# Patient Record
Sex: FEMALE | Race: WHITE | NOT HISPANIC OR LATINO | Employment: FULL TIME | URBAN - METROPOLITAN AREA
[De-identification: names, ages, dates, MRNs, and addresses within clinical notes are randomized per-mention and may not be internally consistent; named-entity substitution may affect disease eponyms.]

---

## 2018-02-03 ENCOUNTER — HOSPITAL ENCOUNTER (EMERGENCY)
Facility: HOSPITAL | Age: 63
Discharge: HOME | End: 2018-02-03
Attending: EMERGENCY MEDICINE

## 2018-02-03 VITALS
BODY MASS INDEX: 25.69 KG/M2 | DIASTOLIC BLOOD PRESSURE: 58 MMHG | HEART RATE: 79 BPM | RESPIRATION RATE: 16 BRPM | OXYGEN SATURATION: 95 % | SYSTOLIC BLOOD PRESSURE: 124 MMHG | WEIGHT: 145 LBS | TEMPERATURE: 100 F

## 2018-02-03 DIAGNOSIS — N12 PYELONEPHRITIS: Primary | ICD-10-CM

## 2018-02-03 LAB
ALBUMIN SERPL BCP-MCNC: 3.5 G/DL
ALP SERPL-CCNC: 74 U/L
ALT SERPL W/O P-5'-P-CCNC: 22 U/L
ANION GAP SERPL CALC-SCNC: 10 MMOL/L
AST SERPL-CCNC: 12 U/L
BACTERIA #/AREA URNS HPF: ABNORMAL /HPF
BASOPHILS # BLD AUTO: 0 K/UL
BASOPHILS NFR BLD: 0.4 %
BILIRUB SERPL-MCNC: 0.9 MG/DL
BILIRUB UR QL STRIP: NEGATIVE
BUN SERPL-MCNC: 12 MG/DL
CALCIUM SERPL-MCNC: 9.7 MG/DL
CHLORIDE SERPL-SCNC: 108 MMOL/L
CLARITY UR: ABNORMAL
CO2 SERPL-SCNC: 20 MMOL/L
COLOR UR: YELLOW
CREAT SERPL-MCNC: 0.7 MG/DL
DIFFERENTIAL METHOD: ABNORMAL
EOSINOPHIL # BLD AUTO: 0 K/UL
EOSINOPHIL NFR BLD: 0 %
ERYTHROCYTE [DISTWIDTH] IN BLOOD BY AUTOMATED COUNT: 13.4 %
EST. GFR  (AFRICAN AMERICAN): >60 ML/MIN/1.73 M^2
EST. GFR  (NON AFRICAN AMERICAN): >60 ML/MIN/1.73 M^2
GLUCOSE SERPL-MCNC: 120 MG/DL
GLUCOSE UR QL STRIP: NEGATIVE
HCT VFR BLD AUTO: 42.5 %
HGB BLD-MCNC: 14.3 G/DL
HGB UR QL STRIP: ABNORMAL
HYALINE CASTS #/AREA URNS LPF: 0 /LPF
KETONES UR QL STRIP: NEGATIVE
LEUKOCYTE ESTERASE UR QL STRIP: ABNORMAL
LIPASE SERPL-CCNC: 7 U/L
LYMPHOCYTES # BLD AUTO: 1.1 K/UL
LYMPHOCYTES NFR BLD: 9.2 %
MCH RBC QN AUTO: 28.1 PG
MCHC RBC AUTO-ENTMCNC: 33.5 G/DL
MCV RBC AUTO: 84 FL
MICROSCOPIC COMMENT: ABNORMAL
MONOCYTES # BLD AUTO: 0.8 K/UL
MONOCYTES NFR BLD: 6.2 %
NEUTROPHILS # BLD AUTO: 10.4 K/UL
NEUTROPHILS NFR BLD: 84.2 %
NITRITE UR QL STRIP: POSITIVE
PH UR STRIP: 7 [PH] (ref 5–8)
PLATELET # BLD AUTO: 276 K/UL
PMV BLD AUTO: 7.7 FL
POTASSIUM SERPL-SCNC: 3.7 MMOL/L
PROT SERPL-MCNC: 7.5 G/DL
PROT UR QL STRIP: ABNORMAL
RBC # BLD AUTO: 5.07 M/UL
RBC #/AREA URNS HPF: 20 /HPF (ref 0–4)
SODIUM SERPL-SCNC: 138 MMOL/L
SP GR UR STRIP: 1.01 (ref 1–1.03)
SQUAMOUS #/AREA URNS HPF: 0 /HPF
URN SPEC COLLECT METH UR: ABNORMAL
UROBILINOGEN UR STRIP-ACNC: NEGATIVE EU/DL
WBC # BLD AUTO: 12.3 K/UL
WBC #/AREA URNS HPF: 60 /HPF (ref 0–5)

## 2018-02-03 PROCEDURE — 81000 URINALYSIS NONAUTO W/SCOPE: CPT

## 2018-02-03 PROCEDURE — 96374 THER/PROPH/DIAG INJ IV PUSH: CPT

## 2018-02-03 PROCEDURE — 96375 TX/PRO/DX INJ NEW DRUG ADDON: CPT

## 2018-02-03 PROCEDURE — 85025 COMPLETE CBC W/AUTO DIFF WBC: CPT

## 2018-02-03 PROCEDURE — 87088 URINE BACTERIA CULTURE: CPT

## 2018-02-03 PROCEDURE — 63600175 PHARM REV CODE 636 W HCPCS: Performed by: EMERGENCY MEDICINE

## 2018-02-03 PROCEDURE — 25000003 PHARM REV CODE 250: Performed by: NURSE PRACTITIONER

## 2018-02-03 PROCEDURE — 99284 EMERGENCY DEPT VISIT MOD MDM: CPT | Mod: 25

## 2018-02-03 PROCEDURE — 87086 URINE CULTURE/COLONY COUNT: CPT

## 2018-02-03 PROCEDURE — 36415 COLL VENOUS BLD VENIPUNCTURE: CPT

## 2018-02-03 PROCEDURE — 87077 CULTURE AEROBIC IDENTIFY: CPT

## 2018-02-03 PROCEDURE — 63600175 PHARM REV CODE 636 W HCPCS: Performed by: NURSE PRACTITIONER

## 2018-02-03 PROCEDURE — 87186 SC STD MICRODIL/AGAR DIL: CPT

## 2018-02-03 PROCEDURE — 80053 COMPREHEN METABOLIC PANEL: CPT

## 2018-02-03 PROCEDURE — 96361 HYDRATE IV INFUSION ADD-ON: CPT

## 2018-02-03 PROCEDURE — 83690 ASSAY OF LIPASE: CPT

## 2018-02-03 RX ORDER — CIPROFLOXACIN 500 MG/1
500 TABLET ORAL 2 TIMES DAILY
Qty: 20 TABLET | Refills: 0 | Status: SHIPPED | OUTPATIENT
Start: 2018-02-03 | End: 2018-02-03

## 2018-02-03 RX ORDER — CIPROFLOXACIN 500 MG/1
500 TABLET ORAL
Status: COMPLETED | OUTPATIENT
Start: 2018-02-03 | End: 2018-02-03

## 2018-02-03 RX ORDER — ONDANSETRON 4 MG/1
4 TABLET, FILM COATED ORAL EVERY 6 HOURS
Qty: 12 TABLET | Refills: 0 | Status: SHIPPED | OUTPATIENT
Start: 2018-02-03 | End: 2018-02-03

## 2018-02-03 RX ORDER — MORPHINE SULFATE 2 MG/ML
6 INJECTION, SOLUTION INTRAMUSCULAR; INTRAVENOUS
Status: DISCONTINUED | OUTPATIENT
Start: 2018-02-03 | End: 2018-02-03 | Stop reason: SDUPTHER

## 2018-02-03 RX ORDER — MORPHINE SULFATE 10 MG/ML
6 INJECTION INTRAMUSCULAR; INTRAVENOUS; SUBCUTANEOUS
Status: COMPLETED | OUTPATIENT
Start: 2018-02-03 | End: 2018-02-03

## 2018-02-03 RX ORDER — KETOROLAC TROMETHAMINE 30 MG/ML
15 INJECTION, SOLUTION INTRAMUSCULAR; INTRAVENOUS
Status: DISCONTINUED | OUTPATIENT
Start: 2018-02-03 | End: 2018-02-03 | Stop reason: HOSPADM

## 2018-02-03 RX ORDER — HYDROCODONE BITARTRATE AND ACETAMINOPHEN 5; 325 MG/1; MG/1
1 TABLET ORAL EVERY 8 HOURS PRN
Qty: 5 TABLET | Refills: 0 | OUTPATIENT
Start: 2018-02-03 | End: 2019-01-24

## 2018-02-03 RX ORDER — CIPROFLOXACIN 500 MG/1
500 TABLET ORAL 2 TIMES DAILY
Qty: 20 TABLET | Refills: 0 | Status: SHIPPED | OUTPATIENT
Start: 2018-02-03 | End: 2018-02-13

## 2018-02-03 RX ORDER — MORPHINE SULFATE 10 MG/ML
6 INJECTION, SOLUTION INTRAMUSCULAR; INTRAVENOUS
Status: DISCONTINUED | OUTPATIENT
Start: 2018-02-03 | End: 2018-02-03

## 2018-02-03 RX ORDER — HYDROCODONE BITARTRATE AND ACETAMINOPHEN 5; 325 MG/1; MG/1
1 TABLET ORAL EVERY 4 HOURS PRN
Qty: 5 TABLET | Refills: 0 | Status: SHIPPED | OUTPATIENT
Start: 2018-02-03 | End: 2018-02-03

## 2018-02-03 RX ORDER — ONDANSETRON 2 MG/ML
4 INJECTION INTRAMUSCULAR; INTRAVENOUS
Status: COMPLETED | OUTPATIENT
Start: 2018-02-03 | End: 2018-02-03

## 2018-02-03 RX ORDER — ONDANSETRON 4 MG/1
4 TABLET, FILM COATED ORAL EVERY 6 HOURS
Qty: 12 TABLET | Refills: 0 | Status: ON HOLD | OUTPATIENT
Start: 2018-02-03 | End: 2020-11-08

## 2018-02-03 RX ADMIN — MORPHINE SULFATE 6 MG: 10 INJECTION INTRAVENOUS at 12:02

## 2018-02-03 RX ADMIN — SODIUM CHLORIDE 1000 ML: 0.9 INJECTION, SOLUTION INTRAVENOUS at 10:02

## 2018-02-03 RX ADMIN — ONDANSETRON 4 MG: 2 INJECTION INTRAMUSCULAR; INTRAVENOUS at 10:02

## 2018-02-03 RX ADMIN — CIPROFLOXACIN HYDROCHLORIDE 500 MG: 500 TABLET, FILM COATED ORAL at 12:02

## 2018-02-03 NOTE — ED PROVIDER NOTES
Encounter Date: 2/3/2018    SCRIBE #1 NOTE: IHeranndez, am scribing for, and in the presence of, Jailene Conley NP-C.       History     Chief Complaint   Patient presents with    Back Pain     and vomiting x 1 day. thinks she may have kidney stones       02/03/2018 10:39 AM     Chief complaint: bilateral flank pain      Alma Rosa Helm is a 63 y.o. female with a PMHx of COPD, kidney stones, Bipolar, and CHF who presents to the ED c/o bilateral flank pain. She states she began vomiting this morning and reports a low grade fever of 100. She denies having any chest pain, SOB or diarrhea. She does report increased urine frequency and dark colored urine. She has a PSHx of hysterectomy, tubal ligation, cystoscopy, and lithotripsy. She states in 2006 she had bilateral kidney stones and stents were placed. Allergens include Imitrex, pennicillin, and sulfa.      The history is provided by the patient.     Review of patient's allergies indicates:   Allergen Reactions    Imitrex [sumatriptan succinate]     Penicillins     Sulfa (sulfonamide antibiotics)      Past Medical History:   Diagnosis Date    Anxiety     Bipolar disorder     COPD, mild     Kidney stone      Past Surgical History:   Procedure Laterality Date    BACK SURGERY      CYSTOSCOPY      HYSTERECTOMY      LITHOTRIPSY      TUBAL LIGATION       History reviewed. No pertinent family history.  Social History   Substance Use Topics    Smoking status: Current Every Day Smoker     Packs/day: 0.75     Years: 20.00    Smokeless tobacco: Never Used    Alcohol use Yes      Comment: occasionally approx once a month     Review of Systems   Constitutional: Positive for fever (low grade).   HENT: Negative for sore throat.    Respiratory: Negative for shortness of breath.    Cardiovascular: Negative for chest pain.   Gastrointestinal: Positive for vomiting. Negative for diarrhea and nausea.   Genitourinary: Positive for flank pain (bilateral) and frequency.  Negative for dysuria.        Dark colored urine   Musculoskeletal: Negative for back pain.   Skin: Negative for rash.   Neurological: Negative for weakness.   Hematological: Does not bruise/bleed easily.       Physical Exam     Initial Vitals [02/03/18 1025]   BP Pulse Resp Temp SpO2   (!) 124/58 79 16 100.2 °F (37.9 °C) 95 %      MAP       80         Physical Exam    Constitutional: Vital signs are normal. She appears well-developed and well-nourished.   HENT:   Head: Normocephalic and atraumatic.   Mouth/Throat: Abnormal dentition (absent).   Eyes: Pupils are equal, round, and reactive to light.   Neck: Neck supple.   Cardiovascular: Normal rate, regular rhythm and intact distal pulses.   Murmur heard.   Systolic murmur is present with a grade of 3/6   Pulmonary/Chest:   Decreased breath sounds   Abdominal: Soft. Normal appearance and bowel sounds are normal. She exhibits no distension. There is tenderness (mild) in the right upper quadrant and epigastric area. There is CVA tenderness (bilateral, tenderness). There is no rebound and no guarding.   Neurological: She is alert and oriented to person, place, and time. She has normal strength.   Skin: Skin is warm, dry and intact.   Psychiatric: She has a normal mood and affect. Her speech is normal and behavior is normal.         ED Course   Procedures  Labs Reviewed   URINALYSIS             Medical Decision Making:   History:   Old Medical Records: I decided to obtain old medical records.  Differential Diagnosis:   Nephrolithiasis, obstructive uropathy, pyelonephritis, lumbar stain, cholecystis, pancreatitis.   Clinical Tests:   Lab Tests: Ordered and Reviewed  Radiological Study: Ordered and Reviewed       APC / Resident Notes:   Patient is a 63 y.o. female who presents to the ED 02/03/2018 who underwent emergent evaluation for bi flank pain. Patient has history of kidney stones and is certain it is her stones again. Patient has mild left greater than right CVA  tenderness without abdominal tenderness, distention. She is not tachycardic and is afebrile without leukocytosis; she does not meet sepsis criteria; + UTI; see labs; CT abdomen and pelvis without obstructive uropathy; CT results discussed with patient in detail. Patient has SOB secondary to PCN; will not give ROCEPHIN in ED but will start patient on PO ciprofloxacin and send urine culture; will cover patient for pyelonephritis; patient tolerating PO in ED. Pt does not appear septic or toxic. Patient's vital signs normal. Given norco for pain management.   Based on my clinical evaluation, I do not appreciate any immediate, emergent, or life threatening condition or etiology that warrants additional workup today and feel that the patient can be discharged with close follow up care. Case discussed with Dr. Kumar who is agreeable to plan of care. Follow up and return precautions discussed; patient verbalized understanding and is agreeable to plan of care. Patient discharged home in stable condition.               Scribe Attestation:   Scribe #1: I performed the above scribed service and the documentation accurately describes the services I performed. I attest to the accuracy of the note.    Attending Attestation:           Physician Attestation for Scribe:  Physician Attestation Statement for Scribe #1: I, Jailene Conley, reviewed documentation, as scribed by in my presence, and it is both accurate and complete.     Comments: I, LG Payne, personally performed the services described in this documentation. All medical record entries made by the scribe were at my direction and in my presence.  I have reviewed the chart and agree that the record reflects my personal performance and is accurate and complete. LG Payne.  3:08 PM 02/03/2018 e            ED Course      Clinical Impression:   The encounter diagnosis was Pyelonephritis.    Disposition:   Disposition: Discharged  Condition:  Stable                        Jailene Conley, RICHY  02/03/18 1507

## 2018-02-05 LAB — BACTERIA UR CULT: NORMAL

## 2019-01-24 ENCOUNTER — HOSPITAL ENCOUNTER (EMERGENCY)
Facility: HOSPITAL | Age: 64
Discharge: HOME | End: 2019-01-24
Attending: EMERGENCY MEDICINE
Payer: COMMERCIAL

## 2019-01-24 VITALS
WEIGHT: 160 LBS | RESPIRATION RATE: 18 BRPM | HEIGHT: 64 IN | TEMPERATURE: 98 F | SYSTOLIC BLOOD PRESSURE: 129 MMHG | DIASTOLIC BLOOD PRESSURE: 60 MMHG | OXYGEN SATURATION: 96 % | HEART RATE: 63 BPM | BODY MASS INDEX: 27.31 KG/M2

## 2019-01-24 DIAGNOSIS — N39.0 URINARY TRACT INFECTION WITHOUT HEMATURIA, SITE UNSPECIFIED: ICD-10-CM

## 2019-01-24 DIAGNOSIS — N23 RENAL COLIC ON RIGHT SIDE: ICD-10-CM

## 2019-01-24 DIAGNOSIS — N20.0 RENAL STONE: Primary | ICD-10-CM

## 2019-01-24 LAB
ALBUMIN SERPL BCP-MCNC: 3.9 G/DL
ALP SERPL-CCNC: 96 U/L
ALT SERPL W/O P-5'-P-CCNC: 14 U/L
ANION GAP SERPL CALC-SCNC: 10 MMOL/L
AST SERPL-CCNC: 15 U/L
BACTERIA #/AREA URNS HPF: ABNORMAL /HPF
BASOPHILS # BLD AUTO: 0.1 K/UL
BASOPHILS NFR BLD: 0.9 %
BILIRUB SERPL-MCNC: 0.3 MG/DL
BILIRUB UR QL STRIP: NEGATIVE
BUN SERPL-MCNC: 15 MG/DL
CALCIUM SERPL-MCNC: 10 MG/DL
CHLORIDE SERPL-SCNC: 108 MMOL/L
CLARITY UR: ABNORMAL
CO2 SERPL-SCNC: 22 MMOL/L
COLOR UR: YELLOW
CREAT SERPL-MCNC: 0.8 MG/DL
DIFFERENTIAL METHOD: ABNORMAL
EOSINOPHIL # BLD AUTO: 0.1 K/UL
EOSINOPHIL NFR BLD: 0.8 %
ERYTHROCYTE [DISTWIDTH] IN BLOOD BY AUTOMATED COUNT: 14.3 %
EST. GFR  (AFRICAN AMERICAN): >60 ML/MIN/1.73 M^2
EST. GFR  (NON AFRICAN AMERICAN): >60 ML/MIN/1.73 M^2
GLUCOSE SERPL-MCNC: 86 MG/DL
GLUCOSE UR QL STRIP: NEGATIVE
HCT VFR BLD AUTO: 43 %
HGB BLD-MCNC: 14.6 G/DL
HGB UR QL STRIP: ABNORMAL
KETONES UR QL STRIP: NEGATIVE
LEUKOCYTE ESTERASE UR QL STRIP: ABNORMAL
LYMPHOCYTES # BLD AUTO: 2.6 K/UL
LYMPHOCYTES NFR BLD: 26.8 %
MCH RBC QN AUTO: 28 PG
MCHC RBC AUTO-ENTMCNC: 33.9 G/DL
MCV RBC AUTO: 82 FL
MICROSCOPIC COMMENT: ABNORMAL
MONOCYTES # BLD AUTO: 0.5 K/UL
MONOCYTES NFR BLD: 5.5 %
NEUTROPHILS # BLD AUTO: 6.3 K/UL
NEUTROPHILS NFR BLD: 66 %
NITRITE UR QL STRIP: POSITIVE
PH UR STRIP: 6 [PH] (ref 5–8)
PLATELET # BLD AUTO: 370 K/UL
PMV BLD AUTO: 7.7 FL
POTASSIUM SERPL-SCNC: 4.2 MMOL/L
PROT SERPL-MCNC: 7.8 G/DL
PROT UR QL STRIP: NEGATIVE
RBC # BLD AUTO: 5.21 M/UL
RBC #/AREA URNS HPF: 2 /HPF (ref 0–4)
SODIUM SERPL-SCNC: 140 MMOL/L
SP GR UR STRIP: 1.02 (ref 1–1.03)
URN SPEC COLLECT METH UR: ABNORMAL
UROBILINOGEN UR STRIP-ACNC: NEGATIVE EU/DL
WBC # BLD AUTO: 9.6 K/UL
WBC #/AREA URNS HPF: 60 /HPF (ref 0–5)

## 2019-01-24 PROCEDURE — 85025 COMPLETE CBC W/AUTO DIFF WBC: CPT

## 2019-01-24 PROCEDURE — 36415 COLL VENOUS BLD VENIPUNCTURE: CPT

## 2019-01-24 PROCEDURE — 81000 URINALYSIS NONAUTO W/SCOPE: CPT

## 2019-01-24 PROCEDURE — 99284 EMERGENCY DEPT VISIT MOD MDM: CPT | Mod: 25

## 2019-01-24 PROCEDURE — 87088 URINE BACTERIA CULTURE: CPT

## 2019-01-24 PROCEDURE — 87186 SC STD MICRODIL/AGAR DIL: CPT

## 2019-01-24 PROCEDURE — 96365 THER/PROPH/DIAG IV INF INIT: CPT

## 2019-01-24 PROCEDURE — 80053 COMPREHEN METABOLIC PANEL: CPT

## 2019-01-24 PROCEDURE — 96375 TX/PRO/DX INJ NEW DRUG ADDON: CPT

## 2019-01-24 PROCEDURE — 87077 CULTURE AEROBIC IDENTIFY: CPT

## 2019-01-24 PROCEDURE — 25000003 PHARM REV CODE 250: Performed by: PHYSICIAN ASSISTANT

## 2019-01-24 PROCEDURE — 63600175 PHARM REV CODE 636 W HCPCS: Performed by: PHYSICIAN ASSISTANT

## 2019-01-24 PROCEDURE — 87086 URINE CULTURE/COLONY COUNT: CPT

## 2019-01-24 RX ORDER — HYDROCODONE BITARTRATE AND ACETAMINOPHEN 5; 325 MG/1; MG/1
1 TABLET ORAL 4 TIMES DAILY PRN
Qty: 12 TABLET | Refills: 0 | Status: ON HOLD | OUTPATIENT
Start: 2019-01-24 | End: 2020-11-08

## 2019-01-24 RX ORDER — TAMSULOSIN HYDROCHLORIDE 0.4 MG/1
0.4 CAPSULE ORAL
Status: COMPLETED | OUTPATIENT
Start: 2019-01-24 | End: 2019-01-24

## 2019-01-24 RX ORDER — CEPHALEXIN 500 MG/1
500 CAPSULE ORAL 4 TIMES DAILY
Qty: 28 CAPSULE | Refills: 0 | Status: SHIPPED | OUTPATIENT
Start: 2019-01-24 | End: 2019-01-31

## 2019-01-24 RX ORDER — TAMSULOSIN HYDROCHLORIDE 0.4 MG/1
0.4 CAPSULE ORAL DAILY
Qty: 10 CAPSULE | Refills: 0 | Status: ON HOLD | OUTPATIENT
Start: 2019-01-24 | End: 2020-11-08

## 2019-01-24 RX ORDER — MORPHINE SULFATE 4 MG/ML
4 INJECTION, SOLUTION INTRAMUSCULAR; INTRAVENOUS
Status: COMPLETED | OUTPATIENT
Start: 2019-01-24 | End: 2019-01-24

## 2019-01-24 RX ADMIN — TAMSULOSIN HYDROCHLORIDE 0.4 MG: 0.4 CAPSULE ORAL at 11:01

## 2019-01-24 RX ADMIN — CEFTRIAXONE 1 G: 1 INJECTION, SOLUTION INTRAVENOUS at 09:01

## 2019-01-24 RX ADMIN — MORPHINE SULFATE 4 MG: 4 INJECTION, SOLUTION INTRAMUSCULAR; INTRAVENOUS at 09:01

## 2019-01-24 RX ADMIN — SODIUM CHLORIDE 1000 ML: 0.9 INJECTION, SOLUTION INTRAVENOUS at 09:01

## 2019-01-24 NOTE — ED NOTES
Pt awake alert in no acute distress, pt reports rt flank pain since yesterday with nausea, denies emesis or diarrhea, denies chest pain or sob, pt reports hx of kidney stones, family at bedside

## 2019-01-25 NOTE — ED PROVIDER NOTES
Encounter Date: 1/24/2019       History     Chief Complaint   Patient presents with    Flank Pain     C/o right flank pain since yesterday. +nausea; denies urinary sx. Hx of kidney stones     Alma Rosa Helm is a 64 y.o. Female presenting for evaluation of right sided flank and abdominal pain, persisting since yesterday.  She is concerned that she may have another kidney stone, since she has had those in the past.  No fever, no chills.  No nausea, vomiting or diarrhea.  She has noticed no obvious blood in her urine.  She is taking over-the-counter medication with minimal relief.  She has seen Dr. Lauren in the past.      The history is provided by the patient.     Review of patient's allergies indicates:   Allergen Reactions    Imitrex [sumatriptan succinate]     Penicillins     Sulfa (sulfonamide antibiotics)     Toradol [ketorolac] Swelling     Past Medical History:   Diagnosis Date    Anxiety     Bipolar disorder     COPD, mild     Kidney stone      Past Surgical History:   Procedure Laterality Date    BACK SURGERY      CYSTOSCOPY      CYSTOSCOPY WITH STENT REMOVAL Left 1/26/2016    [facility]    CYSTOSCOPY/ RETROGRADE PYELOGRAM/ STENT PLACEMENT/STENT EXCHANGE Left 12/15/2015    [facility]    HYSTERECTOMY      LITHOTRIPSY      LITHOTRIPSY-EXTRACORPOREAL SHOCK WAVE Left 1/26/2016    [facility]    PYELOGRAM-RETROGRADE Right 12/15/2015    [facility]    TUBAL LIGATION       History reviewed. No pertinent family history.  Social History     Tobacco Use    Smoking status: Current Every Day Smoker     Packs/day: 0.75     Years: 20.00     Pack years: 15.00    Smokeless tobacco: Never Used   Substance Use Topics    Alcohol use: Yes     Comment: occasionally approx once a month    Drug use: No     Review of Systems   Constitutional: Negative for chills and fever.   HENT:  Negative for facial swelling and trouble swallowing.    Eyes: Negative for discharge.   Respiratory: Negative for cough, chest tightness, shortness of breath and wheezing.    Cardiovascular: Negative for chest pain and palpitations.   Gastrointestinal: Positive for abdominal pain. Negative for diarrhea, nausea and vomiting.   Genitourinary: Positive for flank pain. Negative for dysuria and hematuria.   Musculoskeletal: Negative for arthralgias, back pain, joint swelling, myalgias, neck pain and neck stiffness.   Skin: Negative for color change, pallor, rash and wound.   Neurological: Negative for dizziness, syncope, weakness, light-headedness, numbness and headaches.   Hematological: Does not bruise/bleed easily.   Psychiatric/Behavioral: The patient is not nervous/anxious.        Physical Exam     Initial Vitals [01/24/19 0802]   BP Pulse Resp Temp SpO2   (!) 114/56 72 18 97.8 °F (36.6 °C) 97 %      MAP       --         Physical Exam    Nursing note and vitals reviewed.  Constitutional: She appears well-developed and well-nourished. She is not diaphoretic. No distress.   HENT:   Head: Normocephalic and atraumatic.   Mouth/Throat: Oropharynx is clear and moist.   Eyes: Conjunctivae are normal.   Neck: Normal range of motion. Neck supple.   Cardiovascular: Normal rate, regular rhythm, normal heart sounds and intact distal pulses. Exam reveals no gallop and no friction rub.    No murmur heard.  Pulmonary/Chest: Breath sounds normal. No respiratory distress. She has no wheezes. She has no rhonchi. She has no rales.   Abdominal: Soft. She exhibits no distension and no mass. There is tenderness.   Mild TTP noted to right sided abdomen.  No CVA tenderness noted.    Musculoskeletal: Normal range of motion. She exhibits no edema or tenderness.   Neurological: She is alert and oriented to person, place, and time. She has normal strength. No sensory deficit.   Skin: Skin is warm and dry. No rash and no abscess noted. No  erythema.   Psychiatric: She has a normal mood and affect.         ED Course   Procedures  Labs Reviewed   URINALYSIS, REFLEX TO URINE CULTURE - Abnormal; Notable for the following components:       Result Value    Appearance, UA Hazy (*)     Occult Blood UA Trace (*)     Nitrite, UA Positive (*)     Leukocytes, UA 3+ (*)     All other components within normal limits    Narrative:     Preferred Collection Type->Urine, Clean Catch   URINALYSIS MICROSCOPIC - Abnormal; Notable for the following components:    WBC, UA 60 (*)     Bacteria, UA Many (*)     All other components within normal limits    Narrative:     Preferred Collection Type->Urine, Clean Catch   CBC W/ AUTO DIFFERENTIAL - Abnormal; Notable for the following components:    Platelets 370 (*)     MPV 7.7 (*)     All other components within normal limits   COMPREHENSIVE METABOLIC PANEL - Abnormal; Notable for the following components:    CO2 22 (*)     All other components within normal limits   CULTURE, URINE          Imaging Results          CT Renal Stone Study ABD Pelvis WO (Final result)  Result time 01/24/19 09:41:21    Final result by Roberto Serrato Jr., MD (01/24/19 09:41:21)                 Impression:      2 mm nonobstructing right UVJ stone.  5 mm right intrarenal stone and small flecks of calcification of the renal medulla in the right kidney.  Prior hysterectomy.  Cholelithiasis.  Two right adrenal adenomas and 1 left adrenal probable adenoma unchanged since February 2018.      Electronically signed by: Roberto Serrato MD  Date:    01/24/2019  Time:    09:41             Narrative:    EXAMINATION:  CT RENAL STONE STUDY ABD PELVIS WO    CLINICAL HISTORY:  Flank pain, stone disease suspected;    TECHNIQUE:  Low dose axial images, sagittal and coronal reformations were obtained from the lung bases to the pubic symphysis.  Contrast was not administered.    COMPARISON:  CT of February 3, 2018.    FINDINGS:  The liver is of normal size contour and  CT density for a noncontrast study.  The gallbladder contains a gallstone but gallbladder wall thickening or edema is not seen.  The pancreas is of normal contour and CT density without edema or mass.  The spleen is of normal size and CT density.    There are a 1.6 cm and a 1.2 cm low-density mass of the right adrenal gland consistent with adrenal adenomas.  There is a 1.6 cm soft tissue density mass of the left adrenal gland also probably a adrenal adenoma.  These are unchanged since February 2018 CT scan.    On the right there is a 5 mm intrarenal stone in the lower pole calyx and small flecks of calcification at the renal medulla.  Significant hydronephrosis is not seen but there is a 2 mm stone at the right UVJ.    On the left an intrarenal stone or hydronephrosis is not seen.  There is calcification of the abdominal aorta which is small.  Retroperitoneal adenopathy is not seen.    The gastrointestinal organs appear normal without dilatation air-fluid levels or soft tissue masses.  A normal appendix is noted bowel dilatation or air-fluid levels are not seen.  No free fluid or free air is seen.    In the pelvis the bladder is of normal contour without mass or asymmetry.  A uterus is not seen.                                 Medical Decision Making:   Differential Diagnosis:     Renal stone  Pyelonephritis  Acute abdomen  Clinical Tests:   Lab Tests: Reviewed and Ordered  Radiological Study: Ordered and Reviewed       APC / Resident Notes:   Urinalysis shows nitrite positive urine and trace hematuria.  CT abdomen and pelvis shows a 2 mm nonobstructing right renal stone at the UVJ.  She is well appearing and is more comfortable after pain medication and IV fluids here in the emergency department.  No need for further imaging or testing at this time.  I did discuss the case with Urology, Dr. Staples on call, and he recommends antibiotics, urine culture and Flomax.  She should be able to pass the stone, especially  since it is close to the bladder already.  She voices understanding and is agreeable to the plan.  She is given a prescription for Keflex and a dose of IV Rocephin here in the ED.  She is also given a prescription for Norco and Flomax.  She is given specific return precautions.                 Clinical Impression:   The primary encounter diagnosis was Renal stone. Diagnoses of Renal colic on right side and Urinary tract infection without hematuria, site unspecified were also pertinent to this visit.                             Peri Garrison PA-C  01/24/19 1652

## 2019-01-26 LAB — BACTERIA UR CULT: NORMAL

## 2020-01-30 ENCOUNTER — TELEPHONE (OUTPATIENT)
Dept: PULMONOLOGY | Facility: CLINIC | Age: 65
End: 2020-01-30

## 2020-01-30 NOTE — TELEPHONE ENCOUNTER
The patient called with a 3 way conference call to have an appointment scheduled with Dr. Holder.  Lost the patient in the middle of the call and when tried to call her back no numbers on the account are working numbers.

## 2020-03-02 RX ORDER — PROPRANOLOL HYDROCHLORIDE 80 MG/1
80 TABLET ORAL
Status: ON HOLD | COMMUNITY
End: 2020-11-08

## 2020-03-02 RX ORDER — TIZANIDINE 4 MG/1
TABLET ORAL
Status: ON HOLD | COMMUNITY
Start: 2020-01-08 | End: 2020-11-08

## 2020-03-02 RX ORDER — PRAVASTATIN SODIUM 20 MG/1
20 TABLET ORAL
Status: ON HOLD | COMMUNITY
End: 2020-11-08

## 2020-03-02 RX ORDER — DOXEPIN HYDROCHLORIDE 10 MG/1
10 CAPSULE ORAL
Status: ON HOLD | COMMUNITY
End: 2020-11-08

## 2020-03-02 RX ORDER — GABAPENTIN 100 MG/1
300 CAPSULE ORAL 3 TIMES DAILY
COMMUNITY
End: 2022-04-11

## 2020-03-02 RX ORDER — OXYBUTYNIN CHLORIDE 5 MG/1
5 TABLET ORAL
Status: ON HOLD | COMMUNITY
End: 2020-11-08

## 2020-03-02 RX ORDER — SIMVASTATIN 20 MG/1
20 TABLET, FILM COATED ORAL
Status: ON HOLD | COMMUNITY
End: 2020-11-08

## 2020-03-02 RX ORDER — BUTALBITAL, ACETAMINOPHEN AND CAFFEINE 50; 325; 40 MG/1; MG/1; MG/1
1 TABLET ORAL
Status: ON HOLD | COMMUNITY
End: 2020-11-08

## 2020-03-02 RX ORDER — TOPIRAMATE 25 MG/1
TABLET ORAL
Status: ON HOLD | COMMUNITY
Start: 2014-12-12 | End: 2020-11-08

## 2020-03-02 RX ORDER — ZIPRASIDONE HYDROCHLORIDE 40 MG/1
40 CAPSULE ORAL
Status: ON HOLD | COMMUNITY
End: 2020-11-08

## 2020-03-02 RX ORDER — NITROGLYCERIN 0.4 MG/1
0.4 TABLET SUBLINGUAL
Status: ON HOLD | COMMUNITY
End: 2020-11-08

## 2020-03-02 RX ORDER — ESTRADIOL 1 MG/1
1 TABLET ORAL
Status: ON HOLD | COMMUNITY
End: 2020-11-08

## 2020-03-02 RX ORDER — DICLOFENAC SODIUM 50 MG/1
50 TABLET, DELAYED RELEASE ORAL 2 TIMES DAILY
COMMUNITY
End: 2020-12-11

## 2020-03-03 ENCOUNTER — OFFICE VISIT (OUTPATIENT)
Dept: PULMONOLOGY | Facility: CLINIC | Age: 65
End: 2020-03-03
Payer: COMMERCIAL

## 2020-03-03 VITALS
SYSTOLIC BLOOD PRESSURE: 140 MMHG | HEART RATE: 81 BPM | WEIGHT: 176 LBS | BODY MASS INDEX: 30.21 KG/M2 | DIASTOLIC BLOOD PRESSURE: 80 MMHG | OXYGEN SATURATION: 97 %

## 2020-03-03 DIAGNOSIS — J44.9 CHRONIC OBSTRUCTIVE PULMONARY DISEASE, UNSPECIFIED COPD TYPE: Primary | ICD-10-CM

## 2020-03-03 PROCEDURE — 99204 OFFICE O/P NEW MOD 45 MIN: CPT | Mod: S$GLB,,, | Performed by: INTERNAL MEDICINE

## 2020-03-03 PROCEDURE — 99204 PR OFFICE/OUTPT VISIT, NEW, LEVL IV, 45-59 MIN: ICD-10-PCS | Mod: S$GLB,,, | Performed by: INTERNAL MEDICINE

## 2020-03-03 PROCEDURE — 3008F PR BODY MASS INDEX (BMI) DOCUMENTED: ICD-10-PCS | Mod: S$GLB,,, | Performed by: INTERNAL MEDICINE

## 2020-03-03 PROCEDURE — 3008F BODY MASS INDEX DOCD: CPT | Mod: S$GLB,,, | Performed by: INTERNAL MEDICINE

## 2020-03-03 RX ORDER — TRAMADOL HYDROCHLORIDE 50 MG/1
50 TABLET ORAL EVERY 8 HOURS PRN
Status: ON HOLD | COMMUNITY
Start: 2019-10-28 | End: 2020-11-08

## 2020-03-03 RX ORDER — BENZONATATE 200 MG/1
200 CAPSULE ORAL 3 TIMES DAILY PRN
Qty: 90 CAPSULE | Refills: 11 | Status: SHIPPED | OUTPATIENT
Start: 2020-03-03 | End: 2020-03-13

## 2020-03-03 NOTE — PROGRESS NOTES
SUBJECTIVE:    Patient ID: Alma Rosa Helm is a 65 y.o. female.    Chief Complaint: Cough (had for over a year daughter sees dr silva ) and Wheezing    HPI The patient is here with a cough.  It is productive.  She cannot lie down because she only coughs.  She has a neublizer.  She has been told she has COPD.  Around 8 years ago she was hospitalized at [facility].  She is still smoking but is down to 3-4 cigarettes a day.  She was smoking 2 ppd.  Past Medical History:   Diagnosis Date    Anxiety     Bipolar disorder     COPD, mild     Nephrolithiasis      Past Surgical History:   Procedure Laterality Date    BACK SURGERY      CYSTOSCOPY      HYSTERECTOMY      LITHOTRIPSY      TUBAL LIGATION       Family History   Problem Relation Age of Onset    Cancer Mother     Diabetes Mother     COPD Father     Hypertension Father         Social History:   Marital Status:   Occupation: CNA at [employer]  Alcohol History:  reports that she does not drink alcohol.  Tobacco History:  reports that she has been smoking cigarettes. She started smoking about 44 years ago. She has a 90.00 pack-year smoking history. She has never used smokeless tobacco.  Drug History:  reports that she does not use drugs.    Review of patient's allergies indicates:   Allergen Reactions    Tramadol Swelling    Imitrex [sumatriptan succinate]     Penicillins     Sulfa (sulfonamide antibiotics)     Sulfur     Toradol [ketorolac] Swelling       Current Outpatient Medications   Medication Sig Dispense Refill    benzonatate (TESSALON) 200 MG capsule Take 1 capsule (200 mg total) by mouth 3 (three) times daily as needed for Cough. 90 capsule 11    butalbital-acetaminophen-caffeine -40 mg (FIORICET, ESGIC) -40 mg per tablet Take 1 tablet by mouth.      clonazePAM (KLONOPIN) 0.5 MG tablet Take 0.5 mg by mouth 3 (three) times daily.       diclofenac (VOLTAREN) 50 MG EC tablet Take 50 mg by mouth.      doxepin (SINEQUAN) 10 MG  capsule Take 10 mg by mouth.      estradiol (ESTRACE) 1 MG tablet Take 1 mg by mouth.      gabapentin (NEURONTIN) 100 MG capsule Take 100 mg by mouth.      HYDROcodone-acetaminophen (NORCO) 5-325 mg per tablet Take 1 tablet by mouth 4 (four) times daily as needed. (Patient not taking: Reported on 3/3/2020) 12 tablet 0    lurasidone 60 mg Tab tablet Take 80 mg by mouth once daily.       methylphenidate (CONCERTA) 36 MG CR tablet Take 54 mg by mouth every morning.       mometasone-formoterol (DULERA) 100-5 mcg/actuation HFAA Inhale 1 puff into the lungs.      nitroGLYCERIN (NITROSTAT) 0.4 MG SL tablet Place 0.4 mg under the tongue.      ondansetron (ZOFRAN) 4 MG tablet Take 1 tablet (4 mg total) by mouth every 6 (six) hours. (Patient not taking: Reported on 3/3/2020) 12 tablet 0    oxybutynin (DITROPAN) 5 MG Tab Take 5 mg by mouth.      pravastatin (PRAVACHOL) 20 MG tablet Take 20 mg by mouth.      propranolol (INDERAL) 80 MG tablet Take 80 mg by mouth.      simvastatin (ZOCOR) 20 MG tablet Take 20 mg by mouth.      tamsulosin (FLOMAX) 0.4 mg Cap Take 1 capsule (0.4 mg total) by mouth once daily. 10 capsule 0    tiZANidine (ZANAFLEX) 4 MG tablet       topiramate (TOPAMAX) 25 MG tablet Take 1 QHS x 1 week, 2 QHS x 1 week, 3 QHS x 1 week, then 4 QHS      traMADol (ULTRAM) 50 mg tablet Take 50 mg by mouth every 8 (eight) hours as needed.      vortioxetine 10 mg Tab Take 20 mg by mouth once daily.       ziprasidone (GEODON) 40 MG Cap Take 40 mg by mouth.       No current facility-administered medications for this visit.        Alpha-1 Antitrypsin:  Last PFT:   Last CT:    Review of Systems  General: Feeling weak and tired.  Eyes: Vision is strange  ENT:  No sinusitis or pharyngitis.   Heart:: No chest pain or palpitations.  Lungs: shortness of breath, wheezing, coughing up mucus daily  GI: No Nausea, vomiting, constipation, diarrhea, or reflux.  : No dysuria, hesitancy, or  nocturia.  Musculoskeletal:everything from the waist down hurts  Skin: No lesions or rashes.  Neuro: No headaches or neuropathy.  Lymph: No edema or adenopathy.  Psych: No anxiety or depression.  Endo: weight gain over time    OBJECTIVE:      BP (!) 140/80 (BP Location: Left arm, Patient Position: Sitting)   Pulse 81   Wt 79.8 kg (176 lb)   SpO2 97%   BMI 30.21 kg/m²     Physical Exam  GENERAL: Older patient in no distress.  HEENT: Pupils equal and reactive. Extraocular movements intact. Nose intact.      Pharynx moist.  NECK: Supple.   HEART: Regular rate and rhythm. No murmur or gallop auscultated.  LUNGS: Clear to auscultation and percussion.  Very productive sounding cough.  Lung excursion symmetrical. No change in fremitus. No adventitial noises.  ABDOMEN: Bowel sounds present. Non-tender, no masses palpated.  EXTREMITIES: Normal muscle tone and joint movement, no cyanosis or clubbing.   LYMPHATICS: No adenopathy palpated, no edema.  SKIN: Dry, intact, no lesions.   NEURO: Cranial nerves II-XII intact. Motor strength 5/5 bilaterally, upper and lower extremities.  PSYCH: Appropriate affect.    Assessment:       1. Chronic obstructive pulmonary disease, unspecified COPD type          Plan:       Chronic obstructive pulmonary disease, unspecified COPD type  -     Complete PFT with bronchodilator; Future  -     CT Chest Lung Screening Low Dose; Future  -     benzonatate (TESSALON) 200 MG capsule; Take 1 capsule (200 mg total) by mouth 3 (three) times daily as needed for Cough.  Dispense: 90 capsule; Refill: 11         Follow up in about 1 month (around 4/3/2020).  QUIT SMOKING

## 2020-03-16 DIAGNOSIS — F17.210 CIGARETTE SMOKER: Primary | ICD-10-CM

## 2020-03-17 ENCOUNTER — TELEPHONE (OUTPATIENT)
Dept: PULMONOLOGY | Facility: CLINIC | Age: 65
End: 2020-03-17

## 2020-03-17 ENCOUNTER — HOSPITAL ENCOUNTER (OUTPATIENT)
Dept: RADIOLOGY | Facility: HOSPITAL | Age: 65
Discharge: HOME | End: 2020-03-17
Attending: INTERNAL MEDICINE
Payer: COMMERCIAL

## 2020-03-17 DIAGNOSIS — J44.9 CHRONIC OBSTRUCTIVE PULMONARY DISEASE, UNSPECIFIED COPD TYPE: ICD-10-CM

## 2020-03-17 PROCEDURE — G0297 LDCT FOR LUNG CA SCREEN: HCPCS | Mod: TC,PO

## 2020-03-17 NOTE — TELEPHONE ENCOUNTER
Screening ct     Impression       1. No noncalcified pulmonary nodules are identified.  2. Few small bilateral blebs are noted, with no acute abnormalities demonstrated.  LUNG-RADS CATEGORY 1: NEGATIVE

## 2020-03-24 ENCOUNTER — TELEPHONE (OUTPATIENT)
Dept: PULMONOLOGY | Facility: CLINIC | Age: 65
End: 2020-03-24

## 2020-03-24 ENCOUNTER — NURSE TRIAGE (OUTPATIENT)
Dept: ADMINISTRATIVE | Facility: CLINIC | Age: 65
End: 2020-03-24

## 2020-03-24 NOTE — TELEPHONE ENCOUNTER
Patient called this am states that she is having more mucous production than usual. 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  She did recently quit smoking, not sure if more mucous is related to that or if she is ill.  I gave her the Covid hotline and strongly advised her that she should not be at work having the symptoms that she is experiencing.  Note typed for her work

## 2020-03-24 NOTE — TELEPHONE ENCOUNTER
Pt calling with COVID-19 concerns (sx: Cough;Muscle pain;Weakness;Shortness of breath;Diarrhea;Fever;Abdominal pain;Joint pain;Severe headache). Reports difficulty breathing as well along with COPD. Per triage protocol, advised that she go to ED now. Pt states she will go to [facility] ED.    Spoke with Charge Nurse Unique at [facility] ED who states pt can just sign in as usual.    Reason for Disposition   Difficulty breathing occurs within 14 days of COVID-19 EXPOSURE    Additional Information   Negative: Sounds like a life-threatening emergency to the triager   Negative: Severe difficulty breathing (e.g., struggling for each breath, speak in single words, bluish lips)    Protocols used: CORONAVIRUS (COVID-19) EXPOSURE-A-[place]

## 2020-04-03 ENCOUNTER — NURSE TRIAGE (OUTPATIENT)
Dept: ADMINISTRATIVE | Facility: CLINIC | Age: 65
End: 2020-04-03

## 2020-04-03 NOTE — TELEPHONE ENCOUNTER
Patient is angry, states she was told my our line to get tested at the hospital in [place] two weeks ago.Also states she was told to stay home from work by her pulmonologist until tested. Went to ED in [place] and just found out they only tested her for the flu, has missed work, states Dr. Holder states she has to be tested. Wants information on [facility],information given on [facility], patient aware she may not be tested but will be treated. Only wants to be tested at [facility], states she will have son drive her to [facility], ED education done, verb understanding

## 2020-04-03 NOTE — TELEPHONE ENCOUNTER
Reason for Disposition   General information question, no triage required and triager able to answer question    Additional Information   Negative: [1] Caller is not with the adult (patient) AND [2] reporting urgent symptoms   Negative: Lab result questions   Negative: Medication questions   Negative: Caller can't be reached by phone   Negative: Caller has already spoken to PCP or another triager   Negative: RN needs further essential information from caller in order to complete triage   Negative: Requesting regular office appointment   Negative: [1] Caller requesting NON-URGENT health information AND [2] PCP's office is the best resource    Protocols used: INFORMATION ONLY CALL-A-

## 2020-04-08 ENCOUNTER — OFFICE VISIT (OUTPATIENT)
Dept: PULMONOLOGY | Facility: CLINIC | Age: 65
End: 2020-04-08
Payer: COMMERCIAL

## 2020-04-08 DIAGNOSIS — J44.9 CHRONIC OBSTRUCTIVE PULMONARY DISEASE, UNSPECIFIED COPD TYPE: Primary | ICD-10-CM

## 2020-04-08 PROCEDURE — 99212 OFFICE O/P EST SF 10 MIN: CPT | Mod: 95,,, | Performed by: NURSE PRACTITIONER

## 2020-04-08 PROCEDURE — 99212 PR OFFICE/OUTPT VISIT, EST, LEVL II, 10-19 MIN: ICD-10-PCS | Mod: 95,,, | Performed by: NURSE PRACTITIONER

## 2020-04-08 RX ORDER — FLUTICASONE FUROATE AND VILANTEROL 100; 25 UG/1; UG/1
1 POWDER RESPIRATORY (INHALATION) DAILY
Qty: 1 EACH | Refills: 6 | Status: SHIPPED | OUTPATIENT
Start: 2020-04-08 | End: 2022-04-11

## 2020-04-08 RX ORDER — IPRATROPIUM BROMIDE AND ALBUTEROL SULFATE 2.5; .5 MG/3ML; MG/3ML
3 SOLUTION RESPIRATORY (INHALATION) EVERY 6 HOURS PRN
Qty: 120 VIAL | Refills: 5 | Status: SHIPPED | OUTPATIENT
Start: 2020-04-08 | End: 2022-11-14 | Stop reason: SDUPTHER

## 2020-04-08 NOTE — PROGRESS NOTES
Subjective:        The chief complaint leading to consultation is: cough  The patient location is:  Home  Visit type: Virtual visit with synchronous audio/video or audio only  This was a video visit in lieu of in-person visit due to the coronavirus emergency. Patient acknowledged and consented to the video visit encounter.     HPI  Patient home feeling better. She quit smoking 5 weeks ago. She is wearing patch. She started having fever and sweats as well as increased cough 2 weeks ago. She went to be tested for COVID as she works at a local nursing home and was only tested for the flu.  She is not running fever anymore and does feel like her breathing is improving. She has proair to use as needed.  She did not get to have her PFT because of Covid precautions at the hospital.    Past Surgical History:   Procedure Laterality Date    BACK SURGERY      CYSTOSCOPY      HYSTERECTOMY      LITHOTRIPSY      TUBAL LIGATION       Past Medical History:   Diagnosis Date    Anxiety     Bipolar disorder     COPD, mild     Nephrolithiasis      Family History   Problem Relation Age of Onset    Cancer Mother     Diabetes Mother     COPD Father     Hypertension Father         Social History:   Marital Status:   Alcohol History:  reports that she does not drink alcohol.  Tobacco History:  reports that she has been smoking cigarettes. She started smoking about 44 years ago. She has a 90.00 pack-year smoking history. She has never used smokeless tobacco.  Drug History:  reports that she does not use drugs.    Review of patient's allergies indicates:   Allergen Reactions    Tramadol Swelling    Imitrex [sumatriptan succinate]     Penicillins     Sulfa (sulfonamide antibiotics)     Sulfur     Toradol [ketorolac] Swelling       Current Outpatient Medications   Medication Sig Dispense Refill    diclofenac (VOLTAREN) 50 MG EC tablet Take 50 mg by mouth.      gabapentin (NEURONTIN) 100 MG capsule Take 100 mg by  mouth.      HYDROcodone-acetaminophen (NORCO) 5-325 mg per tablet Take 1 tablet by mouth 4 (four) times daily as needed. 12 tablet 0    traMADol (ULTRAM) 50 mg tablet Take 50 mg by mouth every 8 (eight) hours as needed.      albuterol-ipratropium (DUO-NEB) 2.5 mg-0.5 mg/3 mL nebulizer solution Take 3 mLs by nebulization every 6 (six) hours as needed for Wheezing. Rescue 120 vial 5    butalbital-acetaminophen-caffeine -40 mg (FIORICET, ESGIC) -40 mg per tablet Take 1 tablet by mouth.      clonazePAM (KLONOPIN) 0.5 MG tablet Take 0.5 mg by mouth 3 (three) times daily.       doxepin (SINEQUAN) 10 MG capsule Take 10 mg by mouth.      estradiol (ESTRACE) 1 MG tablet Take 1 mg by mouth.      fluticasone furoate-vilanteroL (BREO ELLIPTA) 100-25 mcg/dose diskus inhaler Inhale 1 puff into the lungs once daily. Controller Rinse after you use it 1 each 6    lurasidone 60 mg Tab tablet Take 80 mg by mouth once daily.       methylphenidate (CONCERTA) 36 MG CR tablet Take 54 mg by mouth every morning.       mometasone-formoterol (DULERA) 100-5 mcg/actuation HFAA Inhale 1 puff into the lungs.      nitroGLYCERIN (NITROSTAT) 0.4 MG SL tablet Place 0.4 mg under the tongue.      ondansetron (ZOFRAN) 4 MG tablet Take 1 tablet (4 mg total) by mouth every 6 (six) hours. (Patient not taking: Reported on 3/3/2020) 12 tablet 0    oxybutynin (DITROPAN) 5 MG Tab Take 5 mg by mouth.      pravastatin (PRAVACHOL) 20 MG tablet Take 20 mg by mouth.      propranolol (INDERAL) 80 MG tablet Take 80 mg by mouth.      simvastatin (ZOCOR) 20 MG tablet Take 20 mg by mouth.      tamsulosin (FLOMAX) 0.4 mg Cap Take 1 capsule (0.4 mg total) by mouth once daily. 10 capsule 0    tiZANidine (ZANAFLEX) 4 MG tablet       topiramate (TOPAMAX) 25 MG tablet Take 1 QHS x 1 week, 2 QHS x 1 week, 3 QHS x 1 week, then 4 QHS      vortioxetine 10 mg Tab Take 20 mg by mouth once daily.       ziprasidone (GEODON) 40 MG Cap Take 40 mg by  mouth.       No current facility-administered medications for this visit.        Review of Systems   Constitutional: Negative.    HENT: Positive for congestion.    Eyes: Negative.    Respiratory: Positive for cough, chest tightness and shortness of breath.         Chest congestion improving. Does feel tight occasionally with coughing spells.  Quit smoking 5 weeks ago   Cardiovascular: Negative.    Genitourinary: Negative.    Musculoskeletal: Negative.    Skin: Negative.    Neurological: Negative.    Psychiatric/Behavioral: Negative.          Objective:        Physical Exam:   Physical Exam   Constitutional: She is oriented to person, place, and time. She appears well-developed and well-nourished. No distress.   HENT:   Head: Normocephalic and atraumatic.   Pulmonary/Chest: Effort normal. No respiratory distress.   Neurological: She is alert and oriented to person, place, and time.   Psychiatric: She has a normal mood and affect. Her behavior is normal. Judgment and thought content normal.            Assessment:       1. Chronic obstructive pulmonary disease, unspecified COPD type      Plan:   Chronic obstructive pulmonary disease, unspecified COPD type  -     NEBULIZER FOR HOME USE    Other orders  -     fluticasone furoate-vilanteroL (BREO ELLIPTA) 100-25 mcg/dose diskus inhaler; Inhale 1 puff into the lungs once daily. Controller Rinse after you use it  Dispense: 1 each; Refill: 6  -     albuterol-ipratropium (DUO-NEB) 2.5 mg-0.5 mg/3 mL nebulizer solution; Take 3 mLs by nebulization every 6 (six) hours as needed for Wheezing. Rescue  Dispense: 120 vial; Refill: 5    Mucinex 1200mg twice a day  Do not smoke with patch on  Congrats on Quitting  Breo 100 1 puff daily, rinse after you use it  Duonebs for nebulizer as needed  Nebulizer ordered  PFT when hospital starts doing again  No follow-ups on file.    Total time spent with patient: 20 mins    Each patient to whom he or she provides medical services by  telemedicine is:  (1) informed of the relationship between the physician and patient and the respective role of any other health care provider with respect to management of the patient; and (2) notified that he or she may decline to receive medical services by telemedicine and may withdraw from such care at any time.    This note was created using [employer] voice recognition software that occasionally misinterprets phrases or words.

## 2020-04-08 NOTE — PATIENT INSTRUCTIONS
Mucinex 1200mg twice a day  Do not smoke with patch on  Congrats on Quitting  Breo 100 1 puff daily, rinse after you use it  Duonebs for nebulizer as needed  Nebulizer ordered  PFT when hospital starts doing again

## 2020-04-09 ENCOUNTER — TELEPHONE (OUTPATIENT)
Dept: PULMONOLOGY | Facility: CLINIC | Age: 65
End: 2020-04-09

## 2020-04-10 ENCOUNTER — PATIENT MESSAGE (OUTPATIENT)
Dept: PULMONOLOGY | Facility: CLINIC | Age: 65
End: 2020-04-10

## 2020-04-13 ENCOUNTER — PATIENT MESSAGE (OUTPATIENT)
Dept: PULMONOLOGY | Facility: CLINIC | Age: 65
End: 2020-04-13

## 2020-04-20 ENCOUNTER — PATIENT MESSAGE (OUTPATIENT)
Dept: PULMONOLOGY | Facility: CLINIC | Age: 65
End: 2020-04-20

## 2020-05-01 ENCOUNTER — TELEPHONE (OUTPATIENT)
Dept: PULMONOLOGY | Facility: CLINIC | Age: 65
End: 2020-05-01

## 2020-05-01 DIAGNOSIS — Z01.818 PREOP EXAMINATION: Primary | ICD-10-CM

## 2020-05-05 ENCOUNTER — HOSPITAL ENCOUNTER (OUTPATIENT)
Dept: PULMONOLOGY | Facility: HOSPITAL | Age: 65
Discharge: HOME | End: 2020-05-05
Attending: INTERNAL MEDICINE
Payer: COMMERCIAL

## 2020-05-05 ENCOUNTER — TELEPHONE (OUTPATIENT)
Dept: PULMONOLOGY | Facility: CLINIC | Age: 65
End: 2020-05-05

## 2020-05-05 ENCOUNTER — HOSPITAL ENCOUNTER (OUTPATIENT)
Dept: PREADMISSION TESTING | Facility: HOSPITAL | Age: 65
Discharge: HOME | End: 2020-05-05
Attending: INTERNAL MEDICINE
Payer: COMMERCIAL

## 2020-05-05 DIAGNOSIS — J44.9 CHRONIC OBSTRUCTIVE PULMONARY DISEASE, UNSPECIFIED COPD TYPE: ICD-10-CM

## 2020-05-05 DIAGNOSIS — Z01.818 PREOP EXAMINATION: ICD-10-CM

## 2020-05-05 LAB — SARS-COV-2 RDRP RESP QL NAA+PROBE: NEGATIVE

## 2020-05-05 PROCEDURE — 94060 EVALUATION OF WHEEZING: CPT

## 2020-05-05 PROCEDURE — 94010 BREATHING CAPACITY TEST: CPT | Mod: XB

## 2020-05-05 PROCEDURE — 94727 GAS DIL/WSHOT DETER LNG VOL: CPT

## 2020-05-05 PROCEDURE — 94729 DIFFUSING CAPACITY: CPT

## 2020-05-05 PROCEDURE — U0002 COVID-19 LAB TEST NON-CDC: HCPCS

## 2020-05-05 NOTE — TELEPHONE ENCOUNTER
PFT shows no obstruction with good response to bronchodilator. No diffusion defect, no restriction.

## 2020-05-07 ENCOUNTER — OFFICE VISIT (OUTPATIENT)
Dept: PULMONOLOGY | Facility: CLINIC | Age: 65
End: 2020-05-07
Payer: COMMERCIAL

## 2020-05-07 VITALS
WEIGHT: 182 LBS | HEART RATE: 77 BPM | BODY MASS INDEX: 31.07 KG/M2 | TEMPERATURE: 98 F | HEIGHT: 64 IN | SYSTOLIC BLOOD PRESSURE: 125 MMHG | DIASTOLIC BLOOD PRESSURE: 80 MMHG | OXYGEN SATURATION: 98 %

## 2020-05-07 DIAGNOSIS — J44.1 COPD EXACERBATION: Primary | ICD-10-CM

## 2020-05-07 DIAGNOSIS — R06.00 DYSPNEA, UNSPECIFIED TYPE: ICD-10-CM

## 2020-05-07 DIAGNOSIS — J43.9 PULMONARY EMPHYSEMA, UNSPECIFIED EMPHYSEMA TYPE: ICD-10-CM

## 2020-05-07 DIAGNOSIS — Z72.0 TOBACCO ABUSE: ICD-10-CM

## 2020-05-07 PROCEDURE — 99214 OFFICE O/P EST MOD 30 MIN: CPT | Mod: S$GLB,,, | Performed by: NURSE PRACTITIONER

## 2020-05-07 PROCEDURE — 3008F BODY MASS INDEX DOCD: CPT | Mod: S$GLB,,, | Performed by: NURSE PRACTITIONER

## 2020-05-07 PROCEDURE — 99214 PR OFFICE/OUTPT VISIT, EST, LEVL IV, 30-39 MIN: ICD-10-PCS | Mod: S$GLB,,, | Performed by: NURSE PRACTITIONER

## 2020-05-07 PROCEDURE — 3008F PR BODY MASS INDEX (BMI) DOCUMENTED: ICD-10-PCS | Mod: S$GLB,,, | Performed by: NURSE PRACTITIONER

## 2020-05-07 RX ORDER — DOXYCYCLINE HYCLATE 100 MG
100 TABLET ORAL 2 TIMES DAILY
Qty: 14 TABLET | Refills: 0 | Status: ON HOLD | OUTPATIENT
Start: 2020-05-07 | End: 2020-11-08

## 2020-05-07 RX ORDER — PREDNISONE 10 MG/1
TABLET ORAL
Qty: 20 TABLET | Refills: 0 | Status: ON HOLD | OUTPATIENT
Start: 2020-05-07 | End: 2020-11-08

## 2020-05-07 NOTE — PATIENT INSTRUCTIONS
Treatment for COPD    Your healthcare provider will prescribe the best treatments for your COPD.  Treatment  Recommendations include the following:  · Medicines. Some medicines help relieve symptoms when you have them. Others are taken daily to control inflammation in the lungs. Always take your medicines as prescribed. Learn the names of your medicines, as well as how and when to use them.  · Oxygen therapy. Oxygen may be prescribed if tests show that your blood contains too little oxygen.  · Smoking. If you smoke, quit. Smoking is the main cause of COPD. Quitting will help you be able to better manage your COPD. Ask your healthcare provider about ways to help you quit smoking.  · Avoiding infections. Infections, like a cold or the flu, can cause your symptoms to worsen. Try to stay away from people who are sick. Wash your hands often. And, ask your healthcare provider about vaccines for the flu and pneumonia.  Coping with shortness of breath  Coping tips include the following:  · Exercise. Try to be as active as possible. This will improve energy levels and strengthen your muscles, so you can do more.  · Breathing techniques. Ask your healthcare provider or nurse to show you how to do pursed-lip breathing.  · Balance rest and activity. Each day, try to balance rest periods with activity. For example, you might start the day with getting dressed and eating breakfast, then relax and read the paper. After that, take a brief walk. And then sit with your feet up for a while.  · Pulmonary rehabilitation. Ask your provider, or call your local hospital to find out about pulmonary rehab programs. The programs help with managing your disease, breathing techniques, exercise, support and counseling.  · Healthy eating. Eating a healthy, balanced diet and making an effort to maintain your ideal weight are important to staying as healthy as possible. Make sure you have a lot of fruit and vegetables every day, as well as  balanced portions of whole grains, lean meats and fish, and low-fat dairy products.  Date Last Reviewed: 5/1/2016  © 7563-1880 [employer]. [address] [place] [address], [place] [address]. All rights reserved. This information is not intended as a substitute for professional medical care. Always follow your healthcare professional's instructions.      Prednisone short taper   Doxycycline 100mg by mouth twice a day for a week, make sure you eat with it and use sun block in the sun  Use the Duoneb 3-4 times a day  QUIT SMOKING  Refer to cardiology for evaluation of  orthopnea

## 2020-05-07 NOTE — PROGRESS NOTES
SUBJECTIVE:    Patient ID: Alma Rosa Helm is a 65 y.o. female.    Chief Complaint: COPD and Follow-up    HPI   Patient here today feeling alright.  She is using Breo daily.  She has smoked a little of lately.  She is using Proair three times a day. Her PFT showed no obstruction but good response to bronchodilator, no restriction, no diffusion defect. She feels benefit with the Breo.     Screening CT scan showed few small bilateral blebs.   Past Medical History:   Diagnosis Date    Anxiety     Bipolar disorder     COPD, mild     Nephrolithiasis      Past Surgical History:   Procedure Laterality Date    BACK SURGERY      CYSTOSCOPY      HYSTERECTOMY      LITHOTRIPSY      TUBAL LIGATION       Family History   Problem Relation Age of Onset    Cancer Mother     Diabetes Mother     COPD Father     Hypertension Father         Social History:   Marital Status:   Occupation: CNA at [employer]  Alcohol History:  reports that she does not drink alcohol.  Tobacco History:  reports that she quit smoking about 4 weeks ago. Her smoking use included cigarettes. She started smoking about 44 years ago. She has a 90.00 pack-year smoking history. She has never used smokeless tobacco.  Drug History:  reports that she does not use drugs.    Review of patient's allergies indicates:   Allergen Reactions    Tramadol Swelling    Imitrex [sumatriptan succinate]     Penicillins     Sulfa (sulfonamide antibiotics)     Sulfur     Toradol [ketorolac] Swelling       Current Outpatient Medications   Medication Sig Dispense Refill    albuterol-ipratropium (DUO-NEB) 2.5 mg-0.5 mg/3 mL nebulizer solution Take 3 mLs by nebulization every 6 (six) hours as needed for Wheezing. Rescue 120 vial 5    diclofenac (VOLTAREN) 50 MG EC tablet Take 50 mg by mouth.      fluticasone furoate-vilanteroL (BREO ELLIPTA) 100-25 mcg/dose diskus inhaler Inhale 1 puff into the lungs once daily. Controller Rinse after you use it 1 each 6     gabapentin (NEURONTIN) 100 MG capsule Take 100 mg by mouth.      butalbital-acetaminophen-caffeine -40 mg (FIORICET, ESGIC) -40 mg per tablet Take 1 tablet by mouth.      clonazePAM (KLONOPIN) 0.5 MG tablet Take 0.5 mg by mouth 3 (three) times daily.       doxepin (SINEQUAN) 10 MG capsule Take 10 mg by mouth.      doxycycline (VIBRA-TABS) 100 MG tablet Take 1 tablet (100 mg total) by mouth 2 (two) times daily. 14 tablet 0    estradiol (ESTRACE) 1 MG tablet Take 1 mg by mouth.      HYDROcodone-acetaminophen (NORCO) 5-325 mg per tablet Take 1 tablet by mouth 4 (four) times daily as needed. (Patient not taking: Reported on 5/7/2020) 12 tablet 0    lurasidone 60 mg Tab tablet Take 80 mg by mouth once daily.       methylphenidate (CONCERTA) 36 MG CR tablet Take 54 mg by mouth every morning.       mometasone-formoterol (DULERA) 100-5 mcg/actuation HFAA Inhale 1 puff into the lungs.      nitroGLYCERIN (NITROSTAT) 0.4 MG SL tablet Place 0.4 mg under the tongue.      ondansetron (ZOFRAN) 4 MG tablet Take 1 tablet (4 mg total) by mouth every 6 (six) hours. (Patient not taking: Reported on 3/3/2020) 12 tablet 0    oxybutynin (DITROPAN) 5 MG Tab Take 5 mg by mouth.      pravastatin (PRAVACHOL) 20 MG tablet Take 20 mg by mouth.      predniSONE (DELTASONE) 10 MG tablet Take 4 tabs x 2 days, then take 3 tabs x 2 days, then take 2 tabs x 2 days, then take 1 tab x 2 days. 20 tablet 0    propranolol (INDERAL) 80 MG tablet Take 80 mg by mouth.      simvastatin (ZOCOR) 20 MG tablet Take 20 mg by mouth.      tamsulosin (FLOMAX) 0.4 mg Cap Take 1 capsule (0.4 mg total) by mouth once daily. 10 capsule 0    tiZANidine (ZANAFLEX) 4 MG tablet       topiramate (TOPAMAX) 25 MG tablet Take 1 QHS x 1 week, 2 QHS x 1 week, 3 QHS x 1 week, then 4 QHS      traMADol (ULTRAM) 50 mg tablet Take 50 mg by mouth every 8 (eight) hours as needed.      vortioxetine 10 mg Tab Take 20 mg by mouth once daily.       ziprasidone  "(GEODON) 40 MG Cap Take 40 mg by mouth.       No current facility-administered medications for this visit.          Last PFT: 05/2020  Last CT:03/2020    Review of Systems  General: feels alright   Eyes: Vision is strange  ENT:  No sinusitis or pharyngitis.   Heart:: orthopnea   Lungs: wheezing at night, dyspnea with exertion, coughs, back to smoking    GI: No Nausea, vomiting, constipation, diarrhea, or reflux.  : No dysuria, hesitancy, or nocturia.  Musculoskeletal:everything always from the waist down hurts  Skin: No lesions or rashes.  Neuro: No headaches or neuropathy.  Lymph: No edema or adenopathy.  Psych: No anxiety or depression.  Endo: weight gain over time    OBJECTIVE:      /80 (BP Location: Left arm, Patient Position: Sitting, BP Method: Medium (Manual))   Pulse 77   Temp 98.2 °F (36.8 °C)   Ht 5' 4" (1.626 m)   Wt 82.6 kg (182 lb)   SpO2 98%   BMI 31.24 kg/m²     Physical Exam  GENERAL: Older patient in no distress.  HEENT: Pupils equal and reactive. Extraocular movements intact. Nose intact.      Pharynx moist.  NECK: Supple.   HEART: Regular rate and rhythm. No murmur or gallop auscultated.  LUNGS: expiratory wheeze all over   ABDOMEN: Bowel sounds present. Non-tender, no masses palpated.  EXTREMITIES: Normal muscle tone and joint movement, no cyanosis or clubbing.   LYMPHATICS: No adenopathy palpated, no edema.  SKIN: Dry, intact, no lesions.   NEURO: Cranial nerves II-XII intact. Motor strength 5/5 bilaterally, upper and lower extremities.  PSYCH: Appropriate affect.    Assessment:       1. COPD exacerbation    2. Dyspnea, unspecified type    3. Pulmonary emphysema, unspecified emphysema type    4. Tobacco abuse          Plan:       COPD exacerbation    Dyspnea, unspecified type  -     Ambulatory referral/consult to Cardiology; Future; Expected date: 05/14/2020    Pulmonary emphysema, unspecified emphysema type    Tobacco abuse    Other orders  -     predniSONE (DELTASONE) 10 MG " tablet; Take 4 tabs x 2 days, then take 3 tabs x 2 days, then take 2 tabs x 2 days, then take 1 tab x 2 days.  Dispense: 20 tablet; Refill: 0  -     doxycycline (VIBRA-TABS) 100 MG tablet; Take 1 tablet (100 mg total) by mouth 2 (two) times daily.  Dispense: 14 tablet; Refill: 0         Follow up in about 3 months (around 8/7/2020).   Prednisone short taper   Doxycycline 100mg by mouth twice a day for a week, make sure you eat with it and use sun block in the sun  Use the Duoneb 3-4 times a day  QUIT SMOKING  Refer to cardiology for evaluation of  orthopnea

## 2020-05-14 ENCOUNTER — DOCUMENTATION ONLY (OUTPATIENT)
Dept: PULMONOLOGY | Facility: CLINIC | Age: 65
End: 2020-05-14

## 2020-05-14 NOTE — PROGRESS NOTES
In my medical opinion, this procedure, done 5/5/20, was time sensitive and had to be performed at this time. It is in the best interest of the patient, and further delay may have been detrimental to the patient.

## 2020-08-08 ENCOUNTER — HOSPITAL ENCOUNTER (EMERGENCY)
Facility: HOSPITAL | Age: 65
Discharge: HOME | End: 2020-08-08
Attending: EMERGENCY MEDICINE
Payer: COMMERCIAL

## 2020-08-08 VITALS
HEIGHT: 64 IN | RESPIRATION RATE: 18 BRPM | BODY MASS INDEX: 31.81 KG/M2 | WEIGHT: 186.31 LBS | DIASTOLIC BLOOD PRESSURE: 55 MMHG | HEART RATE: 60 BPM | OXYGEN SATURATION: 94 % | SYSTOLIC BLOOD PRESSURE: 111 MMHG | TEMPERATURE: 98 F

## 2020-08-08 DIAGNOSIS — R07.89 CHEST DISCOMFORT: ICD-10-CM

## 2020-08-08 DIAGNOSIS — R07.9 CHEST PAIN, UNSPECIFIED TYPE: Primary | ICD-10-CM

## 2020-08-08 LAB
ALBUMIN SERPL BCP-MCNC: 4.1 G/DL (ref 3.5–5.2)
ALP SERPL-CCNC: 90 U/L (ref 55–135)
ALT SERPL W/O P-5'-P-CCNC: 19 U/L (ref 10–44)
ANION GAP SERPL CALC-SCNC: 12 MMOL/L (ref 8–16)
AST SERPL-CCNC: 12 U/L (ref 10–40)
BASOPHILS # BLD AUTO: 0.05 K/UL (ref 0–0.2)
BASOPHILS NFR BLD: 0.5 % (ref 0–1.9)
BILIRUB SERPL-MCNC: 0.4 MG/DL (ref 0.1–1)
BNP SERPL-MCNC: <10 PG/ML (ref 0–99)
BUN SERPL-MCNC: 20 MG/DL (ref 8–23)
CALCIUM SERPL-MCNC: 9.8 MG/DL (ref 8.7–10.5)
CHLORIDE SERPL-SCNC: 107 MMOL/L (ref 95–110)
CO2 SERPL-SCNC: 22 MMOL/L (ref 23–29)
CREAT SERPL-MCNC: 1 MG/DL (ref 0.5–1.4)
D DIMER PPP IA.FEU-MCNC: 0.25 MG/L FEU
DIFFERENTIAL METHOD: ABNORMAL
EOSINOPHIL # BLD AUTO: 0.3 K/UL (ref 0–0.5)
EOSINOPHIL NFR BLD: 2.6 % (ref 0–8)
ERYTHROCYTE [DISTWIDTH] IN BLOOD BY AUTOMATED COUNT: 14.4 % (ref 11.5–14.5)
EST. GFR  (AFRICAN AMERICAN): >60 ML/MIN/1.73 M^2
EST. GFR  (NON AFRICAN AMERICAN): >60 ML/MIN/1.73 M^2
GLUCOSE SERPL-MCNC: 120 MG/DL (ref 70–110)
HCT VFR BLD AUTO: 44.6 % (ref 37–48.5)
HGB BLD-MCNC: 14.3 G/DL (ref 12–16)
IMM GRANULOCYTES # BLD AUTO: 0.03 K/UL (ref 0–0.04)
IMM GRANULOCYTES NFR BLD AUTO: 0.3 % (ref 0–0.5)
LYMPHOCYTES # BLD AUTO: 3.1 K/UL (ref 1–4.8)
LYMPHOCYTES NFR BLD: 29.6 % (ref 18–48)
MCH RBC QN AUTO: 27.9 PG (ref 27–31)
MCHC RBC AUTO-ENTMCNC: 32.1 G/DL (ref 32–36)
MCV RBC AUTO: 87 FL (ref 82–98)
MONOCYTES # BLD AUTO: 0.7 K/UL (ref 0.3–1)
MONOCYTES NFR BLD: 6.5 % (ref 4–15)
NEUTROPHILS # BLD AUTO: 6.4 K/UL (ref 1.8–7.7)
NEUTROPHILS NFR BLD: 60.5 % (ref 38–73)
NRBC BLD-RTO: 0 /100 WBC
PLATELET # BLD AUTO: 363 K/UL (ref 150–350)
PMV BLD AUTO: 9.2 FL (ref 9.2–12.9)
POTASSIUM SERPL-SCNC: 4.1 MMOL/L (ref 3.5–5.1)
PROT SERPL-MCNC: 8.3 G/DL (ref 6–8.4)
RBC # BLD AUTO: 5.13 M/UL (ref 4–5.4)
SODIUM SERPL-SCNC: 141 MMOL/L (ref 136–145)
TROPONIN I SERPL DL<=0.01 NG/ML-MCNC: 0.01 NG/ML (ref 0–0.03)
TROPONIN I SERPL DL<=0.01 NG/ML-MCNC: 0.02 NG/ML (ref 0–0.03)
WBC # BLD AUTO: 10.53 K/UL (ref 3.9–12.7)

## 2020-08-08 PROCEDURE — 85379 FIBRIN DEGRADATION QUANT: CPT

## 2020-08-08 PROCEDURE — 36415 COLL VENOUS BLD VENIPUNCTURE: CPT

## 2020-08-08 PROCEDURE — 36000 PLACE NEEDLE IN VEIN: CPT

## 2020-08-08 PROCEDURE — 25000003 PHARM REV CODE 250: Performed by: EMERGENCY MEDICINE

## 2020-08-08 PROCEDURE — 85025 COMPLETE CBC W/AUTO DIFF WBC: CPT

## 2020-08-08 PROCEDURE — 99285 EMERGENCY DEPT VISIT HI MDM: CPT | Mod: 25

## 2020-08-08 PROCEDURE — 80053 COMPREHEN METABOLIC PANEL: CPT

## 2020-08-08 PROCEDURE — 93005 ELECTROCARDIOGRAM TRACING: CPT

## 2020-08-08 PROCEDURE — 84484 ASSAY OF TROPONIN QUANT: CPT | Mod: 91

## 2020-08-08 PROCEDURE — 83880 ASSAY OF NATRIURETIC PEPTIDE: CPT

## 2020-08-08 RX ORDER — ASPIRIN 325 MG
325 TABLET ORAL
Status: COMPLETED | OUTPATIENT
Start: 2020-08-08 | End: 2020-08-08

## 2020-08-08 RX ADMIN — ASPIRIN 325 MG ORAL TABLET 325 MG: 325 PILL ORAL at 07:08

## 2020-08-08 NOTE — Clinical Note
Alma Rosa Helm was seen and treated in our emergency department on 8/8/2020.  She may return to work on 08/09/2020.       If you have any questions or concerns, please don't hesitate to call.       RN

## 2020-08-08 NOTE — ED PROVIDER NOTES
Encounter Date: 2020    SCRIBE #1 NOTE: IFawad, am scribing for, and in the presence of, Thien Reyna MD.       History   No chief complaint on file.      Time seen by provider: 7:06 PM on 2020    Alma Rosa Helm is a 65 y.o. female with PMHx COPD, bipolar disorder, and anxiety who presents to the ED with an onset of chest pain beginning PTA. The patient explains she was at work when she began to feel the pain. Associated symptoms includes nausea, SOB, and jaw pain. The patient notes her chest pain has almost resolved since coming the ED. No ripping or tearing pain.  The patient denies abdominal pain or any other symptoms at this time. Pertinent PSHx includes a back surgery.      The history is provided by the patient.     Review of patient's allergies indicates:   Allergen Reactions    Tramadol Swelling    Imitrex [sumatriptan succinate]     Penicillins     Sulfa (sulfonamide antibiotics)     Sulfur     Toradol [ketorolac] Swelling     Past Medical History:   Diagnosis Date    Anxiety     Bipolar disorder     COPD, mild     Nephrolithiasis      Past Surgical History:   Procedure Laterality Date    BACK SURGERY      CYSTOSCOPY      HYSTERECTOMY      LITHOTRIPSY      TUBAL LIGATION       Family History   Problem Relation Age of Onset    Cancer Mother     Diabetes Mother     COPD Father     Hypertension Father      Social History     Tobacco Use    Smoking status: Former Smoker     Packs/day: 2.00     Years: 45.00     Pack years: 90.00     Types: Cigarettes     Start date:      Quit date: 2020     Years since quittin.3    Smokeless tobacco: Never Used    Tobacco comment: now smoking 1/4 ppd   Substance Use Topics    Alcohol use: Never     Frequency: Never    Drug use: No     Review of Systems   Respiratory: Positive for shortness of breath.    Cardiovascular: Positive for chest pain.   Gastrointestinal: Positive for nausea. Negative for abdominal pain.    Musculoskeletal: Positive for arthralgias (jaw).   All other systems reviewed and are negative.      Physical Exam     Initial Vitals   BP Pulse Resp Temp SpO2   -- -- -- -- --      MAP       --         Physical Exam    Nursing note and vitals reviewed.  Constitutional: She appears well-developed and well-nourished. She is not diaphoretic. No distress.   HENT:   Head: Normocephalic and atraumatic.   Eyes: EOM are normal.   Neck: Normal range of motion. Neck supple.   Cardiovascular: Normal rate, regular rhythm and normal heart sounds. Exam reveals no gallop and no friction rub.    No murmur heard.  Equal radial pulses   Pulmonary/Chest: Breath sounds normal. No respiratory distress. She has no wheezes. She has no rhonchi. She has no rales.   Abdominal: Soft. There is no abdominal tenderness.   No abdominal tenderness   Musculoskeletal: Normal range of motion.   Neurological: She is alert and oriented to person, place, and time.   Skin: Skin is warm and dry.   Psychiatric: She has a normal mood and affect. Her behavior is normal. Judgment and thought content normal.         ED Course   Procedures  Labs Reviewed - No data to display       Imaging Results    None          Medical Decision Making:   History:   Old Medical Records: I decided to obtain old medical records.  Independently Interpreted Test(s):   I have ordered and independently interpreted X-rays - see prior notes.  I have ordered and independently interpreted EKG Reading(s) - see prior notes  Clinical Tests:   Lab Tests: Ordered and Reviewed  Radiological Study: Reviewed and Ordered  Medical Tests: Ordered and Reviewed            Scribe Attestation:   Scribe #1: I performed the above scribed service and the documentation accurately describes the services I performed. I attest to the accuracy of the note.    I, Dr. Reyna, personally performed the services described in this documentation. All medical record entries made by the scribe were at my direction  and in my presence.  I have reviewed the chart and agree that the record reflects my personal performance and is accurate and complete.12:58 AM 08/09/2020            ED Course as of Aug 09 0058   Sat Aug 08, 2020   1900 Temp: 98.3 °F (36.8 °C) [EF]   1900 Temp src: Oral [EF]   1900 Pulse: 82 [EF]   1900 Resp: 18 [EF]   1900 SpO2: 98 % [EF]   1905 Sinus rhythm 74 beats per minute normal axis no ST segment elevation or depression or T-wave inversion normal intervals independently interpreted    [EF]   1917 Blood pressure 136/60    [EF]   1946 Troponin I: 0.020 [EF]   1946 D-Dimer: 0.25 [EF]   1947 X-Ray Chest AP Portable [EF]   2034 D-dimer initial troponin negative.  Plan is to repeat 2nd troponin at 10:00 p.m..  By the heart score the patient is low risk.    [EF]   2319 Troponin I: 0.011 [EF]   2329 65-year-old with COPD presents with an episode of sternal chest pain some jaw pain.  Troponin negative x2 D-dimer chest x-ray EKG negative.  EKG is completely normal sinus.  Heart score of 2.  MI has been ruled out PE ruled out no sign of pneumothorax.  No indication for hospital admission or further testing patient can be discharged home.  Follow-up primary care for this return to the ER for any concerns.    [EF]      ED Course User Index  [EF] Thien Reyna MD                Clinical Impression:       ICD-10-CM ICD-9-CM   1. Chest pain, unspecified type  R07.9 786.50   2. Chest discomfort  R07.89 786.59                                Thien Reyna MD  08/09/20 0059

## 2020-11-07 ENCOUNTER — HOSPITAL ENCOUNTER (OUTPATIENT)
Facility: HOSPITAL | Age: 65
Discharge: HOME | End: 2020-11-08
Attending: EMERGENCY MEDICINE | Admitting: INTERNAL MEDICINE
Payer: COMMERCIAL

## 2020-11-07 DIAGNOSIS — N12 PYELONEPHRITIS: Primary | ICD-10-CM

## 2020-11-07 DIAGNOSIS — R07.9 CHEST PAIN: ICD-10-CM

## 2020-11-07 LAB
ALBUMIN SERPL BCP-MCNC: 3.4 G/DL (ref 3.5–5.2)
ALP SERPL-CCNC: 81 U/L (ref 55–135)
ALT SERPL W/O P-5'-P-CCNC: 21 U/L (ref 10–44)
ANION GAP SERPL CALC-SCNC: 11 MMOL/L (ref 8–16)
AST SERPL-CCNC: 14 U/L (ref 10–40)
BACTERIA #/AREA URNS HPF: ABNORMAL /HPF
BASOPHILS # BLD AUTO: 0.03 K/UL (ref 0–0.2)
BASOPHILS NFR BLD: 0.3 % (ref 0–1.9)
BILIRUB SERPL-MCNC: 0.6 MG/DL (ref 0.1–1)
BILIRUB UR QL STRIP: NEGATIVE
BUN SERPL-MCNC: 16 MG/DL (ref 8–23)
CALCIUM SERPL-MCNC: 9.1 MG/DL (ref 8.7–10.5)
CHLORIDE SERPL-SCNC: 109 MMOL/L (ref 95–110)
CLARITY UR: CLEAR
CO2 SERPL-SCNC: 20 MMOL/L (ref 23–29)
COLOR UR: YELLOW
CREAT SERPL-MCNC: 0.8 MG/DL (ref 0.5–1.4)
DIFFERENTIAL METHOD: ABNORMAL
EOSINOPHIL # BLD AUTO: 0 K/UL (ref 0–0.5)
EOSINOPHIL NFR BLD: 0.1 % (ref 0–8)
ERYTHROCYTE [DISTWIDTH] IN BLOOD BY AUTOMATED COUNT: 14.7 % (ref 11.5–14.5)
EST. GFR  (AFRICAN AMERICAN): >60 ML/MIN/1.73 M^2
EST. GFR  (NON AFRICAN AMERICAN): >60 ML/MIN/1.73 M^2
GLUCOSE SERPL-MCNC: 126 MG/DL (ref 70–110)
GLUCOSE UR QL STRIP: NEGATIVE
HCT VFR BLD AUTO: 42.7 % (ref 37–48.5)
HGB BLD-MCNC: 13.6 G/DL (ref 12–16)
HGB UR QL STRIP: ABNORMAL
HYALINE CASTS #/AREA URNS LPF: 0 /LPF
IMM GRANULOCYTES # BLD AUTO: 0.05 K/UL (ref 0–0.04)
IMM GRANULOCYTES NFR BLD AUTO: 0.4 % (ref 0–0.5)
KETONES UR QL STRIP: NEGATIVE
LEUKOCYTE ESTERASE UR QL STRIP: ABNORMAL
LYMPHOCYTES # BLD AUTO: 0.5 K/UL (ref 1–4.8)
LYMPHOCYTES NFR BLD: 4.3 % (ref 18–48)
MCH RBC QN AUTO: 27.6 PG (ref 27–31)
MCHC RBC AUTO-ENTMCNC: 31.9 G/DL (ref 32–36)
MCV RBC AUTO: 87 FL (ref 82–98)
MICROSCOPIC COMMENT: ABNORMAL
MONOCYTES # BLD AUTO: 0.3 K/UL (ref 0.3–1)
MONOCYTES NFR BLD: 2.1 % (ref 4–15)
NEUTROPHILS # BLD AUTO: 10.9 K/UL (ref 1.8–7.7)
NEUTROPHILS NFR BLD: 92.8 % (ref 38–73)
NITRITE UR QL STRIP: POSITIVE
NRBC BLD-RTO: 0 /100 WBC
PH UR STRIP: 6 [PH] (ref 5–8)
PLATELET # BLD AUTO: 268 K/UL (ref 150–350)
PMV BLD AUTO: 9 FL (ref 9.2–12.9)
POTASSIUM SERPL-SCNC: 3.8 MMOL/L (ref 3.5–5.1)
PROT SERPL-MCNC: 6.9 G/DL (ref 6–8.4)
PROT UR QL STRIP: ABNORMAL
RBC # BLD AUTO: 4.92 M/UL (ref 4–5.4)
RBC #/AREA URNS HPF: 10 /HPF (ref 0–4)
SARS-COV-2 RDRP RESP QL NAA+PROBE: NEGATIVE
SODIUM SERPL-SCNC: 140 MMOL/L (ref 136–145)
SP GR UR STRIP: 1.02 (ref 1–1.03)
SQUAMOUS #/AREA URNS HPF: 2 /HPF
URN SPEC COLLECT METH UR: ABNORMAL
UROBILINOGEN UR STRIP-ACNC: NEGATIVE EU/DL
WBC # BLD AUTO: 11.71 K/UL (ref 3.9–12.7)
WBC #/AREA URNS HPF: 31 /HPF (ref 0–5)

## 2020-11-07 PROCEDURE — 99285 EMERGENCY DEPT VISIT HI MDM: CPT | Mod: 25

## 2020-11-07 PROCEDURE — 96365 THER/PROPH/DIAG IV INF INIT: CPT

## 2020-11-07 PROCEDURE — 80053 COMPREHEN METABOLIC PANEL: CPT

## 2020-11-07 PROCEDURE — U0002 COVID-19 LAB TEST NON-CDC: HCPCS

## 2020-11-07 PROCEDURE — 85025 COMPLETE CBC W/AUTO DIFF WBC: CPT

## 2020-11-07 PROCEDURE — 63600175 PHARM REV CODE 636 W HCPCS: Performed by: EMERGENCY MEDICINE

## 2020-11-07 PROCEDURE — 87088 URINE BACTERIA CULTURE: CPT

## 2020-11-07 PROCEDURE — 81000 URINALYSIS NONAUTO W/SCOPE: CPT

## 2020-11-07 PROCEDURE — 25000003 PHARM REV CODE 250: Performed by: EMERGENCY MEDICINE

## 2020-11-07 PROCEDURE — 96361 HYDRATE IV INFUSION ADD-ON: CPT

## 2020-11-07 PROCEDURE — 87186 SC STD MICRODIL/AGAR DIL: CPT

## 2020-11-07 PROCEDURE — 36415 COLL VENOUS BLD VENIPUNCTURE: CPT

## 2020-11-07 PROCEDURE — 87077 CULTURE AEROBIC IDENTIFY: CPT

## 2020-11-07 PROCEDURE — 87086 URINE CULTURE/COLONY COUNT: CPT

## 2020-11-07 RX ORDER — CIPROFLOXACIN 500 MG/1
500 TABLET ORAL
COMMUNITY
Start: 2020-11-06 | End: 2020-11-16

## 2020-11-07 RX ORDER — ACETAMINOPHEN 500 MG
1000 TABLET ORAL
Status: COMPLETED | OUTPATIENT
Start: 2020-11-07 | End: 2020-11-07

## 2020-11-07 RX ORDER — PHENAZOPYRIDINE HYDROCHLORIDE 200 MG/1
200 TABLET, FILM COATED ORAL
COMMUNITY
Start: 2020-11-06 | End: 2020-11-09

## 2020-11-07 RX ADMIN — CEFTRIAXONE 1 G: 1 INJECTION, SOLUTION INTRAVENOUS at 09:11

## 2020-11-07 RX ADMIN — SODIUM CHLORIDE 1000 ML: 0.9 INJECTION, SOLUTION INTRAVENOUS at 10:11

## 2020-11-07 RX ADMIN — ACETAMINOPHEN 1000 MG: 500 TABLET ORAL at 10:11

## 2020-11-08 VITALS
OXYGEN SATURATION: 95 % | RESPIRATION RATE: 20 BRPM | TEMPERATURE: 98 F | DIASTOLIC BLOOD PRESSURE: 52 MMHG | SYSTOLIC BLOOD PRESSURE: 110 MMHG | BODY MASS INDEX: 32.61 KG/M2 | HEIGHT: 64 IN | WEIGHT: 191 LBS | HEART RATE: 78 BPM

## 2020-11-08 PROBLEM — M19.90 ARTHRITIS: Status: ACTIVE | Noted: 2020-11-08

## 2020-11-08 PROBLEM — N12 PYELONEPHRITIS: Status: ACTIVE | Noted: 2020-11-08

## 2020-11-08 LAB
ALBUMIN SERPL BCP-MCNC: 3.2 G/DL (ref 3.5–5.2)
ALP SERPL-CCNC: 77 U/L (ref 55–135)
ALT SERPL W/O P-5'-P-CCNC: 19 U/L (ref 10–44)
ANION GAP SERPL CALC-SCNC: 12 MMOL/L (ref 8–16)
AST SERPL-CCNC: 13 U/L (ref 10–40)
BASOPHILS # BLD AUTO: 0.03 K/UL (ref 0–0.2)
BASOPHILS NFR BLD: 0.3 % (ref 0–1.9)
BILIRUB SERPL-MCNC: 0.6 MG/DL (ref 0.1–1)
BUN SERPL-MCNC: 16 MG/DL (ref 8–23)
CALCIUM SERPL-MCNC: 8.8 MG/DL (ref 8.7–10.5)
CHLORIDE SERPL-SCNC: 108 MMOL/L (ref 95–110)
CO2 SERPL-SCNC: 23 MMOL/L (ref 23–29)
CREAT SERPL-MCNC: 0.8 MG/DL (ref 0.5–1.4)
DIFFERENTIAL METHOD: ABNORMAL
EOSINOPHIL # BLD AUTO: 0.1 K/UL (ref 0–0.5)
EOSINOPHIL NFR BLD: 1.3 % (ref 0–8)
ERYTHROCYTE [DISTWIDTH] IN BLOOD BY AUTOMATED COUNT: 15 % (ref 11.5–14.5)
EST. GFR  (AFRICAN AMERICAN): >60 ML/MIN/1.73 M^2
EST. GFR  (NON AFRICAN AMERICAN): >60 ML/MIN/1.73 M^2
GLUCOSE SERPL-MCNC: 94 MG/DL (ref 70–110)
HCT VFR BLD AUTO: 42.2 % (ref 37–48.5)
HGB BLD-MCNC: 13.2 G/DL (ref 12–16)
IMM GRANULOCYTES # BLD AUTO: 0.04 K/UL (ref 0–0.04)
IMM GRANULOCYTES NFR BLD AUTO: 0.4 % (ref 0–0.5)
LYMPHOCYTES # BLD AUTO: 1.3 K/UL (ref 1–4.8)
LYMPHOCYTES NFR BLD: 11.5 % (ref 18–48)
MAGNESIUM SERPL-MCNC: 2 MG/DL (ref 1.6–2.6)
MCH RBC QN AUTO: 27.4 PG (ref 27–31)
MCHC RBC AUTO-ENTMCNC: 31.3 G/DL (ref 32–36)
MCV RBC AUTO: 88 FL (ref 82–98)
MONOCYTES # BLD AUTO: 0.5 K/UL (ref 0.3–1)
MONOCYTES NFR BLD: 4.8 % (ref 4–15)
NEUTROPHILS # BLD AUTO: 9.1 K/UL (ref 1.8–7.7)
NEUTROPHILS NFR BLD: 81.7 % (ref 38–73)
NRBC BLD-RTO: 0 /100 WBC
PLATELET # BLD AUTO: 253 K/UL (ref 150–350)
PMV BLD AUTO: 9.2 FL (ref 9.2–12.9)
POTASSIUM SERPL-SCNC: 4.2 MMOL/L (ref 3.5–5.1)
PROT SERPL-MCNC: 6.8 G/DL (ref 6–8.4)
RBC # BLD AUTO: 4.81 M/UL (ref 4–5.4)
SODIUM SERPL-SCNC: 143 MMOL/L (ref 136–145)
WBC # BLD AUTO: 11.15 K/UL (ref 3.9–12.7)

## 2020-11-08 PROCEDURE — G0378 HOSPITAL OBSERVATION PER HR: HCPCS

## 2020-11-08 PROCEDURE — 85025 COMPLETE CBC W/AUTO DIFF WBC: CPT

## 2020-11-08 PROCEDURE — 96376 TX/PRO/DX INJ SAME DRUG ADON: CPT

## 2020-11-08 PROCEDURE — 25000242 PHARM REV CODE 250 ALT 637 W/ HCPCS: Performed by: NURSE PRACTITIONER

## 2020-11-08 PROCEDURE — 80053 COMPREHEN METABOLIC PANEL: CPT

## 2020-11-08 PROCEDURE — 27000221 HC OXYGEN, UP TO 24 HOURS

## 2020-11-08 PROCEDURE — 83735 ASSAY OF MAGNESIUM: CPT

## 2020-11-08 PROCEDURE — 96361 HYDRATE IV INFUSION ADD-ON: CPT

## 2020-11-08 PROCEDURE — 63600175 PHARM REV CODE 636 W HCPCS: Performed by: NURSE PRACTITIONER

## 2020-11-08 PROCEDURE — 25000003 PHARM REV CODE 250: Performed by: NURSE PRACTITIONER

## 2020-11-08 PROCEDURE — 36415 COLL VENOUS BLD VENIPUNCTURE: CPT

## 2020-11-08 PROCEDURE — 94761 N-INVAS EAR/PLS OXIMETRY MLT: CPT

## 2020-11-08 PROCEDURE — 87040 BLOOD CULTURE FOR BACTERIA: CPT | Mod: 59

## 2020-11-08 PROCEDURE — 94640 AIRWAY INHALATION TREATMENT: CPT

## 2020-11-08 RX ORDER — LANOLIN ALCOHOL/MO/W.PET/CERES
800 CREAM (GRAM) TOPICAL
Status: DISCONTINUED | OUTPATIENT
Start: 2020-11-08 | End: 2020-11-08 | Stop reason: HOSPADM

## 2020-11-08 RX ORDER — HYDROCODONE BITARTRATE AND ACETAMINOPHEN 7.5; 325 MG/1; MG/1
1 TABLET ORAL 2 TIMES DAILY
COMMUNITY
End: 2022-06-02

## 2020-11-08 RX ORDER — IBUPROFEN 200 MG
16 TABLET ORAL
Status: DISCONTINUED | OUTPATIENT
Start: 2020-11-08 | End: 2020-11-08 | Stop reason: HOSPADM

## 2020-11-08 RX ORDER — ONDANSETRON 2 MG/ML
4 INJECTION INTRAMUSCULAR; INTRAVENOUS EVERY 6 HOURS PRN
Status: DISCONTINUED | OUTPATIENT
Start: 2020-11-08 | End: 2020-11-08 | Stop reason: HOSPADM

## 2020-11-08 RX ORDER — SODIUM CHLORIDE 0.9 % (FLUSH) 0.9 %
10 SYRINGE (ML) INJECTION
Status: DISCONTINUED | OUTPATIENT
Start: 2020-11-08 | End: 2020-11-08 | Stop reason: HOSPADM

## 2020-11-08 RX ORDER — POTASSIUM CHLORIDE 1.5 G/1.58G
40 POWDER, FOR SOLUTION ORAL
Status: DISCONTINUED | OUTPATIENT
Start: 2020-11-08 | End: 2020-11-08 | Stop reason: HOSPADM

## 2020-11-08 RX ORDER — SODIUM CHLORIDE 9 MG/ML
INJECTION, SOLUTION INTRAVENOUS CONTINUOUS
Status: DISCONTINUED | OUTPATIENT
Start: 2020-11-08 | End: 2020-11-08 | Stop reason: HOSPADM

## 2020-11-08 RX ORDER — GABAPENTIN 100 MG/1
100 CAPSULE ORAL 3 TIMES DAILY
Status: DISCONTINUED | OUTPATIENT
Start: 2020-11-08 | End: 2020-11-08 | Stop reason: HOSPADM

## 2020-11-08 RX ORDER — CYCLOBENZAPRINE HCL 5 MG
5 TABLET ORAL 2 TIMES DAILY
COMMUNITY
End: 2023-11-02 | Stop reason: DRUGHIGH

## 2020-11-08 RX ORDER — IPRATROPIUM BROMIDE AND ALBUTEROL SULFATE 2.5; .5 MG/3ML; MG/3ML
3 SOLUTION RESPIRATORY (INHALATION)
Status: DISCONTINUED | OUTPATIENT
Start: 2020-11-08 | End: 2020-11-08 | Stop reason: HOSPADM

## 2020-11-08 RX ORDER — ACETAMINOPHEN 325 MG/1
650 TABLET ORAL EVERY 4 HOURS PRN
Status: DISCONTINUED | OUTPATIENT
Start: 2020-11-08 | End: 2020-11-08 | Stop reason: HOSPADM

## 2020-11-08 RX ORDER — INSULIN ASPART 100 [IU]/ML
0-5 INJECTION, SOLUTION INTRAVENOUS; SUBCUTANEOUS
Status: DISCONTINUED | OUTPATIENT
Start: 2020-11-08 | End: 2020-11-08 | Stop reason: HOSPADM

## 2020-11-08 RX ORDER — IBUPROFEN 200 MG
24 TABLET ORAL
Status: DISCONTINUED | OUTPATIENT
Start: 2020-11-08 | End: 2020-11-08 | Stop reason: HOSPADM

## 2020-11-08 RX ORDER — GLUCAGON 1 MG
1 KIT INJECTION
Status: DISCONTINUED | OUTPATIENT
Start: 2020-11-08 | End: 2020-11-08 | Stop reason: HOSPADM

## 2020-11-08 RX ADMIN — CEFTRIAXONE 1 G: 1 INJECTION, SOLUTION INTRAVENOUS at 09:11

## 2020-11-08 RX ADMIN — SODIUM CHLORIDE: 0.9 INJECTION, SOLUTION INTRAVENOUS at 04:11

## 2020-11-08 RX ADMIN — IPRATROPIUM BROMIDE AND ALBUTEROL SULFATE 3 ML: .5; 3 SOLUTION RESPIRATORY (INHALATION) at 09:11

## 2020-11-08 RX ADMIN — GABAPENTIN 100 MG: 100 CAPSULE ORAL at 08:11

## 2020-11-08 NOTE — ASSESSMENT & PLAN NOTE
Noted in EMR - patient states she does not take any of her home medications anymore except for Voltaren, Norco, Gabapentin, and Flexeril.

## 2020-11-08 NOTE — ED PROVIDER NOTES
"Encounter Date: 11/7/2020    SCRIBE #1 NOTE: I, Darryl Myrick, am scribing for, and in the presence of, Dr. Champion.       History     Chief Complaint   Patient presents with    Flank Pain     to right side since yesterday     Time seen by provider: 8:39 PM on 11/07/2020      Alma Rosa Helm is a 65 y.o. female with a PMHx of COPD, Anxiety, bipolar disorder, and nephrolithiasis who presents to the ED via EMS for right flank pain that started 1 day ago. She states that she was recently diagnosed with a UTI and states that she has known "Stones in my kidney." Patient reports that while she was at home she started having vomiting and some nausea. She states that she was complaining of right flank and right sided abdominal pain that she describes as a fullness. The patient does admit to having some difficulty urinating, stating "It comes out in dribbles sometimes." The patient denies SOB, chest pain, pain with urination, fever, blood in urine, or any other complaint at this time. The patient has a PSHx of hysterectomy, lithotripsy, tubal ligation, back surgery, and cystoscopy.       The history is provided by the patient.     Review of patient's allergies indicates:   Allergen Reactions    Tramadol Swelling    Imitrex [sumatriptan succinate]     Penicillins     Sulfa (sulfonamide antibiotics)     Toradol [ketorolac] Swelling     Past Medical History:   Diagnosis Date    Anxiety     Bipolar disorder     COPD, mild     Nephrolithiasis      Past Surgical History:   Procedure Laterality Date    BACK SURGERY      CYSTOSCOPY      HYSTERECTOMY      LITHOTRIPSY      TUBAL LIGATION       Family History   Problem Relation Age of Onset    Cancer Mother     Diabetes Mother     COPD Father     Hypertension Father      Social History     Tobacco Use    Smoking status: Current Every Day Smoker     Packs/day: 0.50     Years: 45.00     Pack years: 22.50     Types: Cigarettes     Start date: 1976     Last attempt to " quit: 2020     Years since quittin.5    Smokeless tobacco: Never Used    Tobacco comment: now smoking 1/4 ppd   Substance Use Topics    Alcohol use: Never     Frequency: Never    Drug use: No     Review of Systems   Constitutional: Negative for appetite change, chills, fever and unexpected weight change.   HENT: Negative for congestion, ear pain, hearing loss and sore throat.    Eyes: Negative for pain and visual disturbance.   Respiratory: Negative for cough, shortness of breath and wheezing.    Cardiovascular: Negative for chest pain, palpitations and leg swelling.   Gastrointestinal: Negative for abdominal pain, blood in stool, diarrhea, nausea and vomiting.   Genitourinary: Positive for difficulty urinating and flank pain. Negative for dysuria and hematuria.   Musculoskeletal: Negative for back pain, gait problem, myalgias, neck pain and neck stiffness.   Skin: Negative for rash.   Neurological: Negative for syncope, weakness, numbness and headaches.   Hematological: Does not bruise/bleed easily.       Physical Exam     Initial Vitals [20 1925]   BP Pulse Resp Temp SpO2   (!) 152/67 (!) 111 20 (!) 101.7 °F (38.7 °C) (!) 94 %      MAP       --         Physical Exam    Nursing note and vitals reviewed.  Constitutional: She appears well-developed.  Non-toxic appearance.   Nontoxic, well appearing female.    HENT:   Head: Normocephalic and atraumatic.   Eyes: EOM are normal. Pupils are equal, round, and reactive to light.   Neck: Neck supple.   Cardiovascular: Normal rate, regular rhythm, normal heart sounds and intact distal pulses. Exam reveals no gallop and no friction rub.    No murmur heard.  Pulmonary/Chest: No respiratory distress. She has no decreased breath sounds. She has wheezes. She has no rhonchi. She has no rales.   Bilateral expiratory wheezing   Abdominal: Soft. Bowel sounds are normal. She exhibits no distension. There is no abdominal tenderness. There is CVA tenderness.   Right  flank tenderness to palpation   Musculoskeletal: Normal range of motion.   Neurological: She is alert and oriented to person, place, and time.   Skin: Skin is warm and dry.   Psychiatric: She has a normal mood and affect.         ED Course   Procedures  Labs Reviewed   COMPREHENSIVE METABOLIC PANEL - Abnormal; Notable for the following components:       Result Value    CO2 20 (*)     Glucose 126 (*)     Albumin 3.4 (*)     All other components within normal limits   URINALYSIS, REFLEX TO URINE CULTURE - Abnormal; Notable for the following components:    Protein, UA 1+ (*)     Occult Blood UA 1+ (*)     Nitrite, UA Positive (*)     Leukocytes, UA Trace (*)     All other components within normal limits    Narrative:     Specimen Source->Urine   CBC W/ AUTO DIFFERENTIAL - Abnormal; Notable for the following components:    MCHC 31.9 (*)     RDW 14.7 (*)     MPV 9.0 (*)     Gran # (ANC) 10.9 (*)     Immature Grans (Abs) 0.05 (*)     Lymph # 0.5 (*)     Gran % 92.8 (*)     Lymph % 4.3 (*)     Mono % 2.1 (*)     All other components within normal limits   URINALYSIS MICROSCOPIC - Abnormal; Notable for the following components:    RBC, UA 10 (*)     WBC, UA 31 (*)     Bacteria Many (*)     All other components within normal limits    Narrative:     Specimen Source->Urine   SARS-COV-2 RNA AMPLIFICATION, QUAL          Imaging Results          X-Ray Chest AP Portable (Final result)  Result time 11/07/20 23:29:11    Final result by Danny Banda DO (11/07/20 23:29:11)                 Impression:      No acute abnormality.      Electronically signed by: Danny Banda  Date:    11/07/2020  Time:    23:29             Narrative:    EXAMINATION:  XR CHEST AP PORTABLE    CLINICAL HISTORY:  Shortness of breath.    TECHNIQUE:  Single frontal view of the chest was performed.    COMPARISON:  Chest radiographs from 08/08/2020, 04/06/2014.    FINDINGS:  The lungs are well expanded and clear. No focal opacities are seen. The pleural  spaces are clear.  The cardiac silhouette is unremarkable.  The visualized osseous structures are unremarkable.                               CT Renal Stone Study ABD Pelvis WO (Final result)  Result time 11/07/20 21:38:51    Final result by Danny Banda DO (11/07/20 21:38:51)                 Impression:      1. Nonobstructing stones within the right kidney, the largest of which measures 9 mm.  No hydronephrosis or obstructing stone.  2. Cholelithiasis without acute cholecystitis.  3. Colonic diverticulosis without acute diverticulitis.  4. Bilateral adrenal adenomas, unchanged.  5. Left renal angiomyolipoma, unchanged.      Electronically signed by: Danny Banda  Date:    11/07/2020  Time:    21:38             Narrative:    EXAMINATION:  CT RENAL STONE STUDY ABD PELVIS WO    CLINICAL HISTORY:  Flank pain, kidney stone suspected;    TECHNIQUE:  Multiplanar images were obtained of the abdomen and pelvis from the hemidiaphragms through the symphysis pubis without intravenous contrast.    COMPARISON:  CT of the abdomen and pelvis from 01/24/2019 and additional priors.    FINDINGS:  Lung Bases: There is subsegmental atelectasis of the right posterior lower lobe.    Heart: Heart size is normal. No pericardial effusion.    Liver: The liver is enlarged.  No focal hepatic lesion.    Biliary tract: No intrahepatic or extrahepatic biliary ductal dilatation.    Gallbladder: There is a radiodense gallstone.  No wall thickening or pericholecystic fluid.    Pancreas: Normal. No pancreatic ductal dilatation.    Spleen: Normal.    Adrenals: There is a 2.0 right adrenal adenoma and a 1.9 cm left adrenal adenoma.    Kidneys and urinary collecting systems: There are nonobstructing stones within the right kidney, the largest of which measures 9 mm.  There is no hydronephrosis.  There is a gross fat containing lesion within the left kidney midpole measuring 1.5 cm, unchanged from prior, compatible with an angiomyolipoma.    Lymph  nodes: None enlarged.    Stomach and bowel: The stomach is normal. Loops of small and large bowel are normal in caliber without evidence for inflammation or obstruction.  There is colonic diverticulosis without evidence of acute diverticulitis.  The appendix is normal.    Peritoneum and mesentery: No ascites or free intraperitoneal air. No abdominal fluid collection.    Vasculature: Moderate atherosclerosis of the abdominal aorta.    Urinary bladder: Normal.    Reproductive organs: The uterus is surgically absent.    Body wall: No abnormality.    Musculoskeletal: No aggressive osseous lesion.                                 Medical Decision Making:   History:   Old Medical Records: I decided to obtain old medical records.  Clinical Tests:   Lab Tests: Ordered and Reviewed  Radiological Study: Ordered and Reviewed            Scribe Attestation:   Scribe #1: I performed the above scribed service and the documentation accurately describes the services I performed. I attest to the accuracy of the note.    I, Dr. Darryl Champion personally performed the services described in this documentation. All medical record entries made by the scribe were at my direction and in my presence.  I have reviewed the chart and agree that the record reflects my personal performance and is accurate and complete. Darryl Champion MD.  6:25 AM 11/10/2020    DISCLAIMER: This note was prepared with Dragon NaturallySpeaking voice recognition transcription software. Garbled syntax, mangled pronouns, and other bizarre constructions may be attributed to that software system         ED Course as of Nov 10 0625   Sat Nov 07, 2020 2130 Patient appears to have urinary tract infection.  Will give a dose of Rocephin here.    [JS]   2201 Patient likely has a kidney stone , but no signs of ureterolithiasis at this time.  She does have urinary tract infection.  I gave her dose of IV Rocephin.  She feels better here in the ER and would like to go home.   She needs to follow up with Urology.  I do not believe this is cholecystitis.  No signs of increased LFTs.    [JS]   2208 Patient is febrile tachycardic has urinary tract infection nephrolithiasis.  No evidence of ureterolithiasis on CT.  She is tender over the right flank.  She is vomiting.  She was already on outpatient antibiotics.  She has failed outpatient antibiotic therapy and I believe has pyelonephritis.  She is also has a history of COPD and is satting 91% with a cough.  I will add on a chest x-ray to evaluate for pneumonia.  COVID test order.  Will admit for IV antibiotics.  I am concerned about sepsis.    [JS]   2208 Originally I thought the patient would go home and I ordered IV antibiotics however she still feels bad and therefore to admitted to the hospital pyelonephritis    [JS]      ED Course User Index  [JS] Darryl Champion MD            Clinical Impression:     ICD-10-CM ICD-9-CM   1. Pyelonephritis  N12 590.80     R07.9 786.50                          ED Disposition Condition    Observation                             Darryl Champion MD  11/10/20 0625

## 2020-11-08 NOTE — ED NOTES
Presented to room 6 by EMS via stretcher with complaints of right flank pain and vomiting that has since resolved stated toradol given in ambulance relieved pain. Denies any pain at present

## 2020-11-08 NOTE — SUBJECTIVE & OBJECTIVE
Past Medical History:   Diagnosis Date    Anxiety     Bipolar disorder     COPD, mild     Nephrolithiasis        Past Surgical History:   Procedure Laterality Date    BACK SURGERY      CYSTOSCOPY      HYSTERECTOMY      LITHOTRIPSY      TUBAL LIGATION         Review of patient's allergies indicates:   Allergen Reactions    Tramadol Swelling    Imitrex [sumatriptan succinate]     Penicillins     Sulfa (sulfonamide antibiotics)     Toradol [ketorolac] Swelling       No current facility-administered medications on file prior to encounter.      Current Outpatient Medications on File Prior to Encounter   Medication Sig    ciprofloxacin HCl (CIPRO) 500 MG tablet Take 500 mg by mouth.    cyclobenzaprine (FLEXERIL) 5 MG tablet Take 5 mg by mouth 2 (two) times a day.    HYDROcodone-acetaminophen (NORCO) 7.5-325 mg per tablet Take 1 tablet by mouth 2 (two) times a day.    phenazopyridine (PYRIDIUM) 200 MG tablet Take 200 mg by mouth.    albuterol-ipratropium (DUO-NEB) 2.5 mg-0.5 mg/3 mL nebulizer solution Take 3 mLs by nebulization every 6 (six) hours as needed for Wheezing. Rescue    butalbital-acetaminophen-caffeine -40 mg (FIORICET, ESGIC) -40 mg per tablet Take 1 tablet by mouth.    clonazePAM (KLONOPIN) 0.5 MG tablet Take 0.5 mg by mouth 3 (three) times daily.     diclofenac (VOLTAREN) 50 MG EC tablet Take 50 mg by mouth.    doxepin (SINEQUAN) 10 MG capsule Take 10 mg by mouth.    doxycycline (VIBRA-TABS) 100 MG tablet Take 1 tablet (100 mg total) by mouth 2 (two) times daily.    estradiol (ESTRACE) 1 MG tablet Take 1 mg by mouth.    fluticasone furoate-vilanteroL (BREO ELLIPTA) 100-25 mcg/dose diskus inhaler Inhale 1 puff into the lungs once daily. Controller Rinse after you use it    gabapentin (NEURONTIN) 100 MG capsule Take 300 mg by mouth 3 (three) times daily.     HYDROcodone-acetaminophen (NORCO) 5-325 mg per tablet Take 1 tablet by mouth 4 (four) times daily as needed.  (Patient not taking: Reported on 2020)    lurasidone 60 mg Tab tablet Take 80 mg by mouth once daily.     methylphenidate (CONCERTA) 36 MG CR tablet Take 54 mg by mouth every morning.     mometasone-formoterol (DULERA) 100-5 mcg/actuation HFAA Inhale 1 puff into the lungs.    nitroGLYCERIN (NITROSTAT) 0.4 MG SL tablet Place 0.4 mg under the tongue.    ondansetron (ZOFRAN) 4 MG tablet Take 1 tablet (4 mg total) by mouth every 6 (six) hours. (Patient not taking: Reported on 3/3/2020)    oxybutynin (DITROPAN) 5 MG Tab Take 5 mg by mouth.    pravastatin (PRAVACHOL) 20 MG tablet Take 20 mg by mouth.    predniSONE (DELTASONE) 10 MG tablet Take 4 tabs x 2 days, then take 3 tabs x 2 days, then take 2 tabs x 2 days, then take 1 tab x 2 days.    propranolol (INDERAL) 80 MG tablet Take 80 mg by mouth.    simvastatin (ZOCOR) 20 MG tablet Take 20 mg by mouth.    tamsulosin (FLOMAX) 0.4 mg Cap Take 1 capsule (0.4 mg total) by mouth once daily.    tiZANidine (ZANAFLEX) 4 MG tablet     topiramate (TOPAMAX) 25 MG tablet Take 1 QHS x 1 week, 2 QHS x 1 week, 3 QHS x 1 week, then 4 QHS    traMADol (ULTRAM) 50 mg tablet Take 50 mg by mouth every 8 (eight) hours as needed.    vortioxetine 10 mg Tab Take 20 mg by mouth once daily.     ziprasidone (GEODON) 40 MG Cap Take 40 mg by mouth.     Family History     Problem Relation (Age of Onset)    COPD Father    Cancer Mother    Diabetes Mother    Hypertension Father        Tobacco Use    Smoking status: Current Every Day Smoker     Packs/day: 0.50     Years: 45.00     Pack years: 22.50     Types: Cigarettes     Start date:      Last attempt to quit: 2020     Years since quittin.5    Smokeless tobacco: Never Used    Tobacco comment: now smoking 1/4 ppd   Substance and Sexual Activity    Alcohol use: Never     Frequency: Never    Drug use: No    Sexual activity: Not on file     Review of Systems   Constitutional: Positive for activity change, chills and  fatigue. Negative for appetite change and fever.   HENT: Negative for congestion, sinus pressure, sinus pain and sore throat.    Eyes: Negative for photophobia and visual disturbance.   Respiratory: Negative for cough, choking, chest tightness, shortness of breath and wheezing.    Cardiovascular: Negative for chest pain, palpitations and leg swelling.   Gastrointestinal: Positive for abdominal pain, nausea and vomiting. Negative for abdominal distention, blood in stool, constipation and diarrhea.   Genitourinary: Positive for dysuria. Negative for difficulty urinating, flank pain and hematuria.   Musculoskeletal: Positive for arthralgias, back pain and joint swelling. Negative for gait problem.   Skin: Negative for rash and wound.   Neurological: Positive for weakness. Negative for dizziness, syncope, light-headedness, numbness and headaches.   Psychiatric/Behavioral: Negative for confusion. The patient is not nervous/anxious.      Objective:     Vital Signs (Most Recent):  Temp: 97.6 °F (36.4 °C) (11/08/20 0428)  Pulse: 69 (11/08/20 0428)  Resp: 20 (11/08/20 0428)  BP: (!) 100/49 (11/08/20 0428)  SpO2: 99 % (11/08/20 0428) Vital Signs (24h Range):  Temp:  [97.5 °F (36.4 °C)-101.7 °F (38.7 °C)] 97.6 °F (36.4 °C)  Pulse:  [] 69  Resp:  [20] 20  SpO2:  [93 %-99 %] 99 %  BP: ()/(47-67) 100/49     Weight: 81.6 kg (180 lb)  Body mass index is 30.9 kg/m².    Physical Exam  Vitals signs and nursing note reviewed.   Constitutional:       General: She is not in acute distress.     Appearance: She is well-developed. She is not diaphoretic.   HENT:      Head: Normocephalic and atraumatic.   Eyes:      General:         Right eye: No discharge.         Left eye: No discharge.      Pupils: Pupils are equal, round, and reactive to light.   Neck:      Musculoskeletal: Normal range of motion.      Vascular: No JVD.   Cardiovascular:      Rate and Rhythm: Normal rate and regular rhythm.      Heart sounds: Normal heart  sounds. No murmur.   Pulmonary:      Effort: Pulmonary effort is normal. No respiratory distress.      Breath sounds: Normal breath sounds. No wheezing or rales.      Comments: 2L via NC , in no acute distress    Chest:      Chest wall: No tenderness.   Abdominal:      General: Bowel sounds are normal. There is no distension.      Palpations: Abdomen is soft.      Tenderness: There is no abdominal tenderness. There is right CVA tenderness. There is no guarding or rebound.   Genitourinary:     Comments: Exam Deferred     Musculoskeletal: Normal range of motion.         General: No tenderness or deformity.   Skin:     General: Skin is warm and dry.      Capillary Refill: Capillary refill takes 2 to 3 seconds.      Findings: No erythema or rash.   Neurological:      Mental Status: She is alert and oriented to person, place, and time.      Cranial Nerves: No cranial nerve deficit.      Sensory: No sensory deficit.   Psychiatric:         Behavior: Behavior normal.           CRANIAL NERVES     CN III, IV, VI   Pupils are equal, round, and reactive to light.       Significant Labs:   CBC:   Recent Labs   Lab 11/07/20 2125   WBC 11.71   HGB 13.6   HCT 42.7        CMP:   Recent Labs   Lab 11/07/20 2125      K 3.8      CO2 20*   *   BUN 16   CREATININE 0.8   CALCIUM 9.1   PROT 6.9   ALBUMIN 3.4*   BILITOT 0.6   ALKPHOS 81   AST 14   ALT 21   ANIONGAP 11   EGFRNONAA >60     Urine Studies:   Recent Labs   Lab 11/07/20 2056   COLORU Yellow   APPEARANCEUA Clear   PHUR 6.0   SPECGRAV 1.020   PROTEINUA 1+*   GLUCUA Negative   KETONESU Negative   BILIRUBINUA Negative   OCCULTUA 1+*   NITRITE Positive*   UROBILINOGEN Negative   LEUKOCYTESUR Trace*   RBCUA 10*   WBCUA 31*   BACTERIA Many*   SQUAMEPITHEL 2   HYALINECASTS 0     COVID-negative    All pertinent labs within the past 24 hours have been reviewed.    Significant Imaging: I have reviewed all pertinent imaging results/findings within the past 24  hours.     Chest x-ray:  Impression:       No acute abnormality.      CT renal stone study abdomen pelvis without:  Impression:       1. Nonobstructing stones within the right kidney, the largest of which measures 9 mm.  No hydronephrosis or obstructing stone.   2. Cholelithiasis without acute cholecystitis.   3. Colonic diverticulosis without acute diverticulitis.   4. Bilateral adrenal adenomas, unchanged.   5. Left renal angiomyolipoma, unchanged

## 2020-11-08 NOTE — ASSESSMENT & PLAN NOTE
Patient was initiated on IV Rocephin in the emergency room, will continue upon admission to the hospital.  Urine culture pending.  IV fluid hydration.

## 2020-11-08 NOTE — PLAN OF CARE
Pt sleeping. Admit assessment done. Pt admitted to floor from ER in stable condition. All needs in reach. Continuing to monitor.

## 2020-11-08 NOTE — PLAN OF CARE
VSS. Pt denied pain, N/V. Pt states she is feeling better. O2 removed this am. O2 home trial completed by respiratory. Pt ambulated in [place] with respiratory and in room independently.

## 2020-11-08 NOTE — HPI
Alma Rosa Helm is a 65-year-old female with a past medical history of bipolar disorder, hypertension, hyperlipidemia, and arthritis who presents to the emergency room tonight with reports of vomiting.  Patient states she developed vomiting, projectile and trembling tonight prior to arrival to emergency room.  Patient states she was seen on 11/06/2020 by her family doctor for abdominal pain and flank pain.  Patient was given Cipro prescription and Pyridium.  Patient states she has yet to start the Pyridium, however has taken 2 doses of Cipro.  [facility] suspected fever with her symptoms.  Patient denies chest pain, shortness of breath, dysuria, hematuria, syncope, diarrhea.  In the emergency room patient noted to have pyelonephritis, was initiated on IV Rocephin.  Patient placed in observation on November 8, 2020 at approximately 3:00 a.m..  Patient not accompanied by any visitors during my initial interview and physical exam.

## 2020-11-08 NOTE — NURSING
Discharge instructions given to pt. All questions answered. Pt verbalized understanding. Pt waiting on ride at this time.

## 2020-11-08 NOTE — H&P
[facility] Medicine  History & Physical    Patient Name: Alma Rosa Helm  MRN: [id number]  Admission Date: 11/7/2020  Attending Physician: Prerna Espitia MD   Primary Care Provider: Cindy Lowe NP         Patient information was obtained from patient, past medical records and ER records.     Subjective:     Principal Problem:Pyelonephritis    Chief Complaint:   Chief Complaint   Patient presents with    Flank Pain     to right side since yesterday        HPI: Alma Rosa Helm is a 65-year-old female with a past medical history of bipolar disorder, hypertension, hyperlipidemia, and arthritis who presents to the emergency room tonight with reports of vomiting.  Patient states she developed vomiting, projectile and trembling tonight prior to arrival to emergency room.  Patient states she was seen on 11/06/2020 by her family doctor for abdominal pain and flank pain.  Patient was given Cipro prescription and Pyridium.  Patient states she has yet to start the Pyridium, however has taken 2 doses of Cipro.  Patient states she did not check her temperature prior to arrival to emergency room tonight, but however suspected fever with her symptoms.  Patient denies chest pain, shortness of breath, dysuria, hematuria, syncope, diarrhea.  In the emergency room patient noted to have pyelonephritis, was initiated on IV Rocephin.  Patient placed in observation on November 8, 2020 at approximately 3:00 a.m..  Patient not accompanied by any visitors during my initial interview and physical exam.    Past Medical History:   Diagnosis Date    Anxiety     Bipolar disorder     COPD, mild     Nephrolithiasis        Past Surgical History:   Procedure Laterality Date    BACK SURGERY      CYSTOSCOPY      HYSTERECTOMY      LITHOTRIPSY      TUBAL LIGATION         Review of patient's allergies indicates:   Allergen Reactions    Tramadol Swelling    Imitrex [sumatriptan succinate]     Penicillins      Sulfa (sulfonamide antibiotics)     Toradol [ketorolac] Swelling       No current facility-administered medications on file prior to encounter.      Current Outpatient Medications on File Prior to Encounter   Medication Sig    ciprofloxacin HCl (CIPRO) 500 MG tablet Take 500 mg by mouth.    cyclobenzaprine (FLEXERIL) 5 MG tablet Take 5 mg by mouth 2 (two) times a day.    HYDROcodone-acetaminophen (NORCO) 7.5-325 mg per tablet Take 1 tablet by mouth 2 (two) times a day.    phenazopyridine (PYRIDIUM) 200 MG tablet Take 200 mg by mouth.    albuterol-ipratropium (DUO-NEB) 2.5 mg-0.5 mg/3 mL nebulizer solution Take 3 mLs by nebulization every 6 (six) hours as needed for Wheezing. Rescue    butalbital-acetaminophen-caffeine -40 mg (FIORICET, ESGIC) -40 mg per tablet Take 1 tablet by mouth.    clonazePAM (KLONOPIN) 0.5 MG tablet Take 0.5 mg by mouth 3 (three) times daily.     diclofenac (VOLTAREN) 50 MG EC tablet Take 50 mg by mouth.    doxepin (SINEQUAN) 10 MG capsule Take 10 mg by mouth.    doxycycline (VIBRA-TABS) 100 MG tablet Take 1 tablet (100 mg total) by mouth 2 (two) times daily.    estradiol (ESTRACE) 1 MG tablet Take 1 mg by mouth.    fluticasone furoate-vilanteroL (BREO ELLIPTA) 100-25 mcg/dose diskus inhaler Inhale 1 puff into the lungs once daily. Controller Rinse after you use it    gabapentin (NEURONTIN) 100 MG capsule Take 300 mg by mouth 3 (three) times daily.     HYDROcodone-acetaminophen (NORCO) 5-325 mg per tablet Take 1 tablet by mouth 4 (four) times daily as needed. (Patient not taking: Reported on 5/7/2020)    lurasidone 60 mg Tab tablet Take 80 mg by mouth once daily.     methylphenidate (CONCERTA) 36 MG CR tablet Take 54 mg by mouth every morning.     mometasone-formoterol (DULERA) 100-5 mcg/actuation HFAA Inhale 1 puff into the lungs.    nitroGLYCERIN (NITROSTAT) 0.4 MG SL tablet Place 0.4 mg under the tongue.    ondansetron (ZOFRAN) 4 MG tablet Take 1 tablet (4 mg  total) by mouth every 6 (six) hours. (Patient not taking: Reported on 3/3/2020)    oxybutynin (DITROPAN) 5 MG Tab Take 5 mg by mouth.    pravastatin (PRAVACHOL) 20 MG tablet Take 20 mg by mouth.    predniSONE (DELTASONE) 10 MG tablet Take 4 tabs x 2 days, then take 3 tabs x 2 days, then take 2 tabs x 2 days, then take 1 tab x 2 days.    propranolol (INDERAL) 80 MG tablet Take 80 mg by mouth.    simvastatin (ZOCOR) 20 MG tablet Take 20 mg by mouth.    tamsulosin (FLOMAX) 0.4 mg Cap Take 1 capsule (0.4 mg total) by mouth once daily.    tiZANidine (ZANAFLEX) 4 MG tablet     topiramate (TOPAMAX) 25 MG tablet Take 1 QHS x 1 week, 2 QHS x 1 week, 3 QHS x 1 week, then 4 QHS    traMADol (ULTRAM) 50 mg tablet Take 50 mg by mouth every 8 (eight) hours as needed.    vortioxetine 10 mg Tab Take 20 mg by mouth once daily.     ziprasidone (GEODON) 40 MG Cap Take 40 mg by mouth.     Family History     Problem Relation (Age of Onset)    COPD Father    Cancer Mother    Diabetes Mother    Hypertension Father        Tobacco Use    Smoking status: Current Every Day Smoker     Packs/day: 0.50     Years: 45.00     Pack years: 22.50     Types: Cigarettes     Start date:      Last attempt to quit: 2020     Years since quittin.5    Smokeless tobacco: Never Used    Tobacco comment: now smoking 1/4 ppd   Substance and Sexual Activity    Alcohol use: Never     Frequency: Never    Drug use: No    Sexual activity: Not on file     Review of Systems   Constitutional: Positive for activity change, chills and fatigue. Negative for appetite change and fever.   HENT: Negative for congestion, sinus pressure, sinus pain and sore throat.    Eyes: Negative for photophobia and visual disturbance.   Respiratory: Negative for cough, choking, chest tightness, shortness of breath and wheezing.    Cardiovascular: Negative for chest pain, palpitations and leg swelling.   Gastrointestinal: Positive for abdominal pain, nausea and  vomiting. Negative for abdominal distention, blood in stool, constipation and diarrhea.   Genitourinary: Positive for dysuria. Negative for difficulty urinating, flank pain and hematuria.   Musculoskeletal: Positive for arthralgias, back pain and joint swelling. Negative for gait problem.   Skin: Negative for rash and wound.   Neurological: Positive for weakness. Negative for dizziness, syncope, light-headedness, numbness and headaches.   Psychiatric/Behavioral: Negative for confusion. The patient is not nervous/anxious.      Objective:     Vital Signs (Most Recent):  Temp: 97.6 °F (36.4 °C) (11/08/20 0428)  Pulse: 69 (11/08/20 0428)  Resp: 20 (11/08/20 0428)  BP: (!) 100/49 (11/08/20 0428)  SpO2: 99 % (11/08/20 0428) Vital Signs (24h Range):  Temp:  [97.5 °F (36.4 °C)-101.7 °F (38.7 °C)] 97.6 °F (36.4 °C)  Pulse:  [] 69  Resp:  [20] 20  SpO2:  [93 %-99 %] 99 %  BP: ()/(47-67) 100/49     Weight: 81.6 kg (180 lb)  Body mass index is 30.9 kg/m².    Physical Exam  Vitals signs and nursing note reviewed.   Constitutional:       General: She is not in acute distress.     Appearance: She is well-developed. She is not diaphoretic.   HENT:      Head: Normocephalic and atraumatic.   Eyes:      General:         Right eye: No discharge.         Left eye: No discharge.      Pupils: Pupils are equal, round, and reactive to light.   Neck:      Musculoskeletal: Normal range of motion.      Vascular: No JVD.   Cardiovascular:      Rate and Rhythm: Normal rate and regular rhythm.      Heart sounds: Normal heart sounds. No murmur.   Pulmonary:      Effort: Pulmonary effort is normal. No respiratory distress.      Breath sounds: Normal breath sounds. No wheezing or rales.      Comments: 2L via NC , in no acute distress    Chest:      Chest wall: No tenderness.   Abdominal:      General: Bowel sounds are normal. There is no distension.      Palpations: Abdomen is soft.      Tenderness: There is no abdominal tenderness.  There is right CVA tenderness. There is no guarding or rebound.   Genitourinary:     Comments: Exam Deferred     Musculoskeletal: Normal range of motion.         General: No tenderness or deformity.   Skin:     General: Skin is warm and dry.      Capillary Refill: Capillary refill takes 2 to 3 seconds.      Findings: No erythema or rash.   Neurological:      Mental Status: She is alert and oriented to person, place, and time.      Cranial Nerves: No cranial nerve deficit.      Sensory: No sensory deficit.   Psychiatric:         Behavior: Behavior normal.           CRANIAL NERVES     CN III, IV, VI   Pupils are equal, round, and reactive to light.       Significant Labs:   CBC:   Recent Labs   Lab 11/07/20 2125   WBC 11.71   HGB 13.6   HCT 42.7        CMP:   Recent Labs   Lab 11/07/20 2125      K 3.8      CO2 20*   *   BUN 16   CREATININE 0.8   CALCIUM 9.1   PROT 6.9   ALBUMIN 3.4*   BILITOT 0.6   ALKPHOS 81   AST 14   ALT 21   ANIONGAP 11   EGFRNONAA >60     Urine Studies:   Recent Labs   Lab 11/07/20 2056   COLORU Yellow   APPEARANCEUA Clear   PHUR 6.0   SPECGRAV 1.020   PROTEINUA 1+*   GLUCUA Negative   KETONESU Negative   BILIRUBINUA Negative   OCCULTUA 1+*   NITRITE Positive*   UROBILINOGEN Negative   LEUKOCYTESUR Trace*   RBCUA 10*   WBCUA 31*   BACTERIA Many*   SQUAMEPITHEL 2   HYALINECASTS 0     COVID-negative    All pertinent labs within the past 24 hours have been reviewed.    Significant Imaging: I have reviewed all pertinent imaging results/findings within the past 24 hours.     Chest x-ray:  Impression:       No acute abnormality.      CT renal stone study abdomen pelvis without:  Impression:       1. Nonobstructing stones within the right kidney, the largest of which measures 9 mm.  No hydronephrosis or obstructing stone.   2. Cholelithiasis without acute cholecystitis.   3. Colonic diverticulosis without acute diverticulitis.   4. Bilateral adrenal adenomas, unchanged.   5.  Left renal angiomyolipoma, unchanged         Assessment/Plan:     * Pyelonephritis  Patient was initiated on IV Rocephin in the emergency room, will continue upon admission to the hospital.  Urine culture pending.  IV fluid hydration.      Arthritis  Chronic, controlled.  Continue home medication.      Nephrolithiasis  Noted, IV fluid hydration.      Bipolar disorder  Noted in EMR - patient states she does not take any of her home medications anymore except for Voltaren, Norco, Gabapentin, and Flexeril.        Nicotine addiction  Assistance with smoking cessation was offered, including:  []  Medications  [x]  Counseling  []  Printed Information on Smoking Cessation  []  Referral to a Smoking Cessation Program    Patient was counseled regarding smoking for >10 minutes.            VTE Risk Mitigation (From admission, onward)         Ordered     Place BEAR hose  Until discontinued      11/08/20 0426     IP VTE HIGH RISK PATIENT  Once      11/08/20 0404     Place sequential compression device  Until discontinued      11/08/20 0404              Time spent seeing patient( greater than 1/2 spent in direct contact) : 65 minutes     Ansley Lee NP  Department of Hospital Medicine   [facility]

## 2020-11-08 NOTE — PLAN OF CARE
Pt clear for discharge from case management standpoint. Pt discharging home with no DME/HH/infusion needs. SW remains available.         11/08/20 1111   Final Note   Assessment Type Final Discharge Note   Anticipated Discharge Disposition Home   Right Care Referral Info   Post Acute Recommendation No Care

## 2020-11-08 NOTE — CARE UPDATE
11/08/20 0716   PRE-TX-O2   O2 Device (Oxygen Therapy) nasal cannula   $ Is the patient on Low Flow Oxygen? Yes   Flow (L/min) 2   SpO2 99 %   Pulse Oximetry Type Intermittent   $ Pulse Oximetry - Multiple Charge Pulse Oximetry - Multiple

## 2020-11-08 NOTE — CARE UPDATE
11/08/20 0920   Home Oxygen Qualification   Room Air SpO2 At Rest 94 %   Room Air SpO2 on Exertion 91 %   SpO2 on Recovery 96 %   Recovery Heart Rate 78 bpm   Home O2 Eval Comments Does not qualify for home 02

## 2020-11-08 NOTE — PLAN OF CARE
SW spoke with patient to complete a discharge planning assessment. Patient presents as alert and oriented x 4, pleasant, good eye contact, well groomed, recall good, concentration/judgement good, average intelligence, calm, communicative, cooperative, and asking and answering questions appropriately. JIGNESH verified all information on demographic sheet is correct. Patient reports living with  self  and that she is independent with ADLs at this time and does drive. Patient reports daughter will transport pt home.    Patient reports does not have home health. Patient reports that she is not receiving outside resources. Patient reports having no medical equipment. Pt may discharge with oxygen pending respiratory recommendations. Pt is choosing [facility] as her provider.  Patient reports Cindy Lowe NP as pt's PCP and has [employer] as their insurance. Patient reports receiving medications from [facility] in [place] and reports that she is able to afford medications at this time and at time of discharge. Patient reports that she is not on dialysis at this time. Patient reports that she is not receiving services with the coumadin clinic. Patient reports has not been admitted to the hospital within the last 30 days.    Patient reports does not have a Living Will or Healthcare Power of . Patient denies mental health issues.    Patient expressed no other needs at this time. SW remains available.         11/08/20 0915   Discharge Assessment   Assessment Type Discharge Planning Assessment   Confirmed/corrected address and phone number on facesheet? Yes   Assessment information obtained from? Patient   Communicated expected length of stay with patient/caregiver no   Prior to hospitilization cognitive status: Alert/Oriented   Prior to hospitalization functional status: Independent   Current cognitive status: Alert/Oriented   Current Functional Status: Independent;Assistive Equipment   Lives With alone   Able to Return  to Prior Arrangements yes   Is patient able to care for self after discharge? Yes   Who are your caregiver(s) and their phone number(s)? Naomi Cortez (daughter) [phone]   Patient's perception of discharge disposition home or selfcare   Readmission Within the Last 30 Days no previous admission in last 30 days   Patient currently being followed by outpatient case management? No   Patient currently receives any other outside agency services? No   Equipment Currently Used at Home none   Part D Coverage Pt has private insurance   Do you have any problems affording any of your prescribed medications? No   Is the patient taking medications as prescribed? yes   Does the patient have transportation home? Yes   Transportation Anticipated car, drives self;family or friend will provide   Dialysis Name and Scheduled days N/A   Does the patient receive services at the Coumadin Clinic? No   Discharge Plan A Home   Discharge Plan B Home   DME Needed Upon Discharge  oxygen   Patient/Family in Agreement with Plan yes   Does the patient have transportation to healthcare appointments? Yes

## 2020-11-08 NOTE — CARE UPDATE
Receiving aerosol tx Q4     11/08/20 0925   Patient Assessment/Suction   Level of Consciousness (AVPU) alert   Respiratory Effort Short of breath   All Lung Fields Breath Sounds wheezes, expiratory;crackles   Cough Frequency infrequent   Cough Type congested   PRE-TX-O2   O2 Device (Oxygen Therapy) room air   SpO2 95 %   Pulse Oximetry Type Intermittent   $ Pulse Oximetry - Multiple Charge Pulse Oximetry - Multiple   Pulse 78   Resp 20   Aerosol Therapy   $ Aerosol Therapy Charges Aerosol Treatment   Respiratory Treatment Status (SVN) given   Treatment Route (SVN) mask;oxygen   Patient Position (SVN) semi-Hein's   Signs of Intolerance (SVN) none   Breath Sounds Post-Respiratory Treatment   Throughout All Fields Post-Treatment All Fields   Throughout All Fields Post-Treatment aeration increased   Post-treatment Heart Rate (beats/min) 75   Post-treatment Resp Rate (breaths/min) 20

## 2020-11-10 ENCOUNTER — LAB VISIT (OUTPATIENT)
Dept: LAB | Facility: OTHER | Age: 65
End: 2020-11-10
Attending: INTERNAL MEDICINE
Payer: COMMERCIAL

## 2020-11-10 ENCOUNTER — TELEPHONE (OUTPATIENT)
Dept: MEDSURG UNIT | Facility: HOSPITAL | Age: 65
End: 2020-11-10

## 2020-11-10 DIAGNOSIS — Z11.59 SPECIAL SCREENING EXAMINATION FOR UNSPECIFIED VIRAL DISEASE: ICD-10-CM

## 2020-11-10 LAB — BACTERIA UR CULT: ABNORMAL

## 2020-11-10 PROCEDURE — U0003 INFECTIOUS AGENT DETECTION BY NUCLEIC ACID (DNA OR RNA); SEVERE ACUTE RESPIRATORY SYNDROME CORONAVIRUS 2 (SARS-COV-2) (CORONAVIRUS DISEASE [COVID-19]), AMPLIFIED PROBE TECHNIQUE, MAKING USE OF HIGH THROUGHPUT TECHNOLOGIES AS DESCRIBED BY CMS-2020-01-R: HCPCS

## 2020-11-10 NOTE — DISCHARGE SUMMARY
[facility]  Discharge Summary      Patient Name: Alma Rosa Helm  MRN: [id number]  Admission Date: 11/7/2020  Hospital Length of Stay: 0 days  Discharge Date and Time: 11/8/2020 11:57 AM  Attending Physician: No att. providers found   Discharging Provider: Tabby Holly NP  Primary Care Provider: Cindy Lowe NP      HPI:   Alma Rosa Helm is a 65-year-old female with a past medical history of bipolar disorder, hypertension, hyperlipidemia, and arthritis who presents to the emergency room tonight with reports of vomiting.  Patient states she developed vomiting, projectile and trembling tonight prior to arrival to emergency room.  Patient states she was seen on 11/06/2020 by her family doctor for abdominal pain and flank pain.  Patient was given Cipro prescription and Pyridium.  Patient states she has yet to start the Pyridium, however has taken 2 doses of Cipro.  Patient states she did not check her temperature prior to arrival to emergency room tonight, but however suspected fever with her symptoms.  Patient denies chest pain, shortness of breath, dysuria, hematuria, syncope, diarrhea.  In the emergency room patient noted to have pyelonephritis, was initiated on IV Rocephin.  Patient placed in observation on November 8, 2020 at approximately 3:00 a.m..  Patient not accompanied by any visitors during my initial interview and physical exam.    * No surgery found *      Hospital Course:   Patient monitored closely during observation stay. She was initiated on IV abx for UTI. Urine culture obtained. She is pain free and afebrile. She was noted to have wheezing and received resp treatment. CXR negative. She did not qualify for home oxygen. She did not fail outpatient abx and is stable for DC from medical standpoint.     PE chest few exp wheeze. Abd: soft NT     Consults:     No new Assessment & Plan notes have been filed under this hospital service since the last note  was generated.  Service: Hospital Medicine    Final Active Diagnoses:    Diagnosis Date Noted POA    PRINCIPAL PROBLEM:  Pyelonephritis [N12] 11/08/2020 Yes    Arthritis [M19.90] 11/08/2020 Yes    Nephrolithiasis [N20.0] 01/26/2016 Yes    Nicotine addiction [F17.200] 12/14/2015 Yes     Chronic    Bipolar disorder [F31.9] 12/14/2015 Yes     Chronic      Problems Resolved During this Admission:       Discharged Condition: stable    Disposition: Home or Self Care    Follow Up:  Follow-up Information     Cindy Lowe NP In 1 week.    Specialty: Family Medicine  Contact information:  [address]  [facility] [place] [address]  [phone]                 Patient Instructions:      Diet Cardiac     Notify your health care provider if you experience any of the following:  severe persistent headache     Notify your health care provider if you experience any of the following:  difficulty breathing or increased cough     Notify your health care provider if you experience any of the following:  redness, tenderness, or signs of infection (pain, swelling, redness, odor or green/yellow discharge around incision site)     Notify your health care provider if you experience any of the following:  severe uncontrolled pain     Notify your health care provider if you experience any of the following:  persistent nausea and vomiting or diarrhea     Notify your health care provider if you experience any of the following:  temperature >100.4     Notify your health care provider if you experience any of the following:  persistent dizziness, light-headedness, or visual disturbances     Activity as tolerated       Significant Diagnostic Studies: Labs:   BMP:   Recent Labs   Lab 11/07/20 2125 11/08/20  0941   * 94    143   K 3.8 4.2    108   CO2 20* 23   BUN 16 16   CREATININE 0.8 0.8   CALCIUM 9.1 8.8   MG  --  2.0    and CMP   Recent Labs   Lab 11/07/20 2125 11/08/20  0941    143   K  3.8 4.2    108   CO2 20* 23   * 94   BUN 16 16   CREATININE 0.8 0.8   CALCIUM 9.1 8.8   PROT 6.9 6.8   ALBUMIN 3.4* 3.2*   BILITOT 0.6 0.6   ALKPHOS 81 77   AST 14 13   ALT 21 19   ANIONGAP 11 12   ESTGFRAFRICA >60 >60   EGFRNONAA >60 >60       Pending Diagnostic Studies:     None         Medications:  Reconciled Home Medications:      Medication List      CONTINUE taking these medications    albuterol-ipratropium 2.5 mg-0.5 mg/3 mL nebulizer solution  Commonly known as: DUO-NEB  Take 3 mLs by nebulization every 6 (six) hours as needed for Wheezing. Rescue     ciprofloxacin HCl 500 MG tablet  Commonly known as: CIPRO  Take 500 mg by mouth.     cyclobenzaprine 5 MG tablet  Commonly known as: FLEXERIL  Take 5 mg by mouth 2 (two) times a day.     diclofenac 50 MG EC tablet  Commonly known as: VOLTAREN  Take 50 mg by mouth 2 (two) times daily.     fluticasone furoate-vilanteroL 100-25 mcg/dose diskus inhaler  Commonly known as: BREO ELLIPTA  Inhale 1 puff into the lungs once daily. Controller Rinse after you use it     gabapentin 100 MG capsule  Commonly known as: NEURONTIN  Take 300 mg by mouth 3 (three) times daily.     HYDROcodone-acetaminophen 7.5-325 mg per tablet  Commonly known as: NORCO  Take 1 tablet by mouth 2 (two) times a day.     phenazopyridine 200 MG tablet  Commonly known as: PYRIDIUM  Take 200 mg by mouth.            Indwelling Lines/Drains at time of discharge:   Lines/Drains/Airways     None                 Time spent on the discharge of patient: 50 minutes  Patient was seen and examined on the date of discharge and determined to be suitable for discharge.         Tabby Holly NP  Department of Hospital Medicine  [facility]

## 2020-11-10 NOTE — HOSPITAL COURSE
Patient monitored closely during observation stay. She was initiated on IV abx for UTI. Urine culture obtained. She is pain free and afebrile. She was noted to have wheezing and received resp treatment. CXR negative. She did not qualify for home oxygen. She did not fail outpatient abx and is stable for DC from medical standpoint.     PE chest few exp wheeze. Abd: soft NT

## 2020-11-11 LAB — SARS-COV-2 RNA RESP QL NAA+PROBE: NOT DETECTED

## 2020-11-13 LAB
BACTERIA BLD CULT: NORMAL
BACTERIA BLD CULT: NORMAL

## 2020-11-17 ENCOUNTER — LAB VISIT (OUTPATIENT)
Dept: LAB | Facility: OTHER | Age: 65
End: 2020-11-17
Payer: COMMERCIAL

## 2020-11-17 DIAGNOSIS — Z03.818 ENCOUNTER FOR OBSERVATION FOR SUSPECTED EXPOSURE TO OTHER BIOLOGICAL AGENTS RULED OUT: ICD-10-CM

## 2020-11-17 PROCEDURE — U0003 INFECTIOUS AGENT DETECTION BY NUCLEIC ACID (DNA OR RNA); SEVERE ACUTE RESPIRATORY SYNDROME CORONAVIRUS 2 (SARS-COV-2) (CORONAVIRUS DISEASE [COVID-19]), AMPLIFIED PROBE TECHNIQUE, MAKING USE OF HIGH THROUGHPUT TECHNOLOGIES AS DESCRIBED BY CMS-2020-01-R: HCPCS

## 2020-11-19 LAB — SARS-COV-2 RNA RESP QL NAA+PROBE: NOT DETECTED

## 2020-11-24 ENCOUNTER — LAB VISIT (OUTPATIENT)
Dept: LAB | Facility: OTHER | Age: 65
End: 2020-11-24
Payer: COMMERCIAL

## 2020-11-24 DIAGNOSIS — Z03.818 ENCOUNTER FOR OBSERVATION FOR SUSPECTED EXPOSURE TO OTHER BIOLOGICAL AGENTS RULED OUT: ICD-10-CM

## 2020-11-24 PROCEDURE — U0003 INFECTIOUS AGENT DETECTION BY NUCLEIC ACID (DNA OR RNA); SEVERE ACUTE RESPIRATORY SYNDROME CORONAVIRUS 2 (SARS-COV-2) (CORONAVIRUS DISEASE [COVID-19]), AMPLIFIED PROBE TECHNIQUE, MAKING USE OF HIGH THROUGHPUT TECHNOLOGIES AS DESCRIBED BY CMS-2020-01-R: HCPCS

## 2020-11-26 LAB — SARS-COV-2 RNA RESP QL NAA+PROBE: NOT DETECTED

## 2020-12-01 ENCOUNTER — LAB VISIT (OUTPATIENT)
Dept: LAB | Facility: OTHER | Age: 65
End: 2020-12-01
Payer: OTHER GOVERNMENT

## 2020-12-01 DIAGNOSIS — Z03.818 ENCOUNTER FOR OBSERVATION FOR SUSPECTED EXPOSURE TO OTHER BIOLOGICAL AGENTS RULED OUT: ICD-10-CM

## 2020-12-01 PROCEDURE — U0003 INFECTIOUS AGENT DETECTION BY NUCLEIC ACID (DNA OR RNA); SEVERE ACUTE RESPIRATORY SYNDROME CORONAVIRUS 2 (SARS-COV-2) (CORONAVIRUS DISEASE [COVID-19]), AMPLIFIED PROBE TECHNIQUE, MAKING USE OF HIGH THROUGHPUT TECHNOLOGIES AS DESCRIBED BY CMS-2020-01-R: HCPCS

## 2020-12-03 LAB — SARS-COV-2 RNA RESP QL NAA+PROBE: NOT DETECTED

## 2020-12-08 ENCOUNTER — LAB VISIT (OUTPATIENT)
Dept: LAB | Facility: OTHER | Age: 65
End: 2020-12-08
Attending: INTERNAL MEDICINE
Payer: OTHER GOVERNMENT

## 2020-12-08 DIAGNOSIS — Z03.818 ENCOUNTER FOR OBSERVATION FOR SUSPECTED EXPOSURE TO OTHER BIOLOGICAL AGENTS RULED OUT: ICD-10-CM

## 2020-12-08 PROCEDURE — U0003 INFECTIOUS AGENT DETECTION BY NUCLEIC ACID (DNA OR RNA); SEVERE ACUTE RESPIRATORY SYNDROME CORONAVIRUS 2 (SARS-COV-2) (CORONAVIRUS DISEASE [COVID-19]), AMPLIFIED PROBE TECHNIQUE, MAKING USE OF HIGH THROUGHPUT TECHNOLOGIES AS DESCRIBED BY CMS-2020-01-R: HCPCS

## 2020-12-10 LAB — SARS-COV-2 RNA RESP QL NAA+PROBE: NOT DETECTED

## 2020-12-11 ENCOUNTER — HOSPITAL ENCOUNTER (EMERGENCY)
Facility: HOSPITAL | Age: 65
Discharge: HOME | End: 2020-12-11
Attending: EMERGENCY MEDICINE
Payer: COMMERCIAL

## 2020-12-11 VITALS
WEIGHT: 189.63 LBS | SYSTOLIC BLOOD PRESSURE: 192 MMHG | TEMPERATURE: 98 F | RESPIRATION RATE: 20 BRPM | OXYGEN SATURATION: 95 % | BODY MASS INDEX: 32.54 KG/M2 | DIASTOLIC BLOOD PRESSURE: 91 MMHG | HEART RATE: 72 BPM

## 2020-12-11 DIAGNOSIS — M15.3 POST-TRAUMATIC OSTEOARTHRITIS OF MULTIPLE JOINTS: ICD-10-CM

## 2020-12-11 DIAGNOSIS — R06.02 SHORTNESS OF BREATH: ICD-10-CM

## 2020-12-11 DIAGNOSIS — J44.1 COPD WITH EXACERBATION: Primary | ICD-10-CM

## 2020-12-11 DIAGNOSIS — R06.02 SOB (SHORTNESS OF BREATH): ICD-10-CM

## 2020-12-11 LAB
ALBUMIN SERPL BCP-MCNC: 3.8 G/DL (ref 3.5–5.2)
ALP SERPL-CCNC: 88 U/L (ref 55–135)
ALT SERPL W/O P-5'-P-CCNC: 17 U/L (ref 10–44)
ANION GAP SERPL CALC-SCNC: 10 MMOL/L (ref 8–16)
AST SERPL-CCNC: 11 U/L (ref 10–40)
BASOPHILS # BLD AUTO: 0.03 K/UL (ref 0–0.2)
BASOPHILS NFR BLD: 0.3 % (ref 0–1.9)
BILIRUB SERPL-MCNC: 0.3 MG/DL (ref 0.1–1)
BNP SERPL-MCNC: 21 PG/ML (ref 0–99)
BUN SERPL-MCNC: 14 MG/DL (ref 8–23)
CALCIUM SERPL-MCNC: 9.5 MG/DL (ref 8.7–10.5)
CHLORIDE SERPL-SCNC: 107 MMOL/L (ref 95–110)
CO2 SERPL-SCNC: 25 MMOL/L (ref 23–29)
CREAT SERPL-MCNC: 0.8 MG/DL (ref 0.5–1.4)
DIFFERENTIAL METHOD: ABNORMAL
EOSINOPHIL # BLD AUTO: 0.4 K/UL (ref 0–0.5)
EOSINOPHIL NFR BLD: 3.7 % (ref 0–8)
ERYTHROCYTE [DISTWIDTH] IN BLOOD BY AUTOMATED COUNT: 14.6 % (ref 11.5–14.5)
EST. GFR  (AFRICAN AMERICAN): >60 ML/MIN/1.73 M^2
EST. GFR  (NON AFRICAN AMERICAN): >60 ML/MIN/1.73 M^2
GLUCOSE SERPL-MCNC: 90 MG/DL (ref 70–110)
HCT VFR BLD AUTO: 45.2 % (ref 37–48.5)
HGB BLD-MCNC: 14.7 G/DL (ref 12–16)
IMM GRANULOCYTES # BLD AUTO: 0.03 K/UL (ref 0–0.04)
IMM GRANULOCYTES NFR BLD AUTO: 0.3 % (ref 0–0.5)
INR PPP: 0.9 (ref 0.8–1.2)
LYMPHOCYTES # BLD AUTO: 2.9 K/UL (ref 1–4.8)
LYMPHOCYTES NFR BLD: 28.2 % (ref 18–48)
MCH RBC QN AUTO: 28.2 PG (ref 27–31)
MCHC RBC AUTO-ENTMCNC: 32.5 G/DL (ref 32–36)
MCV RBC AUTO: 87 FL (ref 82–98)
MONOCYTES # BLD AUTO: 0.7 K/UL (ref 0.3–1)
MONOCYTES NFR BLD: 7.1 % (ref 4–15)
NEUTROPHILS # BLD AUTO: 6.2 K/UL (ref 1.8–7.7)
NEUTROPHILS NFR BLD: 60.4 % (ref 38–73)
NRBC BLD-RTO: 0 /100 WBC
PLATELET # BLD AUTO: 307 K/UL (ref 150–350)
PMV BLD AUTO: 8.8 FL (ref 9.2–12.9)
POTASSIUM SERPL-SCNC: 3.9 MMOL/L (ref 3.5–5.1)
PROT SERPL-MCNC: 7.8 G/DL (ref 6–8.4)
PROTHROMBIN TIME: 10.2 SEC (ref 9–12.5)
RBC # BLD AUTO: 5.22 M/UL (ref 4–5.4)
SODIUM SERPL-SCNC: 142 MMOL/L (ref 136–145)
TROPONIN I SERPL DL<=0.01 NG/ML-MCNC: <0.006 NG/ML (ref 0–0.03)
TSH SERPL DL<=0.005 MIU/L-ACNC: 1.1 UIU/ML (ref 0.4–4)
WBC # BLD AUTO: 10.19 K/UL (ref 3.9–12.7)

## 2020-12-11 PROCEDURE — 85025 COMPLETE CBC W/AUTO DIFF WBC: CPT

## 2020-12-11 PROCEDURE — 83880 ASSAY OF NATRIURETIC PEPTIDE: CPT

## 2020-12-11 PROCEDURE — 93010 ELECTROCARDIOGRAM REPORT: CPT | Mod: ,,, | Performed by: INTERNAL MEDICINE

## 2020-12-11 PROCEDURE — 80053 COMPREHEN METABOLIC PANEL: CPT

## 2020-12-11 PROCEDURE — 85610 PROTHROMBIN TIME: CPT

## 2020-12-11 PROCEDURE — 93010 EKG 12-LEAD: ICD-10-PCS | Mod: ,,, | Performed by: INTERNAL MEDICINE

## 2020-12-11 PROCEDURE — 94640 AIRWAY INHALATION TREATMENT: CPT

## 2020-12-11 PROCEDURE — 84443 ASSAY THYROID STIM HORMONE: CPT

## 2020-12-11 PROCEDURE — 25000242 PHARM REV CODE 250 ALT 637 W/ HCPCS: Performed by: EMERGENCY MEDICINE

## 2020-12-11 PROCEDURE — 93005 ELECTROCARDIOGRAM TRACING: CPT

## 2020-12-11 PROCEDURE — 99285 EMERGENCY DEPT VISIT HI MDM: CPT | Mod: 25

## 2020-12-11 PROCEDURE — 36415 COLL VENOUS BLD VENIPUNCTURE: CPT

## 2020-12-11 PROCEDURE — 84484 ASSAY OF TROPONIN QUANT: CPT

## 2020-12-11 RX ORDER — DICLOFENAC SODIUM 50 MG/1
50 TABLET, DELAYED RELEASE ORAL 3 TIMES DAILY PRN
Qty: 15 TABLET | Refills: 0 | Status: SHIPPED | OUTPATIENT
Start: 2020-12-11 | End: 2021-12-11

## 2020-12-11 RX ORDER — IPRATROPIUM BROMIDE AND ALBUTEROL SULFATE 2.5; .5 MG/3ML; MG/3ML
3 SOLUTION RESPIRATORY (INHALATION)
Status: COMPLETED | OUTPATIENT
Start: 2020-12-11 | End: 2020-12-11

## 2020-12-11 RX ORDER — PREDNISONE 20 MG/1
20 TABLET ORAL 2 TIMES DAILY
Qty: 10 TABLET | Refills: 0 | Status: SHIPPED | OUTPATIENT
Start: 2020-12-11 | End: 2020-12-16

## 2020-12-11 RX ADMIN — IPRATROPIUM BROMIDE AND ALBUTEROL SULFATE 3 ML: .5; 2.5 SOLUTION RESPIRATORY (INHALATION) at 03:12

## 2020-12-11 NOTE — ED NOTES
AAOx4, skin warm/dry, respirations even/unlabored.  NAD.  Denies complaints at this time.  Neb treatment complete.

## 2020-12-11 NOTE — ED PROVIDER NOTES
Encounter Date: 12/11/2020    SCRIBE #1 NOTE: I, Darrly Myrick, am scribing for, and in the presence of, Dr. Whaley.       History     Chief Complaint   Patient presents with    Leg Swelling     c/o bilateral leg swelling, since before Thanksgiving. seen by laura levi here , concerned new onset chf.     Time seen by provider: 3:00 PM on 12/11/2020      Alma Rosa Helm is a 65 y.o. female with a PMHx of COPD, bipolar disorder, anxiety, and nephrolithiasis who presents to the ED for bilateral leg swelling that started 15 days ago. She states that she was on a televist appointment with her physician, who instructed the patient to come to the ED. Patient reports that since thanksgiving 2020 she has been having swelling in the bilateral lower extremities. She states that she then developed SOB 1 week ago. She reports that the SOB worsens with exertion. Patient denies chest pain, palpations, cough, fever, abdominal pain, hx of heart failure, or any other complaint at this time. The patient has a PSHx of hysterectomy, tubal ligation, and cystoscopy.    The history is provided by the patient.     Review of patient's allergies indicates:   Allergen Reactions    Tramadol Swelling    Imitrex [sumatriptan succinate]     Penicillins     Sulfa (sulfonamide antibiotics)     Toradol [ketorolac] Swelling     Past Medical History:   Diagnosis Date    Anxiety     Bipolar disorder     COPD, mild     Nephrolithiasis      Past Surgical History:   Procedure Laterality Date    BACK SURGERY      CYSTOSCOPY      HYSTERECTOMY      LITHOTRIPSY      TUBAL LIGATION       Family History   Problem Relation Age of Onset    Cancer Mother     Diabetes Mother     COPD Father     Hypertension Father      Social History     Tobacco Use    Smoking status: Current Every Day Smoker     Packs/day: 0.50     Years: 45.00     Pack years: 22.50     Types: Cigarettes     Start date: 1976     Last attempt to quit: 4/8/2020     Years since  quittin.6    Smokeless tobacco: Never Used    Tobacco comment: now smoking 1/4 ppd   Substance Use Topics    Alcohol use: Never     Frequency: Never    Drug use: No     Review of Systems   Constitutional: Negative for activity change, appetite change, chills, fatigue and fever.   Eyes: Negative for visual disturbance.   Respiratory: Positive for shortness of breath. Negative for apnea and cough.    Cardiovascular: Positive for leg swelling. Negative for chest pain and palpitations.   Gastrointestinal: Negative for abdominal distention, abdominal pain, diarrhea, nausea and vomiting.   Genitourinary: Negative for difficulty urinating.   Musculoskeletal: Negative for neck pain.   Skin: Negative for pallor and rash.   Neurological: Negative for headaches.   Hematological: Does not bruise/bleed easily.   Psychiatric/Behavioral: Negative for agitation.       Physical Exam     Initial Vitals [20 1229]   BP Pulse Resp Temp SpO2   (!) 192/91 72 20 98 °F (36.7 °C) 95 %      MAP       --         Physical Exam    Nursing note and vitals reviewed.  Constitutional: She appears well-developed and well-nourished.   HENT:   Head: Normocephalic and atraumatic.   Eyes: Conjunctivae are normal.   Neck: Normal range of motion. Neck supple.   Cardiovascular: Normal rate, regular rhythm and normal heart sounds. Exam reveals no gallop and no friction rub.    No murmur heard.  Pulmonary/Chest: Effort normal. No respiratory distress. She has wheezes (expiratory). She has no rhonchi. She has rales (bibasilar).   Abdominal: Soft. She exhibits no distension. There is no abdominal tenderness.   Musculoskeletal: Normal range of motion.   Neurological: She is alert and oriented to person, place, and time.   Skin: Skin is warm and dry. No erythema.   Psychiatric: She has a normal mood and affect.         ED Course   Procedures  Labs Reviewed   CBC W/ AUTO DIFFERENTIAL   COMPREHENSIVE METABOLIC PANEL   TROPONIN I   B-TYPE NATRIURETIC  PEPTIDE   PROTIME-INR     EKG Readings: (Independently Interpreted)   Rhythm: Normal Sinus Rhythm. Heart Rate: 61. Ectopy: No Ectopy. Conduction: Normal. ST Segments: Normal ST Segments. T Waves: Normal. Clinical Impression: Normal Sinus Rhythm       Imaging Results          X-Ray Chest AP Portable (Final result)  Result time 12/11/20 14:16:42    Final result by Roberto Serrato Jr., MD (12/11/20 14:16:42)                 Impression:      No acute abnormality.      Electronically signed by: Roberto Serrato MD  Date:    12/11/2020  Time:    14:16             Narrative:    EXAMINATION:  XR CHEST AP PORTABLE    CLINICAL HISTORY:  CHF;    TECHNIQUE:  Single frontal view of the chest was performed.    COMPARISON:  Prior chest of November 7, 2020    FINDINGS:  The lungs are clear, with normal appearance of pulmonary vasculature and no pleural effusion or pneumothorax.    The cardiac silhouette is normal in size. The hilar and mediastinal contours are unremarkable.    Bones are intact.                                 Medical Decision Making:   History:   Old Medical Records: I decided to obtain old medical records.  Independently Interpreted Test(s):   I have ordered and independently interpreted EKG Reading(s) - see summary below  Clinical Tests:   Lab Tests: Ordered and Reviewed  Radiological Study: Ordered and Reviewed  Medical Tests: Ordered and Reviewed  ED Management:  65-year-old female presents with complaints of shortness of breath and lower extremity swelling.  She has no visible swelling of the lower extremities at present.  There is no evidence of right-sided heart failure or left-sided heart failure.  Chest x-ray independently interpreted by me is normal.  She has symptomatic improvement with bronchodilators suggesting that the shortness of breath is related to a COPD exacerbation.  She is encouraged to discontinue all smoking.  She reports bilateral diffuse MTP pain which most likely is secondary to  osteoarthritis.  She request a work note for pain that is persistent for weeks.  She is encouraged to follow up with Podiatry.  The diffuse nature of the pain makes inflammatory arthritis less likely.  The absence of fever night sweats or chills makes septic arthritis less likely.       APC / Resident Notes:   I, Dr. Homero Whaley III, personally performed the services described in this documentation. All medical record entries made by the scribe were at my direction and in my presence.  I have reviewed the chart and agree that the record reflects my personal performance and is accurate and complete       Scribe Attestation:   Scribe #1: I performed the above scribed service and the documentation accurately describes the services I performed. I attest to the accuracy of the note.                      Clinical Impression:       ICD-10-CM ICD-9-CM   1. Shortness of breath  R06.02 786.05                                               Homero Whaley III, MD  12/11/20 1713

## 2020-12-11 NOTE — FIRST PROVIDER EVALUATION
Emergency Department TeleTriage Encounter Note      CHIEF COMPLAINT    Chief Complaint   Patient presents with    Leg Swelling     c/o bilateral leg swelling, since before Thanksgiving. seen by  , sent here , concerned new onset chf.       VITAL SIGNS   Initial Vitals [12/11/20 1229]   BP Pulse Resp Temp SpO2   (!) 192/91 72 20 98 °F (36.7 °C) 95 %      MAP       --            ALLERGIES    Review of patient's allergies indicates:   Allergen Reactions    Tramadol Swelling    Imitrex [sumatriptan succinate]     Penicillins     Sulfa (sulfonamide antibiotics)     Toradol [ketorolac] Swelling       PROVIDER TRIAGE NOTE  Patient is a 65-year-old female who presents with bilateral lower extremity swelling for a week now.  She had a telephone visit with her primary care provider and also complained of orthopnea, cough and shortness of breath.  She was sent to the ER for evaluation for possible congestive heart failure.      ORDERS  Labs Reviewed   CBC W/ AUTO DIFFERENTIAL   COMPREHENSIVE METABOLIC PANEL   TROPONIN I   B-TYPE NATRIURETIC PEPTIDE   PROTIME-INR       ED Orders (720h ago, onward)    Start Ordered     Status Ordering Provider    12/11/20 1333 12/11/20 1332  Insert peripheral IV  Once      Ordered ARLEEN GARCIA    12/11/20 1333 12/11/20 1332  CBC auto differential  STAT      Pending Collection ARLEEN GARCIA    12/11/20 1333 12/11/20 1332  Comprehensive metabolic panel  STAT      Pending Collection ARLEEN GARCIA    12/11/20 1333 12/11/20 1332  Troponin I  STAT      Pending Collection ARLEEN GARCIA    12/11/20 1333 12/11/20 1332  Brain natriuretic peptide  STAT      Pending Collection ARLEEN GARCIA    12/11/20 1333 12/11/20 1332  Protime-INR  Once      Pending Collection ARLEEN GARCIA    12/11/20 1333 12/11/20 1332  EKG 12-lead  Once      Ordered ARLEEN GARCIA    12/11/20 1333 12/11/20 1332  X-Ray Chest AP  Portable  1 time imaging      Ordered ARLEEN GARCIA            Virtual Visit Note: The provider triage portion of this emergency department evaluation and documentation was performed via [employer], a HIPAA-compliant telemedicine application, in concert with a tele-presenter in the room. A face to face patient evaluation with one of my colleagues will occur once the patient is placed in an emergency department room.      DISCLAIMER: This note was prepared with [employer] voice recognition transcription software. Garbled syntax, mangled pronouns, and other bizarre constructions may be attributed to that software system.

## 2020-12-15 ENCOUNTER — LAB VISIT (OUTPATIENT)
Dept: LAB | Facility: OTHER | Age: 65
End: 2020-12-15
Payer: COMMERCIAL

## 2020-12-15 DIAGNOSIS — Z03.818 ENCOUNTER FOR OBSERVATION FOR SUSPECTED EXPOSURE TO OTHER BIOLOGICAL AGENTS RULED OUT: ICD-10-CM

## 2020-12-15 PROCEDURE — U0003 INFECTIOUS AGENT DETECTION BY NUCLEIC ACID (DNA OR RNA); SEVERE ACUTE RESPIRATORY SYNDROME CORONAVIRUS 2 (SARS-COV-2) (CORONAVIRUS DISEASE [COVID-19]), AMPLIFIED PROBE TECHNIQUE, MAKING USE OF HIGH THROUGHPUT TECHNOLOGIES AS DESCRIBED BY CMS-2020-01-R: HCPCS

## 2020-12-16 LAB — SARS-COV-2 RNA RESP QL NAA+PROBE: NOT DETECTED

## 2020-12-22 ENCOUNTER — LAB VISIT (OUTPATIENT)
Dept: LAB | Facility: OTHER | Age: 65
End: 2020-12-22
Payer: OTHER GOVERNMENT

## 2020-12-22 DIAGNOSIS — Z03.818 ENCOUNTER FOR OBSERVATION FOR SUSPECTED EXPOSURE TO OTHER BIOLOGICAL AGENTS RULED OUT: ICD-10-CM

## 2020-12-22 PROCEDURE — U0003 INFECTIOUS AGENT DETECTION BY NUCLEIC ACID (DNA OR RNA); SEVERE ACUTE RESPIRATORY SYNDROME CORONAVIRUS 2 (SARS-COV-2) (CORONAVIRUS DISEASE [COVID-19]), AMPLIFIED PROBE TECHNIQUE, MAKING USE OF HIGH THROUGHPUT TECHNOLOGIES AS DESCRIBED BY CMS-2020-01-R: HCPCS

## 2020-12-23 LAB — SARS-COV-2 RNA RESP QL NAA+PROBE: NOT DETECTED

## 2020-12-29 ENCOUNTER — LAB VISIT (OUTPATIENT)
Dept: LAB | Facility: OTHER | Age: 65
End: 2020-12-29
Payer: COMMERCIAL

## 2020-12-29 DIAGNOSIS — Z03.818 ENCOUNTER FOR OBSERVATION FOR SUSPECTED EXPOSURE TO OTHER BIOLOGICAL AGENTS RULED OUT: ICD-10-CM

## 2020-12-29 PROCEDURE — U0003 INFECTIOUS AGENT DETECTION BY NUCLEIC ACID (DNA OR RNA); SEVERE ACUTE RESPIRATORY SYNDROME CORONAVIRUS 2 (SARS-COV-2) (CORONAVIRUS DISEASE [COVID-19]), AMPLIFIED PROBE TECHNIQUE, MAKING USE OF HIGH THROUGHPUT TECHNOLOGIES AS DESCRIBED BY CMS-2020-01-R: HCPCS

## 2020-12-30 LAB — SARS-COV-2 RNA RESP QL NAA+PROBE: NOT DETECTED

## 2021-01-05 ENCOUNTER — LAB VISIT (OUTPATIENT)
Dept: LAB | Facility: OTHER | Age: 66
End: 2021-01-05
Payer: OTHER GOVERNMENT

## 2021-01-05 DIAGNOSIS — Z03.818 ENCOUNTER FOR OBSERVATION FOR SUSPECTED EXPOSURE TO OTHER BIOLOGICAL AGENTS RULED OUT: ICD-10-CM

## 2021-01-05 PROCEDURE — U0003 INFECTIOUS AGENT DETECTION BY NUCLEIC ACID (DNA OR RNA); SEVERE ACUTE RESPIRATORY SYNDROME CORONAVIRUS 2 (SARS-COV-2) (CORONAVIRUS DISEASE [COVID-19]), AMPLIFIED PROBE TECHNIQUE, MAKING USE OF HIGH THROUGHPUT TECHNOLOGIES AS DESCRIBED BY CMS-2020-01-R: HCPCS

## 2021-01-07 LAB — SARS-COV-2 RNA RESP QL NAA+PROBE: NOT DETECTED

## 2021-03-16 DIAGNOSIS — Z20.822 ENCOUNTER FOR LABORATORY TESTING FOR COVID-19 VIRUS: ICD-10-CM

## 2021-03-23 ENCOUNTER — LAB VISIT (OUTPATIENT)
Dept: LAB | Facility: OTHER | Age: 66
End: 2021-03-23
Payer: OTHER GOVERNMENT

## 2021-03-23 DIAGNOSIS — Z20.822 ENCOUNTER FOR LABORATORY TESTING FOR COVID-19 VIRUS: ICD-10-CM

## 2021-03-23 PROCEDURE — U0003 INFECTIOUS AGENT DETECTION BY NUCLEIC ACID (DNA OR RNA); SEVERE ACUTE RESPIRATORY SYNDROME CORONAVIRUS 2 (SARS-COV-2) (CORONAVIRUS DISEASE [COVID-19]), AMPLIFIED PROBE TECHNIQUE, MAKING USE OF HIGH THROUGHPUT TECHNOLOGIES AS DESCRIBED BY CMS-2020-01-R: HCPCS | Performed by: NURSE PRACTITIONER

## 2021-03-25 LAB — SARS-COV-2 RNA RESP QL NAA+PROBE: NOT DETECTED

## 2021-04-06 ENCOUNTER — LAB VISIT (OUTPATIENT)
Dept: LAB | Facility: OTHER | Age: 66
End: 2021-04-06
Payer: OTHER GOVERNMENT

## 2021-04-06 DIAGNOSIS — Z20.822 ENCOUNTER FOR LABORATORY TESTING FOR COVID-19 VIRUS: ICD-10-CM

## 2021-04-06 PROCEDURE — U0003 INFECTIOUS AGENT DETECTION BY NUCLEIC ACID (DNA OR RNA); SEVERE ACUTE RESPIRATORY SYNDROME CORONAVIRUS 2 (SARS-COV-2) (CORONAVIRUS DISEASE [COVID-19]), AMPLIFIED PROBE TECHNIQUE, MAKING USE OF HIGH THROUGHPUT TECHNOLOGIES AS DESCRIBED BY CMS-2020-01-R: HCPCS | Performed by: NURSE PRACTITIONER

## 2021-04-08 LAB — SARS-COV-2 RNA RESP QL NAA+PROBE: NOT DETECTED

## 2021-05-04 ENCOUNTER — LAB VISIT (OUTPATIENT)
Dept: LAB | Facility: OTHER | Age: 66
End: 2021-05-04
Payer: OTHER GOVERNMENT

## 2021-05-04 DIAGNOSIS — Z20.822 ENCOUNTER FOR LABORATORY TESTING FOR COVID-19 VIRUS: ICD-10-CM

## 2021-05-04 PROCEDURE — U0003 INFECTIOUS AGENT DETECTION BY NUCLEIC ACID (DNA OR RNA); SEVERE ACUTE RESPIRATORY SYNDROME CORONAVIRUS 2 (SARS-COV-2) (CORONAVIRUS DISEASE [COVID-19]), AMPLIFIED PROBE TECHNIQUE, MAKING USE OF HIGH THROUGHPUT TECHNOLOGIES AS DESCRIBED BY CMS-2020-01-R: HCPCS | Performed by: NURSE PRACTITIONER

## 2021-05-05 LAB — SARS-COV-2 RNA RESP QL NAA+PROBE: NOT DETECTED

## 2021-07-06 ENCOUNTER — LAB VISIT (OUTPATIENT)
Dept: LAB | Facility: OTHER | Age: 66
End: 2021-07-06
Payer: COMMERCIAL

## 2021-07-06 DIAGNOSIS — Z20.822 ENCOUNTER FOR LABORATORY TESTING FOR COVID-19 VIRUS: ICD-10-CM

## 2021-07-06 PROCEDURE — U0003 INFECTIOUS AGENT DETECTION BY NUCLEIC ACID (DNA OR RNA); SEVERE ACUTE RESPIRATORY SYNDROME CORONAVIRUS 2 (SARS-COV-2) (CORONAVIRUS DISEASE [COVID-19]), AMPLIFIED PROBE TECHNIQUE, MAKING USE OF HIGH THROUGHPUT TECHNOLOGIES AS DESCRIBED BY CMS-2020-01-R: HCPCS | Performed by: NURSE PRACTITIONER

## 2021-07-07 LAB — SARS-COV-2 RNA RESP QL NAA+PROBE: NOT DETECTED

## 2021-08-03 ENCOUNTER — LAB VISIT (OUTPATIENT)
Dept: LAB | Facility: OTHER | Age: 66
End: 2021-08-03
Payer: COMMERCIAL

## 2021-08-03 DIAGNOSIS — Z20.822 ENCOUNTER FOR LABORATORY TESTING FOR COVID-19 VIRUS: ICD-10-CM

## 2021-08-03 PROCEDURE — U0003 INFECTIOUS AGENT DETECTION BY NUCLEIC ACID (DNA OR RNA); SEVERE ACUTE RESPIRATORY SYNDROME CORONAVIRUS 2 (SARS-COV-2) (CORONAVIRUS DISEASE [COVID-19]), AMPLIFIED PROBE TECHNIQUE, MAKING USE OF HIGH THROUGHPUT TECHNOLOGIES AS DESCRIBED BY CMS-2020-01-R: HCPCS | Performed by: NURSE PRACTITIONER

## 2021-08-06 LAB
SARS-COV-2 RNA RESP QL NAA+PROBE: NORMAL
TEST PERFORMANCE INFO SPEC: NORMAL

## 2021-08-10 DIAGNOSIS — Z20.822 ENCOUNTER FOR LABORATORY TESTING FOR COVID-19 VIRUS: ICD-10-CM

## 2021-08-17 ENCOUNTER — LAB VISIT (OUTPATIENT)
Dept: LAB | Facility: OTHER | Age: 66
End: 2021-08-17
Payer: COMMERCIAL

## 2021-08-17 DIAGNOSIS — Z20.822 ENCOUNTER FOR LABORATORY TESTING FOR COVID-19 VIRUS: ICD-10-CM

## 2021-08-17 PROCEDURE — U0003 INFECTIOUS AGENT DETECTION BY NUCLEIC ACID (DNA OR RNA); SEVERE ACUTE RESPIRATORY SYNDROME CORONAVIRUS 2 (SARS-COV-2) (CORONAVIRUS DISEASE [COVID-19]), AMPLIFIED PROBE TECHNIQUE, MAKING USE OF HIGH THROUGHPUT TECHNOLOGIES AS DESCRIBED BY CMS-2020-01-R: HCPCS | Performed by: NURSE PRACTITIONER

## 2021-08-20 LAB
SARS-COV-2 RNA RESP QL NAA+PROBE: NORMAL
TEST PERFORMANCE INFO SPEC: NORMAL

## 2021-12-28 ENCOUNTER — LAB VISIT (OUTPATIENT)
Dept: LAB | Facility: OTHER | Age: 66
End: 2021-12-28
Payer: COMMERCIAL

## 2021-12-28 DIAGNOSIS — Z20.822 ENCOUNTER FOR LABORATORY TESTING FOR COVID-19 VIRUS: ICD-10-CM

## 2021-12-28 PROCEDURE — U0003 INFECTIOUS AGENT DETECTION BY NUCLEIC ACID (DNA OR RNA); SEVERE ACUTE RESPIRATORY SYNDROME CORONAVIRUS 2 (SARS-COV-2) (CORONAVIRUS DISEASE [COVID-19]), AMPLIFIED PROBE TECHNIQUE, MAKING USE OF HIGH THROUGHPUT TECHNOLOGIES AS DESCRIBED BY CMS-2020-01-R: HCPCS | Performed by: NURSE PRACTITIONER

## 2021-12-30 LAB
SARS-COV-2 RNA RESP QL NAA+PROBE: NOT DETECTED
SARS-COV-2- CYCLE NUMBER: NORMAL

## 2022-03-25 ENCOUNTER — HOSPITAL ENCOUNTER (EMERGENCY)
Facility: HOSPITAL | Age: 67
Discharge: HOME | End: 2022-03-25
Attending: EMERGENCY MEDICINE
Payer: MEDICARE

## 2022-03-25 VITALS
OXYGEN SATURATION: 96 % | BODY MASS INDEX: 32.44 KG/M2 | TEMPERATURE: 98 F | SYSTOLIC BLOOD PRESSURE: 136 MMHG | HEIGHT: 64 IN | RESPIRATION RATE: 16 BRPM | WEIGHT: 190 LBS | DIASTOLIC BLOOD PRESSURE: 63 MMHG | HEART RATE: 74 BPM

## 2022-03-25 DIAGNOSIS — S63.501A SPRAIN OF RIGHT WRIST, INITIAL ENCOUNTER: Primary | ICD-10-CM

## 2022-03-25 DIAGNOSIS — M25.531 RIGHT WRIST PAIN: ICD-10-CM

## 2022-03-25 DIAGNOSIS — M25.512 LEFT SHOULDER PAIN: ICD-10-CM

## 2022-03-25 PROCEDURE — 99284 EMERGENCY DEPT VISIT MOD MDM: CPT | Mod: 25

## 2022-03-25 PROCEDURE — 29125 APPL SHORT ARM SPLINT STATIC: CPT | Mod: RT

## 2022-03-25 NOTE — ED PROVIDER NOTES
Encounter Date: 3/25/2022       History     Chief Complaint   Patient presents with    Fall     C/o R hand, R hip and L shoulder pain after trip and fall yesterday. No LOC      67-year-old female with anxiety bipolar disorder COPD presents with right hand and left shoulder pain after a trip and fall yesterday.  Triage note states right hip pain but the patient states this is chronic and unchanged.  Patient had a mechanical fall yesterday at a gas station.  She fell forward and fell on both hands.  She is complaining of pain to the right hand and bruising to the right hand.  Also left shoulder pain.  Shortness of breath but this is also chronic and unchanged.  No wounds no other complaints.  She did not strike her head.    The history is provided by the patient.     Review of patient's allergies indicates:   Allergen Reactions    Tramadol Swelling    Imitrex [sumatriptan succinate]     Penicillins     Sulfa (sulfonamide antibiotics)     Toradol [ketorolac] Swelling     Past Medical History:   Diagnosis Date    Anxiety     Bipolar disorder     COPD, mild     Nephrolithiasis      Past Surgical History:   Procedure Laterality Date    BACK SURGERY      CYSTOSCOPY      HYSTERECTOMY      LITHOTRIPSY      TUBAL LIGATION       Family History   Problem Relation Age of Onset    Cancer Mother     Diabetes Mother     COPD Father     Hypertension Father      Social History     Tobacco Use    Smoking status: Current Every Day Smoker     Packs/day: 0.50     Years: 45.00     Pack years: 22.50     Types: Cigarettes     Start date:      Last attempt to quit: 2020     Years since quittin.9    Smokeless tobacco: Never Used    Tobacco comment: now smoking 1/4 ppd   Substance Use Topics    Alcohol use: Never    Drug use: No     Review of Systems   Respiratory: Positive for shortness of breath (chronic).    Gastrointestinal: Negative.    Musculoskeletal: Positive for arthralgias and joint swelling (r  hand). Negative for back pain, gait problem, neck pain and neck stiffness.   Skin: Negative for wound.   Neurological: Negative for headaches.       Physical Exam     Initial Vitals [03/25/22 1319]   BP Pulse Resp Temp SpO2   136/63 74 16 97.9 °F (36.6 °C) 96 %      MAP       --         Physical Exam    Nursing note and vitals reviewed.  Constitutional: She appears well-developed and well-nourished. She is not diaphoretic.  Non-toxic appearance. She does not have a sickly appearance. She does not appear ill. No distress.   HENT:   Head: Normocephalic and atraumatic.   Eyes: EOM are normal. Pupils are equal, round, and reactive to light.   Neck: Neck supple.   Normal range of motion.   Full passive range of motion without pain.     Pulmonary/Chest: No respiratory distress. She has wheezes.   Musculoskeletal:         General: No edema.      Right shoulder: Normal.      Left shoulder: Tenderness and bony tenderness present. No swelling or deformity. Decreased range of motion.      Right elbow: Normal.      Left elbow: Normal.      Right wrist: Swelling, tenderness, bony tenderness and snuff box tenderness present. No deformity or effusion. Decreased range of motion.      Left wrist: Normal.      Right hand: Swelling, tenderness and bony tenderness present. No deformity. Decreased range of motion.        Hands:       Cervical back: Full passive range of motion without pain, normal range of motion and neck supple. No rigidity. No spinous process tenderness or muscular tenderness. Normal range of motion.      Right hip: Normal.      Left hip: Normal.      Right knee: Normal.      Left knee: Normal.      Right ankle: Normal.      Left ankle: Normal.     Neurological: She is alert and oriented to person, place, and time.   Skin: Skin is warm and dry.   Psychiatric: She has a normal mood and affect. Her behavior is normal. Judgment and thought content normal.         ED Course   Splint Application    Date/Time: 3/25/2022  3:12 PM  Performed by: Thien Reyna MD  Authorized by: Thien Reyna MD   Consent Done: Not Needed  Location details: right wrist  Splint type: velcro wrist.  Supplies used: velcro.  Post-procedure: The splinted body part was neurovascularly unchanged following the procedure.  Patient tolerance: Patient tolerated the procedure well with no immediate complications        Labs Reviewed - No data to display       Imaging Results          X-Ray Wrist Complete Right (Final result)  Result time 03/25/22 14:00:07    Final result by Eli Logan MD (03/25/22 14:00:07)                 Impression:      No acute fracture or dislocation.      Electronically signed by: Eli Logan MD  Date:    03/25/2022  Time:    14:00             Narrative:    EXAMINATION:  XR WRIST COMPLETE 3 VIEWS RIGHT    CLINICAL HISTORY:  Pain in right wrist    TECHNIQUE:  PA, lateral, and oblique views of the right wrist were performed.    COMPARISON:  None    FINDINGS:  No acute fracture or dislocation.  Joint spaces appear maintained and there are no erosive changes.                               X-Ray Hand 3 view Right (Final result)  Result time 03/25/22 14:01:51    Final result by Eli Logan MD (03/25/22 14:01:51)                 Impression:      As above      Electronically signed by: Eli Logan MD  Date:    03/25/2022  Time:    14:01             Narrative:    EXAMINATION:  XR HAND COMPLETE 3 VIEW RIGHT    CLINICAL HISTORY:  right hand pain;    TECHNIQUE:  PA, lateral, and oblique views of the right hand were performed.    COMPARISON:  None    FINDINGS:  Very mild scattered degenerative changes.  No acute fracture or dislocation.                               X-Ray Shoulder Trauma Left (Final result)  Result time 03/25/22 14:00:47    Final result by Eli Logan MD (03/25/22 14:00:47)                 Impression:      As above      Electronically signed by: Eli Logan MD  Date:    03/25/2022  Time:    14:00              Narrative:    EXAMINATION:  XR SHOULDER TRAUMA 3 VIEW LEFT    CLINICAL HISTORY:  Pain in left shoulder    TECHNIQUE:  Three views of the left shoulder were performed.    COMPARISON  None    FINDINGS:  Mild degenerative change of the AC joint and very slight bony eburnation the greater tuberosity of the humerus.  No acute fracture or dislocation                                 Medications - No data to display  Medical Decision Making:   Clinical Tests:   Radiological Study: Ordered and Reviewed             ED Course as of 03/25/22 1512   Fri Mar 25, 2022   1327 BP: 136/63 [EF]   1327 Temp: 97.9 °F (36.6 °C) [EF]   1327 Temp src: Oral [EF]   1327 Pulse: 74 [EF]   1327 Resp: 16 [EF]   1327 SpO2: 96 % [EF]   1404 X-Ray Wrist Complete Right [EF]   1405 X-Ray Shoulder Trauma Left [EF]   1405 X-Ray Hand 3 view Right [EF]   1424   Patient presents to the emergency room with right hand wrist and left shoulder pain after mechanical fall.    X-rays do not demonstrate any fracture or dislocation. She has tenderness swelling and bruising to the right hand and wrist.  This was splinted in the emergency room.  She is neurovascularly intact in the right upper extremity.  She will be referred to Orthopedics. [EF]      ED Course User Index  [EF] Thien Reyna MD             Clinical Impression:   Final diagnoses:  [M25.512] Left shoulder pain  [M25.531] Right wrist pain  [S63.501A] Sprain of right wrist, initial encounter (Primary)          ED Disposition Condition    Discharge Stable        ED Prescriptions     None        Follow-up Information     Follow up With Specialties Details Why Contact Info    Jayme Wells MD Orthopedic Surgery Schedule an appointment as soon as possible for a visit   [address] [place] [address]  [address]  [facility]  [facility] [address]  [phone]      [facility] Emergency Dept Emergency Medicine  As needed, If symptoms worsen [address] [place] [address]  [place] [address]  [phone]           Thien Reyna MD  03/25/22 1510

## 2022-03-25 NOTE — Clinical Note
"Alma Rosa Dixonshar Helm was seen and treated in our emergency department on 3/25/2022.  She may return to work on 04/01/2022.       If you have any questions or concerns, please don't hesitate to call.      Thien Reyna MD"

## 2022-04-11 ENCOUNTER — OFFICE VISIT (OUTPATIENT)
Dept: ENDOCRINOLOGY | Facility: CLINIC | Age: 67
End: 2022-04-11
Payer: COMMERCIAL

## 2022-04-11 VITALS
SYSTOLIC BLOOD PRESSURE: 122 MMHG | HEIGHT: 64 IN | TEMPERATURE: 98 F | DIASTOLIC BLOOD PRESSURE: 86 MMHG | BODY MASS INDEX: 33.27 KG/M2 | WEIGHT: 194.88 LBS | OXYGEN SATURATION: 98 % | HEART RATE: 102 BPM

## 2022-04-11 DIAGNOSIS — E55.9 HYPOVITAMINOSIS D: ICD-10-CM

## 2022-04-11 DIAGNOSIS — E27.8 ADRENAL NODULE: ICD-10-CM

## 2022-04-11 DIAGNOSIS — E27.8 OTHER SPECIFIED DISORDERS OF ADRENAL GLAND: ICD-10-CM

## 2022-04-11 DIAGNOSIS — R73.03 PRE-DIABETES: ICD-10-CM

## 2022-04-11 DIAGNOSIS — Z78.0 POSTMENOPAUSAL: ICD-10-CM

## 2022-04-11 DIAGNOSIS — E04.1 NODULAR THYROID DISEASE: Primary | ICD-10-CM

## 2022-04-11 PROCEDURE — 1159F PR MEDICATION LIST DOCUMENTED IN MEDICAL RECORD: ICD-10-PCS | Mod: CPTII,S$GLB,, | Performed by: PHYSICIAN ASSISTANT

## 2022-04-11 PROCEDURE — 3079F PR MOST RECENT DIASTOLIC BLOOD PRESSURE 80-89 MM HG: ICD-10-PCS | Mod: CPTII,S$GLB,, | Performed by: PHYSICIAN ASSISTANT

## 2022-04-11 PROCEDURE — 3288F FALL RISK ASSESSMENT DOCD: CPT | Mod: CPTII,S$GLB,, | Performed by: PHYSICIAN ASSISTANT

## 2022-04-11 PROCEDURE — 3008F BODY MASS INDEX DOCD: CPT | Mod: CPTII,S$GLB,, | Performed by: PHYSICIAN ASSISTANT

## 2022-04-11 PROCEDURE — 1160F RVW MEDS BY RX/DR IN RCRD: CPT | Mod: CPTII,S$GLB,, | Performed by: PHYSICIAN ASSISTANT

## 2022-04-11 PROCEDURE — 3074F SYST BP LT 130 MM HG: CPT | Mod: CPTII,S$GLB,, | Performed by: PHYSICIAN ASSISTANT

## 2022-04-11 PROCEDURE — 99203 OFFICE O/P NEW LOW 30 MIN: CPT | Mod: S$GLB,,, | Performed by: PHYSICIAN ASSISTANT

## 2022-04-11 PROCEDURE — 1100F PTFALLS ASSESS-DOCD GE2>/YR: CPT | Mod: CPTII,S$GLB,, | Performed by: PHYSICIAN ASSISTANT

## 2022-04-11 PROCEDURE — 1100F PR PT FALLS ASSESS DOC 2+ FALLS/FALL W/INJURY/YR: ICD-10-PCS | Mod: CPTII,S$GLB,, | Performed by: PHYSICIAN ASSISTANT

## 2022-04-11 PROCEDURE — 99203 PR OFFICE/OUTPT VISIT, NEW, LEVL III, 30-44 MIN: ICD-10-PCS | Mod: S$GLB,,, | Performed by: PHYSICIAN ASSISTANT

## 2022-04-11 PROCEDURE — 3288F PR FALLS RISK ASSESSMENT DOCUMENTED: ICD-10-PCS | Mod: CPTII,S$GLB,, | Performed by: PHYSICIAN ASSISTANT

## 2022-04-11 PROCEDURE — 1159F MED LIST DOCD IN RCRD: CPT | Mod: CPTII,S$GLB,, | Performed by: PHYSICIAN ASSISTANT

## 2022-04-11 PROCEDURE — 3008F PR BODY MASS INDEX (BMI) DOCUMENTED: ICD-10-PCS | Mod: CPTII,S$GLB,, | Performed by: PHYSICIAN ASSISTANT

## 2022-04-11 PROCEDURE — 3079F DIAST BP 80-89 MM HG: CPT | Mod: CPTII,S$GLB,, | Performed by: PHYSICIAN ASSISTANT

## 2022-04-11 PROCEDURE — 3074F PR MOST RECENT SYSTOLIC BLOOD PRESSURE < 130 MM HG: ICD-10-PCS | Mod: CPTII,S$GLB,, | Performed by: PHYSICIAN ASSISTANT

## 2022-04-11 PROCEDURE — 1160F PR REVIEW ALL MEDS BY PRESCRIBER/CLIN PHARMACIST DOCUMENTED: ICD-10-PCS | Mod: CPTII,S$GLB,, | Performed by: PHYSICIAN ASSISTANT

## 2022-04-11 PROCEDURE — 99999 PR PBB SHADOW E&M-EST. PATIENT-LVL V: ICD-10-PCS | Mod: PBBFAC,,, | Performed by: PHYSICIAN ASSISTANT

## 2022-04-11 PROCEDURE — 99999 PR PBB SHADOW E&M-EST. PATIENT-LVL V: CPT | Mod: PBBFAC,,, | Performed by: PHYSICIAN ASSISTANT

## 2022-04-11 RX ORDER — PRAVASTATIN SODIUM 40 MG/1
40 TABLET ORAL NIGHTLY
COMMUNITY
Start: 2022-02-04 | End: 2022-11-09 | Stop reason: SDUPTHER

## 2022-04-11 RX ORDER — METOPROLOL TARTRATE 25 MG/1
12.5 TABLET, FILM COATED ORAL
COMMUNITY
Start: 2021-08-19 | End: 2022-11-09 | Stop reason: SDUPTHER

## 2022-04-11 RX ORDER — ONDANSETRON 4 MG/1
TABLET, ORALLY DISINTEGRATING ORAL
COMMUNITY
Start: 2022-02-04 | End: 2022-10-11 | Stop reason: SDUPTHER

## 2022-04-11 RX ORDER — BUDESONIDE AND FORMOTEROL FUMARATE DIHYDRATE 160; 4.5 UG/1; UG/1
2 AEROSOL RESPIRATORY (INHALATION) EVERY 12 HOURS
COMMUNITY
End: 2023-02-09 | Stop reason: SDUPTHER

## 2022-04-11 RX ORDER — HYDROCODONE BITARTRATE AND ACETAMINOPHEN 10; 325 MG/1; MG/1
1 TABLET ORAL 2 TIMES DAILY PRN
Status: ON HOLD | COMMUNITY
Start: 2021-07-24 | End: 2023-11-06 | Stop reason: HOSPADM

## 2022-04-11 RX ORDER — TOLTERODINE 4 MG/1
4 CAPSULE, EXTENDED RELEASE ORAL DAILY
COMMUNITY
Start: 2021-10-25 | End: 2022-10-11

## 2022-04-11 NOTE — PROGRESS NOTES
"CC: MNG    HPI: Alma Rosa Helm is a 67 y.o. female here for nodular thyroid disease along with pending conditions listed in the Visit Diagnosis. Her mother had a goiter.   Diet is low in seafood, soy and cabbage. No hx of neck radiation. [place].  Thyroid u/s 7/21 showed a 2 cm nodule in the left thyroid.  There is a 1.6 cm nodule in the right thyroid.  FNA was benign on the left. + dysphagia, sob (COPD), voice changes (deep and raspy). + constipation, diarrhea, hair loss, insomnia, anxiety, fatigue, wt gain, palpiatations. -TPO     Adrenal Nodules  CT 7/21 for kidney stones showed two adrenal nodules 1.7 cm on the rights and 1.1 cm on the left  + sweating (significant), facial swelling, HTN.  + palpitations, HA, muscle weakness in legs.     Pre-diabetes  A1c was 5.9% in 1/21    She fell three weeks ago and has a sprained wrist. Not ca or vd.     PMHx, PSHx: reviewed in epic.    Social Hx: no ETOH use. Smokes 1 ppd    Wt Readings from Last 6 Encounters:   04/11/22 88.4 kg (194 lb 14.2 oz)   03/25/22 86.2 kg (190 lb)   12/11/20 86 kg (189 lb 9.5 oz)   11/08/20 86.6 kg (191 lb)   08/08/20 84.5 kg (186 lb 4.6 oz)   05/07/20 82.6 kg (182 lb)      ROS:   Constitutional: No recent significant weight change  Eyes: No recent visual changes  Cardiovascular: Denies current anginal symptoms  Respiratory: Denies current respiratory difficulty  Gastrointestinal: Denies recent bowel disturbances  GenitoUrinary - No dysuria  Skin: No new skin rash  Neurologic: No focal neurologic complaints  Musculoskeletal: no joint pain  Endocrine: no polyphagia, polydipsia or polyuria  Remainder ROS negative     /86 (BP Location: Right arm, Patient Position: Sitting, BP Method: Large (Manual))   Pulse 102   Temp 98.4 °F (36.9 °C) (Oral)   Ht 5' 4" (1.626 m)   Wt 88.4 kg (194 lb 14.2 oz)   SpO2 98%   BMI 33.45 kg/m²      Personally reviewed labs below:    Lab Results   Component Value Date    TSH 1.104 12/11/2020          " Chemistry        Component Value Date/Time     12/11/2020 1504    K 3.9 12/11/2020 1504     12/11/2020 1504    CO2 25 12/11/2020 1504    BUN 14 12/11/2020 1504    CREATININE 0.8 12/11/2020 1504    GLU 90 12/11/2020 1504        Component Value Date/Time    CALCIUM 9.5 12/11/2020 1504    ALKPHOS 88 12/11/2020 1504    AST 11 12/11/2020 1504    ALT 17 12/11/2020 1504    BILITOT 0.3 12/11/2020 1504    ESTGFRAFRICA >60 12/11/2020 1504    EGFRNONAA >60 12/11/2020 1504           No results found for: HGBA1C     PE:  GENERAL: Well developed, well nourished  NECK: Supple neck, normal thyroid. No bruit  LYMPHATIC: No cervical or supraclavicular lymphadenopathy  CARDIOVASCULAR: Normal heart sounds, no pedal edema  RESPIRATORY: Normal effort, clear to auscultation  MUSC: 2+ DTR UE/LE  NEURO: steady gait, CN ll-Xll grossly intact  PSYCH: normal mood and affect    Assessment/Plan:   1. Nodular thyroid disease  Thyroid Peroxidase Antibody    Anti-Thyroglobulin Antibody    Calcitonin    T4, Free    TSH    TSH    T4, Free    CT Soft Tissue Neck WO Contrast   2. Postmenopausal  DXA Bone Density Spine And Hip   3. Adrenal nodule  Metanephrines, Plasma Free    ACTH    Aldosterone/Renin Activity Ratio    Cortisol    Testosterone Panel    Catecholamines, Fractionated, Plasma    Estrogens, fractionated    CT Abdomen With Without Contrast    Creatinine, serum   4. Pre-diabetes  Hemoglobin A1C   5. Other specified disorders of adrenal gland  CT Abdomen With Without Contrast   6. Hypovitaminosis D  Vitamin D      Nodular thyroid disease-stable-CT scan of neck to evaluate dysphagia.  Postmenopausal-DEXA scan.  Adrenal Nodule- CT scan. Check hormones.   Pre-diabetes-check a1c.   Hypovitaminosis d-check vd    Fasting labs   Dexa scan  CT scans-neck and abodomen  F/u in 6 mths -tsh, t4

## 2022-04-18 ENCOUNTER — HOSPITAL ENCOUNTER (OUTPATIENT)
Dept: RADIOLOGY | Facility: CLINIC | Age: 67
Discharge: HOME | End: 2022-04-18
Attending: PHYSICIAN ASSISTANT
Payer: COMMERCIAL

## 2022-04-18 DIAGNOSIS — Z78.0 POSTMENOPAUSAL: ICD-10-CM

## 2022-04-18 PROCEDURE — 77080 DXA BONE DENSITY AXIAL: CPT | Mod: 26,,, | Performed by: RADIOLOGY

## 2022-04-18 PROCEDURE — 77080 DXA BONE DENSITY AXIAL: CPT | Mod: TC,PO

## 2022-04-18 PROCEDURE — 77080 DEXA BONE DENSITY SPINE HIP: ICD-10-PCS | Mod: 26,,, | Performed by: RADIOLOGY

## 2022-04-22 ENCOUNTER — HOSPITAL ENCOUNTER (OUTPATIENT)
Dept: RADIOLOGY | Facility: HOSPITAL | Age: 67
Discharge: HOME | End: 2022-04-22
Attending: PHYSICIAN ASSISTANT
Payer: COMMERCIAL

## 2022-04-22 DIAGNOSIS — E04.1 NODULAR THYROID DISEASE: ICD-10-CM

## 2022-04-22 DIAGNOSIS — E27.8 OTHER SPECIFIED DISORDERS OF ADRENAL GLAND: ICD-10-CM

## 2022-04-22 DIAGNOSIS — E27.8 ADRENAL NODULE: ICD-10-CM

## 2022-04-22 PROCEDURE — 25500020 PHARM REV CODE 255

## 2022-04-22 PROCEDURE — 70490 CT SOFT TISSUE NECK W/O DYE: CPT | Mod: TC

## 2022-04-22 PROCEDURE — 74170 CT ABDOMEN W WO CONTRAST: ICD-10-PCS | Mod: 26,,, | Performed by: RADIOLOGY

## 2022-04-22 PROCEDURE — 74170 CT ABD WO CNTRST FLWD CNTRST: CPT | Mod: 26,,, | Performed by: RADIOLOGY

## 2022-04-22 PROCEDURE — 74170 CT ABD WO CNTRST FLWD CNTRST: CPT | Mod: TC

## 2022-04-22 RX ADMIN — IOHEXOL 100 ML: 350 INJECTION, SOLUTION INTRAVENOUS at 12:04

## 2022-05-05 ENCOUNTER — TELEPHONE (OUTPATIENT)
Dept: ENDOCRINOLOGY | Facility: CLINIC | Age: 67
End: 2022-05-05
Payer: COMMERCIAL

## 2022-05-05 DIAGNOSIS — Z78.0 POSTMENOPAUSAL: Primary | ICD-10-CM

## 2022-05-05 NOTE — TELEPHONE ENCOUNTER
----- Message from Anne-Marie Randall sent at 5/3/2022  1:59 PM CDT -----  There was an issue with the estrogen test ordered 04/18/2022.  The specimen was quantity not sufficient.  The ordered has been cancelled and will need to be reordered and recollected.  If there are any questions please call the sendout laboratory. Anyone in the sendout lab will be able to assist you.

## 2022-05-05 NOTE — TELEPHONE ENCOUNTER
Reordered ESTROGENS, FRACTIONATED due to insufficient quantity & faxed the order to [facility] at Ms. Helm's request. I also scheduled an appt. on 6/2/22 with CLAUDIA Burris PA-C at her request to discuss her concerns & lab results.

## 2022-05-05 NOTE — PROGRESS NOTES
Test Not Performed     Comment: Estrogens, E1+E2, fractionated, S was cancelled on   05/02/2022 at 15:56; Quantity was not sufficient for repeat   testing.

## 2022-05-24 ENCOUNTER — TELEPHONE (OUTPATIENT)
Dept: ENDOCRINOLOGY | Facility: CLINIC | Age: 67
End: 2022-05-24
Payer: COMMERCIAL

## 2022-05-24 DIAGNOSIS — E04.1 NODULAR THYROID DISEASE: Primary | ICD-10-CM

## 2022-05-24 NOTE — TELEPHONE ENCOUNTER
----- Message from Jailene Gamboa MA sent at 5/23/2022  3:24 PM CDT -----  Please advise  ----- Message -----  From: Magui Ayala  Sent: 5/23/2022  12:15 PM CDT  To: [facility] Staff    Type: Needs Medical Advice  Who Called:  PT  Symptoms (please be specific):  PT would like to discuss the next steps in her health care    Best Call Back Number: [phone]  Additional Information: Please call and advise

## 2022-05-24 NOTE — TELEPHONE ENCOUNTER
Attempted to contact Ms. Helm X 3 to inform to call [phone] to schedule an ENT appt. for her thyroid surgery but there was no answer. A VM was left. .     Medication provided. Discussed medication use and side effects. Discussed allergy avoidance and possible etiologist including pets and carpet. Discussed the use of proper handwashing and showers. If not improved can discuss allergy referral at next appointment.

## 2022-05-25 ENCOUNTER — PATIENT MESSAGE (OUTPATIENT)
Dept: FAMILY MEDICINE | Facility: CLINIC | Age: 67
End: 2022-05-25
Payer: COMMERCIAL

## 2022-05-25 ENCOUNTER — TELEPHONE (OUTPATIENT)
Dept: FAMILY MEDICINE | Facility: CLINIC | Age: 67
End: 2022-05-25
Payer: COMMERCIAL

## 2022-06-02 ENCOUNTER — OFFICE VISIT (OUTPATIENT)
Dept: OTOLARYNGOLOGY | Facility: CLINIC | Age: 67
End: 2022-06-02
Payer: COMMERCIAL

## 2022-06-02 VITALS — WEIGHT: 200.81 LBS | HEIGHT: 64 IN | BODY MASS INDEX: 34.28 KG/M2 | OXYGEN SATURATION: 97 % | HEART RATE: 83 BPM

## 2022-06-02 DIAGNOSIS — F17.200 SMOKER: ICD-10-CM

## 2022-06-02 DIAGNOSIS — R13.14 PHARYNGOESOPHAGEAL PHASE DYSPHAGIA: Primary | ICD-10-CM

## 2022-06-02 DIAGNOSIS — E04.2 GOITER, NONTOXIC, MULTINODULAR: ICD-10-CM

## 2022-06-02 DIAGNOSIS — H60.8X3 CHRONIC ECZEMATOUS OTITIS EXTERNA OF BOTH EARS: ICD-10-CM

## 2022-06-02 DIAGNOSIS — D38.0 NEOPLASM OF UNCERTAIN BEHAVIOR OF VOCAL CORD: ICD-10-CM

## 2022-06-02 DIAGNOSIS — R06.02 SHORTNESS OF BREATH: ICD-10-CM

## 2022-06-02 DIAGNOSIS — J44.9 CHRONIC OBSTRUCTIVE PULMONARY DISEASE, UNSPECIFIED COPD TYPE: ICD-10-CM

## 2022-06-02 DIAGNOSIS — H60.391 ACUTE INFECTIVE OTITIS EXTERNA, RIGHT: ICD-10-CM

## 2022-06-02 DIAGNOSIS — E04.1 NODULAR THYROID DISEASE: ICD-10-CM

## 2022-06-02 DIAGNOSIS — E66.9 OBESITY (BMI 30.0-34.9): ICD-10-CM

## 2022-06-02 DIAGNOSIS — J37.0 CHRONIC LARYNGITIS: ICD-10-CM

## 2022-06-02 PROBLEM — E66.811 OBESITY (BMI 30.0-34.9): Status: ACTIVE | Noted: 2022-06-02

## 2022-06-02 PROCEDURE — 99204 PR OFFICE/OUTPT VISIT, NEW, LEVL IV, 45-59 MIN: ICD-10-PCS | Mod: 25,S$GLB,, | Performed by: OTOLARYNGOLOGY

## 2022-06-02 PROCEDURE — 99215 OFFICE O/P EST HI 40 MIN: CPT | Mod: PBBFAC,PO,25 | Performed by: OTOLARYNGOLOGY

## 2022-06-02 PROCEDURE — 99999 PR PBB SHADOW E&M-EST. PATIENT-LVL V: ICD-10-PCS | Mod: PBBFAC,,, | Performed by: OTOLARYNGOLOGY

## 2022-06-02 PROCEDURE — 31575 LARYNGOSCOPY: ICD-10-PCS | Mod: S$GLB,,, | Performed by: OTOLARYNGOLOGY

## 2022-06-02 PROCEDURE — 99204 OFFICE O/P NEW MOD 45 MIN: CPT | Mod: 25,S$GLB,, | Performed by: OTOLARYNGOLOGY

## 2022-06-02 PROCEDURE — 31575 DIAGNOSTIC LARYNGOSCOPY: CPT | Mod: PBBFAC,PO | Performed by: OTOLARYNGOLOGY

## 2022-06-02 PROCEDURE — 99999 PR PBB SHADOW E&M-EST. PATIENT-LVL V: CPT | Mod: PBBFAC,,, | Performed by: OTOLARYNGOLOGY

## 2022-06-02 RX ORDER — CIPROFLOXACIN AND DEXAMETHASONE 3; 1 MG/ML; MG/ML
SUSPENSION/ DROPS AURICULAR (OTIC)
Qty: 7.5 ML | Refills: 1 | Status: ON HOLD | OUTPATIENT
Start: 2022-06-02 | End: 2022-10-25 | Stop reason: HOSPADM

## 2022-06-02 RX ORDER — ALBUTEROL SULFATE 1.25 MG/3ML
1.25 SOLUTION RESPIRATORY (INHALATION)
COMMUNITY
Start: 2022-02-04 | End: 2023-02-04

## 2022-06-02 RX ORDER — DICLOFENAC SODIUM 75 MG/1
75 TABLET, DELAYED RELEASE ORAL 2 TIMES DAILY
COMMUNITY
Start: 2022-05-19 | End: 2023-11-02

## 2022-06-02 RX ORDER — FLUOCINOLONE ACETONIDE 0.11 MG/ML
OIL AURICULAR (OTIC)
Qty: 20 ML | Refills: 0 | Status: SHIPPED | OUTPATIENT
Start: 2022-06-02 | End: 2022-11-09

## 2022-06-02 RX ORDER — ALBUTEROL SULFATE 90 UG/1
2 AEROSOL, METERED RESPIRATORY (INHALATION) EVERY 6 HOURS PRN
COMMUNITY
Start: 2022-03-23 | End: 2023-02-09 | Stop reason: SDUPTHER

## 2022-06-02 RX ORDER — BUPROPION HYDROCHLORIDE 100 MG/1
100 TABLET, EXTENDED RELEASE ORAL 2 TIMES DAILY
Qty: 60 TABLET | Refills: 2 | Status: SHIPPED | OUTPATIENT
Start: 2022-06-02 | End: 2022-11-09

## 2022-06-02 NOTE — PROGRESS NOTES
[facility]    Subjective:      Patient: Alma Rosa Helm Patient PCP: Cindy Lowe NP         :  1955     Sex:  female      MRN:  [id number]          Date of Visit: 2022      Chief Complaint: Nodular thyroid disease      Patient ID: Alma Rosa Helm is a 67 y.o. female smoker with known multinodular goiter with a previous biopsy reportedly benign subsequent development of contralateral nodule in a 1.6 cm not previously biopsied who has been having some longstanding issues with shortness of breath attributed to COPD without stridor but now having more more episodes of choking.  She feels she has to drink through straws in order to not choke on liquids.  She has not been losing weight or had aspiration pneumonia.  She is  referred to me by CLAUDIA Burris in consultation for multinodular goiter with CT scan findings of airway compression in the cervical trachea.  Capture images of prior CT chest from 2 years ago and more recent CT neck similar levels below shows some fish mouth type deformity.  Patient last had pulmonary function testing spirometry done with Dr. Morillo or and 2020 with no reported abnormality of spirometry.  I feel like there is a bit of flattening the inspiratory limb.  This is not been repeated.      Patient also complains of ear infections.  She digs at her ears frequently due to itching.  She is not a diabetic and never had ear surgery.  Her hearing is diminished right worse than left with right greater than left tenderness and intermittent drainage.    Patient is a long-term smoker she smokes between to intense cigarettes a day currently.  She has some intermittent chronic insomnia and previous prescription of Wellbutrin/Zyban resulted in poor sleep but is unclear if this was truly related to the medication.  She is desirous of quitting.    She does report some raspy quality or voice which is mostly intermittent.  She denies any hemoptysis or sore throat or otalgia.    TSH    Date Value Ref Range Status   04/18/2022 0.635 0.400 - 4.000 uIU/mL Final   12/11/2020 1.104 0.400 - 4.000 uIU/mL Final         EXAMINATION:  CT SOFT TISSUE NECK WITHOUT CONTRAST     CLINICAL HISTORY:  Goiter;Please determine if the thyroid is encroaching on the trachea or esophagus.;Nontoxic single thyroid nodule     TECHNIQUE:  Low dose axial images, sagittal and coronal reformations were performed from the skull base to the level of the clavicles.  Contrast was not administered.     COMPARISON:  None     FINDINGS:  There is enlargement of the thyroid and a heterogeneous CT density consistent with nodules.  A hypodense 1.6 cm nodule is seen in the upper right lobe.  On the coronal images the right lobe measures 7 cm in length and the left lobe measures 5.8 cm in length.     The trachea at the lower edge of the larynx is oval.  In the superior mediastinum the trachea is oval but at the thyroid level the trachea is a convex-concave shape consistent compression with the AP  diameter  reduced to 1 cm.  This is seen in the sagittal reconstruction images as well.  In the superior mediastinum the AP diameter is 1.7 cm.     The parotid and submandibular salivary glands are symmetric and of normal CT density.  Adenopathy or soft tissue masses within the anterior cervical triangle is not seen.  Adenopathy within the neck/anterior cervical triangles is not seen.     Impression:     Multinodular goiter with compression of the trachea at the level of the isthmus.  The        Electronically signed by: Roberto Serrato MD  Date:                                            04/22/2022  Time:                                           14:27      CT chest 3/2020        Review of Systems     Past Medical History  She has a past medical history of Anxiety, Bipolar disorder, COPD, mild, and Nephrolithiasis.    Family / Surgical / Social History  Her family history includes COPD in her father; Cancer in her mother; Diabetes in her  mother; Hypertension in her father.    Past Surgical History:   Procedure Laterality Date    BACK SURGERY      CYSTOSCOPY      HYSTERECTOMY      LITHOTRIPSY      TUBAL LIGATION         Social History     Tobacco Use    Smoking status: Current Every Day Smoker     Packs/day: 0.75     Years: 45.00     Pack years: 33.75     Types: Cigarettes     Start date:      Last attempt to quit: 2020     Years since quittin.1    Smokeless tobacco: Never Used    Tobacco comment: now smoking  ppd   Substance and Sexual Activity    Alcohol use: Never    Drug use: No    Sexual activity: Not on file       Medications  She has a current medication list which includes the following prescription(s): albuterol, albuterol, albuterol-ipratropium, budesonide-formoterol 160-4.5 mcg, cyclobenzaprine, diclofenac, hydrocodone-acetaminophen, metoprolol tartrate, ondansetron, pravastatin, tolterodine, bupropion, ciprofloxacin-dexamethasone 0.3-0.1%, and fluocinolone acetonide oil.      Allergies  Review of patient's allergies indicates:   Allergen Reactions    Tramadol Swelling    Imitrex [sumatriptan succinate]     Penicillins     Sulfa (sulfonamide antibiotics)     Toradol [ketorolac] Swelling       All medications, allergies, and past history have been reviewed.    Objective:      Vitals:  Vitals - 1 value per visit 2022   SYSTOLIC 122 - -   DIASTOLIC 86 - -   Pulse 102 - 83   Temp 98.4 - -   Resp - - -   SPO2 98 - 97   Weight (lb) 194.89 - 200.84   Weight (kg) 88.4 - 91.1   Height 64 - 64   BMI (Calculated) 33.4 - 34.5   VISIT REPORT - - -   Pain Score  - 6 -       Body surface area is 2.03 meters squared.    Physical Exam:    GENERAL  APPEARANCE -  alert, appears stated age, cooperative and moderately obese  BARRIER(S) TO COMMUNICATION -  none VOICE - mildly raspy, mild strain    INTEGUMENTARY  no suspicious head and neck lesions    HEENT  HEAD: Normocephalic, without obvious abnormality,  atraumatic  FACE: INSPECTION - Symmetric, no signs of trauma, no suspicious lesion(s)  PALPATION -  No masses SALIVARY GLANDS - non-tender with no appreciable mass  STRENGTH - facial symmetry  NECK/THYROID: mildly enlarged, dominent right nodule of 1.5-2 cm elevates well above clavicle/notch.  Nontender, midline.    EYES  Normal occular alignment and mobility with no visible nystagmus at rest    EARS/NOSE/MOUTH/THROAT  EARS  PINNAE AND EXTERNAL EARS - redness and scaling of meatus bilaterally OTOSCOPIC EXAM (surgical microscopy was not used for visualization/instrumentation): EAR EXAM - right > left exudate and canal edema/erythema.    HEARING - decreased    NOSE AND SINUSES  EXTERNAL NOSE - Grossly normal for age/sex  SEPTUM - normal/no obstruction on anterior exam without decongestion TURBINATES - within normal limits MUCOSA - within normal limits     MOUTH AND THROAT   ORAL CAVITY, LIPS, TEETH, GUMS & TONGUE - moist, no suspicious lesions  OROPHARYNX /TONSILS/PHARYNGEAL WALLS/HYPOPHARYNX - no erythema or exudates  NASOPHARYNX - limited mirror exam - unable to visualize due to anatomy/gag  LARYNX -  - limited mirror exam - unable to visualize due to anatomy/gag      CHEST AND LUNG   INSPECTION & AUSCULTATION - normal effort, no stridor    CARDIOVASCULAR  AUSCULTATION & PERIPHERAL VASCULAR - regular rate and rhythm.    NEUROLOGIC  MENTAL STATUS - alert, interactive CRANIAL NERVES - normal    LYMPHATIC  HEAD AND NECK - non-palpable; SUPRACLAVICULAR - deferred; AXILLARY - deferred; INGUINAL - deferred; LIVER/SPLEEN - deferred        Procedure(s):  Flexible laryngoscopy performed.  See procedure note. Photo in media.    Labs:  WBC   Date Value Ref Range Status   12/11/2020 10.19 3.90 - 12.70 K/uL Final     Hemoglobin   Date Value Ref Range Status   12/11/2020 14.7 12.0 - 16.0 g/dL Final     Platelets   Date Value Ref Range Status   12/11/2020 307 150 - 350 K/uL Final     Creatinine   Date Value Ref Range Status    04/18/2022 0.8 0.5 - 1.4 mg/dL Final     TSH   Date Value Ref Range Status   04/18/2022 0.635 0.400 - 4.000 uIU/mL Final     Glucose   Date Value Ref Range Status   12/11/2020 90 70 - 110 mg/dL Final     Hemoglobin A1C   Date Value Ref Range Status   04/18/2022 5.9 (H) 4.0 - 5.6 % Final     Comment:     ADA Screening Guidelines:  5.7-6.4%  Consistent with prediabetes  >or=6.5%  Consistent with diabetes    High levels of fetal hemoglobin interfere with the HbA1C  assay. Heterozygous hemoglobin variants (HbS, HgC, etc)do  not significantly interfere with this assay.   However, presence of multiple variants may affect accuracy.           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  Return if worsening or not improving prior to planned follow-up in a month.    Prior to follow-up in 1 month complete barium swallow, smoking cessation with Wellbutrin/Zyban/bupropion, pulmonary function testing (spirometry to assess degree of airway obstruction from thyroid versus COPD).  Conservative care of reflux as outlined.    We will plan on operative care of the thyroid if appropriate with respect to swallowing and breathing difficulties otherwise we will proceed with a  repeat biopsy of the 2 nodules 1 on left and more on the right 1 of which is not been biopsied and not proceed with surgery.  We may have to proceed with surgery for biopsy of the right true vocal cord lesion especially if this does not resolve with quitting smoking.

## 2022-06-02 NOTE — PATIENT INSTRUCTIONS
With respect to the ears use the Ciprodex drops to the right ear for a week then used the derm otic (fluocinolone drops) to both ears for another week then follow routine ear care instructions as outlined.  Return if worsening or not improving prior to planned follow-up in a month.    Prior to follow-up in 1 month complete barium swallow, smoking cessation with Wellbutrin/Zyban/bupropion, pulmonary function testing (spirometry to assess degree of airway obstruction from thyroid versus COPD).  Conservative care of reflux as outlined.    We will plan on operative care of the thyroid if appropriate with respect to swallowing and breathing difficulties otherwise we will proceed with a repeat biopsy of the 2 nodules 1 on left and more on the right 1 of which is not been biopsied and not proceed with surgery.  We may have to proceed with surgery for biopsy of the right true vocal cord lesion especially if this does not resolve with quitting smoking.      DERMOTIC/FLUOCINOLONE OIL    Use prescribed fluocinolone/derm otic oil to both ears smearing around the outer ear scaling areas as well as down in the ear canal twice daily for a week no more than 2. At this point follow a routine ear care as outlined.  If not improved with this dose of steroid a higher concentration steroid may prove necessary.      ROUTINE EAR CARE    Keep the ears dry in general.  Water and soap dry the ears increase scaling by stripping away the natural oils of the ears.    If water gets stuck in the ear a twisted tissue can be used on occasion or the water can be displaced with concentrated alcohol like swimming ear or 72-95% isopropyl alcohol.  This will of course further dry the ears.    The ear should be kept moist in general with mineral oil 3 or 4 drops 2 to 3 times a week or even every night.    If the ears need to be irrigated use either a 50 50 mixture of white vinegar and distilled water or 50 50 mixture of alcohol and white  vinegar.    Painful draining years it do not resolve with conservative care could represent secondary infection and may need debridement/clearing of the wax, and topical antibiotic drops with or without steroids.        ACID REFLUX   What is acid reflux?    When we eat, food passes from the throat and into the stomach through a tube called the esophagus. At the bottom of the esophagus is a ring of muscles that acts as a valve between the esophagus and stomach, called the lower esophageal sphincter. Smoking, alcohol, and certain types of food may weaken the sphincter, so it may stop closing properly. The contents in the stomach then may leak back, or reflux, into the esophagus. This problem is called gastroesophageal reflux disease (GERD). Symptoms of GERD include heartburn, belching, regurgitation of stomach contents, and swallowing difficulties.    Sometimes, the stomach acid travels up through the esophagus and spills into the larynx or pharynx (voice box). This is called laryngopharyngeal reflux (LPR) and is irritating to the vocal folds and surrounding tissues. Often, patients with LPR do not experience heartburn as a symptom. More commonly, symptoms of LPR include hoarseness, excessive mucous resulting in frequent throat clearing, post-nasal drip, coughing, throat soreness or burning, choking episodes, difficulty swallowing, and sensation of a lump in the throat.     How is acid reflux treated?   Treatment for acid reflux can involve any combination of medication, lifestyle modifications, and surgery.   Medications. Your doctor may prescribe a proton pump inhibitor (PPI) or an H2 blocker. If you are prescribed a PPI, take in on an empty stomach in the morning 30 minutes prior to eating breakfast. Keep in mind that it may take 4-6 weeks before symptoms begin to resolve, so do not stop medications without consulting your doctor.   Lifestyle and dietary modifications. Eat smaller meals at a slower pace. Avoid  over-eating. If you are overweight, try to lose weight. Do not lie down or exercise directly after eating; eat your last meal of the day at least 2-3 hours prior to going to sleep. Avoid tight-fitting clothes. If you are a smoker, reduce or quit smoking. Elevate your head of bed 4-6 inches by putting phone books under the legs at the head of your bed or buy a wedge pillow, but do not use more than two regular pillows as this causes the body to curl and compresses your stomach.     Food group Foods to avoid to reduce reflux   Beverages  Whole milk, 2% milk, chocolate milk/hot chocolate, alcohol, coffee (regular and decaf), caffeinated tea, mint tea, carbonated beverages, citrus juice    Breads/grains Commercial sweet rolls, doughnuts, croissants, and other high-fat pastries    Fruits and vegetables Fried or cream-style vegetables, tomatoes, tomato-based products, citrus fruits, hot peppers    Soups and seasonings Cream, cheese, tomato-based soups, vinegar    Meats and proteins Fatty or fried meat/fish, marinelli, sausage, pepperoni, lunch meat, fried eggs    Fats and oil Lard, marinelli drippings, salt pork, meat drippings, gravies, highly seasoned salad dressings, nuts    Sweets/desserts Anything made with or from chocolate, peppermint, spearmint, whole milk, or cream; high-fat pastries, gum, hard candy

## 2022-06-02 NOTE — PROCEDURES
"Laryngoscopy    Date/Time: 6/2/2022 11:20 AM  Performed by: Koko Garrison MD  Authorized by: Koko Garrison MD     Time out: Immediately prior to procedure a "time out" was called to verify the correct patient, procedure, equipment, support staff and site/side marked as required.    Consent Done?:  Yes (Verbal)  Anesthesia:     Local anesthetic:  4% Xylocaine spray with Jean Paul-Synephrine    Patient tolerance:  Patient tolerated the procedure well with no immediate complications    Decongestion performed?: Yes    Laryngoscopy:     Areas examined:  Nasal cavities, nasopharynx, oropharynx, hypopharynx, larynx and vocal cords  Larynx/hypopharynx:      Performed for hoarseness in a smoker with dysphagia and multinodular goiter.  No suspicious nasal findings a little bit of dryness of the left middle turbinate.  Some residual mid mild flat adenoid type tissue midline high nasopharynx.  Normal oropharynx hypopharynx otherwise.  No supraglottic lesions.  No asymmetry of function of the vocal cords.  Both vocal cords hyperemesis and thickened with some superficial healed ulceration?  Scarring?  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  Generally inflamed smoker's glottis.      "

## 2022-06-15 ENCOUNTER — TELEPHONE (OUTPATIENT)
Dept: SMOKING CESSATION | Facility: CLINIC | Age: 67
End: 2022-06-15
Payer: COMMERCIAL

## 2022-06-15 NOTE — TELEPHONE ENCOUNTER
Attempt to contact patient in regards to her scheduled appointment. Recorded message left with return contact information.

## 2022-06-20 ENCOUNTER — HOSPITAL ENCOUNTER (OUTPATIENT)
Dept: PULMONOLOGY | Facility: HOSPITAL | Age: 67
Discharge: HOME | End: 2022-06-20
Attending: OTOLARYNGOLOGY
Payer: COMMERCIAL

## 2022-06-20 ENCOUNTER — HOSPITAL ENCOUNTER (OUTPATIENT)
Dept: RADIOLOGY | Facility: HOSPITAL | Age: 67
Discharge: HOME | End: 2022-06-20
Attending: OTOLARYNGOLOGY
Payer: COMMERCIAL

## 2022-06-20 DIAGNOSIS — E66.9 OBESITY (BMI 30.0-34.9): ICD-10-CM

## 2022-06-20 DIAGNOSIS — E04.1 NODULAR THYROID DISEASE: ICD-10-CM

## 2022-06-20 DIAGNOSIS — J44.9 CHRONIC OBSTRUCTIVE PULMONARY DISEASE, UNSPECIFIED COPD TYPE: ICD-10-CM

## 2022-06-20 DIAGNOSIS — R13.14 PHARYNGOESOPHAGEAL PHASE DYSPHAGIA: ICD-10-CM

## 2022-06-20 DIAGNOSIS — E04.1 NODULAR THYROID DISEASE: Primary | ICD-10-CM

## 2022-06-20 DIAGNOSIS — R06.02 SHORTNESS OF BREATH: ICD-10-CM

## 2022-06-20 LAB
BRPFT: NORMAL
ERVN2 LLN: -16449.27
ERVN2 PRE REF: 9.2 %
ERVN2 PRE: 0.07 L
ERVN2 REF: 0.73
FEF 25 75 LLN: 1
FEF 25 75 PRE REF: 72 %
FEF 25 75 REF: 2.05
FEV1 FVC LLN: 66
FEV1 FVC PRE REF: 109.3 %
FEV1 FVC REF: 79
FEV1 LLN: 1.75
FEV1 PRE REF: 53.3 %
FEV1 REF: 2.36
FRCN2 LLN: 1.89
FRCN2 PRE REF: 61.5 %
FRCN2 REF: 2.72
FVC LLN: 2.24
FVC PRE REF: 48.5 %
FVC REF: 3.02
MVV LLN: 75
MVV PRE REF: 52 %
MVV REF: 89
PEF LLN: 4.3
PEF PRE REF: 59.3 %
PEF REF: 6.02
PRE FEF 25 75: 1.48 L/S
PRE FET 100: 2.53 SEC
PRE FEV1 FVC: 85.97 %
PRE FEV1: 1.26 L
PRE FRC N2: 1.67 L
PRE FVC: 1.46 L
PRE MVV: 46 L/MIN
PRE PEF: 3.57 L/S
RVN2 LLN: 1.41
RVN2 PRE REF: 80.9 %
RVN2 PRE: 1.61 L
RVN2 REF: 1.99
RVN2TLCN2 LLN: 31.47
RVN2TLCN2 PRE REF: 116.7 %
RVN2TLCN2 PRE: 47.92 %
RVN2TLCN2 REF: 41.06
TLCN2 LLN: 3.98
TLCN2 PRE REF: 67.6 %
TLCN2 PRE: 3.36 L
TLCN2 REF: 4.97
VCMAXN2 LLN: 2.24
VCMAXN2 PRE REF: 58 %
VCMAXN2 PRE: 1.75 L
VCMAXN2 REF: 3.02

## 2022-06-20 PROCEDURE — 94150 PR VITAL CAPACITY TEST: ICD-10-PCS | Mod: 26,59,, | Performed by: INTERNAL MEDICINE

## 2022-06-20 PROCEDURE — 94727 GAS DIL/WSHOT DETER LNG VOL: CPT | Mod: 26,,, | Performed by: INTERNAL MEDICINE

## 2022-06-20 PROCEDURE — 74220 FL ESOPHAGRAM WITH BARIUM TABLET: ICD-10-PCS | Mod: 26,,, | Performed by: RADIOLOGY

## 2022-06-20 PROCEDURE — 94010 BREATHING CAPACITY TEST: CPT | Mod: 26,,, | Performed by: INTERNAL MEDICINE

## 2022-06-20 PROCEDURE — A9698 NON-RAD CONTRAST MATERIALNOC: HCPCS | Performed by: OTOLARYNGOLOGY

## 2022-06-20 PROCEDURE — 94010 BREATHING CAPACITY TEST: CPT

## 2022-06-20 PROCEDURE — 74220 X-RAY XM ESOPHAGUS 1CNTRST: CPT | Mod: 26,,, | Performed by: RADIOLOGY

## 2022-06-20 PROCEDURE — 94727 PR PULM FUNCTION TEST BY GAS: ICD-10-PCS | Mod: 26,,, | Performed by: INTERNAL MEDICINE

## 2022-06-20 PROCEDURE — 25500020 PHARM REV CODE 255: Performed by: OTOLARYNGOLOGY

## 2022-06-20 PROCEDURE — 74220 X-RAY XM ESOPHAGUS 1CNTRST: CPT | Mod: TC

## 2022-06-20 PROCEDURE — 94010 BREATHING CAPACITY TEST: ICD-10-PCS | Mod: 26,,, | Performed by: INTERNAL MEDICINE

## 2022-06-20 PROCEDURE — 94727 GAS DIL/WSHOT DETER LNG VOL: CPT

## 2022-06-20 PROCEDURE — 94150 VITAL CAPACITY TEST: CPT | Mod: 26,59,, | Performed by: INTERNAL MEDICINE

## 2022-06-20 RX ADMIN — BARIUM SULFATE 300 ML: 0.6 SUSPENSION ORAL at 02:06

## 2022-07-08 ENCOUNTER — OFFICE VISIT (OUTPATIENT)
Dept: OTOLARYNGOLOGY | Facility: CLINIC | Age: 67
End: 2022-07-08
Payer: COMMERCIAL

## 2022-07-08 VITALS — TEMPERATURE: 98 F | HEIGHT: 64 IN | WEIGHT: 202.38 LBS | BODY MASS INDEX: 34.55 KG/M2

## 2022-07-08 DIAGNOSIS — J44.9 CHRONIC OBSTRUCTIVE PULMONARY DISEASE, UNSPECIFIED COPD TYPE: ICD-10-CM

## 2022-07-08 DIAGNOSIS — E66.9 OBESITY (BMI 30.0-34.9): ICD-10-CM

## 2022-07-08 DIAGNOSIS — F17.200 SMOKER: ICD-10-CM

## 2022-07-08 DIAGNOSIS — R06.02 SHORTNESS OF BREATH: ICD-10-CM

## 2022-07-08 DIAGNOSIS — E04.2 GOITER, NONTOXIC, MULTINODULAR: ICD-10-CM

## 2022-07-08 DIAGNOSIS — H60.8X3 CHRONIC ECZEMATOUS OTITIS EXTERNA OF BOTH EARS: ICD-10-CM

## 2022-07-08 DIAGNOSIS — R13.14 PHARYNGOESOPHAGEAL PHASE DYSPHAGIA: ICD-10-CM

## 2022-07-08 DIAGNOSIS — J37.0 CHRONIC LARYNGITIS: ICD-10-CM

## 2022-07-08 DIAGNOSIS — H60.391 ACUTE INFECTIVE OTITIS EXTERNA, RIGHT: ICD-10-CM

## 2022-07-08 DIAGNOSIS — E04.1 NODULAR THYROID DISEASE: ICD-10-CM

## 2022-07-08 DIAGNOSIS — D38.0 NEOPLASM OF UNCERTAIN BEHAVIOR OF VOCAL CORD: Primary | ICD-10-CM

## 2022-07-08 PROCEDURE — 99214 PR OFFICE/OUTPT VISIT, EST, LEVL IV, 30-39 MIN: ICD-10-PCS | Mod: 25,S$GLB,, | Performed by: OTOLARYNGOLOGY

## 2022-07-08 PROCEDURE — 99214 OFFICE O/P EST MOD 30 MIN: CPT | Mod: PBBFAC,PO | Performed by: OTOLARYNGOLOGY

## 2022-07-08 PROCEDURE — 31575 LARYNGOSCOPY: ICD-10-PCS | Mod: S$GLB,,, | Performed by: OTOLARYNGOLOGY

## 2022-07-08 PROCEDURE — 99999 PR PBB SHADOW E&M-EST. PATIENT-LVL IV: ICD-10-PCS | Mod: PBBFAC,,, | Performed by: OTOLARYNGOLOGY

## 2022-07-08 PROCEDURE — 99214 OFFICE O/P EST MOD 30 MIN: CPT | Mod: 25,S$GLB,, | Performed by: OTOLARYNGOLOGY

## 2022-07-08 PROCEDURE — 31575 DIAGNOSTIC LARYNGOSCOPY: CPT | Mod: PBBFAC,PO | Performed by: OTOLARYNGOLOGY

## 2022-07-08 PROCEDURE — 99999 PR PBB SHADOW E&M-EST. PATIENT-LVL IV: CPT | Mod: PBBFAC,,, | Performed by: OTOLARYNGOLOGY

## 2022-07-08 NOTE — PROCEDURES
"Laryngoscopy    Date/Time: 7/8/2022 3:00 PM  Performed by: Koko Garrison MD  Authorized by: Kkoo Garrison MD     Time out: Immediately prior to procedure a "time out" was called to verify the correct patient, procedure, equipment, support staff and site/side marked as required.    Consent Done?:  Yes (Verbal)  Anesthesia:     Local anesthetic:  4% Xylocaine spray with Jean Paul-Synephrine    Patient tolerance:  Patient tolerated the procedure well with no immediate complications    Decongestion performed?: Yes    Laryngoscopy:     Areas examined:  Nasal cavities, nasopharynx, oropharynx, hypopharynx, larynx and vocal cords  Larynx/hypopharynx:      No suspicious findings of a deviated septum of nasal nasopharyngeal exam.  Normal oropharynx and hypopharynx other than some mild posterior edema.  Some generalized laryngeal irritation with pink slight thickening of both vocal cords and some prominent vasculature.  There is some very slight focal anterior 1/3 to superior left possibly reactive change where as the right side shows the persistent thickening and leukoplakia which is unchanged from prior if not possibly a little more dense and even larger.  No ulceration.  No paresis or paralysis.      "

## 2022-07-08 NOTE — PROGRESS NOTES
[facility]    Subjective:      Patient: Alma Rosa Helm Patient PCP: Cindy Lowe NP         :  1955     Sex:  female      MRN:  [id number]          Date of Visit: 2022      Chief Complaint: Follow-up (1 month follow-up //RSI Score: 34/45/GERD: 2/5)      2022 follow-up visit: Alma Rosa Helm is a 67 y.o. female smoker seen a month ago with multiple complaints including multinodular goiter with previous benign biopsy and some interval growth, dysphagia, chronic eczematoid otitis externa and nose notably her smoking and COPD related conditions.  Endoscopy at our last visit revealed    No suspicious nasal findings a little bit of dryness of the left middle turbinate.  Some residual mid mild flat adenoid type tissue midline high nasopharynx.  Normal oropharynx hypopharynx otherwise.  No supraglottic lesions.  No asymmetry of function of the vocal cords.  Both vocal cords hyperemesis and thickened with some superficial healed ulceration?  Scarring?  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  Generally inflamed smoker's glottis.    She returns today for repeat endoscopy.  She continues to smoke but has decreased over a pack a day to 2 per day.  She expects to quit entirely by .  Positional PFTs showed no significant change in function depending upon position.  Esophagram completed showed no appreciable obstruction associated with the thyroid nodules but perhaps a 30% narrowing in area but also notable due to cervical osteophytes on the lateral view..          2022 initial consultation: Alma Rosa Helm is a 67 y.o. female smoker with known multinodular goiter with a previous biopsy reportedly benign subsequent development of contralateral nodule in a 1.6 cm not previously biopsied who has been having some  longstanding issues with shortness of breath attributed to COPD without stridor but now having more more episodes of choking.  She feels she has to drink through straws in order to not choke on liquids.  She has not been losing weight or had aspiration pneumonia.  She is  referred to me by CLAUDIA Burris in consultation for multinodular goiter with CT scan findings of airway compression in the cervical trachea.  Capture images of prior CT chest from 2 years ago and more recent CT neck similar levels below shows some fish mouth type deformity.  Patient last had pulmonary function testing spirometry done with Dr. Morillo or and 2020 with no reported abnormality of spirometry.  I feel like there is a bit of flattening the inspiratory limb.  This is not been repeated.      Patient also complains of ear infections.  She digs at her ears frequently due to itching.  She is not a diabetic and never had ear surgery.  Her hearing is diminished right worse than left with right greater than left tenderness and intermittent drainage.    Patient is a long-term smoker she smokes between to intense cigarettes a day currently.  She has some intermittent chronic insomnia and previous prescription of Wellbutrin/Zyban resulted in poor sleep but is unclear if this was truly related to the medication.  She is desirous of quitting.    She does report some raspy quality or voice which is mostly intermittent.  She denies any hemoptysis or sore throat or otalgia.    TSH   Date Value Ref Range Status   04/18/2022 0.635 0.400 - 4.000 uIU/mL Final   12/11/2020 1.104 0.400 - 4.000 uIU/mL Final         EXAMINATION:  CT SOFT TISSUE NECK WITHOUT CONTRAST     CLINICAL HISTORY:  Goiter;Please determine if the thyroid is encroaching on the trachea or esophagus.;Nontoxic single thyroid nodule     TECHNIQUE:  Low dose axial images, sagittal and coronal reformations were performed from the skull base to the level of the clavicles.  Contrast was not  administered.     COMPARISON:  None     FINDINGS:  There is enlargement of the thyroid and a heterogeneous CT density consistent with nodules.  A hypodense 1.6 cm nodule is seen in the upper right lobe.  On the coronal images the right lobe measures 7 cm in length and the left lobe measures 5.8 cm in length.     The trachea at the lower edge of the larynx is oval.  In the superior mediastinum the trachea is oval but at the thyroid level the trachea is a convex-concave shape consistent compression with the AP  diameter  reduced to 1 cm.  This is seen in the sagittal reconstruction images as well.  In the superior mediastinum the AP diameter is 1.7 cm.     The parotid and submandibular salivary glands are symmetric and of normal CT density.  Adenopathy or soft tissue masses within the anterior cervical triangle is not seen.  Adenopathy within the neck/anterior cervical triangles is not seen.     Impression:     Multinodular goiter with compression of the trachea at the level of the isthmus.  The        Electronically signed by: Roberto Serrato MD  Date:                                            2022  Time:                                           14:27      CT chest 3/2020        Review of Systems     Past Medical History  She has a past medical history of Anxiety, Bipolar disorder, COPD, mild, and Nephrolithiasis.    Family / Surgical / Social History  Her family history includes COPD in her father; Cancer in her mother; Diabetes in her mother; Hypertension in her father.    Past Surgical History:   Procedure Laterality Date    BACK SURGERY      CYSTOSCOPY      HYSTERECTOMY      LITHOTRIPSY      TUBAL LIGATION         Social History     Tobacco Use    Smoking status: Current Every Day Smoker     Packs/day: 0.75     Years: 45.00     Pack years: 33.75     Types: Cigarettes     Start date:      Last attempt to quit: 2020     Years since quittin.2    Smokeless tobacco: Never Used    Tobacco  comment: now smoking 1/4 ppd   Substance and Sexual Activity    Alcohol use: Never    Drug use: No    Sexual activity: Not on file       Medications  She has a current medication list which includes the following prescription(s): albuterol, albuterol, albuterol-ipratropium, budesonide-formoterol 160-4.5 mcg, bupropion, ciprofloxacin-dexamethasone 0.3-0.1%, cyclobenzaprine, diclofenac, fluocinolone acetonide oil, hydrocodone-acetaminophen, metoprolol tartrate, ondansetron, pravastatin, and tolterodine.      Allergies  Review of patient's allergies indicates:   Allergen Reactions    Tramadol Swelling    Imitrex [sumatriptan succinate]     Penicillins     Sulfa (sulfonamide antibiotics)     Toradol [ketorolac] Swelling       All medications, allergies, and past history have been reviewed.    Objective:      Vitals:  Vitals - 1 value per visit 6/2/2022 7/8/2022 7/8/2022   SYSTOLIC - - -   DIASTOLIC - - -   Pulse 83 - -   Temp - - 98.3   Resp - - -   SPO2 97 - -   Weight (lb) 200.84 - 202.38   Weight (kg) 91.1 - 91.8   Height 64 - 64   BMI (Calculated) 34.5 - 34.7   VISIT REPORT - - -   Pain Score  - 8 -       Body surface area is 2.04 meters squared.    Physical Exam:    GENERAL  APPEARANCE -  alert, appears stated age, cooperative and moderately obese  BARRIER(S) TO COMMUNICATION -  none VOICE - mildly raspy, mild strain    INTEGUMENTARY  no suspicious head and neck lesions    HEENT  HEAD: Normocephalic, without obvious abnormality, atraumatic  FACE: INSPECTION - Symmetric, no signs of trauma, no suspicious lesion(s)  PALPATION -  No masses SALIVARY GLANDS - non-tender with no appreciable mass  STRENGTH - facial symmetry  NECK/THYROID: mildly enlarged, dominent right nodule of 1.5-2 cm elevates well above clavicle/notch.  Nontender, midline.    EYES  Normal occular alignment and mobility with no visible nystagmus at rest    EARS/NOSE/MOUTH/THROAT  EARS  PINNAE AND EXTERNAL EARS - redness and scaling of meatus  bilaterally OTOSCOPIC EXAM (surgical microscopy was not used for visualization/instrumentation): EAR EXAM - right > left exudate and canal edema/erythema.    HEARING - decreased    NOSE AND SINUSES  EXTERNAL NOSE - Grossly normal for age/sex  SEPTUM - normal/no obstruction on anterior exam without decongestion TURBINATES - within normal limits MUCOSA - within normal limits     MOUTH AND THROAT   ORAL CAVITY, LIPS, TEETH, GUMS & TONGUE - moist, no suspicious lesions  OROPHARYNX /TONSILS/PHARYNGEAL WALLS/HYPOPHARYNX - no erythema or exudates  NASOPHARYNX - limited mirror exam - unable to visualize due to anatomy/gag  LARYNX -  - limited mirror exam - unable to visualize due to anatomy/gag      CHEST AND LUNG   INSPECTION & AUSCULTATION - normal effort, no stridor    CARDIOVASCULAR  AUSCULTATION & PERIPHERAL VASCULAR - regular rate and rhythm.    NEUROLOGIC  MENTAL STATUS - alert, interactive CRANIAL NERVES - normal    LYMPHATIC  HEAD AND NECK - non-palpable; SUPRACLAVICULAR - deferred; AXILLARY - deferred; INGUINAL - deferred; LIVER/SPLEEN - deferred        Procedure(s):  Flexible laryngoscopy performed.  See procedure note. Photo in media    TODAY            6/2/2022  Last        Labs:  WBC   Date Value Ref Range Status   12/11/2020 10.19 3.90 - 12.70 K/uL Final     Hemoglobin   Date Value Ref Range Status   12/11/2020 14.7 12.0 - 16.0 g/dL Final     Platelets   Date Value Ref Range Status   12/11/2020 307 150 - 350 K/uL Final     Creatinine   Date Value Ref Range Status   04/18/2022 0.8 0.5 - 1.4 mg/dL Final     TSH   Date Value Ref Range Status   04/18/2022 0.635 0.400 - 4.000 uIU/mL Final     Glucose   Date Value Ref Range Status   12/11/2020 90 70 - 110 mg/dL Final     Hemoglobin A1C   Date Value Ref Range Status   04/18/2022 5.9 (H) 4.0 - 5.6 % Final     Comment:     ADA Screening Guidelines:  5.7-6.4%  Consistent with prediabetes  >or=6.5%  Consistent with diabetes    High levels of fetal hemoglobin interfere  with the HbA1C  assay. Heterozygous hemoglobin variants (HbS, HgC, etc)do  not significantly interfere with this assay.   However, presence of multiple variants may affect accuracy.           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  Unfortunately the area of suspicious change of the right true vocal cord has not improved over the course of the last month.  This does not necessarily mean that it is pre cancerous though it does mean we need to perform a biopsy.    Thyroid nodularity has increased since previous studies and biopsy is recommended as ordered.  Thyroidectomy for breathing difficulties in swallowing is not specifically recommended but will be recommended if there is any suspicious finding on needle aspiration biopsy.    We will not proceed with vocal cord biopsy until we complete the thyroid ultrasound biopsies so that both thyroidectomy and biopsy the vocal cord can be performed at the same time under general anesthesia if thyroidectomy is necessary.    If, however, thyroidectomy is not indicated by needle aspiration biopsy we can proceed with outpatient surgery center biopsy of the vocal cords to rule out precancer or worse even an invasive cancer which would need additional treatment such as laser ablation or radiation therapy depending upon pathological  findings.

## 2022-07-08 NOTE — PATIENT INSTRUCTIONS
Smoking cessation again strongly encouraged with quit date set for July 15th.  Unfortunately the area of suspicious change of the right true vocal cord has not improved over the course of the last month.  This does not necessarily mean that it is pre cancerous though it does mean we need to perform a biopsy.    Thyroid nodularity has increased since previous studies and biopsy is recommended as ordered.  Thyroidectomy for breathing difficulties in swallowing is not specifically recommended but will be recommended if there is any suspicious finding on needle aspiration biopsy.    We will not proceed with vocal cord biopsy until we complete the thyroid ultrasound biopsies so that both thyroidectomy and biopsy the vocal cord can be performed at the same time under general anesthesia if thyroidectomy is necessary.    If, however, thyroidectomy is not indicated by needle aspiration biopsy we can proceed with outpatient surgery center biopsy of the vocal cords to rule out precancer or worse even an invasive cancer which would need additional treatment such as laser ablation or radiation therapy depending upon pathological findings.

## 2022-07-19 ENCOUNTER — TELEPHONE (OUTPATIENT)
Dept: OTOLARYNGOLOGY | Facility: CLINIC | Age: 67
End: 2022-07-19
Payer: COMMERCIAL

## 2022-07-19 DIAGNOSIS — E04.2 GOITER, NONTOXIC, MULTINODULAR: ICD-10-CM

## 2022-07-19 DIAGNOSIS — E04.1 NODULAR THYROID DISEASE: Primary | ICD-10-CM

## 2022-07-19 NOTE — TELEPHONE ENCOUNTER
----- Message from Iliana Yang sent at 7/19/2022 12:26 PM CDT -----  Regarding: Thyroid US Orders  We are unable to schedule for her thyroid biopsy until she has a thyroid us. The CT doesn't say if any nodules meet criteria for a FNA.    Radiology Scheduling

## 2022-09-02 ENCOUNTER — HOSPITAL ENCOUNTER (OUTPATIENT)
Dept: RADIOLOGY | Facility: HOSPITAL | Age: 67
Discharge: HOME | End: 2022-09-02
Attending: OTOLARYNGOLOGY
Payer: COMMERCIAL

## 2022-09-02 DIAGNOSIS — E04.1 NODULAR THYROID DISEASE: ICD-10-CM

## 2022-09-02 DIAGNOSIS — E04.2 GOITER, NONTOXIC, MULTINODULAR: ICD-10-CM

## 2022-09-02 PROCEDURE — 76536 US SOFT TISSUE HEAD NECK THYROID: ICD-10-PCS | Mod: 26,,, | Performed by: RADIOLOGY

## 2022-09-02 PROCEDURE — 76536 US EXAM OF HEAD AND NECK: CPT | Mod: TC

## 2022-09-02 PROCEDURE — 76536 US EXAM OF HEAD AND NECK: CPT | Mod: 26,,, | Performed by: RADIOLOGY

## 2022-09-07 ENCOUNTER — TELEPHONE (OUTPATIENT)
Dept: OTOLARYNGOLOGY | Facility: CLINIC | Age: 67
End: 2022-09-07
Payer: COMMERCIAL

## 2022-09-07 DIAGNOSIS — R13.14 PHARYNGOESOPHAGEAL PHASE DYSPHAGIA: ICD-10-CM

## 2022-09-07 DIAGNOSIS — E04.2 MULTIPLE THYROID NODULES: Primary | ICD-10-CM

## 2022-09-07 DIAGNOSIS — E04.2 GOITER, NONTOXIC, MULTINODULAR: ICD-10-CM

## 2022-09-07 NOTE — TELEPHONE ENCOUNTER
----- Message from Ruben Kim sent at 9/7/2022 10:42 AM CDT -----  Contact: Patient  Type:  Patient Call          Who Called:Patient         Does the patient know what this is regarding?:requesting a cll back about what should she do next since she had her ultrasound ;please advise           Would the patient rather a call back or a response via [facility]? Both           Best Call Back Number: [phone]            Additional Information:

## 2022-09-07 NOTE — TELEPHONE ENCOUNTER
Diagnostic US showed 2.2 and 2 cm dominant left and right nodules which need biopsy. As previously ordered.  Will reorder.        EXAMINATION:  US SOFT TISSUE HEAD NECK THYROID     CLINICAL HISTORY:  Nontoxic single thyroid nodule     TECHNIQUE:  Ultrasound of the thyroid and cervical lymph nodes was performed.     COMPARISON:  None.     FINDINGS:  Right lobe: 7.5 x 2.8 x 2.8 cm     Left lobe: 6.6 x 2.4 x 2.6 cm     Isthmus: 0.7 cm     Total thyroid volume: 37 cc     Nodules: Multiple nodules noted bilaterally measuring up to 2.0 cm within the right lobe and 2.2 cm within the left lobe.     Impression:     Multinodular thyroid.        Electronically signed by: Bony Whitman MD  Date:                                            09/03/2022  Time:                                           08:05

## 2022-09-07 NOTE — TELEPHONE ENCOUNTER
Patient asking for Thyroid U/S results and what is the next step? Originally, FNA was ordered, however, Radiology stated that a dedicated Thyroid U/S had to be done first. That was completed on 9/2/22. Please advise.

## 2022-09-07 NOTE — TELEPHONE ENCOUNTER
Called pt and advised of Dr. Garrison's results and recommendations. Advised pt to let us know if she does not get a call to schedule biopsy. Thanks, Hailey

## 2022-09-22 ENCOUNTER — HOSPITAL ENCOUNTER (OUTPATIENT)
Dept: RADIOLOGY | Facility: HOSPITAL | Age: 67
Discharge: HOME | End: 2022-09-22
Attending: OTOLARYNGOLOGY
Payer: COMMERCIAL

## 2022-09-22 DIAGNOSIS — E04.2 MULTIPLE THYROID NODULES: ICD-10-CM

## 2022-09-22 DIAGNOSIS — E04.2 GOITER, NONTOXIC, MULTINODULAR: ICD-10-CM

## 2022-09-22 DIAGNOSIS — R13.14 PHARYNGOESOPHAGEAL PHASE DYSPHAGIA: ICD-10-CM

## 2022-09-22 PROCEDURE — 88173 CYTOPATH EVAL FNA REPORT: CPT | Performed by: STUDENT IN AN ORGANIZED HEALTH CARE EDUCATION/TRAINING PROGRAM

## 2022-09-22 PROCEDURE — 10005 US FINE NEEDLE ASPIRATION THYROID, FIRST LESION: ICD-10-PCS | Mod: ,,, | Performed by: RADIOLOGY

## 2022-09-22 PROCEDURE — 10005 FNA BX W/US GDN 1ST LES: CPT

## 2022-09-22 PROCEDURE — 10005 FNA BX W/US GDN 1ST LES: CPT | Mod: ,,, | Performed by: RADIOLOGY

## 2022-09-22 PROCEDURE — 88173 CYTOPATH EVAL FNA REPORT: CPT | Mod: 26,,, | Performed by: STUDENT IN AN ORGANIZED HEALTH CARE EDUCATION/TRAINING PROGRAM

## 2022-09-22 PROCEDURE — 25000003 PHARM REV CODE 250: Performed by: OTOLARYNGOLOGY

## 2022-09-22 PROCEDURE — 10006 FNA BX W/US GDN EA ADDL: CPT

## 2022-09-22 PROCEDURE — 10006 FNA BX W/US GDN EA ADDL: CPT | Mod: ,,, | Performed by: RADIOLOGY

## 2022-09-22 PROCEDURE — 10006 US FINE NEEDLE ASPIRATION THYROID EA ADDITIONAL LESION: ICD-10-PCS | Mod: ,,, | Performed by: RADIOLOGY

## 2022-09-22 PROCEDURE — 88173 PR  INTERPRETATION OF FNA SMEAR: ICD-10-PCS | Mod: 26,,, | Performed by: STUDENT IN AN ORGANIZED HEALTH CARE EDUCATION/TRAINING PROGRAM

## 2022-09-22 RX ORDER — LIDOCAINE HYDROCHLORIDE 10 MG/ML
1 INJECTION, SOLUTION EPIDURAL; INFILTRATION; INTRACAUDAL; PERINEURAL ONCE
Status: COMPLETED | OUTPATIENT
Start: 2022-09-22 | End: 2022-09-22

## 2022-09-22 RX ADMIN — LIDOCAINE HYDROCHLORIDE 10 MG: 10 INJECTION, SOLUTION EPIDURAL; INFILTRATION; INTRACAUDAL; PERINEURAL at 02:09

## 2022-09-27 ENCOUNTER — PATIENT MESSAGE (OUTPATIENT)
Dept: OTOLARYNGOLOGY | Facility: CLINIC | Age: 67
End: 2022-09-27
Payer: COMMERCIAL

## 2022-09-27 LAB
FINAL PATHOLOGIC DIAGNOSIS: NORMAL
Lab: NORMAL

## 2022-10-04 ENCOUNTER — LAB VISIT (OUTPATIENT)
Dept: LAB | Facility: CLINIC | Age: 67
End: 2022-10-04
Payer: COMMERCIAL

## 2022-10-04 DIAGNOSIS — E04.1 NODULAR THYROID DISEASE: ICD-10-CM

## 2022-10-04 LAB
T4 FREE SERPL-MCNC: 0.91 NG/DL (ref 0.71–1.51)
TSH SERPL DL<=0.005 MIU/L-ACNC: 0.55 UIU/ML (ref 0.4–4)

## 2022-10-04 PROCEDURE — 84439 ASSAY OF FREE THYROXINE: CPT | Performed by: PHYSICIAN ASSISTANT

## 2022-10-04 PROCEDURE — 84443 ASSAY THYROID STIM HORMONE: CPT | Performed by: PHYSICIAN ASSISTANT

## 2022-10-11 ENCOUNTER — OFFICE VISIT (OUTPATIENT)
Dept: ENDOCRINOLOGY | Facility: CLINIC | Age: 67
End: 2022-10-11
Payer: COMMERCIAL

## 2022-10-11 VITALS
WEIGHT: 206.44 LBS | TEMPERATURE: 98 F | SYSTOLIC BLOOD PRESSURE: 126 MMHG | OXYGEN SATURATION: 94 % | BODY MASS INDEX: 35.24 KG/M2 | HEART RATE: 78 BPM | HEIGHT: 64 IN | DIASTOLIC BLOOD PRESSURE: 80 MMHG

## 2022-10-11 DIAGNOSIS — R42 VERTIGO: ICD-10-CM

## 2022-10-11 DIAGNOSIS — E04.1 NODULAR THYROID DISEASE: Primary | ICD-10-CM

## 2022-10-11 DIAGNOSIS — E27.8 ADRENAL NODULE: ICD-10-CM

## 2022-10-11 DIAGNOSIS — R73.03 PRE-DIABETES: ICD-10-CM

## 2022-10-11 DIAGNOSIS — E55.9 HYPOVITAMINOSIS D: ICD-10-CM

## 2022-10-11 DIAGNOSIS — L74.9 SWEATING ABNORMALITY: ICD-10-CM

## 2022-10-11 DIAGNOSIS — Z78.0 POSTMENOPAUSAL: ICD-10-CM

## 2022-10-11 PROCEDURE — 99214 OFFICE O/P EST MOD 30 MIN: CPT | Mod: PBBFAC,PO | Performed by: PHYSICIAN ASSISTANT

## 2022-10-11 PROCEDURE — 99214 PR OFFICE/OUTPT VISIT, EST, LEVL IV, 30-39 MIN: ICD-10-PCS | Mod: S$GLB,,, | Performed by: PHYSICIAN ASSISTANT

## 2022-10-11 PROCEDURE — 99999 PR PBB SHADOW E&M-EST. PATIENT-LVL IV: CPT | Mod: PBBFAC,,, | Performed by: PHYSICIAN ASSISTANT

## 2022-10-11 PROCEDURE — 99214 OFFICE O/P EST MOD 30 MIN: CPT | Mod: S$GLB,,, | Performed by: PHYSICIAN ASSISTANT

## 2022-10-11 PROCEDURE — 99999 PR PBB SHADOW E&M-EST. PATIENT-LVL IV: ICD-10-PCS | Mod: PBBFAC,,, | Performed by: PHYSICIAN ASSISTANT

## 2022-10-11 RX ORDER — OXYBUTYNIN CHLORIDE 5 MG/1
5 TABLET, EXTENDED RELEASE ORAL DAILY
Qty: 30 TABLET | Refills: 11 | Status: SHIPPED | OUTPATIENT
Start: 2022-10-11 | End: 2023-11-09

## 2022-10-11 RX ORDER — ONDANSETRON 4 MG/1
8 TABLET, ORALLY DISINTEGRATING ORAL EVERY 6 HOURS PRN
Qty: 30 TABLET | Refills: 0 | Status: SHIPPED | OUTPATIENT
Start: 2022-10-11 | End: 2023-05-31 | Stop reason: SDUPTHER

## 2022-10-11 RX ORDER — ALENDRONATE SODIUM 70 MG/1
TABLET ORAL
Qty: 4 TABLET | Refills: 11 | Status: ON HOLD | OUTPATIENT
Start: 2022-10-11 | End: 2023-11-06 | Stop reason: HOSPADM

## 2022-10-11 RX ORDER — PREGABALIN 50 MG/1
50 CAPSULE ORAL 3 TIMES DAILY
COMMUNITY
Start: 2022-09-15

## 2022-10-11 NOTE — PROGRESS NOTES
CC: MNG    HPI: Alma Rosa Helm is a 67 y.o. female here for nodular thyroid disease along with pending conditions listed in the Visit Diagnosis. Her mother had a goiter. No hx of neck radiation.   Thyroid u/s 7/21 showed a 2 cm nodule in the left thyroid.  There is a 1.6 cm nodule in the right thyroid.  FNA was benign on the left. + dysphagia, sob (COPD), voice changes (deep and raspy). + constipation, diarrhea, hair loss, insomnia, anxiety, fatigue, wt gain, palpiatations. -TPO. CT 4/22 showed the thyroid is compressing her trachea.     Pt saw ENT since last visit. Thyroid u/s 9/22 showed a 2 cm right  thyroid and 2.2 cm in the midleft lobe. FNAs were benign.     Adrenal Nodules  CT 7/21 for kidney stones showed two adrenal nodules 1.7 cm on the rights and 1.1 cm on the left. CT 4/22 shows 2 cm nodule on rt w/ -12 HU and 93% w/o and 1.9 cm left adrenal nodule  w/ 2.4 HU and 73% w/o.  + sweating (significant). Controlled HTN.  + palpitations, HA, muscle weakness in legs. No easy bruising or hair loss.    Pre-diabetes  A1c was 5.9% in 4/22.    Dexa 5/22 shows osteopenia with a high risk of fracture. No recent falls, fx or steriod injections. Walks her two dogs and walks at work. Not taking ca or vd.    PMHx, PSHx: reviewed in epic.    Social Hx: no ETOH use. Smokes 1 ppd. CNA at [employer].     Wt Readings from Last 6 Encounters:   10/11/22 93.6 kg (206 lb 7.4 oz)   07/08/22 91.8 kg (202 lb 6.1 oz)   06/02/22 91.1 kg (200 lb 13.4 oz)   04/11/22 88.4 kg (194 lb 14.2 oz)   03/25/22 86.2 kg (190 lb)   12/11/20 86 kg (189 lb 9.5 oz)      ROS:   Constitutional: No recent significant weight change  Eyes: No recent visual changes  Cardiovascular: Denies current anginal symptoms  Respiratory: Denies current respiratory difficulty  Gastrointestinal: + occ nausea (vertigo), Denies recent bowel disturbances  GenitoUrinary - No dysuria  Skin: No new skin rash  Neurologic: No focal neurologic complaints  Musculoskeletal: no  "joint pain  Endocrine: no polyphagia, polydipsia or polyuria  Remainder ROS negative     /80 (BP Location: Left arm, Patient Position: Sitting, BP Method: Small (Manual))   Pulse 78   Temp 98.2 °F (36.8 °C) (Oral)   Ht 5' 4" (1.626 m)   Wt 93.6 kg (206 lb 7.4 oz)   SpO2 (!) 94%   BMI 35.44 kg/m²      Personally reviewed labs below:    Lab Results   Component Value Date    TSH 0.545 10/04/2022    FREET4 0.91 10/04/2022          Chemistry        Component Value Date/Time     12/11/2020 1504    K 3.9 12/11/2020 1504     12/11/2020 1504    CO2 25 12/11/2020 1504    BUN 14 12/11/2020 1504    CREATININE 0.8 04/18/2022 0930    GLU 90 12/11/2020 1504        Component Value Date/Time    CALCIUM 9.5 12/11/2020 1504    ALKPHOS 88 12/11/2020 1504    AST 11 12/11/2020 1504    ALT 17 12/11/2020 1504    BILITOT 0.3 12/11/2020 1504    ESTGFRAFRICA >60.0 04/18/2022 0930    EGFRNONAA >60.0 04/18/2022 0930           Lab Results   Component Value Date    HGBA1C 5.9 (H) 04/18/2022     EXAMINATION:  DEXA BONE DENSITY SPINE HIP     CLINICAL HISTORY:  Asymptomatic menopausal state     TECHNIQUE:  DXA scanning was performed over the left hip and lumbar spine.  Review of the images confirms satisfactory positioning and technique.     COMPARISON:  None     FINDINGS:  The L1 to L4 vertebral bone mineral density is equal to 1.109 g/cm squared with a T score of 0.6.     The left femoral neck bone mineral density is equal to 0.650 g/cm squared with a T score of -1.8.     There is a 20% risk of a major osteoporotic fracture and a 4.3% risk of hip fracture in the next 10 years (FRAX).     Impression:     Osteopenia.     Consider FDA approved medical therapies in postmenopausal women and men aged 50 years and older, based on the following:     *A hip or vertebral (clinical or morphometric) fracture  *T score less than or equal to -2.5 at the femoral neck or spine after appropriate evaluation to exclude secondary causes.  *Low " bone mass -- also known as osteopenia (T score between -1.0 and -2.5 at the femoral neck or spine) and a 10 year probability of hip fracture greater than or equal to 3% or a 10 year probability of major osteoporosis-related fracture greater than or equal to 20% based on the [place]-adapted WHO algorithm.  *Clinicians judgment and/or patient preference may indicate treatment for people with 10 year fracture probabilities is above or below these levels.        Electronically signed by: Saleem Taylor  Date:                                            04/18/2022  Time:                                           09:01           Exam Ended: 04/18/22 08:57 Last Resulted: 04/18/22 09:01            PE:  GENERAL: elderly female, Well developed, well nourished  NECK: Supple neck, normal thyroid. No bruit  LYMPHATIC: No cervical or supraclavicular lymphadenopathy  CARDIOVASCULAR: Normal heart sounds, no pedal edema  RESPIRATORY: Normal effort, clear to auscultation  MUSC: 2+ DTR UE/LE  NEURO: steady gait, CN ll-Xll grossly intact  PSYCH: normal mood and affect    Assessment/Plan:   1. Nodular thyroid disease        2. Postmenopausal        3. Adrenal nodule  Metanephrines, urine 24 Hours    Cortisol, Saliva    Cortisol, Saliva    Aldosterone/Renin Activity Ratio      4. Pre-diabetes  Hemoglobin A1C      5. Hypovitaminosis D        6. Vertigo  ondansetron (ZOFRAN-ODT) 4 MG TbDL      7. Sweating abnormality  oxybutynin (DITROPAN-XL) 5 MG TR24         Nodular thyroid disease-stable-CT scan of neck to evaluate dysphagia.  Postmenopausal-DEXA scan 4/24. Start Fosamax. Start taking 2000 IU of vitamin D and 1500 mg of calcium. Also, participate in weight bearing and resistance exercises for 40 min 4x weekly to maintain your bone density. .  Adrenal Nodule- CT scan 4/24. Check hormones next time. ACTH was low and cortisol was elevated last time. Check saliva tests.  Pre-diabetes-stable-monitor  Hypovitaminosis d-check vd  Sweating-start  oxybutin  Vertigo-continue zofran as needed. Refill w/ PCP.    F/u in 6 mths -w/ labs

## 2022-10-14 ENCOUNTER — PATIENT MESSAGE (OUTPATIENT)
Dept: OTOLARYNGOLOGY | Facility: CLINIC | Age: 67
End: 2022-10-14
Payer: COMMERCIAL

## 2022-10-14 DIAGNOSIS — J38.3 LEUKOPLAKIA OF VOCAL CORDS: Primary | ICD-10-CM

## 2022-10-21 DIAGNOSIS — J44.9 CHRONIC OBSTRUCTIVE PULMONARY DISEASE, UNSPECIFIED COPD TYPE: Primary | ICD-10-CM

## 2022-10-21 DIAGNOSIS — Z01.818 PRE-OP TESTING: ICD-10-CM

## 2022-10-22 ENCOUNTER — LAB VISIT (OUTPATIENT)
Dept: LAB | Facility: HOSPITAL | Age: 67
End: 2022-10-22
Attending: ANESTHESIOLOGY
Payer: COMMERCIAL

## 2022-10-22 DIAGNOSIS — Z01.818 PRE-OP TESTING: ICD-10-CM

## 2022-10-22 DIAGNOSIS — J44.9 CHRONIC OBSTRUCTIVE PULMONARY DISEASE, UNSPECIFIED COPD TYPE: ICD-10-CM

## 2022-10-22 LAB
ALBUMIN SERPL BCP-MCNC: 4 G/DL (ref 3.5–5.2)
ALP SERPL-CCNC: 92 U/L (ref 55–135)
ALT SERPL W/O P-5'-P-CCNC: 35 U/L (ref 10–44)
ANION GAP SERPL CALC-SCNC: 11 MMOL/L (ref 8–16)
AST SERPL-CCNC: 30 U/L (ref 10–40)
BASOPHILS # BLD AUTO: 0.05 K/UL (ref 0–0.2)
BASOPHILS NFR BLD: 0.5 % (ref 0–1.9)
BILIRUB SERPL-MCNC: 0.4 MG/DL (ref 0.1–1)
BUN SERPL-MCNC: 25 MG/DL (ref 8–23)
CALCIUM SERPL-MCNC: 9.9 MG/DL (ref 8.7–10.5)
CHLORIDE SERPL-SCNC: 107 MMOL/L (ref 95–110)
CO2 SERPL-SCNC: 24 MMOL/L (ref 23–29)
CREAT SERPL-MCNC: 0.9 MG/DL (ref 0.5–1.4)
DIFFERENTIAL METHOD: ABNORMAL
EOSINOPHIL # BLD AUTO: 0.3 K/UL (ref 0–0.5)
EOSINOPHIL NFR BLD: 2.6 % (ref 0–8)
ERYTHROCYTE [DISTWIDTH] IN BLOOD BY AUTOMATED COUNT: 14.9 % (ref 11.5–14.5)
EST. GFR  (NO RACE VARIABLE): >60 ML/MIN/1.73 M^2
GLUCOSE SERPL-MCNC: 101 MG/DL (ref 70–110)
HCT VFR BLD AUTO: 48 % (ref 37–48.5)
HGB BLD-MCNC: 15.3 G/DL (ref 12–16)
IMM GRANULOCYTES # BLD AUTO: 0.03 K/UL (ref 0–0.04)
IMM GRANULOCYTES NFR BLD AUTO: 0.3 % (ref 0–0.5)
LYMPHOCYTES # BLD AUTO: 3.4 K/UL (ref 1–4.8)
LYMPHOCYTES NFR BLD: 33.5 % (ref 18–48)
MCH RBC QN AUTO: 28.1 PG (ref 27–31)
MCHC RBC AUTO-ENTMCNC: 31.9 G/DL (ref 32–36)
MCV RBC AUTO: 88 FL (ref 82–98)
MONOCYTES # BLD AUTO: 0.6 K/UL (ref 0.3–1)
MONOCYTES NFR BLD: 6.3 % (ref 4–15)
NEUTROPHILS # BLD AUTO: 5.7 K/UL (ref 1.8–7.7)
NEUTROPHILS NFR BLD: 56.8 % (ref 38–73)
NRBC BLD-RTO: 0 /100 WBC
PLATELET # BLD AUTO: 358 K/UL (ref 150–450)
PMV BLD AUTO: 10 FL (ref 9.2–12.9)
POTASSIUM SERPL-SCNC: 4.6 MMOL/L (ref 3.5–5.1)
PROT SERPL-MCNC: 8.1 G/DL (ref 6–8.4)
RBC # BLD AUTO: 5.45 M/UL (ref 4–5.4)
SODIUM SERPL-SCNC: 142 MMOL/L (ref 136–145)
WBC # BLD AUTO: 10.09 K/UL (ref 3.9–12.7)

## 2022-10-22 PROCEDURE — 36415 COLL VENOUS BLD VENIPUNCTURE: CPT | Performed by: ANESTHESIOLOGY

## 2022-10-22 PROCEDURE — 85025 COMPLETE CBC W/AUTO DIFF WBC: CPT | Performed by: ANESTHESIOLOGY

## 2022-10-22 PROCEDURE — 80053 COMPREHEN METABOLIC PANEL: CPT | Performed by: ANESTHESIOLOGY

## 2022-10-24 ENCOUNTER — HOSPITAL ENCOUNTER (OUTPATIENT)
Dept: CARDIOLOGY | Facility: HOSPITAL | Age: 67
Discharge: HOME | End: 2022-10-24
Attending: ANESTHESIOLOGY
Payer: COMMERCIAL

## 2022-10-24 ENCOUNTER — HOSPITAL ENCOUNTER (OUTPATIENT)
Dept: RADIOLOGY | Facility: HOSPITAL | Age: 67
Discharge: HOME | End: 2022-10-24
Attending: ANESTHESIOLOGY
Payer: COMMERCIAL

## 2022-10-24 ENCOUNTER — ANESTHESIA EVENT (OUTPATIENT)
Dept: SURGERY | Facility: AMBULARY SURGERY CENTER | Age: 67
End: 2022-10-24
Payer: COMMERCIAL

## 2022-10-24 DIAGNOSIS — J44.9 CHRONIC OBSTRUCTIVE PULMONARY DISEASE, UNSPECIFIED COPD TYPE: ICD-10-CM

## 2022-10-24 DIAGNOSIS — Z01.818 PRE-OP TESTING: ICD-10-CM

## 2022-10-24 PROCEDURE — 93005 ELECTROCARDIOGRAM TRACING: CPT

## 2022-10-24 PROCEDURE — 71045 XR CHEST 1 VIEW PRE-OP: ICD-10-PCS | Mod: 26,,, | Performed by: RADIOLOGY

## 2022-10-24 PROCEDURE — 71045 X-RAY EXAM CHEST 1 VIEW: CPT | Mod: TC,FY

## 2022-10-24 PROCEDURE — 93010 ELECTROCARDIOGRAM REPORT: CPT | Mod: ,,, | Performed by: INTERNAL MEDICINE

## 2022-10-24 PROCEDURE — 71045 X-RAY EXAM CHEST 1 VIEW: CPT | Mod: 26,,, | Performed by: RADIOLOGY

## 2022-10-24 PROCEDURE — 93010 EKG 12-LEAD: ICD-10-PCS | Mod: ,,, | Performed by: INTERNAL MEDICINE

## 2022-10-25 ENCOUNTER — ANESTHESIA (OUTPATIENT)
Dept: SURGERY | Facility: AMBULARY SURGERY CENTER | Age: 67
End: 2022-10-25
Payer: COMMERCIAL

## 2022-10-25 ENCOUNTER — HOSPITAL ENCOUNTER (OUTPATIENT)
Facility: AMBULARY SURGERY CENTER | Age: 67
Discharge: HOME | End: 2022-10-25
Attending: OTOLARYNGOLOGY | Admitting: OTOLARYNGOLOGY
Payer: COMMERCIAL

## 2022-10-25 DIAGNOSIS — J38.3 LEUKOPLAKIA OF VOCAL CORDS: ICD-10-CM

## 2022-10-25 PROCEDURE — D9220A PRA ANESTHESIA: Mod: CRNA,,, | Performed by: NURSE ANESTHETIST, CERTIFIED REGISTERED

## 2022-10-25 PROCEDURE — 31541 LARYNSCOP W/TUMR EXC + SCOPE: CPT | Mod: ,,, | Performed by: OTOLARYNGOLOGY

## 2022-10-25 PROCEDURE — 88305 TISSUE EXAM BY PATHOLOGIST: CPT | Mod: 26,,, | Performed by: PATHOLOGY

## 2022-10-25 PROCEDURE — 88305 TISSUE EXAM BY PATHOLOGIST: CPT | Performed by: PATHOLOGY

## 2022-10-25 PROCEDURE — 31541 PR LARYNGOSCOPY,DIRCT,OP SCOP,EXC TUMR: ICD-10-PCS | Mod: ,,, | Performed by: OTOLARYNGOLOGY

## 2022-10-25 PROCEDURE — D9220A PRA ANESTHESIA: Mod: ANES,,, | Performed by: ANESTHESIOLOGY

## 2022-10-25 PROCEDURE — 31536 LARYNGOSCOPY W/BX & OP SCOPE: CPT | Performed by: OTOLARYNGOLOGY

## 2022-10-25 PROCEDURE — 88305 TISSUE EXAM BY PATHOLOGIST: ICD-10-PCS | Mod: 26,,, | Performed by: PATHOLOGY

## 2022-10-25 PROCEDURE — D9220A PRA ANESTHESIA: ICD-10-PCS | Mod: CRNA,,, | Performed by: NURSE ANESTHETIST, CERTIFIED REGISTERED

## 2022-10-25 PROCEDURE — D9220A PRA ANESTHESIA: ICD-10-PCS | Mod: ANES,,, | Performed by: ANESTHESIOLOGY

## 2022-10-25 RX ORDER — MEPERIDINE HYDROCHLORIDE 50 MG/ML
12.5 INJECTION INTRAMUSCULAR; INTRAVENOUS; SUBCUTANEOUS ONCE AS NEEDED
Status: ACTIVE | OUTPATIENT
Start: 2022-10-25 | End: 2022-10-26

## 2022-10-25 RX ORDER — PROPOFOL 10 MG/ML
VIAL (ML) INTRAVENOUS
Status: DISCONTINUED | OUTPATIENT
Start: 2022-10-25 | End: 2022-10-25

## 2022-10-25 RX ORDER — ACETAMINOPHEN 10 MG/ML
INJECTION, SOLUTION INTRAVENOUS
Status: DISCONTINUED | OUTPATIENT
Start: 2022-10-25 | End: 2022-10-25

## 2022-10-25 RX ORDER — SODIUM CHLORIDE, SODIUM LACTATE, POTASSIUM CHLORIDE, CALCIUM CHLORIDE 600; 310; 30; 20 MG/100ML; MG/100ML; MG/100ML; MG/100ML
10 INJECTION, SOLUTION INTRAVENOUS CONTINUOUS
Status: DISCONTINUED | OUTPATIENT
Start: 2022-10-25 | End: 2023-02-09

## 2022-10-25 RX ORDER — FENTANYL CITRATE 50 UG/ML
INJECTION, SOLUTION INTRAMUSCULAR; INTRAVENOUS
Status: DISCONTINUED | OUTPATIENT
Start: 2022-10-25 | End: 2022-10-25

## 2022-10-25 RX ORDER — OXYMETAZOLINE HCL 0.05 %
SPRAY, NON-AEROSOL (ML) NASAL
Status: DISCONTINUED | OUTPATIENT
Start: 2022-10-25 | End: 2022-10-25 | Stop reason: HOSPADM

## 2022-10-25 RX ORDER — FENTANYL CITRATE 50 UG/ML
25 INJECTION, SOLUTION INTRAMUSCULAR; INTRAVENOUS EVERY 5 MIN PRN
Status: DISCONTINUED | OUTPATIENT
Start: 2022-10-25 | End: 2023-02-09

## 2022-10-25 RX ORDER — ROCURONIUM BROMIDE 10 MG/ML
INJECTION, SOLUTION INTRAVENOUS
Status: DISCONTINUED | OUTPATIENT
Start: 2022-10-25 | End: 2022-10-25

## 2022-10-25 RX ORDER — SODIUM CHLORIDE, SODIUM LACTATE, POTASSIUM CHLORIDE, CALCIUM CHLORIDE 600; 310; 30; 20 MG/100ML; MG/100ML; MG/100ML; MG/100ML
500 INJECTION, SOLUTION INTRAVENOUS ONCE
Status: DISCONTINUED | OUTPATIENT
Start: 2022-10-25 | End: 2023-02-09

## 2022-10-25 RX ORDER — OXYCODONE HYDROCHLORIDE 5 MG/1
5 TABLET ORAL ONCE AS NEEDED
Status: DISCONTINUED | OUTPATIENT
Start: 2022-10-25 | End: 2023-02-09

## 2022-10-25 RX ORDER — LIDOCAINE HYDROCHLORIDE 10 MG/ML
1 INJECTION, SOLUTION EPIDURAL; INFILTRATION; INTRACAUDAL; PERINEURAL ONCE
Status: DISCONTINUED | OUTPATIENT
Start: 2022-10-25 | End: 2023-02-09

## 2022-10-25 RX ORDER — PROCHLORPERAZINE EDISYLATE 5 MG/ML
5 INJECTION INTRAMUSCULAR; INTRAVENOUS EVERY 30 MIN PRN
Status: DISCONTINUED | OUTPATIENT
Start: 2022-10-25 | End: 2023-02-09

## 2022-10-25 RX ORDER — SUCCINYLCHOLINE CHLORIDE 20 MG/ML
INJECTION INTRAMUSCULAR; INTRAVENOUS
Status: DISCONTINUED | OUTPATIENT
Start: 2022-10-25 | End: 2022-10-25

## 2022-10-25 RX ORDER — HYDROMORPHONE HYDROCHLORIDE 1 MG/ML
0.2 INJECTION, SOLUTION INTRAMUSCULAR; INTRAVENOUS; SUBCUTANEOUS EVERY 5 MIN PRN
Status: DISCONTINUED | OUTPATIENT
Start: 2022-10-25 | End: 2023-02-09

## 2022-10-25 RX ORDER — MIDAZOLAM HYDROCHLORIDE 1 MG/ML
INJECTION INTRAMUSCULAR; INTRAVENOUS
Status: DISCONTINUED | OUTPATIENT
Start: 2022-10-25 | End: 2022-10-25

## 2022-10-25 RX ORDER — LIDOCAINE HYDROCHLORIDE AND EPINEPHRINE 10; 10 MG/ML; UG/ML
INJECTION, SOLUTION INFILTRATION; PERINEURAL
Status: DISCONTINUED | OUTPATIENT
Start: 2022-10-25 | End: 2022-10-25 | Stop reason: HOSPADM

## 2022-10-25 RX ORDER — OXYMETAZOLINE HCL 0.05 %
SPRAY, NON-AEROSOL (ML) NASAL
Status: DISCONTINUED
Start: 2022-10-25 | End: 2022-10-25 | Stop reason: HOSPADM

## 2022-10-25 RX ORDER — LIDOCAINE HCL/PF 100 MG/5ML
SYRINGE (ML) INTRAVENOUS
Status: DISCONTINUED | OUTPATIENT
Start: 2022-10-25 | End: 2022-10-25

## 2022-10-25 RX ORDER — DEXAMETHASONE SODIUM PHOSPHATE 4 MG/ML
INJECTION, SOLUTION INTRA-ARTICULAR; INTRALESIONAL; INTRAMUSCULAR; INTRAVENOUS; SOFT TISSUE
Status: DISCONTINUED | OUTPATIENT
Start: 2022-10-25 | End: 2022-10-25

## 2022-10-25 RX ORDER — ONDANSETRON 2 MG/ML
4 INJECTION INTRAMUSCULAR; INTRAVENOUS ONCE AS NEEDED
Status: DISCONTINUED | OUTPATIENT
Start: 2022-10-25 | End: 2023-02-09

## 2022-10-25 RX ORDER — SODIUM CHLORIDE 0.9 % (FLUSH) 0.9 %
3 SYRINGE (ML) INJECTION EVERY 8 HOURS
Status: DISCONTINUED | OUTPATIENT
Start: 2022-10-25 | End: 2023-02-09

## 2022-10-25 RX ORDER — ONDANSETRON HYDROCHLORIDE 2 MG/ML
INJECTION, SOLUTION INTRAMUSCULAR; INTRAVENOUS
Status: DISCONTINUED | OUTPATIENT
Start: 2022-10-25 | End: 2022-10-25

## 2022-10-25 RX ORDER — DIPHENHYDRAMINE HYDROCHLORIDE 50 MG/ML
12.5 INJECTION INTRAMUSCULAR; INTRAVENOUS ONCE AS NEEDED
Status: DISCONTINUED | OUTPATIENT
Start: 2022-10-25 | End: 2023-02-09

## 2022-10-25 RX ADMIN — ACETAMINOPHEN 1000 MG: 10 INJECTION, SOLUTION INTRAVENOUS at 08:10

## 2022-10-25 RX ADMIN — MIDAZOLAM HYDROCHLORIDE 2 MG: 1 INJECTION INTRAMUSCULAR; INTRAVENOUS at 07:10

## 2022-10-25 RX ADMIN — SODIUM CHLORIDE, SODIUM LACTATE, POTASSIUM CHLORIDE, CALCIUM CHLORIDE 10 ML/HR: 600; 310; 30; 20 INJECTION, SOLUTION INTRAVENOUS at 07:10

## 2022-10-25 RX ADMIN — FENTANYL CITRATE 50 MCG: 50 INJECTION, SOLUTION INTRAMUSCULAR; INTRAVENOUS at 07:10

## 2022-10-25 RX ADMIN — ROCURONIUM BROMIDE 5 MG: 10 INJECTION, SOLUTION INTRAVENOUS at 07:10

## 2022-10-25 RX ADMIN — Medication 100 MG: at 07:10

## 2022-10-25 RX ADMIN — SUCCINYLCHOLINE CHLORIDE 140 MG: 20 INJECTION INTRAMUSCULAR; INTRAVENOUS at 07:10

## 2022-10-25 RX ADMIN — ONDANSETRON HYDROCHLORIDE 4 MG: 2 INJECTION, SOLUTION INTRAMUSCULAR; INTRAVENOUS at 07:10

## 2022-10-25 RX ADMIN — DEXAMETHASONE SODIUM PHOSPHATE 4 MG: 4 INJECTION, SOLUTION INTRA-ARTICULAR; INTRALESIONAL; INTRAMUSCULAR; INTRAVENOUS; SOFT TISSUE at 07:10

## 2022-10-25 RX ADMIN — Medication 150 MG: at 07:10

## 2022-10-25 RX ADMIN — FENTANYL CITRATE 50 MCG: 50 INJECTION, SOLUTION INTRAMUSCULAR; INTRAVENOUS at 08:10

## 2022-10-25 NOTE — ANESTHESIA PROCEDURE NOTES
Intubation    Date/Time: 10/25/2022 7:55 AM  Performed by: Kelsey Yuan CRNA  Authorized by: Cody Carmona MD     Intubation:     Induction:  Intravenous    Intubated:  Postinduction    Mask Ventilation:  Easy mask    Attempts:  1    Attempted By:  CRNA    Method of Intubation:  Direct    Laryngeal View Grade: Grade I - full view of cords      Difficult Airway Encountered?: No      Complications:  None    Airway Device:  Oral endotracheal tube    Airway Device Size:  6.0    Style/Cuff Inflation:  Cuffed (inflated to minimal occlusive pressure)    Tube secured:  22    Secured at:  The lips    Placement Verified By:  Capnometry    Complicating Factors:  None    Findings Post-Intubation:  BS equal bilateral and atraumatic/condition of teeth unchanged

## 2022-10-25 NOTE — TRANSFER OF CARE
Anesthesia Transfer of Care Note    Patient: Alma Rosa Helm    Procedure(s) Performed: Procedure(s) (LRB):  MICROLARYNGOSCOPY, SUSPENSION, with vocal cord biopsy(ies) (N/A)    Patient location: PACU    Anesthesia Type: general    Transport from OR: Transported from OR on room air with adequate spontaneous ventilation    Post pain: adequate analgesia    Post assessment: no apparent anesthetic complications and tolerated procedure well    Post vital signs: stable    Level of consciousness: sedated and responds to stimulation    Nausea/Vomiting: no nausea/vomiting    Complications: none    Transfer of care protocol was followed      Last vitals:   Visit Vitals  BP (!) 166/72   Pulse 84   Temp 36.4 °C (97.5 °F) (Skin)   Resp 20   Wt 93.6 kg (206 lb 5.6 oz)   SpO2 100%   Breastfeeding No   BMI 35.42 kg/m²

## 2022-10-25 NOTE — OP NOTE
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 female smoker with persistent somewhat dense appearing right mid anterior true vocal cord medial edge leukoplakia.     Consent was signed by patient (guardian) after all risks, benefits, limitations and alternatives to surgery were discussed in detail and legal consent executed and read at time out in the room.    Findings:     Dense leukoplakia of the medial to inferior edge of the right true vocal cord anterior to the midpoint some mucosal fullness and slight widening more posteriorly all elevated with submucosal injection.  No other suspicious findings    Procedure in Detail:     After obtaining informed consent which all risks, benefits, limitations and alternatives were discussed including need for additional surgery or treatment, voice damage, bleeding, infection, damage to adjacent structures including the upper edentulous gingiva patient elected proceed with surgery.      She was brought to the operating room placed on the operating table and general endotracheal anesthesia was performed with video laryngoscope intubation without complication or difficulty the table was turned 90°.  Time-out was performed and procedure was begun.  The upper gum was protected with a Ray-Ness sponge she was draped in the standard fashion the eyes were protected and the Dedo operating laryngoscope was  introduced atraumatically evaluating the hypopharynx and supraglottis then placing it into the laryngeal introitus and then in suspension on the Le stand.      Using binocular microscopy with a 400 mm lens the right true vocal cord was injected with a minimal amount of 1% lidocaine with epinephrine which elevated the mucosa of the right true vocal cord including the dense area of leukoplakia of the mid to anterior medial to inferior true vocal cord..      Using a sickle knife a incision was made lateral to the medial surface and wide of the extent of the leukoplakia this was then grasped with atraumatic mucosal graspers and retracted medially submucosal dissection was performed with curved then straight scissors and the mucosa excised a wider more inferior 2nd strip of mucosa was resected more anteriorly to be sure to remove the entire inferior extent of the anterior location of the denser portion of leukoplakia.  All dissection and resection was superficial and full-thickness mucosa without any injury to the underlying lamina propria, musculature or ligament.  There was no bleeding needing any topical application.  All instrumentation was removed there was no apparent trauma to the oropharynx oral cavity.      All sponge count needle, and instrument counts were correct at the end of the procedure.    There were no known complications of surgery at the time of this operative note's creation.    Voice recognition software was used in the creation of this operative note, any sound alike areas which may have occurred should be taken in context when interpreting.       Condition: Good  Disposition: PACU - hemodynamically stable.    Koko Garrison MD, FACS  [facility] Dept of Otolaryngology - Head and Neck Surgery

## 2022-10-25 NOTE — ANESTHESIA PREPROCEDURE EVALUATION
10/25/2022  Alma Rosa Helm is a 67 y.o., female.    Pre-op Assessment    I have reviewed the Patient Summary Reports.    I have reviewed the Nursing Notes. I have reviewed the NPO Status.   I have reviewed the Medications.     Review of Systems  Anesthesia Hx:  No problems with previous Anesthesia    Social:  Smoker    EENT/Dental:   Vocal cord lesions   Cardiovascular:  Cardiovascular Normal  Denies MI.  Denies CAD.       Pulmonary:   COPD, moderate Denies Asthma.  Denies Recent URI.    Renal/:   Denies Chronic Renal Disease. renal calculi     Hepatic/GI:   Denies GERD. Denies Liver Disease.    Neurological:   Denies TIA. Denies CVA. Denies Seizures.    Endocrine:   Denies Diabetes. Denies Hypothyroidism. goiter Obesity / BMI > 30  Psych:   Psychiatric History depression          Physical Exam  General:  Well nourished      Airway/Jaw/Neck:  Airway Findings: Mouth Opening: Normal   Tongue: Normal   General Airway Assessment: Adult, Good Mallampati: III  Improves to II with phonation.  TM Distance: 4-6 cm       Dental:  Dental Findings: In tact, Edentulous     Chest/Lungs:  Chest/Lungs Findings: Clear to auscultation, Normal Respiratory Rate, Rhonchi   Clears with cough     Heart/Vascular:  Heart Findings: Rate: Normal  Rhythm: Regular Rhythm  Sounds: Normal  Heart murmur: negative       Mental Status:  Mental Status Findings:  Cooperative, Alert and Oriented         Anesthesia Plan  Type of Anesthesia, risks & benefits discussed:  Anesthesia Type:  general    Patient's Preference:   Plan Factors:          Intra-op Monitoring Plan: Standard ASA Monitors  Intra-op Monitoring Plan Comments:   Post Op Pain Control Plan: multimodal analgesia and IV/PO Opioids PRN  Post Op Pain Control Plan Comments:     Induction:   IV and Inhalation  Beta Blocker:  Patient is not currently on a Beta-Blocker (No further  documentation required).       Informed Consent: Informed consent signed with the Patient and all parties understand the risks and agree with anesthesia plan.  All questions answered.  Anesthesia consent signed with patient.  ASA Score: 3     Day of Surgery Review of History & Physical:              Ready For Surgery From Anesthesia Perspective.           Physical Exam  General: Well nourished    Airway:  Mallampati: III / II  Mouth Opening: Normal  TM Distance: 4-6 cm  Tongue: Normal    Dental:  In tact, Edentulous    Chest/Lungs:  Clear to auscultation, Normal Respiratory Rate, Rhonchi  Clears with cough  Heart:  Rate: Normal  Rhythm: Regular Rhythm  Sounds: Normal          Anesthesia Plan  Type of Anesthesia, risks & benefits discussed:    Anesthesia Type: general  Intra-op Monitoring Plan: Standard ASA Monitors  Post Op Pain Control Plan: multimodal analgesia and IV/PO Opioids PRN  Induction:  IV and Inhalation  Informed Consent: Informed consent signed with the Patient and all parties understand the risks and agree with anesthesia plan.  All questions answered.   ASA Score: 3    Ready For Surgery From Anesthesia Perspective.       .

## 2022-10-25 NOTE — PATIENT INSTRUCTIONS
Advanced diet to normal diet as tolerated starting with ice chips and clear liquids to avoid postoperative nausea and vomiting.      Strict voice rest for 1 full week then no yelling or whispering or any straining of the voice.  Cough as necessary to clear mucus from the lungs but do not routinely clear the throat.  Stay hydrated and do hard swallows instead of throat clearing.      Pain control with Tylenol/acetaminophen or Advil/ibuprofen.      Call if not notified with results of biopsy/excision within 2 weeks.  Routine follow-up for repeat laryngoscopy in 6 weeks.  Call the office to arrange this appointment.

## 2022-10-25 NOTE — ANESTHESIA POSTPROCEDURE EVALUATION
Anesthesia Post Evaluation    Patient: Alma Rosa Helm    Procedure(s) Performed: Procedure(s) (LRB):  MICROLARYNGOSCOPY, SUSPENSION, with vocal cord biopsy(ies) (N/A)    Final Anesthesia Type: general      Patient location during evaluation: PACU  Patient participation: Yes- Able to Participate  Level of consciousness: sedated and awake  Post-procedure vital signs: reviewed and stable  Pain management: adequate  Airway patency: patent    PONV status at discharge: No PONV  Anesthetic complications: no      Cardiovascular status: hypertensive and blood pressure returned to baseline  Respiratory status: spontaneous ventilation  Hydration status: euvolemic  Follow-up not needed.          Vitals Value Taken Time   /72 10/25/22 0837   Temp 36.4 °C (97.5 °F) 10/25/22 0830   Pulse 76 10/25/22 0839   Resp 20 10/25/22 0830   SpO2 95 % 10/25/22 0839   Vitals shown include unvalidated device data.      No case tracking events are documented in the log.      Pain/Henry Score: Modified Henry Score: 18 (10/25/2022  8:26 AM)

## 2022-10-25 NOTE — PLAN OF CARE
Discharge instructions given to pt/dtr, verbalized understanding.  Tolerating PO fluids.  IV removed.  Pain 3/10. Voided x1.  Work excuse provided.  Ambulating out with dtr in no distress.

## 2022-10-25 NOTE — PROGRESS NOTES
[facility]    Subjective:      Patient: Alma Rosa Helm Patient PCP: Cindy Lowe NP         :  1955     Sex:  female      MRN:  [id number]          Date of Visit: 10/25/2022      Chief Complaint: Presurgical evaluation    10/25/2022 day of surgery visit: Alma Rosa Helm is a 67 y.o. female smoker / Vaper seen today at [facility] [place] for MSL for right mid/anterior TVC leukoplakia.  No change in condition.  No hemoptysis.  Now vaping instead of smoking.    2022 follow-up visit: Alma Rosa Helm is a 67 y.o. female smoker seen a month ago with multiple complaints including multinodular goiter with previous benign biopsy and some interval growth, dysphagia, chronic eczematoid otitis externa and nose notably her smoking and COPD related conditions.  Endoscopy at our last visit revealed    No suspicious nasal findings a little bit of dryness of the left middle turbinate.  Some residual mid mild flat adenoid type tissue midline high nasopharynx.  Normal oropharynx hypopharynx otherwise.  No supraglottic lesions.  No asymmetry of function of the vocal cords.  Both vocal cords hyperemesis and thickened with some superficial healed ulceration?  Scarring?  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  Generally inflamed smoker's glottis.    She returns today for repeat endoscopy.  She continues to smoke but has decreased over a pack a day to 2 per day.  She expects to quit entirely by .  Positional PFTs showed no significant change in function depending upon position.  Esophagram completed showed no appreciable obstruction associated with the thyroid nodules but perhaps a 30% narrowing in area but also notable due to cervical osteophytes on the lateral view..          2022 initial consultation: Alma Rosa Helm is a 67 y.o. female  smoker with known multinodular goiter with a previous biopsy reportedly benign subsequent development of contralateral nodule in a 1.6 cm not previously biopsied who has been having some longstanding issues with shortness of breath attributed to COPD without stridor but now having more more episodes of choking.  She feels she has to drink through straws in order to not choke on liquids.  She has not been losing weight or had aspiration pneumonia.  She is  referred to me by CLAUDIA Burris in consultation for multinodular goiter with CT scan findings of airway compression in the cervical trachea.  Capture images of prior CT chest from 2 years ago and more recent CT neck similar levels below shows some fish mouth type deformity.  Patient last had pulmonary function testing spirometry done with Dr. Morillo or and 2020 with no reported abnormality of spirometry.  I feel like there is a bit of flattening the inspiratory limb.  This is not been repeated.      Patient also complains of ear infections.  She digs at her ears frequently due to itching.  She is not a diabetic and never had ear surgery.  Her hearing is diminished right worse than left with right greater than left tenderness and intermittent drainage.    Patient is a long-term smoker she smokes between to intense cigarettes a day currently.  She has some intermittent chronic insomnia and previous prescription of Wellbutrin/Zyban resulted in poor sleep but is unclear if this was truly related to the medication.  She is desirous of quitting.    She does report some raspy quality or voice which is mostly intermittent.  She denies any hemoptysis or sore throat or otalgia.    TSH   Date Value Ref Range Status   10/04/2022 0.545 0.400 - 4.000 uIU/mL Final   04/18/2022 0.635 0.400 - 4.000 uIU/mL Final   12/11/2020 1.104 0.400 - 4.000 uIU/mL Final         EXAMINATION:  CT SOFT TISSUE NECK WITHOUT CONTRAST     CLINICAL HISTORY:  Goiter;Please determine if the thyroid is  encroaching on the trachea or esophagus.;Nontoxic single thyroid nodule     TECHNIQUE:  Low dose axial images, sagittal and coronal reformations were performed from the skull base to the level of the clavicles.  Contrast was not administered.     COMPARISON:  None     FINDINGS:  There is enlargement of the thyroid and a heterogeneous CT density consistent with nodules.  A hypodense 1.6 cm nodule is seen in the upper right lobe.  On the coronal images the right lobe measures 7 cm in length and the left lobe measures 5.8 cm in length.     The trachea at the lower edge of the larynx is oval.  In the superior mediastinum the trachea is oval but at the thyroid level the trachea is a convex-concave shape consistent compression with the AP  diameter  reduced to 1 cm.  This is seen in the sagittal reconstruction images as well.  In the superior mediastinum the AP diameter is 1.7 cm.     The parotid and submandibular salivary glands are symmetric and of normal CT density.  Adenopathy or soft tissue masses within the anterior cervical triangle is not seen.  Adenopathy within the neck/anterior cervical triangles is not seen.     Impression:     Multinodular goiter with compression of the trachea at the level of the isthmus.  The        Electronically signed by: Roberto Serrato MD  Date:                                            04/22/2022  Time:                                           14:27      CT chest 3/2020        Review of Systems     Past Medical History  She has a past medical history of Anxiety, Bipolar disorder, COPD, mild, and Nephrolithiasis.    Family / Surgical / Social History  Her family history includes COPD in her father; Cancer in her mother; Diabetes in her mother; Hypertension in her father.    Past Surgical History:   Procedure Laterality Date    BACK SURGERY      CYSTOSCOPY      HYSTERECTOMY      LITHOTRIPSY      TUBAL LIGATION         Social History     Tobacco Use    Smoking status: Every Day      Packs/day: 0.75     Years: 45.00     Pack years: 33.75     Types: Cigarettes     Start date:      Last attempt to quit: 2020     Years since quittin.5    Smokeless tobacco: Never    Tobacco comments:     now smoking 1/ ppd   Substance and Sexual Activity    Alcohol use: Never    Drug use: No    Sexual activity: Not on file       Medications  She has a current medication list which includes the following prescription(s): albuterol, albuterol, albuterol-ipratropium, alendronate, budesonide-formoterol 160-4.5 mcg, bupropion, ciprofloxacin-dexamethasone 0.3-0.1%, cyclobenzaprine, diclofenac, fluocinolone acetonide oil, hydrocodone-acetaminophen, metoprolol tartrate, ondansetron, oxybutynin, pravastatin, and pregabalin, and the following Facility-Administered Medications: electrolyte-s (ph 7.4), lactated ringers, lactated ringers, lidocaine (pf) 10 mg/ml (1%), oxymetazoline, and sodium chloride 0.9%.      Allergies  Review of patient's allergies indicates:   Allergen Reactions    Tramadol Swelling    Imitrex [sumatriptan succinate]     Penicillins     Sulfa (sulfonamide antibiotics)     Toradol [ketorolac] Swelling       All medications, allergies, and past history have been reviewed.    Objective:      Vitals:  Vitals - 1 value per visit 10/11/2022 10/20/2022 10/25/2022   SYSTOLIC 126 - 142   DIASTOLIC 80 - 65   Pulse 78 - 60   Temp 98.2 - 97.5   Resp - - 18   SPO2 94 - 92   Weight (lb) 206.46 206.35 -   Weight (kg) 93.65 93.6 -   Height 64 - -   BMI (Calculated) 35.4 - -   VISIT REPORT - - -   Pain Score  - - -       There is no height or weight on file to calculate BSA.    Physical Exam:    GENERAL  APPEARANCE -  alert, appears stated age, cooperative and moderately obese  BARRIER(S) TO COMMUNICATION -  none VOICE - mildly raspy, mild strain    INTEGUMENTARY  no suspicious head and neck lesions    HEENT  HEAD: Normocephalic, without obvious abnormality, atraumatic  FACE: INSPECTION - Symmetric, no signs  of trauma, no suspicious lesion(s)  PALPATION -  No masses SALIVARY GLANDS - non-tender with no appreciable mass  STRENGTH - facial symmetry  NECK/THYROID: mildly enlarged, dominent right nodule of 1.5-2 cm elevates well above clavicle/notch.  Nontender, midline.    EYES  Normal occular alignment and mobility with no visible nystagmus at rest    EARS/NOSE/MOUTH/THROAT  EARS  PINNAE AND EXTERNAL EARS - redness and scaling of meatus bilaterally     NOSE AND SINUSES  EXTERNAL NOSE - Grossly normal for age/sex  MUCOSA - no active drainage    MOUTH AND THROAT   ORAL CAVITY, LIPS, TEETH, GUMS & TONGUE - moist, no suspicious lesions  OROPHARYNX /TONSILS/PHARYNGEAL WALLS/HYPOPHARYNX - no erythema or exudates  NASOPHARYNX - limited mirror exam - unable to visualize due to anatomy/gag  LARYNX -  - limited mirror exam - unable to visualize due to anatomy/gag      CHEST AND LUNG   INSPECTION & AUSCULTATION - normal effort, no stridor    CARDIOVASCULAR  AUSCULTATION & PERIPHERAL VASCULAR - regular rate and rhythm.    NEUROLOGIC  MENTAL STATUS - alert, interactive CRANIAL NERVES - normal    LYMPHATIC  HEAD AND NECK - non-palpable; SUPRACLAVICULAR - deferred; AXILLARY - deferred; INGUINAL - deferred; LIVER/SPLEEN - deferred        Procedure(s):  none    7/8/2022 6/2/2022  Last        Labs:  WBC   Date Value Ref Range Status   10/22/2022 10.09 3.90 - 12.70 K/uL Final     Hemoglobin   Date Value Ref Range Status   10/22/2022 15.3 12.0 - 16.0 g/dL Final     Platelets   Date Value Ref Range Status   10/22/2022 358 150 - 450 K/uL Final     Creatinine   Date Value Ref Range Status   10/22/2022 0.9 0.5 - 1.4 mg/dL Final     TSH   Date Value Ref Range Status   10/04/2022 0.545 0.400 - 4.000 uIU/mL Final     Glucose   Date Value Ref Range Status   10/22/2022 101 70 - 110 mg/dL Final     Hemoglobin A1C   Date Value Ref Range Status   04/18/2022 5.9 (H) 4.0 - 5.6 % Final     Comment:     ADA Screening Guidelines:  5.7-6.4%   Consistent with prediabetes  >or=6.5%  Consistent with diabetes    High levels of fetal hemoglobin interfere with the HbA1C  assay. Heterozygous hemoglobin variants (HbS, HgC, etc)do  not significantly interfere with this assay.   However, presence of multiple variants may affect accuracy.           Assessment:      Problem List Items Addressed This Visit    None  Visit Diagnoses       Leukoplakia of vocal cords    -  Primary                 Plan:        We will proceed with MSL with biopsy vs. Excision right TVC lesion.      Smoking and vaping cessation discussed/encouraged.    Aftercare reviewed with patient and family.    Consent signed after full disclosure of all risks, benefits and alternatives.

## 2022-10-25 NOTE — DISCHARGE SUMMARY
[facility]  Discharge Note  Short Stay    Procedure(s) (LRB):  MICROLARYNGOSCOPY, SUSPENSION, with vocal cord biopsy(ies) (N/A)      OUTCOME: Patient tolerated treatment/procedure well without complication and is now ready for discharge.    DISPOSITION: Home or Self Care    FINAL DIAGNOSIS:  <principal problem not specified>    FOLLOWUP: In clinic    DISCHARGE INSTRUCTIONS:  No discharge procedures on file.     TIME SPENT ON DISCHARGE: 10 minutes

## 2022-10-26 ENCOUNTER — TELEPHONE (OUTPATIENT)
Dept: OTOLARYNGOLOGY | Facility: CLINIC | Age: 67
End: 2022-10-26
Payer: COMMERCIAL

## 2022-10-26 RX ORDER — DM/P-EPHED/ACETAMINOPH/DOXYLAM 30-7.5/3
2 LIQUID (ML) ORAL
Qty: 48 LOZENGE | Refills: 2 | Status: SHIPPED | OUTPATIENT
Start: 2022-10-26 | End: 2022-11-09

## 2022-10-26 NOTE — TELEPHONE ENCOUNTER
Called for daughter again. Daughter states that pt wants to try the Nicotine Lozenges and/or patches to assist with smoking cessation. Daughter states that they contacted insurance and insurance will cover them if a prescription is sent in by the provider. Verified pharmacy. Please send in prescriptions if appropriate. Thanks, Hailey

## 2022-10-26 NOTE — TELEPHONE ENCOUNTER
Left message on voicemail for daughter to call back. Can advise that Dr. Garrison said it is ok for pt to use OTC lozenges if needed. Thanks, Hailey

## 2022-10-26 NOTE — TELEPHONE ENCOUNTER
----- Message from Jackelyn Baires sent at 10/26/2022 10:59 AM CDT -----  .Type:  Patient Call Back    Who Called: PT DAUGHTER       Does the patient know what this is regarding?: SHE NEEDS TO SPEAK WITH THE OFFICE ABOUT GETTING PT THE THROAT LOZENGES SHE WANTS A CALL ASAP PLEASE     Would the patient rather a call back YES     Best Call Back Number:  [phone]    Additional Information: Thank You

## 2022-10-27 ENCOUNTER — TELEPHONE (OUTPATIENT)
Dept: OTOLARYNGOLOGY | Facility: CLINIC | Age: 67
End: 2022-10-27
Payer: COMMERCIAL

## 2022-10-27 VITALS
TEMPERATURE: 98 F | RESPIRATION RATE: 20 BRPM | OXYGEN SATURATION: 97 % | HEART RATE: 60 BPM | BODY MASS INDEX: 35.42 KG/M2 | SYSTOLIC BLOOD PRESSURE: 142 MMHG | DIASTOLIC BLOOD PRESSURE: 60 MMHG | WEIGHT: 206.38 LBS

## 2022-10-27 LAB
FINAL PATHOLOGIC DIAGNOSIS: NORMAL
GROSS: NORMAL
Lab: NORMAL
MICROSCOPIC EXAM: NORMAL

## 2022-10-27 NOTE — TELEPHONE ENCOUNTER
Biopsy results from the right true vocal cord excision showed no cancer.  It showed some thickening which we could see as well as some basilar atypia which means some atypical appearing cells where the cells are made but not any significant dysplasia (precancerous change).  Smoking cessation is critically important have this not come back, heal properly, and not ultimately progressed to worsening disease such as dysplasia or even carcinoma/cancer.

## 2022-10-27 NOTE — TELEPHONE ENCOUNTER
Called daughter and advised of results. Daughter states that pt is working on smoking cessation. She bought pt some nicotine lozenges to hold over until the Rx is approved by the insurance. States that pt is continuing voice rest at this time. Advised daughter that I would send the results to pt via [facility] so that she can see them as well. Advised to let us know if they have any questions or concerns. Thanks, Hailey

## 2022-11-09 ENCOUNTER — OFFICE VISIT (OUTPATIENT)
Dept: FAMILY MEDICINE | Facility: CLINIC | Age: 67
End: 2022-11-09
Payer: COMMERCIAL

## 2022-11-09 VITALS
HEART RATE: 76 BPM | TEMPERATURE: 98 F | HEIGHT: 64 IN | WEIGHT: 203.69 LBS | SYSTOLIC BLOOD PRESSURE: 130 MMHG | DIASTOLIC BLOOD PRESSURE: 80 MMHG | BODY MASS INDEX: 34.77 KG/M2 | OXYGEN SATURATION: 93 %

## 2022-11-09 DIAGNOSIS — J44.9 CHRONIC OBSTRUCTIVE PULMONARY DISEASE, UNSPECIFIED COPD TYPE: Primary | Chronic | ICD-10-CM

## 2022-11-09 DIAGNOSIS — J44.1 CHRONIC OBSTRUCTIVE PULMONARY DISEASE WITH ACUTE EXACERBATION: ICD-10-CM

## 2022-11-09 DIAGNOSIS — R05.9 COUGH, UNSPECIFIED TYPE: ICD-10-CM

## 2022-11-09 DIAGNOSIS — F17.200 SMOKER: ICD-10-CM

## 2022-11-09 DIAGNOSIS — Z12.11 SCREENING FOR COLON CANCER: ICD-10-CM

## 2022-11-09 DIAGNOSIS — Z12.31 ENCOUNTER FOR SCREENING MAMMOGRAM FOR MALIGNANT NEOPLASM OF BREAST: ICD-10-CM

## 2022-11-09 DIAGNOSIS — I10 HYPERTENSION, ESSENTIAL, BENIGN: ICD-10-CM

## 2022-11-09 DIAGNOSIS — E78.2 MIXED HYPERLIPIDEMIA: ICD-10-CM

## 2022-11-09 DIAGNOSIS — R73.03 PREDIABETES: ICD-10-CM

## 2022-11-09 PROCEDURE — 3079F DIAST BP 80-89 MM HG: CPT | Mod: CPTII,S$GLB,,

## 2022-11-09 PROCEDURE — 3008F PR BODY MASS INDEX (BMI) DOCUMENTED: ICD-10-PCS | Mod: CPTII,S$GLB,,

## 2022-11-09 PROCEDURE — 3044F PR MOST RECENT HEMOGLOBIN A1C LEVEL <7.0%: ICD-10-PCS | Mod: CPTII,S$GLB,,

## 2022-11-09 PROCEDURE — 1101F PT FALLS ASSESS-DOCD LE1/YR: CPT | Mod: CPTII,S$GLB,,

## 2022-11-09 PROCEDURE — 99999 PR PBB SHADOW E&M-EST. PATIENT-LVL V: ICD-10-PCS | Mod: PBBFAC,,,

## 2022-11-09 PROCEDURE — 3075F SYST BP GE 130 - 139MM HG: CPT | Mod: CPTII,S$GLB,,

## 2022-11-09 PROCEDURE — 99214 OFFICE O/P EST MOD 30 MIN: CPT | Mod: S$GLB,,,

## 2022-11-09 PROCEDURE — 3288F PR FALLS RISK ASSESSMENT DOCUMENTED: ICD-10-PCS | Mod: CPTII,S$GLB,,

## 2022-11-09 PROCEDURE — 3075F PR MOST RECENT SYSTOLIC BLOOD PRESS GE 130-139MM HG: ICD-10-PCS | Mod: CPTII,S$GLB,,

## 2022-11-09 PROCEDURE — 1159F PR MEDICATION LIST DOCUMENTED IN MEDICAL RECORD: ICD-10-PCS | Mod: CPTII,S$GLB,,

## 2022-11-09 PROCEDURE — 3044F HG A1C LEVEL LT 7.0%: CPT | Mod: CPTII,S$GLB,,

## 2022-11-09 PROCEDURE — 1160F RVW MEDS BY RX/DR IN RCRD: CPT | Mod: CPTII,S$GLB,,

## 2022-11-09 PROCEDURE — 1160F PR REVIEW ALL MEDS BY PRESCRIBER/CLIN PHARMACIST DOCUMENTED: ICD-10-PCS | Mod: CPTII,S$GLB,,

## 2022-11-09 PROCEDURE — 1159F MED LIST DOCD IN RCRD: CPT | Mod: CPTII,S$GLB,,

## 2022-11-09 PROCEDURE — 3008F BODY MASS INDEX DOCD: CPT | Mod: CPTII,S$GLB,,

## 2022-11-09 PROCEDURE — 3079F PR MOST RECENT DIASTOLIC BLOOD PRESSURE 80-89 MM HG: ICD-10-PCS | Mod: CPTII,S$GLB,,

## 2022-11-09 PROCEDURE — 3288F FALL RISK ASSESSMENT DOCD: CPT | Mod: CPTII,S$GLB,,

## 2022-11-09 PROCEDURE — 99999 PR PBB SHADOW E&M-EST. PATIENT-LVL V: CPT | Mod: PBBFAC,,,

## 2022-11-09 PROCEDURE — 99214 PR OFFICE/OUTPT VISIT, EST, LEVL IV, 30-39 MIN: ICD-10-PCS | Mod: S$GLB,,,

## 2022-11-09 PROCEDURE — 1101F PR PT FALLS ASSESS DOC 0-1 FALLS W/OUT INJ PAST YR: ICD-10-PCS | Mod: CPTII,S$GLB,,

## 2022-11-09 RX ORDER — DOXYCYCLINE 100 MG/1
100 CAPSULE ORAL EVERY 12 HOURS
Qty: 20 CAPSULE | Refills: 0 | Status: SHIPPED | OUTPATIENT
Start: 2022-11-09 | End: 2022-11-19

## 2022-11-09 RX ORDER — METOPROLOL TARTRATE 25 MG/1
25 TABLET, FILM COATED ORAL DAILY
Qty: 30 TABLET | Refills: 11 | Status: SHIPPED | OUTPATIENT
Start: 2022-11-09 | End: 2023-08-15 | Stop reason: SDUPTHER

## 2022-11-09 RX ORDER — PROMETHAZINE HYDROCHLORIDE AND DEXTROMETHORPHAN HYDROBROMIDE 6.25; 15 MG/5ML; MG/5ML
5 SYRUP ORAL 4 TIMES DAILY PRN
Qty: 120 ML | Refills: 0 | Status: SHIPPED | OUTPATIENT
Start: 2022-11-09 | End: 2022-11-16

## 2022-11-09 RX ORDER — PRAVASTATIN SODIUM 40 MG/1
40 TABLET ORAL NIGHTLY
Qty: 90 TABLET | Refills: 3 | Status: SHIPPED | OUTPATIENT
Start: 2022-11-09

## 2022-11-09 NOTE — PATIENT INSTRUCTIONS

## 2022-11-09 NOTE — PROGRESS NOTES
Subjective:       Patient ID: Alma Rosa Helm is a 67 y.o. female.    Chief Complaint: Hypertension (Pt states ran out of her medication about 1 month ago.) and Hyperlipidemia (Pt states not taking any medication.)    Patient presents to the clinic with complaints of hypertension.     States she ran out of her BP meds and her BP has been elevated. States her BP was >200 at work on Saturday and she went home from work. States she was taking Vicks 44. Instructed to avoid this from now on.     BP Readings from Last 3 Encounters:  11/09/22 : 130/80  10/25/22 : (!) 142/60  10/11/22 : 126/80  Needs refill on Metoprolol.     Hyperlipidemia-  Out of Pravachol-will refill.     Has complaint of cough and wheezing. Started end of October after she got her biopsy from ENT.     Ogsqheqfx-Pui-Jgsa Order  Colonoscopy-Never had Colonoscopy-agreeable to fit kit  Dexa-4/2022    Followed by:  Endocrinology-MEGAN Yen    Has decreased smoking to a few cigarettes daily.     Has no other complaints or concerns.     Patient educated on plan of care, verbalized understanding.      Review of Systems   Constitutional:  Negative for activity change, appetite change, chills, diaphoresis and fever.   HENT:  Negative for congestion, ear pain, postnasal drip, sinus pressure, sneezing and sore throat.    Eyes:  Negative for pain, discharge, redness and itching.   Respiratory:  Positive for cough, shortness of breath and wheezing. Negative for apnea and chest tightness.    Cardiovascular:  Negative for chest pain and leg swelling.   Gastrointestinal:  Negative for abdominal distention, abdominal pain, constipation, diarrhea, nausea and vomiting.   Genitourinary:  Negative for difficulty urinating, dysuria, flank pain and frequency.   Musculoskeletal:  Positive for arthralgias and myalgias.   Skin:  Negative for color change, rash and wound.   Neurological:  Negative for dizziness.   All other systems reviewed and are negative.    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    Chronic eczematous otitis externa of both ears    Acute infective otitis externa, right    Leukoplakia of vocal cords       Objective:      Physical Exam    Lab Results   Component Value Date    WBC 10.09 10/22/2022    HGB 15.3 10/22/2022    HCT 48.0 10/22/2022     10/22/2022    ALT 35 10/22/2022    AST 30 10/22/2022     10/22/2022    K 4.6 10/22/2022     10/22/2022    CREATININE 0.9 10/22/2022    BUN 25 (H) 10/22/2022    CO2 24 10/22/2022    TSH 0.545 10/04/2022    INR 0.9 12/11/2020    HGBA1C 5.9 (H) 04/18/2022     The ASCVD Risk score (Giselle CRAWFORD, et al., 2019) failed to calculate for the following reasons:    Cannot find a previous HDL lab    Cannot find a previous total cholesterol lab  Visit Vitals  /80 (BP Location: Right arm, Patient Position: Sitting, BP Method: Large (Manual))   Pulse 76   Temp 97.9 °F (36.6 °C) (Temporal)   Ht 5' 4" (1.626 m)   Wt 92.4 kg (203 lb 11.3 oz)   SpO2 (!) 93%   BMI 34.97 kg/m²      Assessment:       1. Chronic obstructive pulmonary disease, unspecified COPD type    2. Mixed hyperlipidemia    3. Cough, unspecified type    4. Chronic obstructive pulmonary disease with acute exacerbation    5. Prediabetes    6. BMI 34.0-34.9,adult    7. Smoker    8. Encounter for screening mammogram for malignant neoplasm of breast    9. Screening for colon cancer        Plan:       1. Chronic obstructive pulmonary disease, unspecified COPD type/Chronic " obstructive pulmonary disease with acute exacerbation/Cough, unspecified type   -     doxycycline (VIBRAMYCIN) 100 MG Cap; Take 1 capsule (100 mg total) by mouth every 12 (twelve) hours. for 10 days  Dispense: 20 capsule; Refill: 0  -     promethazine-dextromethorphan (PROMETHAZINE-DM) 6.25-15 mg/5 mL Syrp; Take 5 mLs by mouth 4 (four) times daily as needed.  Dispense: 120 mL; Refill: 0   - Continue current plan of care   - Follow up with PCP   - Patient counseled on smoking cessation.     2. Mixed hyperlipidemia  -     pravastatin (PRAVACHOL) 40 MG tablet; Take 1 tablet (40 mg total) by mouth nightly.  Dispense: 90 tablet; Refill: 3  -     Lipid Panel; Future; Expected date: 11/09/2022   - Stable-continue Pravachol and low fat, low cholesterol diet   - Continue current plan of care   - Follow up with PCP    3. Prediabetes  -     Hemoglobin A1C; Future; Expected date: 11/09/2022   - Stable   - Continue current plan of care   - Follow up with PCP    4. Hypertension, essential, benign  -     metoprolol tartrate (LOPRESSOR) 25 MG tablet; Take 1 tablet (25 mg total) by mouth Daily.  Dispense: 30 tablet; Refill: 11   - Stable-Continue Metoprolol   - Continue current plan of care   - Follow up with PCP    5. BMI 34.0-34.9,adult   - Low fat, low cholesterol, diabetic diet and exercise as tolerated.     6. Smoker  - Patient counseled on smoking cessation.     7. Encounter for screening mammogram for malignant neoplasm of breast  -     Mammo Digital Screening Bilat w/ Alfredo; Future; Expected date: 11/09/2022    8. Screening for colon cancer  -     Fecal Immunochemical Test (iFOBT); Future; Expected date: 11/09/2022      Follow up in about 3 months (around 2/9/2023), or if symptoms worsen or fail to improve.      Future Appointments       Date Provider Specialty Appt Notes    12/5/2022 Koko Garrison MD Otolaryngology 6 wk f/u laryngoscopy w/ biopsy done 10/25/22    2/9/2023 Rhonda Klein NP Family Medicine 3 month  follow up    4/5/2023  Lab b    4/12/2023 CLAUDIA Burris PA-C Endocrinology 6 month f/u thyroid

## 2022-11-14 RX ORDER — IPRATROPIUM BROMIDE AND ALBUTEROL SULFATE 2.5; .5 MG/3ML; MG/3ML
3 SOLUTION RESPIRATORY (INHALATION) EVERY 6 HOURS PRN
Qty: 120 EACH | Refills: 5 | Status: SHIPPED | OUTPATIENT
Start: 2022-11-14 | End: 2024-04-04 | Stop reason: SDUPTHER

## 2022-11-14 NOTE — TELEPHONE ENCOUNTER
Patient is requesting a refill on the following unsigned order below.       albuterol-ipratropium (DUO-NEB) 2.5 mg-0.5 mg/3 mL nebulizer solution 120 vial --

## 2022-11-30 ENCOUNTER — TELEPHONE (OUTPATIENT)
Dept: OTOLARYNGOLOGY | Facility: CLINIC | Age: 67
End: 2022-11-30
Payer: COMMERCIAL

## 2022-11-30 NOTE — TELEPHONE ENCOUNTER
Left message on voicemail for pt regarding appt with Dr. Garrison on 12/5/22. Dr. Garrison will be in surgery that morning, can pt come in at 1 pm? Will also send my chart message. Thanks, Hailey

## 2022-12-15 ENCOUNTER — HOSPITAL ENCOUNTER (INPATIENT)
Facility: HOSPITAL | Age: 67
LOS: 4 days | Discharge: HOME HEALTH | DRG: 690 | End: 2022-12-19
Attending: EMERGENCY MEDICINE | Admitting: HOSPITALIST
Payer: COMMERCIAL

## 2022-12-15 ENCOUNTER — OFFICE VISIT (OUTPATIENT)
Dept: FAMILY MEDICINE | Facility: CLINIC | Age: 67
End: 2022-12-15
Payer: COMMERCIAL

## 2022-12-15 VITALS
OXYGEN SATURATION: 96 % | WEIGHT: 203.06 LBS | TEMPERATURE: 98 F | DIASTOLIC BLOOD PRESSURE: 60 MMHG | HEIGHT: 64 IN | BODY MASS INDEX: 34.67 KG/M2 | HEART RATE: 80 BPM | SYSTOLIC BLOOD PRESSURE: 102 MMHG

## 2022-12-15 DIAGNOSIS — Z87.442 HISTORY OF KIDNEY STONES: Chronic | ICD-10-CM

## 2022-12-15 DIAGNOSIS — N39.0 COMPLICATED UTI (URINARY TRACT INFECTION): ICD-10-CM

## 2022-12-15 DIAGNOSIS — R78.81 E COLI BACTEREMIA: ICD-10-CM

## 2022-12-15 DIAGNOSIS — R78.81 POSITIVE BLOOD CULTURES: Primary | ICD-10-CM

## 2022-12-15 DIAGNOSIS — R78.81 POSITIVE BLOOD CULTURE: ICD-10-CM

## 2022-12-15 DIAGNOSIS — N12 PYELONEPHRITIS: Primary | ICD-10-CM

## 2022-12-15 DIAGNOSIS — B96.20 E COLI BACTEREMIA: ICD-10-CM

## 2022-12-15 LAB
ALBUMIN SERPL BCP-MCNC: 3 G/DL (ref 3.5–5.2)
ALP SERPL-CCNC: 65 U/L (ref 55–135)
ALT SERPL W/O P-5'-P-CCNC: 33 U/L (ref 10–44)
ANION GAP SERPL CALC-SCNC: 8 MMOL/L (ref 8–16)
AST SERPL-CCNC: 13 U/L (ref 10–40)
BACTERIA #/AREA URNS HPF: ABNORMAL /HPF
BASOPHILS # BLD AUTO: 0.04 K/UL (ref 0–0.2)
BASOPHILS NFR BLD: 0.3 % (ref 0–1.9)
BILIRUB SERPL-MCNC: 0.5 MG/DL (ref 0.1–1)
BILIRUB UR QL STRIP: NEGATIVE
BUN SERPL-MCNC: 19 MG/DL (ref 8–23)
CALCIUM SERPL-MCNC: 9.7 MG/DL (ref 8.7–10.5)
CHLORIDE SERPL-SCNC: 109 MMOL/L (ref 95–110)
CLARITY UR: CLEAR
CO2 SERPL-SCNC: 25 MMOL/L (ref 23–29)
COLOR UR: YELLOW
CREAT SERPL-MCNC: 0.8 MG/DL (ref 0.5–1.4)
DIFFERENTIAL METHOD: ABNORMAL
EOSINOPHIL # BLD AUTO: 0.4 K/UL (ref 0–0.5)
EOSINOPHIL NFR BLD: 2.7 % (ref 0–8)
ERYTHROCYTE [DISTWIDTH] IN BLOOD BY AUTOMATED COUNT: 14.5 % (ref 11.5–14.5)
EST. GFR  (NO RACE VARIABLE): >60 ML/MIN/1.73 M^2
GLUCOSE SERPL-MCNC: 111 MG/DL (ref 70–110)
GLUCOSE UR QL STRIP: NEGATIVE
HCT VFR BLD AUTO: 46.4 % (ref 37–48.5)
HGB BLD-MCNC: 15.3 G/DL (ref 12–16)
HGB UR QL STRIP: NEGATIVE
IMM GRANULOCYTES # BLD AUTO: 0.24 K/UL (ref 0–0.04)
IMM GRANULOCYTES NFR BLD AUTO: 1.8 % (ref 0–0.5)
KETONES UR QL STRIP: NEGATIVE
LACTATE SERPL-SCNC: 1.2 MMOL/L (ref 0.5–2.2)
LEUKOCYTE ESTERASE UR QL STRIP: ABNORMAL
LYMPHOCYTES # BLD AUTO: 2.5 K/UL (ref 1–4.8)
LYMPHOCYTES NFR BLD: 18.4 % (ref 18–48)
MCH RBC QN AUTO: 27.6 PG (ref 27–31)
MCHC RBC AUTO-ENTMCNC: 33 G/DL (ref 32–36)
MCV RBC AUTO: 84 FL (ref 82–98)
MICROSCOPIC COMMENT: ABNORMAL
MONOCYTES # BLD AUTO: 0.8 K/UL (ref 0.3–1)
MONOCYTES NFR BLD: 5.5 % (ref 4–15)
NEUTROPHILS # BLD AUTO: 9.7 K/UL (ref 1.8–7.7)
NEUTROPHILS NFR BLD: 71.3 % (ref 38–73)
NITRITE UR QL STRIP: POSITIVE
NRBC BLD-RTO: 0 /100 WBC
PH UR STRIP: 7 [PH] (ref 5–8)
PLATELET # BLD AUTO: 360 K/UL (ref 150–450)
PMV BLD AUTO: 9 FL (ref 9.2–12.9)
POTASSIUM SERPL-SCNC: 3.8 MMOL/L (ref 3.5–5.1)
PROCALCITONIN SERPL IA-MCNC: 0.1 NG/ML
PROT SERPL-MCNC: 7.5 G/DL (ref 6–8.4)
PROT UR QL STRIP: NEGATIVE
RBC # BLD AUTO: 5.55 M/UL (ref 4–5.4)
SODIUM SERPL-SCNC: 142 MMOL/L (ref 136–145)
SP GR UR STRIP: 1.01 (ref 1–1.03)
URN SPEC COLLECT METH UR: ABNORMAL
UROBILINOGEN UR STRIP-ACNC: 1 EU/DL
WBC # BLD AUTO: 13.62 K/UL (ref 3.9–12.7)
WBC #/AREA URNS HPF: 1 /HPF (ref 0–5)

## 2022-12-15 PROCEDURE — 3044F HG A1C LEVEL LT 7.0%: CPT | Mod: CPTII,,,

## 2022-12-15 PROCEDURE — 1159F MED LIST DOCD IN RCRD: CPT | Mod: CPTII,,,

## 2022-12-15 PROCEDURE — 94761 N-INVAS EAR/PLS OXIMETRY MLT: CPT

## 2022-12-15 PROCEDURE — 25000003 PHARM REV CODE 250: Performed by: NURSE PRACTITIONER

## 2022-12-15 PROCEDURE — 12000002 HC ACUTE/MED SURGE SEMI-PRIVATE ROOM

## 2022-12-15 PROCEDURE — 99215 OFFICE O/P EST HI 40 MIN: CPT | Mod: ,,,

## 2022-12-15 PROCEDURE — 85025 COMPLETE CBC W/AUTO DIFF WBC: CPT | Performed by: EMERGENCY MEDICINE

## 2022-12-15 PROCEDURE — 99285 EMERGENCY DEPT VISIT HI MDM: CPT | Mod: 25

## 2022-12-15 PROCEDURE — 1159F PR MEDICATION LIST DOCUMENTED IN MEDICAL RECORD: ICD-10-PCS | Mod: CPTII,,,

## 2022-12-15 PROCEDURE — 99215 PR OFFICE/OUTPT VISIT, EST, LEVL V, 40-54 MIN: ICD-10-PCS | Mod: ,,,

## 2022-12-15 PROCEDURE — 86803 HEPATITIS C AB TEST: CPT | Performed by: EMERGENCY MEDICINE

## 2022-12-15 PROCEDURE — 1101F PT FALLS ASSESS-DOCD LE1/YR: CPT | Mod: CPTII,,,

## 2022-12-15 PROCEDURE — 87186 SC STD MICRODIL/AGAR DIL: CPT | Performed by: NURSE PRACTITIONER

## 2022-12-15 PROCEDURE — 3074F PR MOST RECENT SYSTOLIC BLOOD PRESSURE < 130 MM HG: ICD-10-PCS | Mod: CPTII,,,

## 2022-12-15 PROCEDURE — 36415 COLL VENOUS BLD VENIPUNCTURE: CPT | Performed by: EMERGENCY MEDICINE

## 2022-12-15 PROCEDURE — 3074F SYST BP LT 130 MM HG: CPT | Mod: CPTII,,,

## 2022-12-15 PROCEDURE — 87088 URINE BACTERIA CULTURE: CPT | Performed by: NURSE PRACTITIONER

## 2022-12-15 PROCEDURE — 3078F PR MOST RECENT DIASTOLIC BLOOD PRESSURE < 80 MM HG: ICD-10-PCS | Mod: CPTII,,,

## 2022-12-15 PROCEDURE — 3044F PR MOST RECENT HEMOGLOBIN A1C LEVEL <7.0%: ICD-10-PCS | Mod: CPTII,,,

## 2022-12-15 PROCEDURE — 87077 CULTURE AEROBIC IDENTIFY: CPT | Performed by: NURSE PRACTITIONER

## 2022-12-15 PROCEDURE — S4991 NICOTINE PATCH NONLEGEND: HCPCS | Performed by: NURSE PRACTITIONER

## 2022-12-15 PROCEDURE — 80053 COMPREHEN METABOLIC PANEL: CPT | Performed by: EMERGENCY MEDICINE

## 2022-12-15 PROCEDURE — 84145 PROCALCITONIN (PCT): CPT | Performed by: EMERGENCY MEDICINE

## 2022-12-15 PROCEDURE — 87389 HIV-1 AG W/HIV-1&-2 AB AG IA: CPT | Performed by: EMERGENCY MEDICINE

## 2022-12-15 PROCEDURE — 3078F DIAST BP <80 MM HG: CPT | Mod: CPTII,,,

## 2022-12-15 PROCEDURE — 87086 URINE CULTURE/COLONY COUNT: CPT | Performed by: NURSE PRACTITIONER

## 2022-12-15 PROCEDURE — 94640 AIRWAY INHALATION TREATMENT: CPT

## 2022-12-15 PROCEDURE — 63600175 PHARM REV CODE 636 W HCPCS: Performed by: NURSE PRACTITIONER

## 2022-12-15 PROCEDURE — 87040 BLOOD CULTURE FOR BACTERIA: CPT | Performed by: EMERGENCY MEDICINE

## 2022-12-15 PROCEDURE — 83605 ASSAY OF LACTIC ACID: CPT | Performed by: EMERGENCY MEDICINE

## 2022-12-15 PROCEDURE — 1160F PR REVIEW ALL MEDS BY PRESCRIBER/CLIN PHARMACIST DOCUMENTED: ICD-10-PCS | Mod: CPTII,,,

## 2022-12-15 PROCEDURE — 3008F PR BODY MASS INDEX (BMI) DOCUMENTED: ICD-10-PCS | Mod: CPTII,,,

## 2022-12-15 PROCEDURE — 3288F FALL RISK ASSESSMENT DOCD: CPT | Mod: CPTII,,,

## 2022-12-15 PROCEDURE — 3008F BODY MASS INDEX DOCD: CPT | Mod: CPTII,,,

## 2022-12-15 PROCEDURE — 1160F RVW MEDS BY RX/DR IN RCRD: CPT | Mod: CPTII,,,

## 2022-12-15 PROCEDURE — 25000242 PHARM REV CODE 250 ALT 637 W/ HCPCS: Performed by: NURSE PRACTITIONER

## 2022-12-15 PROCEDURE — 1101F PR PT FALLS ASSESS DOC 0-1 FALLS W/OUT INJ PAST YR: ICD-10-PCS | Mod: CPTII,,,

## 2022-12-15 PROCEDURE — 96360 HYDRATION IV INFUSION INIT: CPT

## 2022-12-15 PROCEDURE — 3288F PR FALLS RISK ASSESSMENT DOCUMENTED: ICD-10-PCS | Mod: CPTII,,,

## 2022-12-15 PROCEDURE — 63600175 PHARM REV CODE 636 W HCPCS: Performed by: EMERGENCY MEDICINE

## 2022-12-15 PROCEDURE — 81000 URINALYSIS NONAUTO W/SCOPE: CPT | Performed by: EMERGENCY MEDICINE

## 2022-12-15 RX ORDER — NALOXONE HCL 0.4 MG/ML
0.02 VIAL (ML) INJECTION
Status: DISCONTINUED | OUTPATIENT
Start: 2022-12-15 | End: 2022-12-19 | Stop reason: HOSPADM

## 2022-12-15 RX ORDER — AMOXICILLIN 250 MG
1 CAPSULE ORAL 2 TIMES DAILY PRN
Status: DISCONTINUED | OUTPATIENT
Start: 2022-12-15 | End: 2022-12-19 | Stop reason: HOSPADM

## 2022-12-15 RX ORDER — MEROPENEM AND SODIUM CHLORIDE 1 G/50ML
1 INJECTION, SOLUTION INTRAVENOUS
Status: DISCONTINUED | OUTPATIENT
Start: 2022-12-16 | End: 2022-12-19 | Stop reason: HOSPADM

## 2022-12-15 RX ORDER — MAG HYDROX/ALUMINUM HYD/SIMETH 200-200-20
30 SUSPENSION, ORAL (FINAL DOSE FORM) ORAL 4 TIMES DAILY PRN
Status: DISCONTINUED | OUTPATIENT
Start: 2022-12-15 | End: 2022-12-19 | Stop reason: HOSPADM

## 2022-12-15 RX ORDER — ONDANSETRON 2 MG/ML
4 INJECTION INTRAMUSCULAR; INTRAVENOUS EVERY 6 HOURS PRN
Status: DISCONTINUED | OUTPATIENT
Start: 2022-12-15 | End: 2022-12-19 | Stop reason: HOSPADM

## 2022-12-15 RX ORDER — MINOCYCLINE HYDROCHLORIDE 100 MG/1
CAPSULE ORAL
Status: ON HOLD | COMMUNITY
Start: 2022-12-13 | End: 2022-12-19 | Stop reason: HOSPADM

## 2022-12-15 RX ORDER — HYDROCODONE BITARTRATE AND ACETAMINOPHEN 10; 325 MG/1; MG/1
1 TABLET ORAL EVERY 8 HOURS PRN
Status: DISCONTINUED | OUTPATIENT
Start: 2022-12-15 | End: 2022-12-19 | Stop reason: HOSPADM

## 2022-12-15 RX ORDER — ACETAMINOPHEN 500 MG
1000 TABLET ORAL EVERY 6 HOURS PRN
Status: DISCONTINUED | OUTPATIENT
Start: 2022-12-15 | End: 2022-12-19 | Stop reason: HOSPADM

## 2022-12-15 RX ORDER — LANOLIN ALCOHOL/MO/W.PET/CERES
800 CREAM (GRAM) TOPICAL
Status: DISCONTINUED | OUTPATIENT
Start: 2022-12-15 | End: 2022-12-19 | Stop reason: HOSPADM

## 2022-12-15 RX ORDER — GUAIFENESIN/DEXTROMETHORPHAN 100-10MG/5
10 SYRUP ORAL EVERY 4 HOURS PRN
Status: DISCONTINUED | OUTPATIENT
Start: 2022-12-15 | End: 2022-12-19 | Stop reason: HOSPADM

## 2022-12-15 RX ORDER — ENOXAPARIN SODIUM 100 MG/ML
40 INJECTION SUBCUTANEOUS EVERY 24 HOURS
Status: DISCONTINUED | OUTPATIENT
Start: 2022-12-15 | End: 2022-12-19 | Stop reason: HOSPADM

## 2022-12-15 RX ORDER — PROMETHAZINE HYDROCHLORIDE AND DEXTROMETHORPHAN HYDROBROMIDE 6.25; 15 MG/5ML; MG/5ML
SYRUP ORAL
COMMUNITY
End: 2023-06-28

## 2022-12-15 RX ORDER — IBUPROFEN 200 MG
1 TABLET ORAL DAILY
Status: DISCONTINUED | OUTPATIENT
Start: 2022-12-16 | End: 2022-12-15

## 2022-12-15 RX ORDER — MEROPENEM AND SODIUM CHLORIDE 1 G/50ML
1 INJECTION, SOLUTION INTRAVENOUS
Status: COMPLETED | OUTPATIENT
Start: 2022-12-15 | End: 2022-12-15

## 2022-12-15 RX ORDER — IBUPROFEN 200 MG
16 TABLET ORAL
Status: DISCONTINUED | OUTPATIENT
Start: 2022-12-15 | End: 2022-12-19 | Stop reason: HOSPADM

## 2022-12-15 RX ORDER — SODIUM CHLORIDE 9 MG/ML
INJECTION, SOLUTION INTRAVENOUS CONTINUOUS
Status: DISCONTINUED | OUTPATIENT
Start: 2022-12-15 | End: 2022-12-19 | Stop reason: HOSPADM

## 2022-12-15 RX ORDER — METOPROLOL TARTRATE 25 MG/1
25 TABLET, FILM COATED ORAL DAILY
Status: DISCONTINUED | OUTPATIENT
Start: 2022-12-15 | End: 2022-12-19 | Stop reason: HOSPADM

## 2022-12-15 RX ORDER — PREGABALIN 25 MG/1
50 CAPSULE ORAL 3 TIMES DAILY
Status: DISCONTINUED | OUTPATIENT
Start: 2022-12-15 | End: 2022-12-19 | Stop reason: HOSPADM

## 2022-12-15 RX ORDER — IBUPROFEN 200 MG
1 TABLET ORAL DAILY
Status: DISCONTINUED | OUTPATIENT
Start: 2022-12-15 | End: 2022-12-19 | Stop reason: HOSPADM

## 2022-12-15 RX ORDER — MUPIROCIN 20 MG/G
OINTMENT TOPICAL
COMMUNITY

## 2022-12-15 RX ORDER — BUDESONIDE AND FORMOTEROL FUMARATE DIHYDRATE 160; 4.5 UG/1; UG/1
2 AEROSOL RESPIRATORY (INHALATION) EVERY 12 HOURS
Status: DISCONTINUED | OUTPATIENT
Start: 2022-12-15 | End: 2022-12-19 | Stop reason: HOSPADM

## 2022-12-15 RX ORDER — IBUPROFEN 200 MG
24 TABLET ORAL
Status: DISCONTINUED | OUTPATIENT
Start: 2022-12-15 | End: 2022-12-19 | Stop reason: HOSPADM

## 2022-12-15 RX ORDER — GLUCAGON 1 MG
1 KIT INJECTION
Status: DISCONTINUED | OUTPATIENT
Start: 2022-12-15 | End: 2022-12-19 | Stop reason: HOSPADM

## 2022-12-15 RX ORDER — CEFDINIR 300 MG/1
300 CAPSULE ORAL 2 TIMES DAILY
Status: ON HOLD | COMMUNITY
Start: 2022-12-12 | End: 2022-12-19 | Stop reason: HOSPADM

## 2022-12-15 RX ORDER — PRAVASTATIN SODIUM 40 MG/1
40 TABLET ORAL NIGHTLY
Status: DISCONTINUED | OUTPATIENT
Start: 2022-12-15 | End: 2022-12-19 | Stop reason: HOSPADM

## 2022-12-15 RX ORDER — SODIUM CHLORIDE 0.9 % (FLUSH) 0.9 %
10 SYRINGE (ML) INJECTION EVERY 8 HOURS
Status: DISCONTINUED | OUTPATIENT
Start: 2022-12-15 | End: 2022-12-19 | Stop reason: HOSPADM

## 2022-12-15 RX ORDER — IPRATROPIUM BROMIDE AND ALBUTEROL SULFATE 2.5; .5 MG/3ML; MG/3ML
3 SOLUTION RESPIRATORY (INHALATION) EVERY 4 HOURS PRN
Status: DISCONTINUED | OUTPATIENT
Start: 2022-12-15 | End: 2022-12-19 | Stop reason: HOSPADM

## 2022-12-15 RX ORDER — TALC
9 POWDER (GRAM) TOPICAL NIGHTLY PRN
Status: DISCONTINUED | OUTPATIENT
Start: 2022-12-15 | End: 2022-12-19 | Stop reason: HOSPADM

## 2022-12-15 RX ADMIN — MEROPENEM AND SODIUM CHLORIDE 1 G: 1 INJECTION, SOLUTION INTRAVENOUS at 04:12

## 2022-12-15 RX ADMIN — SODIUM CHLORIDE, POTASSIUM CHLORIDE, SODIUM LACTATE AND CALCIUM CHLORIDE 1000 ML: 600; 310; 30; 20 INJECTION, SOLUTION INTRAVENOUS at 02:12

## 2022-12-15 RX ADMIN — PREGABALIN 50 MG: 25 CAPSULE ORAL at 08:12

## 2022-12-15 RX ADMIN — IPRATROPIUM BROMIDE AND ALBUTEROL SULFATE 3 ML: .5; 3 SOLUTION RESPIRATORY (INHALATION) at 08:12

## 2022-12-15 RX ADMIN — GUAIFENESIN AND DEXTROMETHORPHAN 10 ML: 100; 10 SYRUP ORAL at 11:12

## 2022-12-15 RX ADMIN — ENOXAPARIN SODIUM 40 MG: 40 INJECTION SUBCUTANEOUS at 04:12

## 2022-12-15 RX ADMIN — SODIUM CHLORIDE: 9 INJECTION, SOLUTION INTRAVENOUS at 06:12

## 2022-12-15 RX ADMIN — PRAVASTATIN SODIUM 40 MG: 40 TABLET ORAL at 08:12

## 2022-12-15 RX ADMIN — SODIUM CHLORIDE: 9 INJECTION, SOLUTION INTRAVENOUS at 05:12

## 2022-12-15 RX ADMIN — METOPROLOL TARTRATE 25 MG: 25 TABLET, FILM COATED ORAL at 04:12

## 2022-12-15 RX ADMIN — NICOTINE 1 PATCH: 14 PATCH TRANSDERMAL at 05:12

## 2022-12-15 RX ADMIN — BUDESONIDE AND FORMOTEROL FUMARATE DIHYDRATE 2 PUFF: 160; 4.5 AEROSOL RESPIRATORY (INHALATION) at 08:12

## 2022-12-15 NOTE — PROGRESS NOTES
Subjective:       Patient ID: Alma Rosa Helm is a 67 y.o. female.    Chief Complaint: Hospital Follow Up (States was in hospital for chronic uti. Discharged this past Monday. Pt states feel worse than when in the hospital. Pt denies any fever), Fatigue, Dizziness, Nausea, Abdominal Pain (Right side and back), and Headache (Twinges of sharp pains while coming here today. Right side)    Patient presents to the clinic for a hospital follow up.     Patient presents to the clinic today accompanied by her daughter. Since discharge she has noticed on her mychart that she has a positive blood culture report. The daughter states she called [place] [facility] and was told that she does not need to come back in to follow up with her PCP and to continue the oral antibiotics that were prescribed for her.   Patient blood cultures grew resistant E-coli ESBL. Patient states she feels worse than when she was originally admitted. She also has fatigue, dizziness, nausea, headache, and abdominal pain.     Instructed patient that she needs to go back to the ED for evaluation and admission.         HOSPITAL NOTE:  DATE OF DISCHARGE:12/12/2022  DATE OF ADMISSION: 12/10/2022  DISCHARGE PHYSICIAN: Matthew Faulkner Jr., NP  ADMISSION DIAGNOSIS:   Pyelonephritis [N12];COPD exacerbation (HCC Code) [J44.1]    DISCHARGE DIAGNOSES:   Active Problems:  Hypertension, essential, benign  Tobacco dependence    HPI: The patient is a 67 y.o. female with a past medical history of COPD, tobacco dependence, kidney stones that presents with right flank pain x1 day. Patient states she started getting sick last night. States that back pain is classic to the type of kidney pain she has felt in the past. Pain is constant, severe, pain is a 10 on a 0-10 pain scale. No alleviating or exacerbating factors identified. History of infected renal stone, underwent stent placement in January 2021. Endorses nausea and vomiting and difficulty holding fluids down. Denies any  constipation. Had a bowel movement on Friday. Patient states that her urine output seems adequate, denies any hematuria. Denies any fever or chills. Endorses shortness of breath, wheezing and cough. Has daily symptoms. Uses inhaler as needed. 20-pack-year history. Patient be admitted to [facility] for further evaluation and management       HOSPITAL COURSE: During hospitalization she was admitted to [facility] telemetry for evaluation management of pyelonephritis, she was given IV antibiotics and aggressively hydrated with IV fluid. She is clinically improved and being discharged today with a continued course of cefdinir as well as doxycycline. It is recommended the patient continue prehospital medication regiment as well as follow-up with primary care provider within 1 to 2 weeks post discharge. Plan of care and anticipated discharge was discussed in detail with the attending. Patient was seen and examined on the day of discharge and is medically stable.      Patient sent to back to ER for evaluation and admission for positive blood cultures.        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    Chronic eczematous otitis externa of both ears    Acute infective otitis externa, right    Leukoplakia of vocal cords    Hypertension, essential, benign       Objective:      Physical Exam    Lab Results   Component Value Date    WBC 10.09 10/22/2022    HGB 15.3 10/22/2022    HCT 48.0 10/22/2022     10/22/2022    ALT 35  "10/22/2022    AST 30 10/22/2022     10/22/2022    K 4.6 10/22/2022     10/22/2022    CREATININE 0.9 10/22/2022    BUN 25 (H) 10/22/2022    CO2 24 10/22/2022    TSH 0.545 10/04/2022    INR 0.9 12/11/2020    HGBA1C 5.9 (H) 04/18/2022     The ASCVD Risk score (Giselle DK, et al., 2019) failed to calculate for the following reasons:    Cannot find a previous HDL lab    Cannot find a previous total cholesterol lab  Visit Vitals  /60 (BP Location: Left arm, Patient Position: Sitting, BP Method: Large (Manual))   Pulse 80   Temp 98 °F (36.7 °C) (Temporal)   Ht 5' 4" (1.626 m)   Wt 92.1 kg (203 lb 0.7 oz)   SpO2 96%   BMI 34.85 kg/m²      Assessment:       1. Pyelonephritis    2. Positive blood culture        Plan:       1. Pyelonephritis/Positive blood culture   - Sent to ER for evaluation       Follow up if symptoms worsen or fail to improve.      Future Appointments       Date Provider Specialty Appt Notes    1/26/2023 Koko Garrison MD Otolaryngology 6 wk f/u laryngoscopy w/ biopsy done 10/25/22    2/9/2023 Rhonda Klein NP Family Medicine 3 month follow up    4/5/2023  Lab b    4/12/2023 CLAUDIA Burris PA-C Endocrinology 6 month f/u thyroid             "

## 2022-12-15 NOTE — ED NOTES
Patient to room 8- recently d/c'd from [place] for pyelonephritis/esbl + blood cultures. States at hospital follow up, told to come back to ER due to her hx of ESBL and need for previous long term IV abx.

## 2022-12-15 NOTE — ED PROVIDER NOTES
Encounter Date: 12/15/2022    SCRIBE #1 NOTE: IChristine, am scribing for, and in the presence of,  Darryl Champion MD.     History     Chief Complaint   Patient presents with    Abnormal Lab     Sent by doctor for abnormal blood cultures, recent discharge from [place]      Time seen by provider: 1:50 PM on 12/15/2022    Alma Rosa Helm is a 67 y.o. female with a PMHx of COPD, bipolar disorder, and nephrolithiasis who presents to the ED for evaluation s/p abnormal labs and persistent R flank pain.  Patient was recently admitted by [facility] for pyelonephritis on 12/10/2022 at which time she had lab work performed which showed a positive urine culture as well as a positive blood culture.  She was started on Doxycycline and Cefdinir at that time and notes being compliant with taking these medications s/p discharge on 12/12/2022.  Patient followed-up with her PCP today and was referred to the ED for further evaluation and Tx.  Per PCP chart, the patient's blood cultures grew resistant E-coli ESBL and patient states she feels worse than when she was originally admitted. She presents today with associated fatigue, a chronic runny nose, RLQ abdominal pain, R flank pain, N/V (last episode this morning), dizziness, and headache.  The patient denies a current fever (was 101 °F but has since resolved 5 days ago), chills, diarrhea or any other symptoms at this time.  Allergies to PCN, Imitrex, Sulfa and Toradol noted.  PSHx includes back surgery, hysterectomy, tubal ligation, lithotripsy and cystoscopy.      The history is provided by the patient.   Review of patient's allergies indicates:   Allergen Reactions    Tramadol Swelling    Imitrex [sumatriptan succinate]     Penicillins     Sulfa (sulfonamide antibiotics)     Toradol [ketorolac] Swelling     Past Medical History:   Diagnosis Date    Anxiety     Bipolar disorder     COPD, mild     Nephrolithiasis      Past Surgical History:   Procedure  Laterality Date    BACK SURGERY      CYSTOSCOPY      HYSTERECTOMY      LITHOTRIPSY      TUBAL LIGATION       Family History   Problem Relation Age of Onset    Cancer Mother     Diabetes Mother     COPD Father     Hypertension Father      Social History     Tobacco Use    Smoking status: Every Day     Packs/day: 0.75     Years: 45.00     Pack years: 33.75     Types: Cigarettes     Start date:      Last attempt to quit: 2020     Years since quittin.7    Smokeless tobacco: Never    Tobacco comments:     now smoking 1/4 ppd   Substance Use Topics    Alcohol use: Never    Drug use: No     Review of Systems   Constitutional:  Positive for fatigue. Negative for chills and fever.   HENT:  Positive for rhinorrhea (chronic). Negative for sore throat.    Respiratory:  Negative for shortness of breath.    Cardiovascular:  Negative for chest pain.   Gastrointestinal:  Positive for abdominal pain (RLQ), nausea and vomiting. Negative for diarrhea.   Genitourinary:  Positive for flank pain (R). Negative for dysuria.   Musculoskeletal:  Negative for back pain.   Skin:  Negative for rash.   Neurological:  Positive for dizziness. Negative for weakness and headaches.   Hematological:  Does not bruise/bleed easily.     Physical Exam     Initial Vitals [12/15/22 1333]   BP Pulse Resp Temp SpO2   (!) 146/70 86 20 98.5 °F (36.9 °C) 96 %      MAP       --         Physical Exam    Nursing note and vitals reviewed.  Constitutional: She appears well-developed.  Non-toxic appearance.   HENT:   Head: Normocephalic and atraumatic.   Mouth/Throat: Mucous membranes are normal.   Eyes: EOM are normal. Pupils are equal, round, and reactive to light.   Neck: Neck supple.   Cardiovascular:  Normal rate, regular rhythm, normal heart sounds and intact distal pulses.     Exam reveals no gallop and no friction rub.       No murmur heard.  Pulmonary/Chest: No respiratory distress. She has no decreased breath sounds. She has no wheezes. She has  rhonchi (bilaterally). She has no rales.   Abdominal: Abdomen is soft. Bowel sounds are normal. She exhibits no distension. There is no abdominal tenderness.   Tenderness to the R flank and R lateral abdomen.    There is right CVA tenderness.  Musculoskeletal:         General: Normal range of motion.      Cervical back: Neck supple.     Neurological: She is alert and oriented to person, place, and time.   Skin: Skin is warm and dry.   Psychiatric: She has a normal mood and affect.       ED Course   Procedures  Labs Reviewed   CBC W/ AUTO DIFFERENTIAL - Abnormal; Notable for the following components:       Result Value    WBC 13.62 (*)     RBC 5.55 (*)     MPV 9.0 (*)     Immature Granulocytes 1.8 (*)     Gran # (ANC) 9.7 (*)     Immature Grans (Abs) 0.24 (*)     All other components within normal limits   COMPREHENSIVE METABOLIC PANEL - Abnormal; Notable for the following components:    Glucose 111 (*)     Albumin 3.0 (*)     All other components within normal limits   URINALYSIS, REFLEX TO URINE CULTURE - Abnormal; Notable for the following components:    Nitrite, UA Positive (*)     Leukocytes, UA 1+ (*)     All other components within normal limits    Narrative:     Specimen Source->Urine   URINALYSIS MICROSCOPIC - Abnormal; Notable for the following components:    Bacteria Many (*)     All other components within normal limits    Narrative:     Specimen Source->Urine   LACTIC ACID, PLASMA   PROCALCITONIN          Imaging Results              CT Renal Stone Study ABD Pelvis WO (Final result)  Result time 12/15/22 14:47:19      Final result by Bharti Mar MD (12/15/22 14:47:19)                   Impression:      There are bilateral nonobstructing renal calculi with the largest stone at seen at the inferior pole on the right measuring 5 mm.      Electronically signed by: Bharti Mar MD  Date:    12/15/2022  Time:    14:47               Narrative:    EXAMINATION:  CT RENAL STONE STUDY ABD PELVIS  WO    CLINICAL HISTORY:  Flank pain, kidney stone suspected;    TECHNIQUE:  Low dose axial images, sagittal and coronal reformations were obtained from the lung bases to the pubic symphysis.  Contrast was not administered.    COMPARISON:  a 04/22/2022 with    FINDINGS:  The lung bases are clear.    There is hepatic steatosis.  The spleen, pancreas are unremarkable.  There are bilateral nonobstructing renal calculi the largest of which is seen within the right inferior pole of the kidney measuring 5 mm.  There is no hydronephrosis.  There is scarring seen in association with the midpole of the left kidney.    There are bilateral adrenal adenomas the largest of which is seen on the right and measures 2.2 cm.  There is cholelithiasis.    There is no evidence bowel obstruction.  Stool is seen throughout the colon.  The appendix is within normal limits.    The osseous structures show degenerative change.                                       Medications   lactated ringers bolus 1,000 mL (0 mLs Intravenous Stopped 12/15/22 1613)   meropenem-0.9% sodium chloride 1 g/50 mL IVPB (0 g Intravenous Stopped 12/15/22 1722)     Medical Decision Making:   History:   Old Medical Records: I decided to obtain old medical records.  Clinical Tests:   Lab Tests: Ordered and Reviewed  Radiological Study: Ordered and Reviewed  Given blood cultures and persistent flank pain patient will be admitted.  I spoke to Infectious Disease who agrees with the plan.  Repeat cultures and broad-spectrum antibiotics have been given.  Oral antibiotics will not be sufficient for her cultures.  She needs meropenem.  This was agreed upon by infectious disease.  She does not appear to be septic or        Scribe Attestation:   Scribe #1: I performed the above scribed service and the documentation accurately describes the services I performed. I attest to the accuracy of the note.                 I, Dr. Darryl Champion personally performed the services  described in this documentation. All medical record entries made by the scribe were at my direction and in my presence.  I have reviewed the chart and agree that the record reflects my personal performance and is accurate and complete. Darryl Champion MD.  3:01 PM 12/20/2022    DISCLAIMER: This note was prepared with Dragon NaturallySpeaking voice recognition transcription software. Garbled syntax, mangled pronouns, and other bizarre constructions may be attributed to that software system     Clinical Impression:   Final diagnoses:  [R78.81] Positive blood cultures (Primary)  [N39.0] Complicated UTI (urinary tract infection)  [R78.81, B96.20] E coli bacteremia        ED Disposition Condition    Admit Stable                Darryl Champion MD  12/20/22 [id number]

## 2022-12-16 ENCOUNTER — OUTSIDE PLACE OF SERVICE (OUTPATIENT)
Dept: INFECTIOUS DISEASES | Facility: CLINIC | Age: 67
End: 2022-12-16
Payer: COMMERCIAL

## 2022-12-16 ENCOUNTER — PATIENT MESSAGE (OUTPATIENT)
Dept: ADMINISTRATIVE | Facility: HOSPITAL | Age: 67
End: 2022-12-16
Payer: MEDICARE

## 2022-12-16 DIAGNOSIS — R78.81 E COLI BACTEREMIA: Primary | ICD-10-CM

## 2022-12-16 DIAGNOSIS — B96.20 E COLI BACTEREMIA: Primary | ICD-10-CM

## 2022-12-16 LAB
ALBUMIN SERPL BCP-MCNC: 2.7 G/DL (ref 3.5–5.2)
ALP SERPL-CCNC: 59 U/L (ref 55–135)
ALT SERPL W/O P-5'-P-CCNC: 26 U/L (ref 10–44)
ANION GAP SERPL CALC-SCNC: 8 MMOL/L (ref 8–16)
AST SERPL-CCNC: 10 U/L (ref 10–40)
BASOPHILS # BLD AUTO: 0.05 K/UL (ref 0–0.2)
BASOPHILS NFR BLD: 0.4 % (ref 0–1.9)
BILIRUB SERPL-MCNC: 0.7 MG/DL (ref 0.1–1)
BUN SERPL-MCNC: 14 MG/DL (ref 8–23)
CALCIUM SERPL-MCNC: 8.7 MG/DL (ref 8.7–10.5)
CHLORIDE SERPL-SCNC: 111 MMOL/L (ref 95–110)
CO2 SERPL-SCNC: 23 MMOL/L (ref 23–29)
CREAT SERPL-MCNC: 0.8 MG/DL (ref 0.5–1.4)
CRP SERPL-MCNC: 29.2 MG/L (ref 0–8.2)
DIFFERENTIAL METHOD: ABNORMAL
EOSINOPHIL # BLD AUTO: 0.4 K/UL (ref 0–0.5)
EOSINOPHIL NFR BLD: 3.2 % (ref 0–8)
ERYTHROCYTE [DISTWIDTH] IN BLOOD BY AUTOMATED COUNT: 14.5 % (ref 11.5–14.5)
EST. GFR  (NO RACE VARIABLE): >60 ML/MIN/1.73 M^2
GLUCOSE SERPL-MCNC: 108 MG/DL (ref 70–110)
HCT VFR BLD AUTO: 41.8 % (ref 37–48.5)
HGB BLD-MCNC: 13.6 G/DL (ref 12–16)
IMM GRANULOCYTES # BLD AUTO: 0.21 K/UL (ref 0–0.04)
IMM GRANULOCYTES NFR BLD AUTO: 1.7 % (ref 0–0.5)
LYMPHOCYTES # BLD AUTO: 2.4 K/UL (ref 1–4.8)
LYMPHOCYTES NFR BLD: 20 % (ref 18–48)
MAGNESIUM SERPL-MCNC: 1.9 MG/DL (ref 1.6–2.6)
MCH RBC QN AUTO: 27.7 PG (ref 27–31)
MCHC RBC AUTO-ENTMCNC: 32.5 G/DL (ref 32–36)
MCV RBC AUTO: 85 FL (ref 82–98)
MONOCYTES # BLD AUTO: 0.7 K/UL (ref 0.3–1)
MONOCYTES NFR BLD: 5.6 % (ref 4–15)
NEUTROPHILS # BLD AUTO: 8.3 K/UL (ref 1.8–7.7)
NEUTROPHILS NFR BLD: 69.1 % (ref 38–73)
NRBC BLD-RTO: 0 /100 WBC
PLATELET # BLD AUTO: 336 K/UL (ref 150–450)
PMV BLD AUTO: 9.3 FL (ref 9.2–12.9)
POTASSIUM SERPL-SCNC: 3.6 MMOL/L (ref 3.5–5.1)
PROCALCITONIN SERPL IA-MCNC: 0.07 NG/ML
PROT SERPL-MCNC: 6.5 G/DL (ref 6–8.4)
RBC # BLD AUTO: 4.91 M/UL (ref 4–5.4)
SODIUM SERPL-SCNC: 142 MMOL/L (ref 136–145)
WBC # BLD AUTO: 12.08 K/UL (ref 3.9–12.7)

## 2022-12-16 PROCEDURE — 80053 COMPREHEN METABOLIC PANEL: CPT | Performed by: NURSE PRACTITIONER

## 2022-12-16 PROCEDURE — G0425 INPT/ED TELECONSULT30: HCPCS | Mod: GT,,, | Performed by: PSYCHIATRY & NEUROLOGY

## 2022-12-16 PROCEDURE — 97165 OT EVAL LOW COMPLEX 30 MIN: CPT

## 2022-12-16 PROCEDURE — 97116 GAIT TRAINING THERAPY: CPT

## 2022-12-16 PROCEDURE — 83735 ASSAY OF MAGNESIUM: CPT | Performed by: NURSE PRACTITIONER

## 2022-12-16 PROCEDURE — 12000002 HC ACUTE/MED SURGE SEMI-PRIVATE ROOM

## 2022-12-16 PROCEDURE — 99223 PR INITIAL HOSPITAL CARE,LEVL III: ICD-10-PCS | Mod: ,,, | Performed by: UROLOGY

## 2022-12-16 PROCEDURE — S4991 NICOTINE PATCH NONLEGEND: HCPCS | Performed by: NURSE PRACTITIONER

## 2022-12-16 PROCEDURE — 85025 COMPLETE CBC W/AUTO DIFF WBC: CPT | Performed by: NURSE PRACTITIONER

## 2022-12-16 PROCEDURE — G0425 PR INPT TELEHEALTH CONSULT 30M: ICD-10-PCS | Mod: GT,,, | Performed by: PSYCHIATRY & NEUROLOGY

## 2022-12-16 PROCEDURE — 99223 PR INITIAL HOSPITAL CARE,LEVL III: ICD-10-PCS | Mod: S$GLB,,, | Performed by: STUDENT IN AN ORGANIZED HEALTH CARE EDUCATION/TRAINING PROGRAM

## 2022-12-16 PROCEDURE — 25000242 PHARM REV CODE 250 ALT 637 W/ HCPCS: Performed by: NURSE PRACTITIONER

## 2022-12-16 PROCEDURE — 99900035 HC TECH TIME PER 15 MIN (STAT)

## 2022-12-16 PROCEDURE — 99223 1ST HOSP IP/OBS HIGH 75: CPT | Mod: S$GLB,,, | Performed by: STUDENT IN AN ORGANIZED HEALTH CARE EDUCATION/TRAINING PROGRAM

## 2022-12-16 PROCEDURE — 94761 N-INVAS EAR/PLS OXIMETRY MLT: CPT

## 2022-12-16 PROCEDURE — 36415 COLL VENOUS BLD VENIPUNCTURE: CPT | Performed by: STUDENT IN AN ORGANIZED HEALTH CARE EDUCATION/TRAINING PROGRAM

## 2022-12-16 PROCEDURE — A4216 STERILE WATER/SALINE, 10 ML: HCPCS | Performed by: NURSE PRACTITIONER

## 2022-12-16 PROCEDURE — 84145 PROCALCITONIN (PCT): CPT | Performed by: STUDENT IN AN ORGANIZED HEALTH CARE EDUCATION/TRAINING PROGRAM

## 2022-12-16 PROCEDURE — 36415 COLL VENOUS BLD VENIPUNCTURE: CPT | Performed by: NURSE PRACTITIONER

## 2022-12-16 PROCEDURE — 97161 PT EVAL LOW COMPLEX 20 MIN: CPT

## 2022-12-16 PROCEDURE — 86140 C-REACTIVE PROTEIN: CPT | Performed by: STUDENT IN AN ORGANIZED HEALTH CARE EDUCATION/TRAINING PROGRAM

## 2022-12-16 PROCEDURE — 25000003 PHARM REV CODE 250: Performed by: NURSE PRACTITIONER

## 2022-12-16 PROCEDURE — 94640 AIRWAY INHALATION TREATMENT: CPT

## 2022-12-16 PROCEDURE — 99223 1ST HOSP IP/OBS HIGH 75: CPT | Mod: ,,, | Performed by: UROLOGY

## 2022-12-16 PROCEDURE — 27000207 HC ISOLATION

## 2022-12-16 PROCEDURE — 63600175 PHARM REV CODE 636 W HCPCS: Performed by: NURSE PRACTITIONER

## 2022-12-16 RX ORDER — ARIPIPRAZOLE 5 MG/1
10 TABLET ORAL DAILY
Status: DISCONTINUED | OUTPATIENT
Start: 2022-12-16 | End: 2022-12-19 | Stop reason: HOSPADM

## 2022-12-16 RX ADMIN — METOPROLOL TARTRATE 25 MG: 25 TABLET, FILM COATED ORAL at 04:12

## 2022-12-16 RX ADMIN — MEROPENEM AND SODIUM CHLORIDE 1 G: 1 INJECTION, SOLUTION INTRAVENOUS at 08:12

## 2022-12-16 RX ADMIN — PREGABALIN 50 MG: 25 CAPSULE ORAL at 04:12

## 2022-12-16 RX ADMIN — BUDESONIDE AND FORMOTEROL FUMARATE DIHYDRATE 2 PUFF: 160; 4.5 AEROSOL RESPIRATORY (INHALATION) at 08:12

## 2022-12-16 RX ADMIN — BUDESONIDE AND FORMOTEROL FUMARATE DIHYDRATE 2 PUFF: 160; 4.5 AEROSOL RESPIRATORY (INHALATION) at 07:12

## 2022-12-16 RX ADMIN — SODIUM CHLORIDE 10 ML: 9 INJECTION INTRAMUSCULAR; INTRAVENOUS; SUBCUTANEOUS at 02:12

## 2022-12-16 RX ADMIN — HYDROCODONE BITARTRATE AND ACETAMINOPHEN 1 TABLET: 10; 325 TABLET ORAL at 09:12

## 2022-12-16 RX ADMIN — NICOTINE 1 PATCH: 14 PATCH TRANSDERMAL at 10:12

## 2022-12-16 RX ADMIN — SODIUM CHLORIDE: 9 INJECTION, SOLUTION INTRAVENOUS at 04:12

## 2022-12-16 RX ADMIN — ENOXAPARIN SODIUM 40 MG: 40 INJECTION SUBCUTANEOUS at 04:12

## 2022-12-16 RX ADMIN — HYDROCODONE BITARTRATE AND ACETAMINOPHEN 1 TABLET: 10; 325 TABLET ORAL at 04:12

## 2022-12-16 RX ADMIN — MEROPENEM AND SODIUM CHLORIDE 1 G: 1 INJECTION, SOLUTION INTRAVENOUS at 12:12

## 2022-12-16 RX ADMIN — IPRATROPIUM BROMIDE AND ALBUTEROL SULFATE 3 ML: .5; 3 SOLUTION RESPIRATORY (INHALATION) at 08:12

## 2022-12-16 RX ADMIN — PRAVASTATIN SODIUM 40 MG: 40 TABLET ORAL at 09:12

## 2022-12-16 RX ADMIN — ARIPIPRAZOLE 10 MG: 5 TABLET ORAL at 04:12

## 2022-12-16 RX ADMIN — PREGABALIN 50 MG: 25 CAPSULE ORAL at 10:12

## 2022-12-16 RX ADMIN — GUAIFENESIN AND DEXTROMETHORPHAN 10 ML: 100; 10 SYRUP ORAL at 09:12

## 2022-12-16 RX ADMIN — MEROPENEM AND SODIUM CHLORIDE 1 G: 1 INJECTION, SOLUTION INTRAVENOUS at 04:12

## 2022-12-16 RX ADMIN — PREGABALIN 50 MG: 25 CAPSULE ORAL at 09:12

## 2022-12-16 NOTE — ASSESSMENT & PLAN NOTE
Pt with known Hx of nephrolithiasis, present on CT today  -CT negative for any obstruction  -no ureterolithiasis   -discussed with ID: will consult urology for evaluation.

## 2022-12-16 NOTE — PT/OT/SLP EVAL
Occupational Therapy   Evaluation    Name: Alma Rosa Helm  MRN: [id number]  Admitting Diagnosis: E coli bacteremia  Recent Surgery: * No surgery found *     Recommendations:     Discharge Recommendations: home  Discharge Equipment Recommendations:  none  Barriers to discharge:  None    Assessment:     Alma Rosa Helm is a 67 y.o. female with a medical diagnosis of E coli bacteremia.  She presents with performance deficits affecting function: impaired endurance, impaired self care skills and impaired functional mobility.      Rehab Prognosis: Good; patient would benefit from acute skilled OT services to address these deficits and reach maximum level of function.       Plan:     Patient to be seen 3 x/week to address the above listed problems via self-care/home management, therapeutic activities, therapeutic exercises  Plan of Care Expires: 12/30/22  Plan of Care Reviewed with: patient    Subjective     Chief Complaint: Fatigue  Patient/Family Comments/goals: To go home    Occupational Profile:  Living Environment: Pt lives alone.  Previous level of function: Independent   Equipment Used at Home: none  Assistance upon Discharge: TBD    Pain/Comfort:  Pain Rating 1: 0/10    Patients cultural, spiritual, Taoism conflicts given the current situation: yes    Objective:     Communicated with: nurse prior to session.  Patient found up in chair upon OT entry to room.    General Precautions: Standard, fall  Orthopedic Precautions: N/A  Braces: N/A  Respiratory Status: Room air    Occupational Performance:    Functional Mobility/Transfers:  Patient completed Sit <> Stand Transfer with stand by assistance with rolling walker     Activities of Daily Living:  Feeding:  independence  Grooming: stand by assistance  Upper Body Dressing: stand by assistance  Lower Body Dressing: stand by assistance  Toileting: stand by assistance    Cognitive/Visual Perceptual:  Cognitive/Psychosocial Skills:  -       Oriented to: Person,  Place, Time, and Situation   -       Follows Commands/attention: Follows multistep commands  -       Communication: clear/fluent    Physical Exam:  Upper Extremity Range of Motion:  -       Right Upper Extremity: WFL  -       Left Upper Extremity: WFL  Upper Extremity Strength: -       Right Upper Extremity: WFL  -       Left Upper Extremity: WFL    AMPAC 6 Click ADL:  AMPAC Total Score: 21    Treatment & Education:  Pt was given education on role of OT and POC. Pt verbalized understanding and agreed.    Patient left up in chair with all lines intact, call button in reach and chair alarm on.    GOALS:   Multidisciplinary Problems       Occupational Therapy Goals          Problem: Occupational Therapy    Goal Priority Disciplines Outcome Interventions   Occupational Therapy Goal     OT, PT/OT Ongoing, Progressing    Description: Goals to be met by: 12/30/2022     Patient will increase functional independence with ADLs by performing:    UE Dressing with [place].  LE Dressing with [place].  Grooming with [place].  Toileting from toilet with [place] for hygiene and clothing management.   Toilet transfer to toilet with [place].                         History:     Past Medical History:   Diagnosis Date    Anxiety     Bipolar disorder     COPD, mild     Nephrolithiasis          Past Surgical History:   Procedure Laterality Date    BACK SURGERY      CYSTOSCOPY      HYSTERECTOMY      LITHOTRIPSY      TUBAL LIGATION         Time Tracking:     OT Date of Treatment: 12/16/22  OT Start Time: 1100  OT Stop Time: 1115  OT Total Time (min): 15 min    Billable Minutes:Evaluation 15    12/16/2022

## 2022-12-16 NOTE — SUBJECTIVE & OBJECTIVE
Past Medical History:   Diagnosis Date    Anxiety     Bipolar disorder     COPD, mild     Nephrolithiasis        Past Surgical History:   Procedure Laterality Date    BACK SURGERY      CYSTOSCOPY      HYSTERECTOMY      LITHOTRIPSY      TUBAL LIGATION         Review of patient's allergies indicates:   Allergen Reactions    Tramadol Swelling    Imitrex [sumatriptan succinate]     Penicillins     Sulfa (sulfonamide antibiotics)     Toradol [ketorolac] Swelling       Current Facility-Administered Medications on File Prior to Encounter   Medication    diphenhydrAMINE injection 12.5 mg    electrolyte-S (ISOLYTE)    fentaNYL 50 mcg/mL injection 25 mcg    HYDROmorphone injection 0.2 mg    lactated ringers infusion    lactated ringers infusion    LIDOcaine (PF) 10 mg/ml (1%) injection 10 mg    ondansetron injection 4 mg    oxyCODONE immediate release tablet 5 mg    prochlorperazine injection Soln 5 mg    sodium chloride 0.9% flush 3 mL     Current Outpatient Medications on File Prior to Encounter   Medication Sig    albuterol (ACCUNEB) 1.25 mg/3 mL Nebu Inhale 1.25 mg into the lungs.    albuterol (PROVENTIL/VENTOLIN HFA) 90 mcg/actuation inhaler Inhale 2 puffs into the lungs every 6 (six) hours as needed.    albuterol-ipratropium (DUO-NEB) 2.5 mg-0.5 mg/3 mL nebulizer solution Take 3 mLs by nebulization every 6 (six) hours as needed for Wheezing. Rescue (Patient not taking: Reported on 12/15/2022)    alendronate (FOSAMAX) 70 MG tablet Take one tablet weekly.    budesonide-formoterol 160-4.5 mcg (SYMBICORT) 160-4.5 mcg/actuation HFAA Inhale 2 puffs into the lungs every 12 (twelve) hours. Controller    cefdinir (OMNICEF) 300 MG capsule Take 300 mg by mouth 2 (two) times daily.    cyclobenzaprine (FLEXERIL) 5 MG tablet Take 5 mg by mouth 2 (two) times a day.    diclofenac (VOLTAREN) 75 MG EC tablet Take 75 mg by mouth 2 (two) times daily.    HYDROcodone-acetaminophen (NORCO)  mg per tablet Take 1 tablet by mouth 2 (two)  times daily as needed.    metoprolol tartrate (LOPRESSOR) 25 MG tablet Take 1 tablet (25 mg total) by mouth Daily.    minocycline (MINOCIN,DYNACIN) 100 MG capsule Take by mouth.    mupirocin (BACTROBAN) 2 % ointment mupirocin 2 % topical ointment   APPLY TO THE AFFECTED AREA OF THE TOENAIL TWICE DAILY FOR 3 WEEKS    ondansetron (ZOFRAN-ODT) 4 MG TbDL Take 2 tablets (8 mg total) by mouth every 6 (six) hours as needed (Vertigo).    oxybutynin (DITROPAN-XL) 5 MG TR24 Take 1 tablet (5 mg total) by mouth once daily.    pravastatin (PRAVACHOL) 40 MG tablet Take 1 tablet (40 mg total) by mouth nightly.    pregabalin (LYRICA) 50 MG capsule Take 50 mg by mouth 3 (three) times daily.    promethazine-dextromethorphan (PROMETHAZINE-DM) 6.25-15 mg/5 mL Syrp promethazine-DM 6.25 mg-15 mg/5 mL oral syrup   TAKE 5 ML BY MOUTH FOUR TIMES DAILY AS NEEDED     Family History       Problem Relation (Age of Onset)    COPD Father    Cancer Mother    Diabetes Mother    Hypertension Father          Tobacco Use    Smoking status: Every Day     Packs/day: 0.75     Years: 45.00     Pack years: 33.75     Types: Cigarettes     Start date:      Last attempt to quit: 2020     Years since quittin.6    Smokeless tobacco: Never    Tobacco comments:     now smoking 1/4 ppd   Substance and Sexual Activity    Alcohol use: Never    Drug use: No    Sexual activity: Not on file     Review of Systems   Constitutional:  Positive for activity change and fatigue. Negative for chills and fever.   HENT:  Negative for congestion, postnasal drip and trouble swallowing.    Eyes:  Positive for visual disturbance (some blurry vision). Negative for redness.   Respiratory:  Positive for shortness of breath (At her COPD baseline). Negative for cough.    Gastrointestinal:  Positive for diarrhea, nausea and vomiting. Negative for abdominal pain and constipation.   Genitourinary:  Negative for difficulty urinating, dysuria and flank pain.   Musculoskeletal:   Negative for arthralgias and myalgias.   Skin:  Negative for color change.   Neurological:  Positive for dizziness and weakness.   Psychiatric/Behavioral:  Positive for dysphoric mood. Negative for confusion. The patient is not nervous/anxious.    Objective:     Vital Signs (Most Recent):  Temp: 97.9 °F (36.6 °C) (12/15/22 1956)  Pulse: (!) 55 (12/15/22 2008)  Resp: 18 (12/15/22 2008)  BP: (!) 184/77 (12/15/22 1956)  SpO2: (!) 94 % (12/15/22 2008)   Vital Signs (24h Range):  Temp:  [97.9 °F (36.6 °C)-98.5 °F (36.9 °C)] 97.9 °F (36.6 °C)  Pulse:  [52-86] 55  Resp:  [18-20] 18  SpO2:  [91 %-96 %] 94 %  BP: (102-184)/(58-77) 184/77     Weight: 94.1 kg (207 lb 7.3 oz)  Body mass index is 35.61 kg/m².    Physical Exam  Vitals and nursing note reviewed.   Constitutional:       Appearance: Normal appearance. She is obese.   HENT:      Head: Normocephalic and atraumatic.      Mouth/Throat:      Mouth: Mucous membranes are moist.      Pharynx: Oropharynx is clear.   Eyes:      Extraocular Movements: Extraocular movements intact.      Conjunctiva/sclera: Conjunctivae normal.      Pupils: Pupils are equal, round, and reactive to light.   Cardiovascular:      Rate and Rhythm: Normal rate and regular rhythm.   Pulmonary:      Effort: Pulmonary effort is normal. No respiratory distress.      Breath sounds: Normal breath sounds.   Abdominal:      General: Abdomen is flat. Bowel sounds are normal. There is no distension.      Palpations: Abdomen is soft.      Tenderness: There is no abdominal tenderness.   Musculoskeletal:         General: Normal range of motion.      Cervical back: Normal range of motion and neck supple.   Skin:     General: Skin is warm and dry.      Capillary Refill: Capillary refill takes less than 2 seconds.   Neurological:      General: No focal deficit present.      Mental Status: She is alert and oriented to person, place, and time. Mental status is at baseline.   Psychiatric:         Mood and Affect: Mood  normal.         Behavior: Behavior normal.         Thought Content: Thought content normal.       CRANIAL NERVES     CN III, IV, VI   Pupils are equal, round, and reactive to light.     Significant Labs: All pertinent labs within the past 24 hours have been reviewed.  Blood Culture: No results for input(s): LABBLOO in the last 48 hours.  CBC:   Recent Labs   Lab 12/15/22  1451   WBC 13.62*   HGB 15.3   HCT 46.4        CMP:   Recent Labs   Lab 12/15/22  1450      K 3.8      CO2 25   *   BUN 19   CREATININE 0.8   CALCIUM 9.7   PROT 7.5   ALBUMIN 3.0*   BILITOT 0.5   ALKPHOS 65   AST 13   ALT 33   ANIONGAP 8     Lactic Acid:   Recent Labs   Lab 12/15/22  1450   LACTATE 1.2     Urine Studies:   Recent Labs   Lab 12/15/22  1429   COLORU Yellow   APPEARANCEUA Clear   PHUR 7.0   SPECGRAV 1.010   PROTEINUA Negative   GLUCUA Negative   KETONESU Negative   BILIRUBINUA Negative   OCCULTUA Negative   NITRITE Positive*   UROBILINOGEN 1.0   LEUKOCYTESUR 1+*   WBCUA 1   BACTERIA Many*       Significant Imaging: I have reviewed all pertinent imaging results/findings within the past 24 hours.

## 2022-12-16 NOTE — ASSESSMENT & PLAN NOTE
Pt with known Hx of nephrolithiasis, present on CT today  -CT negative for any obstruction  -no ureterolithiasis   -will need outpatient follow-up with Urology

## 2022-12-16 NOTE — PROGRESS NOTES
[facility] Hospital Medicine  Progress Note    Patient Name: Alma Rosa Helm  MRN: [id number]  Patient Class: IP- Inpatient   Admission Date: 12/15/2022  Length of Stay: 1 days  Attending Physician: Leyla Luna MD  Primary Care Provider: Rhonda Klein NP        Subjective:     Principal Problem:E coli bacteremia        HPI:  Alma Rosa Helm is a 67-year-old female with PMHx significant for HTN, COPD, nicotine dependence, kidney stones, and bipolar disease.  She presented to the ED for further evaluation of positive blood cultures drawn 12/10 at outside facility.  Pt was hospitalized at [facility] 12/10-12/12.  She was diagnosed with pyelonephritis and D/C to complete a course of oral cefdinir and doxycycline.  Her blood culture are revealing for E coli ESBL.  Pt reports since discharge she has continued to feel poorly.  Specifically, she notes generalized weakness, malaise, dizziness, and nausea with 1 episode of emesis prior to arrival.  She reports Hx of bacterial infection requiring 8 weeks of IV Abx at home via PICC line.  Review of records from [facility] revealed that she indeed had E coli ESBL at that time due to pyelonephritis with resultant bacteremia and was treated with Merrem.  She denies any chest pain, change in baseline SOB, fever, or chills.  She does note that she is been depressed lately and carries a bipolar diagnosis and is not currently taking any mood stabilizing medications.  She denies any suicidal ideations.  Her case was discussed with ID by ED physician who recommended hospitalization with IV meropenem because of her E coli ESBL bacteremia.  She was admitted to the [facility] Medicine for further evaluation management.  Other pertinent medical Hx as below:        Overview/Hospital Course:  No notes on file    Interval History:  Pt seen and examined.  Afebrile overnight.  Discussed case with ID.  Will continue antibiotic therapy  for now, follow cultures closely, and tentatively plan for outpatient IV Abx at discharge.  Urology consult requested.  She denies nausea vomiting and feels better today than yesterday.  She met with tele psych this morning, awaiting their note and will implement recommendations.    Review of Systems   Constitutional:  Negative for chills, fatigue and fever.   HENT:  Negative for congestion, postnasal drip and trouble swallowing.    Respiratory:  Positive for cough (chronic, unchanged) and shortness of breath (chronic- unchanged). Negative for wheezing.    Cardiovascular:  Negative for chest pain and leg swelling.   Gastrointestinal:  Negative for nausea and vomiting.   Genitourinary:  Negative for difficulty urinating and dysuria.   Neurological:  Positive for weakness (improved). Negative for dizziness.   Objective:     Vital Signs (Most Recent):  Temp: 98.2 °F (36.8 °C) (12/16/22 0741)  Pulse: (!) 54 (12/16/22 0741)  Resp: 18 (12/16/22 0741)  BP: (!) 150/66 (12/16/22 0741)  SpO2: (!) 91 % (12/16/22 0741)   Vital Signs (24h Range):  Temp:  [97.7 °F (36.5 °C)-98.8 °F (37.1 °C)] 98.2 °F (36.8 °C)  Pulse:  [52-86] 54  Resp:  [18-20] 18  SpO2:  [91 %-96 %] 91 %  BP: (129-184)/(58-77) 150/66     Weight: 94.1 kg (207 lb 7.3 oz)  Body mass index is 35.61 kg/m².    Intake/Output Summary (Last 24 hours) at 12/16/2022 1124  Last data filed at 12/16/2022 0544  Gross per 24 hour   Intake 2240.45 ml   Output --   Net 2240.45 ml      Physical Exam  Vitals and nursing note reviewed.   Constitutional:       Appearance: Normal appearance. She is obese.   HENT:      Head: Normocephalic and atraumatic.   Eyes:      Extraocular Movements: Extraocular movements intact.      Conjunctiva/sclera: Conjunctivae normal.      Pupils: Pupils are equal, round, and reactive to light.   Cardiovascular:      Rate and Rhythm: Normal rate and regular rhythm.   Pulmonary:      Effort: Pulmonary effort is normal.      Breath sounds: Normal breath  sounds.   Abdominal:      General: Abdomen is flat. Bowel sounds are normal.      Palpations: Abdomen is soft.      Tenderness: There is right CVA tenderness.   Musculoskeletal:         General: Normal range of motion.      Cervical back: Normal range of motion and neck supple.   Skin:     General: Skin is warm and dry.   Neurological:      Mental Status: She is alert.   Psychiatric:         Mood and Affect: Mood normal.       Significant Labs: All pertinent labs within the past 24 hours have been reviewed.  Blood Culture:   Recent Labs   Lab 12/15/22  1450   LABBLOO No Growth to date  No Growth to date     CBC:   Recent Labs   Lab 12/15/22  1451 12/16/22  0538   WBC 13.62* 12.08   HGB 15.3 13.6   HCT 46.4 41.8    336     CMP:   Recent Labs   Lab 12/15/22  1450 12/16/22  0538    142   K 3.8 3.6    111*   CO2 25 23   * 108   BUN 19 14   CREATININE 0.8 0.8   CALCIUM 9.7 8.7   PROT 7.5 6.5   ALBUMIN 3.0* 2.7*   BILITOT 0.5 0.7   ALKPHOS 65 59   AST 13 10   ALT 33 26   ANIONGAP 8 8       Significant Imaging: I have reviewed all pertinent imaging results/findings within the past 24 hours.      Assessment/Plan:      * E coli bacteremia  Pt with positive blood cultures 12/10 growing E coli ESBL  -historical infection as well due to UTI   -continue IV meropenem  -ID consultation   -Pt has preference for home antibiotic infusion for discharge dispo      Hypertension, essential, benign  Chronic, controlled.  Latest blood pressure and vitals reviewed-   Temp:  [97.9 °F (36.6 °C)-98.5 °F (36.9 °C)]   Pulse:  [52-86]   Resp:  [18-20]   BP: (102-184)/(58-77)   SpO2:  [91 %-96 %] .   Home meds for hypertension were reviewed and noted below.   Hypertension Medications             metoprolol tartrate (LOPRESSOR) 25 MG tablet Take 1 tablet (25 mg total) by mouth Daily.          While in the hospital, will manage blood pressure as follows; Continue home antihypertensive regimen    Will utilize p.r.n. blood  pressure medication only if patient's blood pressure greater than  180/110 and she develops symptoms such as worsening chest pain or shortness of breath.        Pyelonephritis  Due to E coli ESBL: complicated with stones  -resultant bacteremia   -continue IV Merrem  -Consult urology      Nephrolithiasis  Pt with known Hx of nephrolithiasis, present on CT today  -CT negative for any obstruction  -no ureterolithiasis   -discussed with ID: will consult urology for evaluation.      Bipolar disorder  Pt with known bipolar disorder, currently not medicated  -reports recent depressive who  -consult with tele psych, Pt agreeable her assessment and medication recommendation  -no suicidal ideations      COPD (chronic obstructive pulmonary disease)  Chronic issue, stable   -bronchodilators as needed for shortness breath and wheezing   -oxygen as needed to maintain oxygen saturation greater than 92%  -encourage smoking cessation      Nicotine addiction  Health hazards associated with cigarette smoking were reviewed with patient and cessation was encouraged. Nicotine replacement and counseling options were discussed.  Spent 3 minutes counseling on cessation, patient understands need to quit, not currently ready to quit but is weaning down.  Agreeable to nicotine patch while hospitalized          VTE Risk Mitigation (From admission, onward)         Ordered     enoxaparin injection 40 mg  Daily         12/15/22 1632     IP VTE HIGH RISK PATIENT  Once         12/15/22 1632     Place sequential compression device  Until discontinued         12/15/22 1632                Discharge Planning   SHAMA:  12/18/22    Code Status: Full Code   Is the patient medically ready for discharge?:     Reason for patient still in hospital (select all that apply): Patient trending condition, Treatment and Consult recommendations                     Jailene Viera NP  Department of Hospital Medicine   [facility]

## 2022-12-16 NOTE — H&P
[facility] Medicine  History & Physical    Patient Name: Alma Rosa Helm  MRN: [id number]  Patient Class: IP- Inpatient  Admission Date: 12/15/2022  Attending Physician: Leyla Luna MD   Primary Care Provider: Rhonda Klein NP         Patient information was obtained from patient, relative(s), past medical records and ER records.     Subjective:     Principal Problem:E coli bacteremia    Chief Complaint:   Chief Complaint   Patient presents with    Abnormal Lab     Sent by doctor for abnormal blood cultures, recent discharge from [facility]         HPI: Alma Rosa Helm is a 67-year-old female with PMHx significant for HTN, COPD, nicotine dependence, kidney stones, and bipolar disease.  She presented to the ED for further evaluation of positive blood cultures drawn 12/10 at outside facility.  Pt was hospitalized at [facility] 12/10-12/12.  She was diagnosed with pyelonephritis and D/C to complete a course of oral cefdinir and doxycycline.  Her blood culture are revealing for E coli ESBL.  Pt reports since discharge she has continued to feel poorly.  Specifically, she notes generalized weakness, malaise, dizziness, and nausea with 1 episode of emesis prior to arrival.  She reports Hx of bacterial infection requiring 8 weeks of IV Abx at home via PICC line.  Review of records from [facility] revealed that she indeed had E coli ESBL at that time due to pyelonephritis with resultant bacteremia and was treated with Merrem.  She denies any chest pain, change in baseline SOB, fever, or chills.  She does note that she is been depressed lately and carries a bipolar diagnosis and is not currently taking any mood stabilizing medications.  She denies any suicidal ideations.  Her case was discussed with ID by ED physician who recommended hospitalization with IV meropenem because of her E coli ESBL bacteremia.  She was admitted to the [facility] Medicine for  further evaluation management.  Other pertinent medical Hx as below:        Past Medical History:   Diagnosis Date    Anxiety     Bipolar disorder     COPD, mild     Nephrolithiasis        Past Surgical History:   Procedure Laterality Date    BACK SURGERY      CYSTOSCOPY      HYSTERECTOMY      LITHOTRIPSY      TUBAL LIGATION         Review of patient's allergies indicates:   Allergen Reactions    Tramadol Swelling    Imitrex [sumatriptan succinate]     Penicillins     Sulfa (sulfonamide antibiotics)     Toradol [ketorolac] Swelling       Current Facility-Administered Medications on File Prior to Encounter   Medication    diphenhydrAMINE injection 12.5 mg    electrolyte-S (ISOLYTE)    fentaNYL 50 mcg/mL injection 25 mcg    HYDROmorphone injection 0.2 mg    lactated ringers infusion    lactated ringers infusion    LIDOcaine (PF) 10 mg/ml (1%) injection 10 mg    ondansetron injection 4 mg    oxyCODONE immediate release tablet 5 mg    prochlorperazine injection Soln 5 mg    sodium chloride 0.9% flush 3 mL     Current Outpatient Medications on File Prior to Encounter   Medication Sig    albuterol (ACCUNEB) 1.25 mg/3 mL Nebu Inhale 1.25 mg into the lungs.    albuterol (PROVENTIL/VENTOLIN HFA) 90 mcg/actuation inhaler Inhale 2 puffs into the lungs every 6 (six) hours as needed.    albuterol-ipratropium (DUO-NEB) 2.5 mg-0.5 mg/3 mL nebulizer solution Take 3 mLs by nebulization every 6 (six) hours as needed for Wheezing. Rescue (Patient not taking: Reported on 12/15/2022)    alendronate (FOSAMAX) 70 MG tablet Take one tablet weekly.    budesonide-formoterol 160-4.5 mcg (SYMBICORT) 160-4.5 mcg/actuation HFAA Inhale 2 puffs into the lungs every 12 (twelve) hours. Controller    cefdinir (OMNICEF) 300 MG capsule Take 300 mg by mouth 2 (two) times daily.    cyclobenzaprine (FLEXERIL) 5 MG tablet Take 5 mg by mouth 2 (two) times a day.    diclofenac (VOLTAREN) 75 MG EC tablet Take 75 mg by mouth 2  (two) times daily.    HYDROcodone-acetaminophen (NORCO)  mg per tablet Take 1 tablet by mouth 2 (two) times daily as needed.    metoprolol tartrate (LOPRESSOR) 25 MG tablet Take 1 tablet (25 mg total) by mouth Daily.    minocycline (MINOCIN,DYNACIN) 100 MG capsule Take by mouth.    mupirocin (BACTROBAN) 2 % ointment mupirocin 2 % topical ointment   APPLY TO THE AFFECTED AREA OF THE TOENAIL TWICE DAILY FOR 3 WEEKS    ondansetron (ZOFRAN-ODT) 4 MG TbDL Take 2 tablets (8 mg total) by mouth every 6 (six) hours as needed (Vertigo).    oxybutynin (DITROPAN-XL) 5 MG TR24 Take 1 tablet (5 mg total) by mouth once daily.    pravastatin (PRAVACHOL) 40 MG tablet Take 1 tablet (40 mg total) by mouth nightly.    pregabalin (LYRICA) 50 MG capsule Take 50 mg by mouth 3 (three) times daily.    promethazine-dextromethorphan (PROMETHAZINE-DM) 6.25-15 mg/5 mL Syrp promethazine-DM 6.25 mg-15 mg/5 mL oral syrup   TAKE 5 ML BY MOUTH FOUR TIMES DAILY AS NEEDED     Family History       Problem Relation (Age of Onset)    COPD Father    Cancer Mother    Diabetes Mother    Hypertension Father          Tobacco Use    Smoking status: Every Day     Packs/day: 0.75     Years: 45.00     Pack years: 33.75     Types: Cigarettes     Start date:      Last attempt to quit: 2020     Years since quittin.6    Smokeless tobacco: Never    Tobacco comments:     now smoking 1/4 ppd   Substance and Sexual Activity    Alcohol use: Never    Drug use: No    Sexual activity: Not on file     Review of Systems   Constitutional:  Positive for activity change and fatigue. Negative for chills and fever.   HENT:  Negative for congestion, postnasal drip and trouble swallowing.    Eyes:  Positive for visual disturbance (some blurry vision). Negative for redness.   Respiratory:  Positive for shortness of breath (At her COPD baseline). Negative for cough.    Gastrointestinal:  Positive for diarrhea, nausea and vomiting. Negative for abdominal  pain and constipation.   Genitourinary:  Negative for difficulty urinating, dysuria and flank pain.   Musculoskeletal:  Negative for arthralgias and myalgias.   Skin:  Negative for color change.   Neurological:  Positive for dizziness and weakness.   Psychiatric/Behavioral:  Positive for dysphoric mood. Negative for confusion. The patient is not nervous/anxious.    Objective:     Vital Signs (Most Recent):  Temp: 97.9 °F (36.6 °C) (12/15/22 1956)  Pulse: (!) 55 (12/15/22 2008)  Resp: 18 (12/15/22 2008)  BP: (!) 184/77 (12/15/22 1956)  SpO2: (!) 94 % (12/15/22 2008)   Vital Signs (24h Range):  Temp:  [97.9 °F (36.6 °C)-98.5 °F (36.9 °C)] 97.9 °F (36.6 °C)  Pulse:  [52-86] 55  Resp:  [18-20] 18  SpO2:  [91 %-96 %] 94 %  BP: (102-184)/(58-77) 184/77     Weight: 94.1 kg (207 lb 7.3 oz)  Body mass index is 35.61 kg/m².    Physical Exam  Vitals and nursing note reviewed.   Constitutional:       Appearance: Normal appearance. She is obese.   HENT:      Head: Normocephalic and atraumatic.      Mouth/Throat:      Mouth: Mucous membranes are moist.      Pharynx: Oropharynx is clear.   Eyes:      Extraocular Movements: Extraocular movements intact.      Conjunctiva/sclera: Conjunctivae normal.      Pupils: Pupils are equal, round, and reactive to light.   Cardiovascular:      Rate and Rhythm: Normal rate and regular rhythm.   Pulmonary:      Effort: Pulmonary effort is normal. No respiratory distress.      Breath sounds: Normal breath sounds.   Abdominal:      General: Abdomen is flat. Bowel sounds are normal. There is no distension.      Palpations: Abdomen is soft.      Tenderness: There is no abdominal tenderness.   Musculoskeletal:         General: Normal range of motion.      Cervical back: Normal range of motion and neck supple.   Skin:     General: Skin is warm and dry.      Capillary Refill: Capillary refill takes less than 2 seconds.   Neurological:      General: No focal deficit present.      Mental Status: She is  alert and oriented to person, place, and time. Mental status is at baseline.   Psychiatric:         Mood and Affect: Mood normal.         Behavior: Behavior normal.         Thought Content: Thought content normal.       CRANIAL NERVES     CN III, IV, VI   Pupils are equal, round, and reactive to light.     Significant Labs: All pertinent labs within the past 24 hours have been reviewed.  Blood Culture: No results for input(s): LABBLOO in the last 48 hours.  CBC:   Recent Labs   Lab 12/15/22  1451   WBC 13.62*   HGB 15.3   HCT 46.4        CMP:   Recent Labs   Lab 12/15/22  1450      K 3.8      CO2 25   *   BUN 19   CREATININE 0.8   CALCIUM 9.7   PROT 7.5   ALBUMIN 3.0*   BILITOT 0.5   ALKPHOS 65   AST 13   ALT 33   ANIONGAP 8     Lactic Acid:   Recent Labs   Lab 12/15/22  1450   LACTATE 1.2     Urine Studies:   Recent Labs   Lab 12/15/22  1429   COLORU Yellow   APPEARANCEUA Clear   PHUR 7.0   SPECGRAV 1.010   PROTEINUA Negative   GLUCUA Negative   KETONESU Negative   BILIRUBINUA Negative   OCCULTUA Negative   NITRITE Positive*   UROBILINOGEN 1.0   LEUKOCYTESUR 1+*   WBCUA 1   BACTERIA Many*       Significant Imaging: I have reviewed all pertinent imaging results/findings within the past 24 hours.    Assessment/Plan:     * E coli bacteremia  Pt with positive blood cultures 12/10 growing E coli ESBL  -historical infection as well due to UTI   -continue IV meropenem  -ID consultation   -Pt has preference for home antibiotic infusion for discharge dispo      Hypertension, essential, benign  Chronic, controlled.  Latest blood pressure and vitals reviewed-   Temp:  [97.9 °F (36.6 °C)-98.5 °F (36.9 °C)]   Pulse:  [52-86]   Resp:  [18-20]   BP: (102-184)/(58-77)   SpO2:  [91 %-96 %] .   Home meds for hypertension were reviewed and noted below.   Hypertension Medications             metoprolol tartrate (LOPRESSOR) 25 MG tablet Take 1 tablet (25 mg total) by mouth Daily.          While in the  hospital, will manage blood pressure as follows; Continue home antihypertensive regimen    Will utilize p.r.n. blood pressure medication only if patient's blood pressure greater than  180/110 and she develops symptoms such as worsening chest pain or shortness of breath.        Pyelonephritis  Due to E coli ESBL  -resultant bacteremia   -continue IV Merrem      Nephrolithiasis  Pt with known Hx of nephrolithiasis, present on CT today  -CT negative for any obstruction  -no ureterolithiasis   -will need outpatient follow-up with Urology      Bipolar disorder  Pt with known bipolar disorder, currently not medicated  -reports recent depressive who  -consult with tele psych, Pt agreeable her assessment and medication recommendation  -no suicidal ideations      COPD (chronic obstructive pulmonary disease)  Chronic issue, stable   -bronchodilators as needed for shortness breath and wheezing   -oxygen as needed to maintain oxygen saturation greater than 92%  -encourage smoking cessation      Nicotine addiction  Health hazards associated with cigarette smoking were reviewed with patient and cessation was encouraged. Nicotine replacement and counseling options were discussed.  Spent 3 minutes counseling on cessation, patient understands need to quit, not currently ready to quit but is weaning down.  Agreeable to nicotine patch while hospitalized          VTE Risk Mitigation (From admission, onward)         Ordered     enoxaparin injection 40 mg  Daily         12/15/22 1632     IP VTE HIGH RISK PATIENT  Once         12/15/22 1632     Place sequential compression device  Until discontinued         12/15/22 1632                   Jailene Viera NP  Department of Hospital Medicine   [facility]

## 2022-12-16 NOTE — ASSESSMENT & PLAN NOTE
Chronic issue, stable   -bronchodilators as needed for shortness breath and wheezing   -oxygen as needed to maintain oxygen saturation greater than 92%  -encourage smoking cessation

## 2022-12-16 NOTE — ASSESSMENT & PLAN NOTE
Due to E coli ESBL: complicated with stones  -resultant bacteremia   -continue IV Merrem  -Consult urology

## 2022-12-16 NOTE — ASSESSMENT & PLAN NOTE
Pt with known bipolar disorder, currently not medicated  -reports recent depressive who  -consult with tele psych, Pt agreeable her assessment and medication recommendation  -no suicidal ideations

## 2022-12-16 NOTE — CONSULTS
[facility]  Psychiatry  Telepsychiatry Consult Note    Please see previous notes:    Patient agreeable to consultation via telepsychiatry.    Tele-Consultation from Psychiatry started: 12/16/2022 at 0830  The chief complaint leading to psychiatric consultation is: med recs  This consultation was requested by, the Medicine  Department NP  The location of the consulting psychiatrist is  [place], [place] .  The patient location is  [facility] MEDICAL SURGICAL UN[facility]*   The patient arrived at the ED at: unknown    Also present with the patient at the time of the consultation: No one    Patient Identification:   Alma Rosa Helm is a 67 y.o. female.    Patient information was obtained from patient, past medical records, and ER records.  Patient presented voluntarily to the Emergency Department      Inpatient consult to Telemedicine - Psych  Consult performed by: Bryson Thompson DO  Consult ordered by: Jailene Viera NP      Consult Start Time: 12/16/2022 08:30 CST  Consult End Time: 12/16/2022 09:05 CST      Subjective:     History of Present Illness:  Per IM MD  Sent by doctor for abnormal blood cultures, recent discharge from [place]          HPI: Alma Rosa Helm is a 67-year-old female with PMHx significant for HTN, COPD, nicotine dependence, kidney stones, and bipolar disease.  She presented to the ED for further evaluation of positive blood cultures drawn 12/10 at outside facility.  Pt was hospitalized at [facility] 12/10-12/12.  She was diagnosed with pyelonephritis and D/C to complete a course of oral cefdinir and doxycycline.  Her blood culture are revealing for E coli ESBL.  Pt reports since discharge she has continued to feel poorly.  Specifically, she notes generalized weakness, malaise, dizziness, and nausea with 1 episode of emesis prior to arrival.  She reports Hx of bacterial infection requiring 8 weeks of IV Abx at home via PICC line.  Review of records from [facility] revealed  that she indeed had E coli ESBL at that time due to pyelonephritis with resultant bacteremia and was treated with Merrem.  She denies any chest pain, change in baseline SOB, fever, or chills.  She does note that she is been depressed lately and carries a bipolar diagnosis and is not currently taking any mood stabilizing medications.  She denies any suicidal ideations.  Her case was discussed with ID by ED physician who recommended hospitalization with IV meropenem because of her E coli ESBL bacteremia.  She was admitted to the [facility] Medicine for further evaluation management.  Other pertinent medical Hx as below:    Psychiatric Evaluation  Chart reviewed. No previous psychiatric encounters documented. Patient reports a long history of bipolar disorder with previous manic episodes. She currently endorses low mood, feelings of hopelessness, PMR, poor sleep and poor concentration. She is interested in resuming psychiatric medications and re-establishing outpatient psychiatric care. Denies SI/HI/AVH. No objective signs of psychosis, marie, delirium or debilitating depression.    Psychiatric History:   Previous Psychiatric diagnosis: bipolar disorder  Previous Psychiatric Hospitalizations: Yes   Previous Medication Trials: Yes   Previous Suicide Attempts: no  History of Depression: yes  History of Marie: yes  Outpatient psychiatrist (current & past): No-current, yes-past    Substance Abuse History:  Tobacco:Yes  Alcohol: No  Illicit Substances:No  Detox/Rehab: No  Social History     Substance and Sexual Activity   Alcohol Use Never       Legal History: Past charges/incarcerations: denies     Family Medical  History:   Family History   Problem Relation Age of Onset    Cancer Mother     Diabetes Mother     COPD Father     Hypertension Father            Social History:  Developmental/Childhood:Achieved all developmental milestones timely  *Education: some college  Employment Status/Finances: CNA    Relationship  Status/Sexual Orientation: single  Children:  yes  Housing Status:  lives alone     history:  NO    Psychiatric Mental Status Exam:  Arousal: alert  Sensorium/Orientation: oriented to grossly intact  Behavior/Cooperation: psychomotor retardation   Speech: slowed, soft  Language: grossly intact  Mood: dysthymic   Affect: constricted  Thought Process: linear  Thought Content:   Auditory hallucinations: NO  Visual hallucinations: NO  Paranoia: NO  Delusions:  NO  Suicidal ideation: no    Homicidal ideation: NO  Attention/Concentration:  intact  Memory:               Recent:  Intact              Remote: Intact     Fund of Knowledge: Aware of current events   Abstract reasoning: similarities were abstract  Insight: awareness of illness  Judgment: behavior is adequate to circumstances      Past Medical History:   Past Medical History:   Diagnosis Date    Anxiety     Bipolar disorder     COPD, mild     Nephrolithiasis       Laboratory Data:   Labs Reviewed   CBC W/ AUTO DIFFERENTIAL - Abnormal; Notable for the following components:       Result Value    WBC 13.62 (*)     RBC 5.55 (*)     MPV 9.0 (*)     Immature Granulocytes 1.8 (*)     Gran # (ANC) 9.7 (*)     Immature Grans (Abs) 0.24 (*)     All other components within normal limits   COMPREHENSIVE METABOLIC PANEL - Abnormal; Notable for the following components:    Glucose 111 (*)     Albumin 3.0 (*)     All other components within normal limits   URINALYSIS, REFLEX TO URINE CULTURE - Abnormal; Notable for the following components:    Nitrite, UA Positive (*)     Leukocytes, UA 1+ (*)     All other components within normal limits    Narrative:     Specimen Source->Urine   URINALYSIS MICROSCOPIC - Abnormal; Notable for the following components:    Bacteria Many (*)     All other components within normal limits    Narrative:     Specimen Source->Urine   LACTIC ACID, PLASMA   PROCALCITONIN       Neurological History:  Seizures: No  Head trauma:  Denies    Allergies:   Review of patient's allergies indicates:   Allergen Reactions    Tramadol Swelling    Imitrex [sumatriptan succinate]     Penicillins     Sulfa (sulfonamide antibiotics)     Toradol [ketorolac] Swelling       Medications in ER:   Medications   budesonide-formoterol 160-4.5 mcg inhaler 2 puff (2 puffs Inhalation Given 12/16/22 8032)   metoprolol tartrate (LOPRESSOR) tablet 25 mg (25 mg Oral Given 12/15/22 8715)   pravastatin tablet 40 mg (40 mg Oral Given 12/15/22 2013)   pregabalin capsule 50 mg (50 mg Oral Given 12/15/22 2013)   HYDROcodone-acetaminophen  mg per tablet 1 tablet (1 tablet Oral Given 12/16/22 8223)   sodium chloride 0.9% flush 10 mL ( Intravenous Canceled Entry 12/16/22 0600)   albuterol-ipratropium 2.5 mg-0.5 mg/3 mL nebulizer solution 3 mL (3 mLs Nebulization Given 12/15/22 2008)   melatonin tablet 9 mg (has no administration in time range)   ondansetron injection 4 mg (has no administration in time range)   aluminum-magnesium hydroxide-simethicone 200-200-20 mg/5 mL suspension 30 mL (has no administration in time range)   acetaminophen tablet 1,000 mg (has no administration in time range)   naloxone 0.4 mg/mL injection 0.02 mg (has no administration in time range)   potassium bicarbonate disintegrating tablet 50 mEq (has no administration in time range)   potassium bicarbonate disintegrating tablet 35 mEq (has no administration in time range)   potassium bicarbonate disintegrating tablet 60 mEq (has no administration in time range)   magnesium oxide tablet 800 mg (has no administration in time range)   magnesium oxide tablet 800 mg (has no administration in time range)   glucose chewable tablet 16 g (has no administration in time range)   glucose chewable tablet 24 g (has no administration in time range)   glucagon (human recombinant) injection 1 mg (has no administration in time range)   0.9%  NaCl infusion ( Intravenous New Bag 12/16/22 7822)   enoxaparin injection  40 mg (40 mg Subcutaneous Given 12/15/22 1645)   senna-docusate 8.6-50 mg per tablet 1 tablet (has no administration in time range)   meropenem-0.9% sodium chloride 1 g/50 mL IVPB (1 g Intravenous New Bag 12/16/22 0837)   dextrose 10% bolus 125 mL 125 mL (has no administration in time range)   dextrose 10% bolus 250 mL 250 mL (has no administration in time range)   nicotine 14 mg/24 hr 1 patch (1 patch Transdermal Patch Applied 12/15/22 1720)   dextromethorphan-guaiFENesin  mg/5 ml liquid 10 mL (10 mLs Oral Given 12/15/22 0079)   lactated ringers bolus 1,000 mL (0 mLs Intravenous Stopped 12/15/22 1613)   meropenem-0.9% sodium chloride 1 g/50 mL IVPB (0 g Intravenous Stopped 12/15/22 1722)       Medications at home:   Current Outpatient Medications   Medication Instructions    albuterol (ACCUNEB) 1.25 mg, Inhalation    albuterol (PROVENTIL/VENTOLIN HFA) 90 mcg/actuation inhaler 2 puffs, Inhalation, Every 6 hours PRN    albuterol-ipratropium (DUO-NEB) 2.5 mg-0.5 mg/3 mL nebulizer solution 3 mLs, Nebulization, Every 6 hours PRN, Rescue    alendronate (FOSAMAX) 70 MG tablet Take one tablet weekly.    budesonide-formoterol 160-4.5 mcg (SYMBICORT) 160-4.5 mcg/actuation HFAA 2 puffs, Inhalation, Every 12 hours, Controller    cefdinir (OMNICEF) 300 mg, Oral, 2 times daily    cyclobenzaprine (FLEXERIL) 5 mg, Oral, 2 times daily    diclofenac (VOLTAREN) 75 mg, Oral, 2 times daily    HYDROcodone-acetaminophen (NORCO)  mg per tablet 1 tablet, Oral, 2 times daily PRN    metoprolol tartrate (LOPRESSOR) 25 mg, Oral, Daily    minocycline (MINOCIN,DYNACIN) 100 MG capsule Oral    mupirocin (BACTROBAN) 2 % ointment mupirocin 2 % topical ointment   APPLY TO THE AFFECTED AREA OF THE TOENAIL TWICE DAILY FOR 3 WEEKS    ondansetron (ZOFRAN-ODT) 8 mg, Oral, Every 6 hours PRN    oxybutynin (DITROPAN-XL) 5 mg, Oral, Daily    pravastatin (PRAVACHOL) 40 mg, Oral, Nightly    pregabalin (LYRICA) 50 mg, Oral, 3 times daily     promethazine-dextromethorphan (PROMETHAZINE-DM) 6.25-15 mg/5 mL Syrp promethazine-DM 6.25 mg-15 mg/5 mL oral syrup   TAKE 5 ML BY MOUTH FOUR TIMES DAILY AS NEEDED         No new subjective & objective note has been filed under this hospital service since the last note was generated.      Assessment - Diagnosis - Goals:     Diagnosis/Impression:   Unspecified mood disorder  Hx of bipolar disorder    Rec:   -Recommend Outpatient psych follow up  -Start Abilify 10 mg daily for mood stabilization and depression    More than 50% of the time was spent counseling/coordinating care    Consulting clinician was informed of the encounter and consult note.      Bryson Thompson,    Psychiatry  [facility]

## 2022-12-16 NOTE — PLAN OF CARE
Problem: Occupational Therapy  Goal: Occupational Therapy Goal  Description: Goals to be met by: 12/30/2022     Patient will increase functional independence with ADLs by performing:    UE Dressing with [place].  LE Dressing with [place].  Grooming with [place].  Toileting from toilet with Thayer for hygiene and clothing management.   Toilet transfer to toilet with [place]ayer.    Outcome: Ongoing, Progressing     OT evaluation completed. POC established.

## 2022-12-16 NOTE — ASSESSMENT & PLAN NOTE
Chronic, controlled.  Latest blood pressure and vitals reviewed-   Temp:  [97.9 °F (36.6 °C)-98.5 °F (36.9 °C)]   Pulse:  [52-86]   Resp:  [18-20]   BP: (102-184)/(58-77)   SpO2:  [91 %-96 %] .   Home meds for hypertension were reviewed and noted below.   Hypertension Medications             metoprolol tartrate (LOPRESSOR) 25 MG tablet Take 1 tablet (25 mg total) by mouth Daily.          While in the hospital, will manage blood pressure as follows; Continue home antihypertensive regimen    Will utilize p.r.n. blood pressure medication only if patient's blood pressure greater than  180/110 and she develops symptoms such as worsening chest pain or shortness of breath.

## 2022-12-16 NOTE — PLAN OF CARE
Problem: Physical Therapy  Goal: Physical Therapy Goal  Description: Goals to be met by: 2022     Patient will increase functional independence with mobility by performin). Supine to sit with Modified [place]  2). Sit to supine with Modified [place]  3). Sit to stand transfer with Modified [place]  4). Gait  x > 150 feet with Modified [place] using Rolling Walker.     Outcome: Ongoing, Progressing

## 2022-12-16 NOTE — ASSESSMENT & PLAN NOTE
Health hazards associated with cigarette smoking were reviewed with patient and cessation was encouraged. Nicotine replacement and counseling options were discussed.  Spent 3 minutes counseling on cessation, patient understands need to quit, not currently ready to quit but is weaning down.  Agreeable to nicotine patch while hospitalized

## 2022-12-16 NOTE — RESPIRATORY THERAPY
RT ordered O2 PRN due pt stating shortness of breath @ times.       12/15/22 2003 12/15/22 2008   Patient Assessment/Suction   Level of Consciousness (AVPU) alert alert   Respiratory Effort Unlabored;Normal Unlabored   Expansion/Accessory Muscles/Retractions no use of accessory muscles no use of accessory muscles   All Lung Fields Breath Sounds wheezes, inspiratory aeration increased   Cough Frequency frequent frequent   Cough Type congested good   PRE-TX-O2   Device (Oxygen Therapy) room air room air   SpO2 (!) 94 % (!) 94 %   Pulse Oximetry Type Intermittent Intermittent   $ Pulse Oximetry - Multiple Charge Pulse Oximetry - Multiple Pulse Oximetry - Multiple   Pulse (!) 54 (!) 55   Resp 18 18   Aerosol Therapy   $ Aerosol Therapy Charges  --  Aerosol Treatment   Daily Review of Necessity (SVN)  --  completed   Respiratory Treatment Status (SVN)  --  given   Treatment Route (SVN)  --  mouthpiece;oxygen   Patient Position (SVN)  --  HOB elevated   Post Treatment Assessment (SVN)  --  breath sounds unchanged   Signs of Intolerance (SVN)  --  none   Inhaler   $ Inhaler Charges MDI (Metered Dose Inahler) Treatment  --    Daily Review of Necessity (Inhaler) completed  --    Respiratory Treatment Status (Inhaler) given  --    Treatment Route (Inhaler) mouthpiece  --    Patient Position (Inhaler) HOB elevated  --    Post Treatment Assessment (Inhaler) breath sounds unchanged  --    Signs of Intolerance (Inhaler) none  --

## 2022-12-16 NOTE — ASSESSMENT & PLAN NOTE
Pt with positive blood cultures 12/10 growing E coli ESBL  -historical infection as well due to UTI   -continue IV meropenem  -ID consultation   -Pt has preference for home antibiotic infusion for discharge dispo

## 2022-12-16 NOTE — HPI
Alma Rosa Helm is a 67-year-old female with PMHx significant for HTN, COPD, nicotine dependence, kidney stones, and bipolar disease.  She presented to the ED for further evaluation of positive blood cultures drawn 12/10 at outside facility.  Pt was hospitalized at [facility] 12/10-12/12.  She was diagnosed with pyelonephritis and D/C to complete a course of oral cefdinir and doxycycline.  Her blood culture are revealing for E coli ESBL.  Pt reports since discharge she has continued to feel poorly.  Specifically, she notes generalized weakness, malaise, dizziness, and nausea with 1 episode of emesis prior to arrival.  She reports Hx of bacterial infection requiring 8 weeks of IV Abx at home via PICC line.  Review of [facility] revealed that she indeed had E coli ESBL at that time due to pyelonephritis with resultant bacteremia and was treated with Merrem.  She denies any chest pain, change in baseline SOB, fever, or chills.  She does note that she is been depressed lately and carries a bipolar diagnosis and is not currently taking any mood stabilizing medications.  She denies any suicidal ideations.  Her case was discussed with ID by ED physician who recommended hospitalization with IV meropenem because of her E coli ESBL bacteremia.  She was admitted to the service hospital Medicine for further evaluation management.  Other pertinent medical Hx as below:

## 2022-12-16 NOTE — HOSPITAL COURSE
Pt was monitored closely during her stay.  She was initiated on IV antibiotic therapy.  PT/OT was consulted.  Her case was discussed with Infectious Disease and Urology was consulted to evaluate for complicated UTI with nephrolithiasis and recurrence resulting in bacteremia.  Pt felt symptomatically improved.  She has depression with bipolar disease and is not currently medicated.  We discussed tele psych consultation with which she was agreeable.  Tele psych recommended initiation of Abilify which was initiated on 12/16 and pt tolerated medication well. Outside records were obtained and revealed ESBL E coli bacteremia.  Repeat blood cultures were obtained and remained negative.  Repeat urine culture obtained and grew ESBL E coli sensitive to ertapenem.  ID recommended mid line for continued IV antibiotics at home to complete full treatment.  Patient underwent midline placement to left UE. Patient has received outpatient IV antibiotics in the past and was comfortable with this on d/c. CM was consulted and arranged outpatient IV antibiotics and home health.  Patient verbalized understanding of discharge instructions and return precautions.    Physical exam:  Awake alert oriented x3, pleasant, no acute distress   Heart-regular rate rhythm, no edema   Lungs-Clear to auscultation bilaterally, respirations even unlabored   Abdomen-soft nontender normoactive bowel sounds

## 2022-12-16 NOTE — PT/OT/SLP EVAL
Physical Therapy Evaluation    Patient Name:  Alma Rosa Helm   MRN:  [id number]    Recommendations:     Discharge Recommendations: home   Discharge Equipment Recommendations: walker, rolling   Barriers to discharge: None    Assessment:     Alma Rosa Helm is a 67 y.o. female admitted with a medical diagnosis of E coli bacteremia.  She presents with the following impairments/functional limitations: weakness, impaired endurance, impaired balance, gait instability, impaired functional mobility, decreased lower extremity function  .    Rehab Prognosis: Good; patient would benefit from acute skilled PT services to address these deficits and reach maximum level of function.    Recent Surgery: * No surgery found *      Plan:     During this hospitalization, patient to be seen 6 x/week to address the identified rehab impairments via gait training, therapeutic activities, therapeutic exercises and progress toward the following goals:    Plan of Care Expires:  12/31/22    Subjective     Patient/Family Comments/goals: prefers to use walker to ambulate    Living Environment:  Alone in mobile home, few steps (with rail) to enter.  Prior to admission, patients level of function was independent short distances due to LE joint pain, without AD.  Equipment used at home: none.  DME owned (not currently used): none.  Upon discharge, patient will have assistance from ?.    Objective:     Communicated with nurse (Quentin) prior to session.  Patient found supine with bed alarm, peripheral IV, telemetry  upon PT entry to room.    General Precautions: Standard, fall  Orthopedic Precautions:N/A   Braces: N/A  Respiratory Status: Room air    Functional Mobility training:  Bed Mobility:     Rolling Left:  stand by assistance  Supine to Sit: stand by assistance  Transfers:     Sit to Stand:  stand by assistance with rolling walker  Gait: 70' with RW with CG/SBA      AM-PAC 6 CLICK MOBILITY  Total Score:18       Treatment &  Education:  Mobility training as above with cues for technique  SEATED: LAQ, ankle DF/PF (rec'd to perform few x/day)    Patient left up in chair with all lines intact, call button in reach, chair alarm on, and nurse (Quentin) notified.    GOALS:   Multidisciplinary Problems       Physical Therapy Goals          Problem: Physical Therapy    Goal Priority Disciplines Outcome Goal Variances Interventions   Physical Therapy Goal     PT, PT/OT Ongoing, Progressing     Description: Goals to be met by: 2022     Patient will increase functional independence with mobility by performin). Supine to sit with Modified [place]  2). Sit to supine with Modified [place]  3). Sit to stand transfer with Modified [place]  4). Gait  x > 150 feet with Modified [place] using Rolling Walker.                          History:     Past Medical History:   Diagnosis Date    Anxiety     Bipolar disorder     COPD, mild     Nephrolithiasis        Past Surgical History:   Procedure Laterality Date    BACK SURGERY      CYSTOSCOPY      HYSTERECTOMY      LITHOTRIPSY      TUBAL LIGATION         Time Tracking:     PT Received On: 22  PT Start Time: 920     PT Stop Time: 943  PT Total Time (min): 23 min     Billable Minutes: Evaluation 13 and Gait Training 10      2022

## 2022-12-16 NOTE — SUBJECTIVE & OBJECTIVE
Interval History:  Pt seen and examined.  Afebrile overnight.  Discussed case with ID.  Will continue antibiotic therapy for now, follow cultures closely, and tentatively plan for outpatient IV Abx at discharge.  Urology consult requested.  She denies nausea vomiting and feels better today than yesterday.  She met with tele psych this morning, awaiting their note and will implement recommendations.    Review of Systems   Constitutional:  Negative for chills, fatigue and fever.   HENT:  Negative for congestion, postnasal drip and trouble swallowing.    Respiratory:  Positive for cough (chronic, unchanged) and shortness of breath (chronic- unchanged). Negative for wheezing.    Cardiovascular:  Negative for chest pain and leg swelling.   Gastrointestinal:  Negative for nausea and vomiting.   Genitourinary:  Negative for difficulty urinating and dysuria.   Neurological:  Positive for weakness (improved). Negative for dizziness.   Objective:     Vital Signs (Most Recent):  Temp: 98.2 °F (36.8 °C) (12/16/22 0741)  Pulse: (!) 54 (12/16/22 0741)  Resp: 18 (12/16/22 0741)  BP: (!) 150/66 (12/16/22 0741)  SpO2: (!) 91 % (12/16/22 0741)   Vital Signs (24h Range):  Temp:  [97.7 °F (36.5 °C)-98.8 °F (37.1 °C)] 98.2 °F (36.8 °C)  Pulse:  [52-86] 54  Resp:  [18-20] 18  SpO2:  [91 %-96 %] 91 %  BP: (129-184)/(58-77) 150/66     Weight: 94.1 kg (207 lb 7.3 oz)  Body mass index is 35.61 kg/m².    Intake/Output Summary (Last 24 hours) at 12/16/2022 1124  Last data filed at 12/16/2022 0544  Gross per 24 hour   Intake 2240.45 ml   Output --   Net 2240.45 ml      Physical Exam  Vitals and nursing note reviewed.   Constitutional:       Appearance: Normal appearance. She is obese.   HENT:      Head: Normocephalic and atraumatic.   Eyes:      Extraocular Movements: Extraocular movements intact.      Conjunctiva/sclera: Conjunctivae normal.      Pupils: Pupils are equal, round, and reactive to light.   Cardiovascular:      Rate and Rhythm:  Normal rate and regular rhythm.   Pulmonary:      Effort: Pulmonary effort is normal.      Breath sounds: Normal breath sounds.   Abdominal:      General: Abdomen is flat. Bowel sounds are normal.      Palpations: Abdomen is soft.      Tenderness: There is right CVA tenderness.   Musculoskeletal:         General: Normal range of motion.      Cervical back: Normal range of motion and neck supple.   Skin:     General: Skin is warm and dry.   Neurological:      Mental Status: She is alert.   Psychiatric:         Mood and Affect: Mood normal.       Significant Labs: All pertinent labs within the past 24 hours have been reviewed.  Blood Culture:   Recent Labs   Lab 12/15/22  1450   LABBLOO No Growth to date  No Growth to date     CBC:   Recent Labs   Lab 12/15/22  1451 12/16/22  0538   WBC 13.62* 12.08   HGB 15.3 13.6   HCT 46.4 41.8    336     CMP:   Recent Labs   Lab 12/15/22  1450 12/16/22  0538    142   K 3.8 3.6    111*   CO2 25 23   * 108   BUN 19 14   CREATININE 0.8 0.8   CALCIUM 9.7 8.7   PROT 7.5 6.5   ALBUMIN 3.0* 2.7*   BILITOT 0.5 0.7   ALKPHOS 65 59   AST 13 10   ALT 33 26   ANIONGAP 8 8       Significant Imaging: I have reviewed all pertinent imaging results/findings within the past 24 hours.

## 2022-12-16 NOTE — CONSULTS
[facility] Consult Note:    Alma Rosa Helm is a 67 y.o. female who presents for pyelonephritis.    Patient with a history of COPD, bipolar disorder and kidney stones who presented to the [facility] emergency department on 12/15/22 with right flank pain and abnormal lab results.     Patient had previously been admitted at [facility] on 12/10/22 for pyelonephritis with positive urine and blood cultures. Started on Abx and discharged home on 12/12/22.     She was subsequently evaluated by her PCP on 12/15/22 and found to have ESBL E. Coli on her blood culture results that was very resistant. Referred to the ED for evaluation as patient continued to report abdominal pain, flank pain, nausea and vomiting with fever. On admission, WBC 13, Creatinine 0.8. and UA with positive nitrite and many bacteria. No urine culture.     CT renal stone with bilateral non-obstructing stones - largest in right lower pole measuring 5 mm. There is scarring in the left mid kidney and bilateral adrenal adenomas - largest on right measuring 2.2. cm.     She has previously been managed by Dr. Lauren in the past for her stone disease. Left ESWL in 1/2016 and bilateral ureteral stent placement in 12/2015.     Denies any bone pain, unintentional weight loss,  trauma or history of  malignancy.         Past Medical History:   Diagnosis Date    Anxiety     Bipolar disorder     COPD, mild     Nephrolithiasis        Past Surgical History:   Procedure Laterality Date    BACK SURGERY      CYSTOSCOPY      HYSTERECTOMY      LITHOTRIPSY      TUBAL LIGATION         Family History   Problem Relation Age of Onset    Cancer Mother     Diabetes Mother     COPD Father     Hypertension Father        Review of patient's allergies indicates:   Allergen Reactions    Tramadol Swelling    Imitrex [sumatriptan succinate]     Penicillins     Sulfa (sulfonamide antibiotics)     Toradol [ketorolac] Swelling        Medications Reviewed: see MAR    PHYSICAL EXAM:    Vitals:    12/16/22 1100   BP: (!) 145/59   Pulse: 62   Resp: 16   Temp: 97.9 °F (36.6 °C)     Body mass index is 35.61 kg/m².       General: Alert, cooperative, no distress, appears stated age  Abdomen: Soft, non-tender, no CVA tenderness, non-distended      LABS:    Lab Results   Component Value Date    WBC 12.08 12/16/2022    HGB 13.6 12/16/2022    HCT 41.8 12/16/2022    MCV 85 12/16/2022     12/16/2022       BMP  Lab Results   Component Value Date     12/16/2022    K 3.6 12/16/2022     (H) 12/16/2022    CO2 23 12/16/2022    BUN 14 12/16/2022    CREATININE 0.8 12/16/2022    CALCIUM 8.7 12/16/2022    ANIONGAP 8 12/16/2022    EGFRNORACEVR >60 12/16/2022         IMAGING:    CT Images independently reviewed by me        Assessment/Diagnosis:    Pyelonephritis  Bilateral non-obstructing stones  Bacteremia  Flank pain    Plans:       - Extensive discussion with patient regarding the etiology and management of her pyelonephritis in the setting of bilateral non-obstructing stones - largest 5 mm. We discussed that there is no indication for any acute urologic intervention at this time. Would recommend continuing management with IV Abx as it is most likely that her initial infection was not adequately treated.   - Follow up cultures and tailor antibiotics based on results.   - Stone diet discussed for future stone prevention.

## 2022-12-16 NOTE — CARE UPDATE
12/16/22 0732   Patient Assessment/Suction   Level of Consciousness (AVPU) alert   PRE-TX-O2   SpO2 96 %   Pulse (!) 55   Resp 18   Aerosol Therapy   $ Aerosol Therapy Charges PRN treatment not required   Inhaler   $ Inhaler Charges MDI (Metered Dose Inahler) Treatment;Given With Spacer   Respiratory Treatment Status (Inhaler) given   Treatment Route (Inhaler) spacer/holding chamber;mouthpiece   Patient Position (Inhaler) HOB elevated   Signs of Intolerance (Inhaler) none

## 2022-12-16 NOTE — CONSULTS
Consult Note  Infectious Disease    Reason for Consult:  Recent ESBL E coli bacteremia    HPI: Alma Rosa Helm is a 67 y.o. female very pleasant, active smoker, with history of bipolar disorder, COPD and bilateral kidney stones with ureteral stent last in .  Who was sent by primary care doctor after finding blood cultures positive for ESBL E coli.  Patient was previously admitted to [facility] 12/10 for pyelonephritis.  She was discharged home on cefdinir and doxycycline.    Patient complaining of generalized weakness, fatigue, denies dysuria although admits to increased urinary frequency, and sometimes incontinence with right flank pain.  She also complains of nausea, vomit, chronic productive cough.  Denies shortness of breath, or change in bowel movements.    In the ER, hemodynamically stable, afebrile  Leukocytosis 13, no left shift, H&H and platelet count stable   Creatinine 0.8   CRP 29.2   Lactic acid 1.2   Procalcitonin 0.1-0.07, normal   UA positive for nitrites, many bacteria, pending culture     ID consult for history of ESBL E coli bacteremia 12/10 at outside hospital.      Review of patient's allergies indicates:   Allergen Reactions    Tramadol Swelling    Imitrex [sumatriptan succinate]     Penicillins     Sulfa (sulfonamide antibiotics)     Toradol [ketorolac] Swelling     Past Medical History:   Diagnosis Date    Anxiety     Bipolar disorder     COPD, mild     Nephrolithiasis      Past Surgical History:   Procedure Laterality Date    BACK SURGERY      CYSTOSCOPY      HYSTERECTOMY      LITHOTRIPSY      TUBAL LIGATION       Social History     Tobacco Use    Smoking status: Every Day     Packs/day: 0.75     Years: 45.00     Pack years: 33.75     Types: Cigarettes     Start date:      Last attempt to quit: 2020     Years since quittin.6    Smokeless tobacco: Never    Tobacco comments:     now smoking / ppd   Substance Use Topics    Alcohol use: Never        Family  History   Problem Relation Age of Onset    Cancer Mother     Diabetes Mother     COPD Father     Hypertension Father        Pertinent medications noted:     Review of Systems:   No chills, fever, sweats, weight loss  No change in vision, loss of vision or diplopia  No sinus congestion, purulent nasal discharge, post nasal drip or facial pain  No pain in mouth or throat. No problems with teeth, gums.  No chest pain, palpitations, syncope  Chronic productive cough, shortness of breath, dyspnea on exertion, pleurisy, hemoptysis  No dysphagia, odynophagia  No nausea, vomiting, diarrhea, constipation, blood in stool, or focal abd pain,    Increased urinary frequency, R flank pain, incontinence   No vaginal discharge, genital ulcers, risk for STD  No swelling of joints, redness of joints, injuries, or new focal pain  No unusual headaches, dizziness, vertigo, numbness, paresthesias, neuropathy, falls  No anxiety, depression, substance abuse, sleep disturbance  No bleeding, lymphadenopathy  No new rashes, lesions, or wounds    Outdoor activities:  Lives at home by herself.  Active smoker, no alcohol.  Travel:  None  Implants:  None  Antibiotic History:  Cefdinir and doxycycline.  Meropenem since 12/15    EXAM & DIAGNOSTICS REVIEWED:   Vitals:     Temp:  [97.7 °F (36.5 °C)-98.8 °F (37.1 °C)]   Temp: 98.2 °F (36.8 °C) (12/16/22 0741)  Pulse: (!) 54 (12/16/22 0741)  Resp: 18 (12/16/22 0741)  BP: (!) 150/66 (12/16/22 0741)  SpO2: (!) 91 % (12/16/22 0741)    Intake/Output Summary (Last 24 hours) at 12/16/2022 0811  Last data filed at 12/16/2022 0544  Gross per 24 hour   Intake 2240.45 ml   Output --   Net 2240.45 ml       General:  In NAD. Alert and attentive, cooperative, comfortable on room air  Eyes:  Anicteric, PERRL  ENT:  No ulcers, exudates, thrush, nares patent, edentulous  Neck:  Supple, no adenopathy appreciated  Lungs: Left base rales  Heart:  S1/S2+, regular rhythm, no murmurs  Abd:  +BS, soft, non tender, non  distended, no rebound  :  Voids, R CVA tenderness  Musc:  Joints without effusion, swelling,  erythema, synovitis, ambulatory  Skin:  Warm, no rash  Wound:   Neuro:  Following commands, no acute focal deficit   Psych:  Calm, cooperative  Lymphatic:     No cervical, supraclavicular nodes  Extrem: No LE edema b/l  VAD:  Peripheral IV       Isolation: Contact ESBL      General Labs reviewed:  Recent Labs   Lab 12/15/22  1451 12/16/22  0538   WBC 13.62* 12.08   HGB 15.3 13.6   HCT 46.4 41.8    336       Recent Labs   Lab 12/15/22  1450 12/16/22  0538    142   K 3.8 3.6    111*   CO2 25 23   BUN 19 14   CREATININE 0.8 0.8   CALCIUM 9.7 8.7   PROT 7.5 6.5   BILITOT 0.5 0.7   ALKPHOS 65 59   ALT 33 26   AST 13 10     Recent Labs   Lab 12/16/22  0654   CRP 29.2*     No results for input(s): SEDRATE in the last 168 hours.    Estimated Creatinine Clearance: 78 mL/min (based on SCr of 0.8 mg/dL).     Prior micro on Care everywhere:   Blood cultures 12/10/22 x2 sets ESBL E coli  Urine culture October 2021 ESBL E coli    Micro:  Microbiology Results (last 7 days)       Procedure Component Value Units Date/Time    Blood culture x two cultures. Draw prior to antibiotics. [[id number]] Collected: 12/15/22 1450    Order Status: Completed Specimen: Blood Updated: 12/16/22 0715     Blood Culture, Routine No Growth to date    Narrative:      Aerobic and anaerobic    Blood culture x two cultures. Draw prior to antibiotics. [[id number]] Collected: 12/15/22 1450    Order Status: Completed Specimen: Blood Updated: 12/16/22 0715     Blood Culture, Routine No Growth to date    Narrative:      Aerobic and anaerobic            Imaging Reviewed:  CT renal: There are bilateral nonobstructing renal calculi with the largest stone at seen at the inferior pole on the right measuring 5 mm.     Cardiology:       IMPRESSION & PLAN     H/o recent ESBL E coli (12/10) in the setting of complicated UTI/pyelonephritis b/l kidney  stones  Blood cultures x 2 no growth to date, patient was on abx at home   UA with many bacteria, adding culture    2. PMHx: COPD, bipolar    Recommendations:  Urology eval for b/l kidney stones  Respiratory culture  Follow cultures   Continue Meropenem 1g IV q8h for recent ESBL E coli bacteremia   Anticipating Midline to continue IV abx for at least 10 days   Incentive spirometry  Aspiration precautions     Dr Mathews will follow cultures over the weekend    D/w patient, NP    Medical Decision Making during this encounter was  [_] Low Complexity  [_] Moderate Complexity  [xx] High Complexity

## 2022-12-16 NOTE — PLAN OF CARE
[facility]  Initial Discharge Assessment       Primary Care Provider: Rhonda Klein NP    Admission Diagnosis: Positive blood cultures [R78.81]  E coli bacteremia [R78.81, B96.20]  Complicated UTI (urinary tract infection) [N39.0]    Admission Date: 12/15/2022  Expected Discharge Date: 12/20/2022    Discharge Barriers Identified: None    Payor: [employer] / Plan: [employer] ([employer]) / Product Type: Commercial /     Extended Emergency Contact Information  Primary Emergency Contact: Naomi Cortez  Address: [address] [place] [address]           [place] [address] [facility]  Home Phone: [phone]  Mobile Phone: [phone]  Relation: Daughter    Discharge Plan A: Home with family, Home  Discharge Plan B: [id number] - [place], [place] - [address] [facility] [address]  [address]  [place] [address]  Phone: [phone] Fax: [phone]    [facility] [place], [place] - [address]  [address]  [place] [address]  Phone: [phone] Fax: [phone]    SW met with patient at bedside to complete discharge planning assessment.  Patient alert and oriented xs 4.  Patient verified all demographic information on facesheet is correct.  Patient verified PCP is RICHY Klein.  Patient verified primary health insurance is R and secondary is Medicare.  Patient with NO home health but has listed DME.  Patient with NO POA or Living Will.  Patient not on dialysis or medication coumadin.  Patient with no 30 day admission.  Patient with no financial issues at this time.  Patient family will provide transportation upon discharge from facility.  Patient independent with ADLs, live with alone, drives self.      Initial Assessment (most recent)       Adult Discharge Assessment - 12/16/22 3746          Discharge Assessment    Assessment Type Discharge Planning Assessment     Confirmed/corrected address,  phone number and insurance Yes     Confirmed Demographics Correct on Facesheet     Source of Information patient     Communicated SHAMA with patient/caregiver Date not available/Unable to determine     People in Home alone     Facility Arrived From: home     Do you expect to return to your current living situation? Yes     Do you have help at home or someone to help you manage your care at home? Yes     Who are your caregiver(s) and their phone number(s)? self     Prior to hospitilization cognitive status: Alert/Oriented     Current cognitive status: Alert/Oriented     Equipment Currently Used at Home nebulizer     Readmission within 30 days? No     Patient currently being followed by outpatient case management? No     Do you currently have service(s) that help you manage your care at home? No     Do you take prescription medications? Yes     Do you have prescription coverage? Yes     Do you have any problems affording any of your prescribed medications? No     Is the patient taking medications as prescribed? yes     Who is going to help you get home at discharge? daughter     How do you get to doctors appointments? car, drives self     Are you on dialysis? No     Do you take coumadin? No     Discharge Plan A Home with family;Home     Discharge Plan B Home Health     DME Needed Upon Discharge  none     Discharge Plan discussed with: Patient     Discharge Barriers Identified None        Physical Activity    On average, how many days per week do you engage in moderate to strenuous exercise (like a brisk walk)? Patient refused     On average, how many minutes do you engage in exercise at this level? Patient refused        Financial Resource Strain    How hard is it for you to pay for the very basics like food, housing, medical care, and heating? Patient refused        Housing Stability    In the last 12 months, was there a time when you were not able to pay the mortgage or rent on time? Patient refused     In the last 12  months, was there a time when you did not have a steady place to sleep or slept in a shelter (including now)? Patient refused        Transportation Needs    In the past 12 months, has lack of transportation kept you from medical appointments or from getting medications? Patient refused     In the past 12 months, has lack of transportation kept you from meetings, work, or from getting things needed for daily living? Patient refused        Food Insecurity    Within the past 12 months, you worried that your food would run out before you got the money to buy more. Patient refused     Within the past 12 months, the food you bought just didn't last and you didn't have money to get more. Patient refused        Stress    Do you feel stress - tense, restless, nervous, or anxious, or unable to sleep at night because your mind is troubled all the time - these days? Patient refused        Social Connections    In a typical week, how many times do you talk on the phone with family, friends, or neighbors? Patient refused     How often do you get together with friends or relatives? Patient refused     How often do you attend Islam or Cheondoism services? Patient refused     Do you belong to any clubs or organizations such as Islam groups, unions, fraternal or athletic groups, or school groups? Patient refused     How often do you attend meetings of the clubs or organizations you belong to? Patient refused     Are you , , , , never , or living with a partner? Patient refused        Alcohol Use    Q1: How often do you have a drink containing alcohol? Patient refused     Q2: How many drinks containing alcohol do you have on a typical day when you are drinking? Patient refused     Q3: How often do you have six or more drinks on one occasion? Patient refused

## 2022-12-16 NOTE — PLAN OF CARE
POC discussed with patient, verbalized understanding. Patient with uneventful night, slept off and on between care. VS stable. Up to bathroom to void, tolerated well. Medicated with Hydrocodone for complaints of back pain. IVF infusing. NSR> call light at bedside, bed alarm in use.

## 2022-12-17 LAB
ALBUMIN SERPL BCP-MCNC: 2.8 G/DL (ref 3.5–5.2)
ALP SERPL-CCNC: 62 U/L (ref 55–135)
ALT SERPL W/O P-5'-P-CCNC: 25 U/L (ref 10–44)
ANION GAP SERPL CALC-SCNC: 8 MMOL/L (ref 8–16)
AST SERPL-CCNC: 13 U/L (ref 10–40)
BASOPHILS # BLD AUTO: 0.05 K/UL (ref 0–0.2)
BASOPHILS NFR BLD: 0.4 % (ref 0–1.9)
BILIRUB SERPL-MCNC: 0.5 MG/DL (ref 0.1–1)
BUN SERPL-MCNC: 16 MG/DL (ref 8–23)
CALCIUM SERPL-MCNC: 8.9 MG/DL (ref 8.7–10.5)
CHLORIDE SERPL-SCNC: 113 MMOL/L (ref 95–110)
CO2 SERPL-SCNC: 20 MMOL/L (ref 23–29)
CREAT SERPL-MCNC: 0.8 MG/DL (ref 0.5–1.4)
DIFFERENTIAL METHOD: ABNORMAL
EOSINOPHIL # BLD AUTO: 0.4 K/UL (ref 0–0.5)
EOSINOPHIL NFR BLD: 3.2 % (ref 0–8)
ERYTHROCYTE [DISTWIDTH] IN BLOOD BY AUTOMATED COUNT: 14.8 % (ref 11.5–14.5)
EST. GFR  (NO RACE VARIABLE): >60 ML/MIN/1.73 M^2
GLUCOSE SERPL-MCNC: 109 MG/DL (ref 70–110)
HCT VFR BLD AUTO: 44.1 % (ref 37–48.5)
HCV AB SERPL QL IA: NORMAL
HGB BLD-MCNC: 14 G/DL (ref 12–16)
HIV 1+2 AB+HIV1 P24 AG SERPL QL IA: NORMAL
IMM GRANULOCYTES # BLD AUTO: 0.22 K/UL (ref 0–0.04)
IMM GRANULOCYTES NFR BLD AUTO: 1.8 % (ref 0–0.5)
LYMPHOCYTES # BLD AUTO: 2.9 K/UL (ref 1–4.8)
LYMPHOCYTES NFR BLD: 23.5 % (ref 18–48)
MAGNESIUM SERPL-MCNC: 2 MG/DL (ref 1.6–2.6)
MCH RBC QN AUTO: 27.3 PG (ref 27–31)
MCHC RBC AUTO-ENTMCNC: 31.7 G/DL (ref 32–36)
MCV RBC AUTO: 86 FL (ref 82–98)
MONOCYTES # BLD AUTO: 0.9 K/UL (ref 0.3–1)
MONOCYTES NFR BLD: 7.4 % (ref 4–15)
NEUTROPHILS # BLD AUTO: 7.9 K/UL (ref 1.8–7.7)
NEUTROPHILS NFR BLD: 63.7 % (ref 38–73)
NRBC BLD-RTO: 0 /100 WBC
PLATELET # BLD AUTO: 333 K/UL (ref 150–450)
PMV BLD AUTO: 9.3 FL (ref 9.2–12.9)
POTASSIUM SERPL-SCNC: 4.3 MMOL/L (ref 3.5–5.1)
PROT SERPL-MCNC: 6.9 G/DL (ref 6–8.4)
RBC # BLD AUTO: 5.12 M/UL (ref 4–5.4)
SODIUM SERPL-SCNC: 141 MMOL/L (ref 136–145)
WBC # BLD AUTO: 12.36 K/UL (ref 3.9–12.7)

## 2022-12-17 PROCEDURE — 12000002 HC ACUTE/MED SURGE SEMI-PRIVATE ROOM

## 2022-12-17 PROCEDURE — 85025 COMPLETE CBC W/AUTO DIFF WBC: CPT | Performed by: NURSE PRACTITIONER

## 2022-12-17 PROCEDURE — C1751 CATH, INF, PER/CENT/MIDLINE: HCPCS

## 2022-12-17 PROCEDURE — 63600175 PHARM REV CODE 636 W HCPCS: Performed by: NURSE PRACTITIONER

## 2022-12-17 PROCEDURE — S4991 NICOTINE PATCH NONLEGEND: HCPCS | Performed by: NURSE PRACTITIONER

## 2022-12-17 PROCEDURE — 36415 COLL VENOUS BLD VENIPUNCTURE: CPT | Performed by: NURSE PRACTITIONER

## 2022-12-17 PROCEDURE — 94640 AIRWAY INHALATION TREATMENT: CPT

## 2022-12-17 PROCEDURE — A4216 STERILE WATER/SALINE, 10 ML: HCPCS | Performed by: NURSE PRACTITIONER

## 2022-12-17 PROCEDURE — 27000207 HC ISOLATION

## 2022-12-17 PROCEDURE — 36410 VNPNXR 3YR/> PHY/QHP DX/THER: CPT

## 2022-12-17 PROCEDURE — 80053 COMPREHEN METABOLIC PANEL: CPT | Performed by: NURSE PRACTITIONER

## 2022-12-17 PROCEDURE — 25000003 PHARM REV CODE 250: Performed by: NURSE PRACTITIONER

## 2022-12-17 PROCEDURE — 99900035 HC TECH TIME PER 15 MIN (STAT)

## 2022-12-17 PROCEDURE — 97116 GAIT TRAINING THERAPY: CPT

## 2022-12-17 PROCEDURE — 94761 N-INVAS EAR/PLS OXIMETRY MLT: CPT

## 2022-12-17 PROCEDURE — 83735 ASSAY OF MAGNESIUM: CPT | Performed by: NURSE PRACTITIONER

## 2022-12-17 RX ADMIN — PREGABALIN 50 MG: 25 CAPSULE ORAL at 08:12

## 2022-12-17 RX ADMIN — SODIUM CHLORIDE 10 ML: 9 INJECTION INTRAMUSCULAR; INTRAVENOUS; SUBCUTANEOUS at 02:12

## 2022-12-17 RX ADMIN — MEROPENEM AND SODIUM CHLORIDE 1 G: 1 INJECTION, SOLUTION INTRAVENOUS at 09:12

## 2022-12-17 RX ADMIN — METOPROLOL TARTRATE 25 MG: 25 TABLET, FILM COATED ORAL at 04:12

## 2022-12-17 RX ADMIN — MEROPENEM AND SODIUM CHLORIDE 1 G: 1 INJECTION, SOLUTION INTRAVENOUS at 04:12

## 2022-12-17 RX ADMIN — PREGABALIN 50 MG: 25 CAPSULE ORAL at 09:12

## 2022-12-17 RX ADMIN — SODIUM CHLORIDE 10 ML: 9 INJECTION INTRAMUSCULAR; INTRAVENOUS; SUBCUTANEOUS at 10:12

## 2022-12-17 RX ADMIN — BUDESONIDE AND FORMOTEROL FUMARATE DIHYDRATE 2 PUFF: 160; 4.5 AEROSOL RESPIRATORY (INHALATION) at 07:12

## 2022-12-17 RX ADMIN — SODIUM CHLORIDE: 9 INJECTION, SOLUTION INTRAVENOUS at 11:12

## 2022-12-17 RX ADMIN — PRAVASTATIN SODIUM 40 MG: 40 TABLET ORAL at 08:12

## 2022-12-17 RX ADMIN — ENOXAPARIN SODIUM 40 MG: 40 INJECTION SUBCUTANEOUS at 04:12

## 2022-12-17 RX ADMIN — PREGABALIN 50 MG: 25 CAPSULE ORAL at 04:12

## 2022-12-17 RX ADMIN — MEROPENEM AND SODIUM CHLORIDE 1 G: 1 INJECTION, SOLUTION INTRAVENOUS at 12:12

## 2022-12-17 RX ADMIN — MEROPENEM AND SODIUM CHLORIDE 1 G: 1 INJECTION, SOLUTION INTRAVENOUS at 11:12

## 2022-12-17 RX ADMIN — ARIPIPRAZOLE 10 MG: 5 TABLET ORAL at 09:12

## 2022-12-17 RX ADMIN — NICOTINE 1 PATCH: 14 PATCH TRANSDERMAL at 09:12

## 2022-12-17 NOTE — CARE UPDATE
12/17/22 0709   Patient Assessment/Suction   Level of Consciousness (AVPU) alert   PRE-TX-O2   Device (Oxygen Therapy) room air   SpO2 95 %   Pulse Oximetry Type Intermittent   $ Pulse Oximetry - Multiple Charge Pulse Oximetry - Multiple   Pulse 67   Resp 20   Aerosol Therapy   $ Aerosol Therapy Charges PRN treatment not required   Inhaler   $ Inhaler Charges MDI (Metered Dose Inahler) Treatment;Given With Spacer   Respiratory Treatment Status (Inhaler) given   Treatment Route (Inhaler) mouthpiece;spacer/holding chamber   Patient Position (Inhaler) HOB elevated   Signs of Intolerance (Inhaler) none

## 2022-12-17 NOTE — SUBJECTIVE & OBJECTIVE
Interval History:  Notes reviewed, no acute events overnight.  Patient resting comfortably in bed.  She states she feels better today. She denies acute complaints.  Chart reviewed and positive blood cultures with sensitivity reviewed.  Will await ID recommendations.      Review of Systems   Constitutional:  Negative for chills, fatigue and fever.   HENT:  Negative for congestion, postnasal drip and trouble swallowing.    Respiratory:  Positive for cough (chronic, unchanged) and shortness of breath (chronic- unchanged). Negative for wheezing.    Cardiovascular:  Negative for chest pain and leg swelling.   Gastrointestinal:  Negative for abdominal pain, diarrhea, nausea and vomiting.   Genitourinary:  Negative for difficulty urinating, dysuria and urgency.   Musculoskeletal:  Negative for arthralgias, back pain and gait problem.   Skin:  Negative for color change, pallor and rash.   Neurological:  Positive for weakness (improved). Negative for dizziness and light-headedness.   Psychiatric/Behavioral:  Negative for agitation, behavioral problems and confusion.    All other systems reviewed and are negative.  Objective:     Vital Signs (Most Recent):  Temp: 98.6 °F (37 °C) (12/17/22 0749)  Pulse: 65 (12/17/22 0749)  Resp: 17 (12/17/22 0749)  BP: (!) 182/73 (12/17/22 0749)  SpO2: (!) 93 % (12/17/22 0749)   Vital Signs (24h Range):  Temp:  [97.7 °F (36.5 °C)-98.6 °F (37 °C)] 98.6 °F (37 °C)  Pulse:  [55-68] 65  Resp:  [15-20] 17  SpO2:  [92 %-97 %] 93 %  BP: (128-182)/(59-73) 182/73     Weight: 94.6 kg (208 lb 8.9 oz)  Body mass index is 35.8 kg/m².    Intake/Output Summary (Last 24 hours) at 12/17/2022 1428  Last data filed at 12/17/2022 0623  Gross per 24 hour   Intake 2652.13 ml   Output --   Net 2652.13 ml        Physical Exam  Vitals and nursing note reviewed.   Constitutional:       General: She is not in acute distress.     Appearance: Normal appearance. She is well-developed. She is obese. She is not ill-appearing  or diaphoretic.   HENT:      Head: Normocephalic and atraumatic.      Right Ear: External ear normal.      Left Ear: External ear normal.      Nose: Nose normal. No congestion or rhinorrhea.      Mouth/Throat:      Mouth: Mucous membranes are moist.      Pharynx: Oropharynx is clear. No oropharyngeal exudate or posterior oropharyngeal erythema.   Eyes:      General: No scleral icterus.     Extraocular Movements: Extraocular movements intact.      Conjunctiva/sclera: Conjunctivae normal.      Pupils: Pupils are equal, round, and reactive to light.   Neck:      Vascular: No JVD.   Cardiovascular:      Rate and Rhythm: Normal rate and regular rhythm.      Pulses: Normal pulses.      Heart sounds: Normal heart sounds. No murmur heard.  Pulmonary:      Effort: Pulmonary effort is normal. No respiratory distress.      Breath sounds: Normal breath sounds. No stridor. No wheezing, rhonchi or rales.   Abdominal:      General: Abdomen is flat. Bowel sounds are normal. There is no distension.      Palpations: Abdomen is soft.      Tenderness: There is no abdominal tenderness. There is right CVA tenderness.   Musculoskeletal:         General: No swelling or tenderness. Normal range of motion.      Cervical back: Normal range of motion and neck supple.   Skin:     General: Skin is warm and dry.      Capillary Refill: Capillary refill takes 2 to 3 seconds.      Coloration: Skin is not jaundiced or pale.      Findings: No erythema.   Neurological:      General: No focal deficit present.      Mental Status: She is alert and oriented to person, place, and time.      Cranial Nerves: No cranial nerve deficit.      Sensory: No sensory deficit.   Psychiatric:         Mood and Affect: Mood normal.         Behavior: Behavior normal.         Thought Content: Thought content normal.       Significant Labs: All pertinent labs within the past 24 hours have been reviewed.  Blood Culture:   Recent Labs   Lab 12/15/22  1450   LABBLOO No Growth  to date  No Growth to date  No Growth to date  No Growth to date       CBC:   Recent Labs   Lab 12/15/22  1451 12/16/22  0538 12/17/22  0501   WBC 13.62* 12.08 12.36   HGB 15.3 13.6 14.0   HCT 46.4 41.8 44.1    336 333       CMP:   Recent Labs   Lab 12/15/22  1450 12/16/22  0538 12/17/22  0501    142 141   K 3.8 3.6 4.3    111* 113*   CO2 25 23 20*   * 108 109   BUN 19 14 16   CREATININE 0.8 0.8 0.8   CALCIUM 9.7 8.7 8.9   PROT 7.5 6.5 6.9   ALBUMIN 3.0* 2.7* 2.8*   BILITOT 0.5 0.7 0.5   ALKPHOS 65 59 62   AST 13 10 13   ALT 33 26 25   ANIONGAP 8 8 8         Significant Imaging: I have reviewed all pertinent imaging results/findings within the past 24 hours.

## 2022-12-17 NOTE — ASSESSMENT & PLAN NOTE
Due to E coli ESBL: complicated with stones  -resultant bacteremia   -continue IV Merrem  -Consult urology appreciated

## 2022-12-17 NOTE — PLAN OF CARE
POC discussed with pt, pt verbalized understanding. Oriented x4. PIV cdi, fluids infusing. NSR on tele. Contact precautions maintained. Awaiting stool for sample for cdiff rule out. Complaints of abdominal pain controlled with prn's. Complaints of pain to throat from cough, cough medicine and cough drops provided. Ambulated to the restroom with stand by assist. Call light in reach, bed alarm set, safety maintained. No complaints or requests at this time, will continue to monitor.

## 2022-12-17 NOTE — PROGRESS NOTES
[facility] Hospital Medicine  Progress Note    Patient Name: Alma Rosa Helm  MRN: [id number]  Patient Class: IP- Inpatient   Admission Date: 12/15/2022  Length of Stay: 2 days  Attending Physician: Leyla Luna MD  Primary Care Provider: Rhonda Klein NP        Subjective:     Principal Problem:E coli bacteremia        HPI:  Alma Rosa Helm is a 67-year-old female with PMHx significant for HTN, COPD, nicotine dependence, kidney stones, and bipolar disease.  She presented to the ED for further evaluation of positive blood cultures drawn 12/10 at outside facility.  Pt was hospitalized at [facility] 12/10-12/12.  She was diagnosed with pyelonephritis and D/C to complete a course of oral cefdinir and doxycycline.  Her blood culture are revealing for E coli ESBL.  Pt reports since discharge she has continued to feel poorly.  Specifically, she notes generalized weakness, malaise, dizziness, and nausea with 1 episode of emesis prior to arrival.  She reports Hx of bacterial infection requiring 8 weeks of IV Abx at home via PICC line.  Review of records from [facility] revealed that she indeed had E coli ESBL at that time due to pyelonephritis with resultant bacteremia and was treated with Merrem.  She denies any chest pain, change in baseline SOB, fever, or chills.  She does note that she is been depressed lately and carries a bipolar diagnosis and is not currently taking any mood stabilizing medications.  She denies any suicidal ideations.  Her case was discussed with ID by ED physician who recommended hospitalization with IV meropenem because of her E coli ESBL bacteremia.  She was admitted to the [facility] hospital Medicine for further evaluation management.  Other pertinent medical Hx as below:        Overview/Hospital Course:  Pt was monitored closely during her stay.  She was initiated on IV antibiotic therapy.  PT/OT was consulted.  Her case was discussed with  Infectious Disease and Urology was consulted to evaluate for complicated UTI with nephrolithiasis and recurrence resulting in bacteremia.  Pt felt symptomatically improved.  She has depression with bipolar disease and is not currently medicated.  We discussed tele psych consultation with which she was agreeable.  Tele psych recommended initiation of Abilify which was initiated on 12/16      Interval History:  Notes reviewed, no acute events overnight.  Patient resting comfortably in bed.  She states she feels better today. She denies acute complaints.  Chart reviewed and positive blood cultures with sensitivity reviewed.  Will await ID recommendations.      Review of Systems   Constitutional:  Negative for chills, fatigue and fever.   HENT:  Negative for congestion, postnasal drip and trouble swallowing.    Respiratory:  Positive for cough (chronic, unchanged) and shortness of breath (chronic- unchanged). Negative for wheezing.    Cardiovascular:  Negative for chest pain and leg swelling.   Gastrointestinal:  Negative for abdominal pain, diarrhea, nausea and vomiting.   Genitourinary:  Negative for difficulty urinating, dysuria and urgency.   Musculoskeletal:  Negative for arthralgias, back pain and gait problem.   Skin:  Negative for color change, pallor and rash.   Neurological:  Positive for weakness (improved). Negative for dizziness and light-headedness.   Psychiatric/Behavioral:  Negative for agitation, behavioral problems and confusion.    All other systems reviewed and are negative.  Objective:     Vital Signs (Most Recent):  Temp: 98.6 °F (37 °C) (12/17/22 0749)  Pulse: 65 (12/17/22 0749)  Resp: 17 (12/17/22 0749)  BP: (!) 182/73 (12/17/22 0749)  SpO2: (!) 93 % (12/17/22 0749)   Vital Signs (24h Range):  Temp:  [97.7 °F (36.5 °C)-98.6 °F (37 °C)] 98.6 °F (37 °C)  Pulse:  [55-68] 65  Resp:  [15-20] 17  SpO2:  [92 %-97 %] 93 %  BP: (128-182)/(59-73) 182/73     Weight: 94.6 kg (208 lb 8.9 oz)  Body mass index  is 35.8 kg/m².    Intake/Output Summary (Last 24 hours) at 12/17/2022 1428  Last data filed at 12/17/2022 0623  Gross per 24 hour   Intake 2652.13 ml   Output --   Net 2652.13 ml        Physical Exam  Vitals and nursing note reviewed.   Constitutional:       General: She is not in acute distress.     Appearance: Normal appearance. She is well-developed. She is obese. She is not ill-appearing or diaphoretic.   HENT:      Head: Normocephalic and atraumatic.      Right Ear: External ear normal.      Left Ear: External ear normal.      Nose: Nose normal. No congestion or rhinorrhea.      Mouth/Throat:      Mouth: Mucous membranes are moist.      Pharynx: Oropharynx is clear. No oropharyngeal exudate or posterior oropharyngeal erythema.   Eyes:      General: No scleral icterus.     Extraocular Movements: Extraocular movements intact.      Conjunctiva/sclera: Conjunctivae normal.      Pupils: Pupils are equal, round, and reactive to light.   Neck:      Vascular: No JVD.   Cardiovascular:      Rate and Rhythm: Normal rate and regular rhythm.      Pulses: Normal pulses.      Heart sounds: Normal heart sounds. No murmur heard.  Pulmonary:      Effort: Pulmonary effort is normal. No respiratory distress.      Breath sounds: Normal breath sounds. No stridor. No wheezing, rhonchi or rales.   Abdominal:      General: Abdomen is flat. Bowel sounds are normal. There is no distension.      Palpations: Abdomen is soft.      Tenderness: There is no abdominal tenderness. There is right CVA tenderness.   Musculoskeletal:         General: No swelling or tenderness. Normal range of motion.      Cervical back: Normal range of motion and neck supple.   Skin:     General: Skin is warm and dry.      Capillary Refill: Capillary refill takes 2 to 3 seconds.      Coloration: Skin is not jaundiced or pale.      Findings: No erythema.   Neurological:      General: No focal deficit present.      Mental Status: She is alert and oriented to person,  place, and time.      Cranial Nerves: No cranial nerve deficit.      Sensory: No sensory deficit.   Psychiatric:         Mood and Affect: Mood normal.         Behavior: Behavior normal.         Thought Content: Thought content normal.       Significant Labs: All pertinent labs within the past 24 hours have been reviewed.  Blood Culture:   Recent Labs   Lab 12/15/22  1450   LABBLOO No Growth to date  No Growth to date  No Growth to date  No Growth to date       CBC:   Recent Labs   Lab 12/15/22  1451 12/16/22  0538 12/17/22  0501   WBC 13.62* 12.08 12.36   HGB 15.3 13.6 14.0   HCT 46.4 41.8 44.1    336 333       CMP:   Recent Labs   Lab 12/15/22  1450 12/16/22  0538 12/17/22  0501    142 141   K 3.8 3.6 4.3    111* 113*   CO2 25 23 20*   * 108 109   BUN 19 14 16   CREATININE 0.8 0.8 0.8   CALCIUM 9.7 8.7 8.9   PROT 7.5 6.5 6.9   ALBUMIN 3.0* 2.7* 2.8*   BILITOT 0.5 0.7 0.5   ALKPHOS 65 59 62   AST 13 10 13   ALT 33 26 25   ANIONGAP 8 8 8         Significant Imaging: I have reviewed all pertinent imaging results/findings within the past 24 hours.      Assessment/Plan:      * E coli bacteremia  Pt with positive blood cultures 12/10 growing E coli ESBL  -historical infection as well due to UTI   -continue IV meropenem  -ID consultation   -Pt has preference for home antibiotic infusion for discharge dispo      Hypertension, essential, benign  Chronic, controlled.  Latest blood pressure and vitals reviewed-   Temp:  [97.9 °F (36.6 °C)-98.5 °F (36.9 °C)]   Pulse:  [52-86]   Resp:  [18-20]   BP: (102-184)/(58-77)   SpO2:  [91 %-96 %] .   Home meds for hypertension were reviewed and noted below.   Hypertension Medications             metoprolol tartrate (LOPRESSOR) 25 MG tablet Take 1 tablet (25 mg total) by mouth Daily.          While in the hospital, will manage blood pressure as follows; Continue home antihypertensive regimen    Will utilize p.r.n. blood pressure medication only if patient's  blood pressure greater than  180/110 and she develops symptoms such as worsening chest pain or shortness of breath.        Pyelonephritis  Due to E coli ESBL: complicated with stones  -resultant bacteremia   -continue IV Merrem  -Consult urology appreciated      Nephrolithiasis  Pt with known Hx of nephrolithiasis, present on CT today  -CT negative for any obstruction  -no ureterolithiasis   -discussed with ID: will consult urology for evaluation.    12/17 - urology rec no further inpt workup at this time      Bipolar disorder  Pt with known bipolar disorder, currently not medicated  -reports recent depressive who  -consult with tele psych, Pt agreeable her assessment and medication recommendation  -no suicidal ideations      COPD (chronic obstructive pulmonary disease)  Chronic issue, stable   -bronchodilators as needed for shortness breath and wheezing   -oxygen as needed to maintain oxygen saturation greater than 92%  -encourage smoking cessation      Nicotine addiction  Health hazards associated with cigarette smoking were reviewed with patient and cessation was encouraged. Nicotine replacement and counseling options were discussed.  Spent 3 minutes counseling on cessation, patient understands need to quit, not currently ready to quit but is weaning down.  Agreeable to nicotine patch while hospitalized          VTE Risk Mitigation (From admission, onward)         Ordered     enoxaparin injection 40 mg  Daily         12/15/22 1632     IP VTE HIGH RISK PATIENT  Once         12/15/22 1632     Place sequential compression device  Until discontinued         12/15/22 1632                Discharge Planning   SHAMA: 12/20/2022     Code Status: Full Code   Is the patient medically ready for discharge?:     Reason for patient still in hospital (select all that apply): Treatment and Consult recommendations  Discharge Plan A: Home with family, Home                  Tabby Khalil NP  Department of Hospital Medicine    [facility]

## 2022-12-17 NOTE — PT/OT/SLP PROGRESS
Physical Therapy Treatment    Patient Name:  Alma Rosa Helm   MRN:  [id number]    Recommendations:     Discharge Recommendations: home  Discharge Equipment Recommendations: walker, rolling  Barriers to discharge: None    Assessment:     Alma Rosa Helm is a 67 y.o. female admitted with a medical diagnosis of E coli bacteremia.  She presents with the following impairments/functional limitations: weakness, impaired endurance, impaired balance, gait instability, impaired functional mobility, decreased lower extremity function  .    Rehab Prognosis: Good; patient would benefit from acute skilled PT services to address these deficits and reach maximum level of function.    Recent Surgery: * No surgery found *      Plan:     During this hospitalization, patient to be seen 6 x/week to address the identified rehab impairments via gait training, therapeutic activities, therapeutic exercises and progress toward the following goals:    Plan of Care Expires:  12/31/22    Subjective     Patient/Family Comments/goals: patient states that she is feeling stronger today    Objective:     Communicated with nurse (Quentin) prior to session.  Patient found supine with telemetry upon PT entry to room.     General Precautions: Standard, contact, fall  Orthopedic Precautions: N/A  Braces: N/A  Respiratory Status: Room air     Functional Mobility training:  Bed Mobility:     Rolling Left:  stand by assistance  Supine to Sit: stand by assistance  Transfers:     Sit to Stand:  stand by assistance with no AD and x  3 reps  Toilet Transfer: modified independence with  no AD  using  Stand Pivot  Gait: 15' x 1, 100' x 1 with SBA (declines use of walker)      AM-PAC 6 CLICK MOBILITY  Turning over in bed (including adjusting bedclothes, sheets and blankets)?: 4  Sitting down on and standing up from a chair with arms (e.g., wheelchair, bedside commode, etc.): 4  Moving from lying on back to sitting on the side of the bed?: 4  Moving to and from a  bed to a chair (including a wheelchair)?: 4  Need to walk in hospital room?: 3  Climbing 3-5 steps with a railing?: 3 (est.)  Basic Mobility Total Score: 22       Treatment & Education:  Mobility training as above with cues for technique  SEATED: LAQ, ankle DF/PF, x 10 reps each (rec'd to perform few x/day)      Patient left up in chair with call button in reach..    GOALS:   Multidisciplinary Problems       Physical Therapy Goals          Problem: Physical Therapy    Goal Priority Disciplines Outcome Goal Variances Interventions   Physical Therapy Goal     PT, PT/OT Ongoing, Progressing     Description: Goals to be met by: 2022     Patient will increase functional independence with mobility by performin). Supine to sit with Modified [place]  2). Sit to supine with Modified [place]  3). Sit to stand transfer with Modified [place]  4). Gait  x > 150 feet with Modified [place] using Rolling Walker.                          Time Tracking:     PT Received On: 22  PT Start Time: 1007     PT Stop Time: 1020  PT Total Time (min): 13 min     Billable Minutes: Gait Training 13    Treatment Type: Treatment  PT/PTA: PT     PTA Visit Number: 0     2022

## 2022-12-17 NOTE — PLAN OF CARE
12/16/22 2032   Patient Assessment/Suction   Level of Consciousness (AVPU) alert   Respiratory Effort Normal;Unlabored   Expansion/Accessory Muscles/Retractions expansion symmetric   All Lung Fields Breath Sounds coarse;wheezes, expiratory   Rhythm/Pattern, Respiratory pattern regular   Cough Frequency no cough   PRE-TX-O2   Device (Oxygen Therapy) room air   SpO2 (!) 93 %   Pulse Oximetry Type Intermittent   Pulse 68   Resp 18   Inhaler   $ Inhaler Charges MDI (Metered Dose Inahler) Treatment;Given With Spacer   Respiratory Treatment Status (Inhaler) given   Treatment Route (Inhaler) mouthpiece;spacer/holding chamber   Patient Position (Inhaler) HOB elevated   Post Treatment Assessment (Inhaler) breath sounds unchanged   Signs of Intolerance (Inhaler) none   Breath Sounds Post-Respiratory Treatment   Post-treatment Heart Rate (beats/min) 68   Post-treatment Resp Rate (breaths/min) 18

## 2022-12-18 LAB
ALBUMIN SERPL BCP-MCNC: 2.8 G/DL (ref 3.5–5.2)
ALP SERPL-CCNC: 72 U/L (ref 55–135)
ALT SERPL W/O P-5'-P-CCNC: 29 U/L (ref 10–44)
ANION GAP SERPL CALC-SCNC: 9 MMOL/L (ref 8–16)
AST SERPL-CCNC: 15 U/L (ref 10–40)
BACTERIA UR CULT: ABNORMAL
BASOPHILS # BLD AUTO: 0.03 K/UL (ref 0–0.2)
BASOPHILS NFR BLD: 0.3 % (ref 0–1.9)
BILIRUB SERPL-MCNC: 0.4 MG/DL (ref 0.1–1)
BUN SERPL-MCNC: 10 MG/DL (ref 8–23)
CALCIUM SERPL-MCNC: 9 MG/DL (ref 8.7–10.5)
CHLORIDE SERPL-SCNC: 109 MMOL/L (ref 95–110)
CO2 SERPL-SCNC: 22 MMOL/L (ref 23–29)
CREAT SERPL-MCNC: 0.7 MG/DL (ref 0.5–1.4)
DIFFERENTIAL METHOD: ABNORMAL
EOSINOPHIL # BLD AUTO: 0.3 K/UL (ref 0–0.5)
EOSINOPHIL NFR BLD: 2.6 % (ref 0–8)
ERYTHROCYTE [DISTWIDTH] IN BLOOD BY AUTOMATED COUNT: 14.6 % (ref 11.5–14.5)
EST. GFR  (NO RACE VARIABLE): >60 ML/MIN/1.73 M^2
GLUCOSE SERPL-MCNC: 139 MG/DL (ref 70–110)
HCT VFR BLD AUTO: 44.3 % (ref 37–48.5)
HGB BLD-MCNC: 14.2 G/DL (ref 12–16)
IMM GRANULOCYTES # BLD AUTO: 0.12 K/UL (ref 0–0.04)
IMM GRANULOCYTES NFR BLD AUTO: 1.2 % (ref 0–0.5)
LYMPHOCYTES # BLD AUTO: 2.6 K/UL (ref 1–4.8)
LYMPHOCYTES NFR BLD: 25.7 % (ref 18–48)
MAGNESIUM SERPL-MCNC: 1.9 MG/DL (ref 1.6–2.6)
MCH RBC QN AUTO: 27.3 PG (ref 27–31)
MCHC RBC AUTO-ENTMCNC: 32.1 G/DL (ref 32–36)
MCV RBC AUTO: 85 FL (ref 82–98)
MONOCYTES # BLD AUTO: 0.8 K/UL (ref 0.3–1)
MONOCYTES NFR BLD: 7.6 % (ref 4–15)
NEUTROPHILS # BLD AUTO: 6.4 K/UL (ref 1.8–7.7)
NEUTROPHILS NFR BLD: 62.6 % (ref 38–73)
NRBC BLD-RTO: 0 /100 WBC
PLATELET # BLD AUTO: 304 K/UL (ref 150–450)
PMV BLD AUTO: 9 FL (ref 9.2–12.9)
POTASSIUM SERPL-SCNC: 4.4 MMOL/L (ref 3.5–5.1)
PROT SERPL-MCNC: 7.1 G/DL (ref 6–8.4)
RBC # BLD AUTO: 5.21 M/UL (ref 4–5.4)
SODIUM SERPL-SCNC: 140 MMOL/L (ref 136–145)
WBC # BLD AUTO: 10.21 K/UL (ref 3.9–12.7)

## 2022-12-18 PROCEDURE — 25000003 PHARM REV CODE 250: Performed by: NURSE PRACTITIONER

## 2022-12-18 PROCEDURE — 83735 ASSAY OF MAGNESIUM: CPT | Performed by: NURSE PRACTITIONER

## 2022-12-18 PROCEDURE — 63600175 PHARM REV CODE 636 W HCPCS: Performed by: NURSE PRACTITIONER

## 2022-12-18 PROCEDURE — 94761 N-INVAS EAR/PLS OXIMETRY MLT: CPT

## 2022-12-18 PROCEDURE — S4991 NICOTINE PATCH NONLEGEND: HCPCS | Performed by: NURSE PRACTITIONER

## 2022-12-18 PROCEDURE — 99900035 HC TECH TIME PER 15 MIN (STAT)

## 2022-12-18 PROCEDURE — 36415 COLL VENOUS BLD VENIPUNCTURE: CPT | Performed by: NURSE PRACTITIONER

## 2022-12-18 PROCEDURE — 85025 COMPLETE CBC W/AUTO DIFF WBC: CPT | Performed by: NURSE PRACTITIONER

## 2022-12-18 PROCEDURE — 12000002 HC ACUTE/MED SURGE SEMI-PRIVATE ROOM

## 2022-12-18 PROCEDURE — A4216 STERILE WATER/SALINE, 10 ML: HCPCS | Performed by: NURSE PRACTITIONER

## 2022-12-18 PROCEDURE — 80053 COMPREHEN METABOLIC PANEL: CPT | Performed by: NURSE PRACTITIONER

## 2022-12-18 PROCEDURE — 27000207 HC ISOLATION

## 2022-12-18 PROCEDURE — 94640 AIRWAY INHALATION TREATMENT: CPT

## 2022-12-18 RX ADMIN — BUDESONIDE AND FORMOTEROL FUMARATE DIHYDRATE 2 PUFF: 160; 4.5 AEROSOL RESPIRATORY (INHALATION) at 07:12

## 2022-12-18 RX ADMIN — SODIUM CHLORIDE 10 ML: 9 INJECTION INTRAMUSCULAR; INTRAVENOUS; SUBCUTANEOUS at 06:12

## 2022-12-18 RX ADMIN — MELATONIN TAB 3 MG 9 MG: 3 TAB at 08:12

## 2022-12-18 RX ADMIN — MEROPENEM AND SODIUM CHLORIDE 1 G: 1 INJECTION, SOLUTION INTRAVENOUS at 04:12

## 2022-12-18 RX ADMIN — ARIPIPRAZOLE 10 MG: 5 TABLET ORAL at 08:12

## 2022-12-18 RX ADMIN — PREGABALIN 50 MG: 25 CAPSULE ORAL at 08:12

## 2022-12-18 RX ADMIN — METOPROLOL TARTRATE 25 MG: 25 TABLET, FILM COATED ORAL at 04:12

## 2022-12-18 RX ADMIN — NICOTINE 1 PATCH: 14 PATCH TRANSDERMAL at 08:12

## 2022-12-18 RX ADMIN — SODIUM CHLORIDE: 9 INJECTION, SOLUTION INTRAVENOUS at 10:12

## 2022-12-18 RX ADMIN — MEROPENEM AND SODIUM CHLORIDE 1 G: 1 INJECTION, SOLUTION INTRAVENOUS at 08:12

## 2022-12-18 RX ADMIN — PREGABALIN 50 MG: 25 CAPSULE ORAL at 04:12

## 2022-12-18 RX ADMIN — HYDROCODONE BITARTRATE AND ACETAMINOPHEN 1 TABLET: 10; 325 TABLET ORAL at 05:12

## 2022-12-18 RX ADMIN — PRAVASTATIN SODIUM 40 MG: 40 TABLET ORAL at 08:12

## 2022-12-18 RX ADMIN — SODIUM CHLORIDE 10 ML: 9 INJECTION INTRAMUSCULAR; INTRAVENOUS; SUBCUTANEOUS at 02:12

## 2022-12-18 RX ADMIN — HYDROCODONE BITARTRATE AND ACETAMINOPHEN 1 TABLET: 10; 325 TABLET ORAL at 08:12

## 2022-12-18 RX ADMIN — ENOXAPARIN SODIUM 40 MG: 40 INJECTION SUBCUTANEOUS at 04:12

## 2022-12-18 NOTE — PLAN OF CARE
Plan of care reviewed with pt. Pt verbalized understanding. Patient is AAO, able to make needs known. Continuous cardiac monitoring in place as ordered, TELE#[id number]. A-febrile throughout shift. Meds given per MAR. IVF infusing as ordered. Ambulated to bathroom independently with out difficulty. Repositions self independently. Left upper arm midline in place infusing without difficulty, no s/s of infection noted to site, dsg CDI. No complaints of pain or discomfort. Purposeful hourly/q2hr rounding done during shift to promote patient safety. NAD noted. Isolation precautions maintained as ordered. Safety maintained with side rails up x3, bed wheels locked, bed in lowest positioned, call light in reach. Patient educated to call for assistance with ambulation if needed, verbalized understanding. Pt remains free of falls. No further needs expressed at this time. Will continue to monitor.

## 2022-12-18 NOTE — PROGRESS NOTES
[facility] Hospital Medicine  Progress Note    Patient Name: Alma Rosa Helm  MRN: [id number]  Patient Class: IP- Inpatient   Admission Date: 12/15/2022  Length of Stay: 3 days  Attending Physician: Renaldo Vick MD  Primary Care Provider: Rhonda Klein NP        Subjective:     Principal Problem:E coli bacteremia        HPI:  Alma Rosa Helm is a 67-year-old female with PMHx significant for HTN, COPD, nicotine dependence, kidney stones, and bipolar disease.  She presented to the ED for further evaluation of positive blood cultures drawn 12/10 at outside facility.  Pt was hospitalized at [facility] 12/10-12/12.  She was diagnosed with pyelonephritis and D/C to complete a course of oral cefdinir and doxycycline.  Her blood culture are revealing for E coli ESBL.  Pt reports since discharge she has continued to feel poorly.  Specifically, she notes generalized weakness, malaise, dizziness, and nausea with 1 episode of emesis prior to arrival.  She reports Hx of bacterial infection requiring 8 weeks of IV Abx at home via PICC line.  Review of records from [facility] revealed that she indeed had E coli ESBL at that time due to pyelonephritis with resultant bacteremia and was treated with Merrem.  She denies any chest pain, change in baseline SOB, fever, or chills.  She does note that she is been depressed lately and carries a bipolar diagnosis and is not currently taking any mood stabilizing medications.  She denies any suicidal ideations.  Her case was discussed with ID by ED physician who recommended hospitalization with IV meropenem because of her E coli ESBL bacteremia.  She was admitted to the [facility] hospital Medicine for further evaluation management.  Other pertinent medical Hx as below:        Overview/Hospital Course:  Pt was monitored closely during her stay.  She was initiated on IV antibiotic therapy.  PT/OT was consulted.  Her case was discussed with  Infectious Disease and Urology was consulted to evaluate for complicated UTI with nephrolithiasis and recurrence resulting in bacteremia.  Pt felt symptomatically improved.  She has depression with bipolar disease and is not currently medicated.  We discussed tele psych consultation with which she was agreeable.  Tele psych recommended initiation of Abilify which was initiated on 12/16      Interval History:  Notes reviewed, no acute events overnight.  Patient resting comfortably in bed.  She reports right sided sore throat today. She states several of her grandchildren have recently tested positive for strep. Will check rapid strep today. Advised RN to provide chloraseptic spray to pts BS for comfort. Will cont to monitor closely and await ID recs. UC growing gram neg rods with sensitivity pending.      Review of Systems   Constitutional:  Negative for chills, fatigue and fever.   HENT:  Negative for congestion, postnasal drip and trouble swallowing.    Respiratory:  Positive for cough (chronic, unchanged) and shortness of breath (chronic- unchanged). Negative for wheezing.    Cardiovascular:  Negative for chest pain and leg swelling.   Gastrointestinal:  Negative for abdominal pain, diarrhea, nausea and vomiting.   Genitourinary:  Negative for difficulty urinating, dysuria and urgency.   Musculoskeletal:  Negative for arthralgias, back pain and gait problem.   Skin:  Negative for color change, pallor and rash.   Neurological:  Positive for weakness (improved). Negative for dizziness and light-headedness.   Psychiatric/Behavioral:  Negative for agitation, behavioral problems and confusion.    All other systems reviewed and are negative.  Objective:     Vital Signs (Most Recent):  Temp: 98.4 °F (36.9 °C) (12/18/22 1209)  Pulse: (!) 58 (12/18/22 1209)  Resp: 18 (12/18/22 1209)  BP: (!) 147/66 (12/18/22 1209)  SpO2: 96 % (12/18/22 1209)   Vital Signs (24h Range):  Temp:  [97.7 °F (36.5 °C)-98.4 °F (36.9 °C)] 98.4 °F  (36.9 °C)  Pulse:  [55-84] 58  Resp:  [16-20] 18  SpO2:  [92 %-96 %] 96 %  BP: (121-182)/(56-77) 147/66     Weight: 94.6 kg (208 lb 8.9 oz)  Body mass index is 35.8 kg/m².    Intake/Output Summary (Last 24 hours) at 12/18/2022 1404  Last data filed at 12/18/2022 1020  Gross per 24 hour   Intake 2068.2 ml   Output 3650 ml   Net -1581.8 ml        Physical Exam  Vitals and nursing note reviewed.   Constitutional:       General: She is not in acute distress.     Appearance: Normal appearance. She is well-developed. She is obese. She is not ill-appearing or diaphoretic.   HENT:      Head: Normocephalic and atraumatic.      Right Ear: External ear normal.      Left Ear: External ear normal.      Nose: Nose normal. No congestion or rhinorrhea.      Mouth/Throat:      Mouth: Mucous membranes are moist.      Pharynx: Oropharynx is clear. No oropharyngeal exudate or posterior oropharyngeal erythema.   Eyes:      General: No scleral icterus.     Extraocular Movements: Extraocular movements intact.      Conjunctiva/sclera: Conjunctivae normal.      Pupils: Pupils are equal, round, and reactive to light.   Neck:      Vascular: No JVD.   Cardiovascular:      Rate and Rhythm: Normal rate and regular rhythm.      Pulses: Normal pulses.      Heart sounds: Normal heart sounds. No murmur heard.  Pulmonary:      Effort: Pulmonary effort is normal. No respiratory distress.      Breath sounds: Normal breath sounds. No stridor. No wheezing, rhonchi or rales.   Abdominal:      General: Abdomen is flat. Bowel sounds are normal. There is no distension.      Palpations: Abdomen is soft.      Tenderness: There is no abdominal tenderness. There is right CVA tenderness.   Musculoskeletal:         General: No swelling or tenderness. Normal range of motion.      Cervical back: Normal range of motion and neck supple.   Skin:     General: Skin is warm and dry.      Capillary Refill: Capillary refill takes 2 to 3 seconds.      Coloration: Skin is not  jaundiced or pale.      Findings: No erythema.   Neurological:      General: No focal deficit present.      Mental Status: She is alert and oriented to person, place, and time.      Cranial Nerves: No cranial nerve deficit.      Sensory: No sensory deficit.   Psychiatric:         Mood and Affect: Mood normal.         Behavior: Behavior normal.         Thought Content: Thought content normal.       Significant Labs: All pertinent labs within the past 24 hours have been reviewed.  Blood Culture:   No results for input(s): LABBLOO in the last 48 hours.    CBC:   Recent Labs   Lab 12/17/22  0501 12/18/22  0510   WBC 12.36 10.21   HGB 14.0 14.2   HCT 44.1 44.3    304       CMP:   Recent Labs   Lab 12/17/22  0501 12/18/22  0510    140   K 4.3 4.4   * 109   CO2 20* 22*    139*   BUN 16 10   CREATININE 0.8 0.7   CALCIUM 8.9 9.0   PROT 6.9 7.1   ALBUMIN 2.8* 2.8*   BILITOT 0.5 0.4   ALKPHOS 62 72   AST 13 15   ALT 25 29   ANIONGAP 8 9         Significant Imaging: I have reviewed all pertinent imaging results/findings within the past 24 hours.      Assessment/Plan:      * E coli bacteremia  Pt with positive blood cultures 12/10 growing E coli ESBL  -historical infection as well due to UTI   -continue IV meropenem  -ID consultation appreciated  -Pt has preference for home antibiotic infusion for discharge dispo      Hypertension, essential, benign  Chronic, controlled.  Latest blood pressure and vitals reviewed-   Temp:  [97.9 °F (36.6 °C)-98.5 °F (36.9 °C)]   Pulse:  [52-86]   Resp:  [18-20]   BP: (102-184)/(58-77)   SpO2:  [91 %-96 %] .   Home meds for hypertension were reviewed and noted below.   Hypertension Medications             metoprolol tartrate (LOPRESSOR) 25 MG tablet Take 1 tablet (25 mg total) by mouth Daily.          While in the hospital, will manage blood pressure as follows; Continue home antihypertensive regimen    Will utilize p.r.n. blood pressure medication only if patient's  blood pressure greater than  180/110 and she develops symptoms such as worsening chest pain or shortness of breath.        Pyelonephritis  Due to E coli ESBL: complicated with stones  -resultant bacteremia   -continue IV Merrem  -Consult urology and ID appreciated      Nephrolithiasis  Pt with known Hx of nephrolithiasis, present on CT today  -CT negative for any obstruction  -no ureterolithiasis   -discussed with ID: will consult urology for evaluation.    12/17 - urology rec no further inpt workup at this time      Bipolar disorder  Pt with known bipolar disorder, currently not medicated  -reports recent depressive who  -consult with tele psych, Pt agreeable her assessment and medication recommendation  -no suicidal ideations      COPD (chronic obstructive pulmonary disease)  Chronic issue, stable   -bronchodilators as needed for shortness breath and wheezing   -oxygen as needed to maintain oxygen saturation greater than 92%  -encourage smoking cessation      Nicotine addiction  Health hazards associated with cigarette smoking were reviewed with patient and cessation was encouraged. Nicotine replacement and counseling options were discussed.  Spent 3 minutes counseling on cessation, patient understands need to quit, not currently ready to quit but is weaning down.  Agreeable to nicotine patch while hospitalized          VTE Risk Mitigation (From admission, onward)         Ordered     enoxaparin injection 40 mg  Daily         12/15/22 1632     IP VTE HIGH RISK PATIENT  Once         12/15/22 1632     Place sequential compression device  Until discontinued         12/15/22 1632                Discharge Planning   SHAMA: 12/20/2022     Code Status: Full Code   Is the patient medically ready for discharge?:     Reason for patient still in hospital (select all that apply): Treatment and Consult recommendations  Discharge Plan A: Home with family, Home                  Tabby Khalil NP  Department of Hospital  Medicine   [facility]

## 2022-12-18 NOTE — ASSESSMENT & PLAN NOTE
Pt with positive blood cultures 12/10 growing E coli ESBL  -historical infection as well due to UTI   -continue IV meropenem  -ID consultation appreciated  -Pt has preference for home antibiotic infusion for discharge dispo

## 2022-12-18 NOTE — PLAN OF CARE
Problem: Adult Inpatient Plan of Care  Goal: Plan of Care Review  Outcome: Ongoing, Progressing     Problem: Adult Inpatient Plan of Care  Goal: Absence of Hospital-Acquired Illness or Injury  Outcome: Ongoing, Progressing     Problem: Adult Inpatient Plan of Care  Goal: Optimal Comfort and Wellbeing  Outcome: Ongoing, Progressing     POC reviewed with pt, verbalized understanding. Pt AAO x 4, able to make needs known. Continuous cardiac monitoring, tele #[id number]. Meds given per MAR. IVF infusing as ordered. ABX administered as scheduled. Afebrile this shift. No complaints of pain or discomfort. Safety maintained with side rails up x2, bed locked and in lowest position, call light in reach. Pt educated to call for assistance with ambulation, verbalized understanding. Pt remains free of falls. No further needs expressed at this time.

## 2022-12-18 NOTE — SUBJECTIVE & OBJECTIVE
Interval History:  Notes reviewed, no acute events overnight.  Patient resting comfortably in bed.  She reports right sided sore throat today. She states several of her grandchildren have recently tested positive for strep. Will check rapid strep today. Advised RN to provide chloraseptic spray to pts BS for comfort. Will cont to monitor closely and await ID recs. UC growing gram neg rods with sensitivity pending.      Review of Systems   Constitutional:  Negative for chills, fatigue and fever.   HENT:  Negative for congestion, postnasal drip and trouble swallowing.    Respiratory:  Positive for cough (chronic, unchanged) and shortness of breath (chronic- unchanged). Negative for wheezing.    Cardiovascular:  Negative for chest pain and leg swelling.   Gastrointestinal:  Negative for abdominal pain, diarrhea, nausea and vomiting.   Genitourinary:  Negative for difficulty urinating, dysuria and urgency.   Musculoskeletal:  Negative for arthralgias, back pain and gait problem.   Skin:  Negative for color change, pallor and rash.   Neurological:  Positive for weakness (improved). Negative for dizziness and light-headedness.   Psychiatric/Behavioral:  Negative for agitation, behavioral problems and confusion.    All other systems reviewed and are negative.  Objective:     Vital Signs (Most Recent):  Temp: 98.4 °F (36.9 °C) (12/18/22 1209)  Pulse: (!) 58 (12/18/22 1209)  Resp: 18 (12/18/22 1209)  BP: (!) 147/66 (12/18/22 1209)  SpO2: 96 % (12/18/22 1209)   Vital Signs (24h Range):  Temp:  [97.7 °F (36.5 °C)-98.4 °F (36.9 °C)] 98.4 °F (36.9 °C)  Pulse:  [55-84] 58  Resp:  [16-20] 18  SpO2:  [92 %-96 %] 96 %  BP: (121-182)/(56-77) 147/66     Weight: 94.6 kg (208 lb 8.9 oz)  Body mass index is 35.8 kg/m².    Intake/Output Summary (Last 24 hours) at 12/18/2022 1404  Last data filed at 12/18/2022 1020  Gross per 24 hour   Intake 2068.2 ml   Output 3650 ml   Net -1581.8 ml        Physical Exam  Vitals and nursing note reviewed.    Constitutional:       General: She is not in acute distress.     Appearance: Normal appearance. She is well-developed. She is obese. She is not ill-appearing or diaphoretic.   HENT:      Head: Normocephalic and atraumatic.      Right Ear: External ear normal.      Left Ear: External ear normal.      Nose: Nose normal. No congestion or rhinorrhea.      Mouth/Throat:      Mouth: Mucous membranes are moist.      Pharynx: Oropharynx is clear. No oropharyngeal exudate or posterior oropharyngeal erythema.   Eyes:      General: No scleral icterus.     Extraocular Movements: Extraocular movements intact.      Conjunctiva/sclera: Conjunctivae normal.      Pupils: Pupils are equal, round, and reactive to light.   Neck:      Vascular: No JVD.   Cardiovascular:      Rate and Rhythm: Normal rate and regular rhythm.      Pulses: Normal pulses.      Heart sounds: Normal heart sounds. No murmur heard.  Pulmonary:      Effort: Pulmonary effort is normal. No respiratory distress.      Breath sounds: Normal breath sounds. No stridor. No wheezing, rhonchi or rales.   Abdominal:      General: Abdomen is flat. Bowel sounds are normal. There is no distension.      Palpations: Abdomen is soft.      Tenderness: There is no abdominal tenderness. There is right CVA tenderness.   Musculoskeletal:         General: No swelling or tenderness. Normal range of motion.      Cervical back: Normal range of motion and neck supple.   Skin:     General: Skin is warm and dry.      Capillary Refill: Capillary refill takes 2 to 3 seconds.      Coloration: Skin is not jaundiced or pale.      Findings: No erythema.   Neurological:      General: No focal deficit present.      Mental Status: She is alert and oriented to person, place, and time.      Cranial Nerves: No cranial nerve deficit.      Sensory: No sensory deficit.   Psychiatric:         Mood and Affect: Mood normal.         Behavior: Behavior normal.         Thought Content: Thought content normal.        Significant Labs: All pertinent labs within the past 24 hours have been reviewed.  Blood Culture:   No results for input(s): LABBLOO in the last 48 hours.    CBC:   Recent Labs   Lab 12/17/22  0501 12/18/22  0510   WBC 12.36 10.21   HGB 14.0 14.2   HCT 44.1 44.3    304       CMP:   Recent Labs   Lab 12/17/22  0501 12/18/22  0510    140   K 4.3 4.4   * 109   CO2 20* 22*    139*   BUN 16 10   CREATININE 0.8 0.7   CALCIUM 8.9 9.0   PROT 6.9 7.1   ALBUMIN 2.8* 2.8*   BILITOT 0.5 0.4   ALKPHOS 62 72   AST 13 15   ALT 25 29   ANIONGAP 8 9         Significant Imaging: I have reviewed all pertinent imaging results/findings within the past 24 hours.

## 2022-12-18 NOTE — ASSESSMENT & PLAN NOTE
Due to E coli ESBL: complicated with stones  -resultant bacteremia   -continue IV Merrem  -Consult urology and ID appreciated

## 2022-12-18 NOTE — CARE UPDATE
12/18/22 0715   Patient Assessment/Suction   Level of Consciousness (AVPU) alert   PRE-TX-O2   Device (Oxygen Therapy) room air   SpO2 96 %   Pulse Oximetry Type Intermittent   $ Pulse Oximetry - Multiple Charge Pulse Oximetry - Multiple   Pulse (!) 56   Resp 20   Aerosol Therapy   $ Aerosol Therapy Charges PRN treatment not required   Inhaler   $ Inhaler Charges MDI (Metered Dose Inahler) Treatment;Given With Spacer   Respiratory Treatment Status (Inhaler) given   Treatment Route (Inhaler) mouthpiece;spacer/holding chamber   Patient Position (Inhaler) HOB elevated   Signs of Intolerance (Inhaler) none

## 2022-12-19 ENCOUNTER — TELEPHONE (OUTPATIENT)
Dept: UROLOGY | Facility: CLINIC | Age: 67
End: 2022-12-19
Payer: MEDICARE

## 2022-12-19 VITALS
DIASTOLIC BLOOD PRESSURE: 61 MMHG | WEIGHT: 208.56 LBS | HEART RATE: 67 BPM | BODY MASS INDEX: 35.61 KG/M2 | TEMPERATURE: 97 F | HEIGHT: 64 IN | RESPIRATION RATE: 17 BRPM | OXYGEN SATURATION: 94 % | SYSTOLIC BLOOD PRESSURE: 128 MMHG

## 2022-12-19 LAB
ALBUMIN SERPL BCP-MCNC: 2.9 G/DL (ref 3.5–5.2)
ALP SERPL-CCNC: 69 U/L (ref 55–135)
ALT SERPL W/O P-5'-P-CCNC: 33 U/L (ref 10–44)
ANION GAP SERPL CALC-SCNC: 9 MMOL/L (ref 8–16)
AST SERPL-CCNC: 20 U/L (ref 10–40)
BASOPHILS # BLD AUTO: 0.04 K/UL (ref 0–0.2)
BASOPHILS NFR BLD: 0.4 % (ref 0–1.9)
BILIRUB SERPL-MCNC: 0.5 MG/DL (ref 0.1–1)
BUN SERPL-MCNC: 12 MG/DL (ref 8–23)
CALCIUM SERPL-MCNC: 9.3 MG/DL (ref 8.7–10.5)
CHLORIDE SERPL-SCNC: 110 MMOL/L (ref 95–110)
CO2 SERPL-SCNC: 22 MMOL/L (ref 23–29)
CREAT SERPL-MCNC: 0.8 MG/DL (ref 0.5–1.4)
DIFFERENTIAL METHOD: ABNORMAL
EOSINOPHIL # BLD AUTO: 0.2 K/UL (ref 0–0.5)
EOSINOPHIL NFR BLD: 2.1 % (ref 0–8)
ERYTHROCYTE [DISTWIDTH] IN BLOOD BY AUTOMATED COUNT: 14.6 % (ref 11.5–14.5)
EST. GFR  (NO RACE VARIABLE): >60 ML/MIN/1.73 M^2
GLUCOSE SERPL-MCNC: 107 MG/DL (ref 70–110)
HCT VFR BLD AUTO: 45.6 % (ref 37–48.5)
HGB BLD-MCNC: 14.4 G/DL (ref 12–16)
IMM GRANULOCYTES # BLD AUTO: 0.13 K/UL (ref 0–0.04)
IMM GRANULOCYTES NFR BLD AUTO: 1.3 % (ref 0–0.5)
LYMPHOCYTES # BLD AUTO: 2.6 K/UL (ref 1–4.8)
LYMPHOCYTES NFR BLD: 25 % (ref 18–48)
MCH RBC QN AUTO: 27.4 PG (ref 27–31)
MCHC RBC AUTO-ENTMCNC: 31.6 G/DL (ref 32–36)
MCV RBC AUTO: 87 FL (ref 82–98)
MONOCYTES # BLD AUTO: 0.8 K/UL (ref 0.3–1)
MONOCYTES NFR BLD: 7.9 % (ref 4–15)
NEUTROPHILS # BLD AUTO: 6.6 K/UL (ref 1.8–7.7)
NEUTROPHILS NFR BLD: 63.3 % (ref 38–73)
NRBC BLD-RTO: 0 /100 WBC
PLATELET # BLD AUTO: 313 K/UL (ref 150–450)
PMV BLD AUTO: 9.3 FL (ref 9.2–12.9)
POTASSIUM SERPL-SCNC: 4.3 MMOL/L (ref 3.5–5.1)
PROT SERPL-MCNC: 7.1 G/DL (ref 6–8.4)
RBC # BLD AUTO: 5.25 M/UL (ref 4–5.4)
SODIUM SERPL-SCNC: 141 MMOL/L (ref 136–145)
WBC # BLD AUTO: 10.34 K/UL (ref 3.9–12.7)

## 2022-12-19 PROCEDURE — 85025 COMPLETE CBC W/AUTO DIFF WBC: CPT | Performed by: NURSE PRACTITIONER

## 2022-12-19 PROCEDURE — S4991 NICOTINE PATCH NONLEGEND: HCPCS | Performed by: NURSE PRACTITIONER

## 2022-12-19 PROCEDURE — 94640 AIRWAY INHALATION TREATMENT: CPT

## 2022-12-19 PROCEDURE — 25000003 PHARM REV CODE 250: Performed by: NURSE PRACTITIONER

## 2022-12-19 PROCEDURE — 97116 GAIT TRAINING THERAPY: CPT | Mod: CQ

## 2022-12-19 PROCEDURE — 63600175 PHARM REV CODE 636 W HCPCS: Performed by: NURSE PRACTITIONER

## 2022-12-19 PROCEDURE — 99233 SBSQ HOSP IP/OBS HIGH 50: CPT | Mod: S$GLB,,, | Performed by: STUDENT IN AN ORGANIZED HEALTH CARE EDUCATION/TRAINING PROGRAM

## 2022-12-19 PROCEDURE — 94761 N-INVAS EAR/PLS OXIMETRY MLT: CPT

## 2022-12-19 PROCEDURE — 99233 PR SUBSEQUENT HOSPITAL CARE,LEVL III: ICD-10-PCS | Mod: S$GLB,,, | Performed by: STUDENT IN AN ORGANIZED HEALTH CARE EDUCATION/TRAINING PROGRAM

## 2022-12-19 PROCEDURE — 99406 BEHAV CHNG SMOKING 3-10 MIN: CPT

## 2022-12-19 PROCEDURE — 97535 SELF CARE MNGMENT TRAINING: CPT

## 2022-12-19 PROCEDURE — 99900035 HC TECH TIME PER 15 MIN (STAT)

## 2022-12-19 PROCEDURE — 80053 COMPREHEN METABOLIC PANEL: CPT | Performed by: NURSE PRACTITIONER

## 2022-12-19 PROCEDURE — 36415 COLL VENOUS BLD VENIPUNCTURE: CPT | Performed by: NURSE PRACTITIONER

## 2022-12-19 RX ORDER — ARIPIPRAZOLE 10 MG/1
10 TABLET ORAL DAILY
Qty: 30 TABLET | Refills: 1 | Status: SHIPPED | OUTPATIENT
Start: 2022-12-20 | End: 2022-12-28 | Stop reason: SDUPTHER

## 2022-12-19 RX ADMIN — HYDROCODONE BITARTRATE AND ACETAMINOPHEN 1 TABLET: 10; 325 TABLET ORAL at 04:12

## 2022-12-19 RX ADMIN — ACETAMINOPHEN 1000 MG: 500 TABLET ORAL at 10:12

## 2022-12-19 RX ADMIN — PREGABALIN 50 MG: 25 CAPSULE ORAL at 08:12

## 2022-12-19 RX ADMIN — MEROPENEM AND SODIUM CHLORIDE 1 G: 1 INJECTION, SOLUTION INTRAVENOUS at 12:12

## 2022-12-19 RX ADMIN — BUDESONIDE AND FORMOTEROL FUMARATE DIHYDRATE 2 PUFF: 160; 4.5 AEROSOL RESPIRATORY (INHALATION) at 06:12

## 2022-12-19 RX ADMIN — MEROPENEM AND SODIUM CHLORIDE 1 G: 1 INJECTION, SOLUTION INTRAVENOUS at 08:12

## 2022-12-19 RX ADMIN — ARIPIPRAZOLE 10 MG: 5 TABLET ORAL at 08:12

## 2022-12-19 RX ADMIN — HYDROCODONE BITARTRATE AND ACETAMINOPHEN 1 TABLET: 10; 325 TABLET ORAL at 12:12

## 2022-12-19 RX ADMIN — NICOTINE 1 PATCH: 14 PATCH TRANSDERMAL at 08:12

## 2022-12-19 NOTE — CARE UPDATE
12/19/22 0651   Patient Assessment/Suction   Level of Consciousness (AVPU) alert   PRE-TX-O2   Device (Oxygen Therapy) room air   SpO2 95 %   Pulse Oximetry Type Intermittent   $ Pulse Oximetry - Multiple Charge Pulse Oximetry - Multiple   Pulse (!) 59   Resp 20   Aerosol Therapy   $ Aerosol Therapy Charges PRN treatment not required   Inhaler   $ Inhaler Charges MDI (Metered Dose Inahler) Treatment;Given With Spacer   Respiratory Treatment Status (Inhaler) given   Treatment Route (Inhaler) mouthpiece;spacer/holding chamber   Patient Position (Inhaler) HOB elevated   Signs of Intolerance (Inhaler) none

## 2022-12-19 NOTE — TELEPHONE ENCOUNTER
----- Message from Dot Jewell Jr., MD sent at 12/19/2022 10:25 AM CST -----  Regarding: RE: hospital follow up  Can see the NP. Just hospital pyelonephritis follow up.     Thanks.     ----- Message -----  From: Mars Tijerina MA  Sent: 12/19/2022  10:08 AM CST  To: Dot Jewell Jr., MD  Subject: FW: hospital follow up                           Should patient see you or NP? You consulted with patient on 12/16.   ----- Message -----  From: Araseli Chapin RN  Sent: 12/19/2022   9:48 AM CST  To: [facility] Staff  Subject: hospital follow up                               Good morning!    This pt is discharging today and needs a hospital follow up scheduled within 4 weeks. Pt is discharging with 7 days of IVAB.     Thank you!

## 2022-12-19 NOTE — PLAN OF CARE
Pt clear for DC from case management standpoint. Discharging to home with [facility] and [facility] for home IVAB       12/19/22 1521   Final Note   Assessment Type Final Discharge Note   Anticipated Discharge Disposition Home-Health   Hospital Resources/Appts/Education Provided Appointments scheduled and added to AVS

## 2022-12-19 NOTE — PLAN OF CARE
[facility] accepted pt       12/19/22 7150   Post-Acute Status   Post-Acute Authorization Home Health   Home Health Status Set-up Complete/Auth obtained

## 2022-12-19 NOTE — CARE UPDATE
12/19/22 1209   Tobacco Cessation Intervention   Do you use any type of tobacco product? Yes   Are you interested in quitting use of tobacco products? Not interested   Are you interested in Nicotine Replacement for symptom relief? Yes  (in use)   $ Smoking Cessation Charges Smoking Cessation - Intermediate (CTTS)

## 2022-12-19 NOTE — PLAN OF CARE
Pt also denied by [facility] due to insurance. HH referral sent to several other companies       12/19/22 8540   Post-Acute Status   Post-Acute Authorization Home Health   Home Health Status Referrals Sent

## 2022-12-19 NOTE — PLAN OF CARE
Pt has selected [facility] for [email or web address].   CM sent HH order to [facility] via [email or web address] for review       12/19/22 1312   Post-Acute Status   Post-Acute Authorization Home Health   Home Health Status Referrals Sent   Patient choice form signed by patient/caregiver List from [facility]

## 2022-12-19 NOTE — PLAN OF CARE
Attempted to schedule urology follow up apt with Dr. Rangel per pt request. Office states pt has not been seen there since 2021 and will have to be seen as new pt. Requesting urology referral to be faxed to [phone] along with clinicals     Faxed as requested    Scheduled follow up apt with Urologist here that can def see pt within a month as back up

## 2022-12-19 NOTE — PT/OT/SLP PROGRESS
Occupational Therapy   Treatment    Name: Alma Rosa Helm  MRN: [id number]  Admitting Diagnosis:  E coli bacteremia    Recommendations:     Discharge Recommendations: home  Discharge Equipment Recommendations:  none  Barriers to discharge:  None    Assessment:     Alma Rosa Helm is a 67 y.o. female with a medical diagnosis of E coli bacteremia.  She demonstrates progress and is independent with ADLs and functional transfers.     Rehab Prognosis:  Good; patient would benefit from acute skilled OT services to address these deficits and reach maximum level of function.       Plan:     Patient to be seen 3 x/week to address the above listed problems via self-care/home management, therapeutic activities, therapeutic exercises  Plan of Care Expires: 12/30/22  Plan of Care Reviewed with: patient    Subjective     Pain/Comfort:  Pain Rating 1: 0/10    Objective:     Communicated with: nurse prior to session.  Patient found HOB elevated upon OT entry to room.    General Precautions: Standard, fall    Orthopedic Precautions:N/A  Braces: N/A  Respiratory Status: Room air     Occupational Performance:     Bed Mobility:    Patient completed Scooting/Bridging with independence  Patient completed Supine to Sit with independence  Patient completed Sit to Supine with independence     Functional Mobility/Transfers:  Patient completed Sit <> Stand Transfer with independence with no assistive device   Functional Mobility: Pt ambulated from the bed > bathroom independently with no assistive device.     Activities of Daily Living:  Grooming: Pt brushed her teeth independently standing at the sink.   Toileting: Independent      Patient left HOB elevated with all lines intact and call button in reach.    GOALS:   Multidisciplinary Problems       Occupational Therapy Goals          Problem: Occupational Therapy    Goal Priority Disciplines Outcome Interventions   Occupational Therapy Goal     OT, PT/OT Ongoing, Progressing    Description:  Goals to be met by: 12/30/2022     Patient will increase functional independence with ADLs by performing:    UE Dressing with [place].  LE Dressing with [place].  Grooming with [place].  Toileting from toilet with [place] for hygiene and clothing management.   Toilet transfer to toilet with [place].                         Time Tracking:     OT Date of Treatment: 12/19/22  OT Start Time: 1307  OT Stop Time: 1315  OT Total Time (min): 8 min    Billable Minutes:Self Care/Home Management 8               12/19/2022

## 2022-12-19 NOTE — PROGRESS NOTES
Consult Note  Infectious Disease    Reason for Consult:  Recent ESBL E coli bacteremia    HPI: Alma Rosa Helm is a 67 y.o. female very pleasant, active smoker, with history of bipolar disorder, COPD and bilateral kidney stones with ureteral stent last in 2015.  Who was sent by primary care doctor after finding blood cultures positive for ESBL E coli.  Patient was previously admitted to [facility] 12/10 for pyelonephritis.  She was discharged home on cefdinir and doxycycline.    Patient complaining of generalized weakness, fatigue, denies dysuria although admits to increased urinary frequency, and sometimes incontinence with right flank pain.  She also complains of nausea, vomit, chronic productive cough.  Denies shortness of breath, or change in bowel movements.    In the ER, hemodynamically stable, afebrile  Leukocytosis 13, no left shift, H&H and platelet count stable   Creatinine 0.8   CRP 29.2   Lactic acid 1.2   Procalcitonin 0.1-0.07, normal   UA positive for nitrites, many bacteria, pending culture     ID consult for history of ESBL E coli bacteremia 12/10 at outside hospital.    INTERVAL HISTORY:  12/19: Interim reviewed, patient seen and examined at bedside, c/o some epigastric pain which she states has been there before coming to the hospital, able to walk to the bathroom. Hemodynamically stable, afebrile. Labs reviewed, stable. Micro reviewed, urine culture grew ESBL E coli sensitive to carbapenems. Blood cultures x 2 no growth.       Review of patient's allergies indicates:   Allergen Reactions    Tramadol Swelling    Imitrex [sumatriptan succinate]     Penicillins     Sulfa (sulfonamide antibiotics)     Toradol [ketorolac] Swelling     Past Medical History:   Diagnosis Date    Anxiety     Bipolar disorder     COPD, mild     Nephrolithiasis      Past Surgical History:   Procedure Laterality Date    BACK SURGERY      CYSTOSCOPY      HYSTERECTOMY      LITHOTRIPSY      TUBAL LIGATION        Social History     Tobacco Use    Smoking status: Every Day     Packs/day: 0.75     Years: 45.00     Pack years: 33.75     Types: Cigarettes     Start date:      Last attempt to quit: 2020     Years since quittin.6    Smokeless tobacco: Never    Tobacco comments:     now smoking 1/4 ppd   Substance Use Topics    Alcohol use: Never        Family History   Problem Relation Age of Onset    Cancer Mother     Diabetes Mother     COPD Father     Hypertension Father        Pertinent medications noted:     Review of Systems:   No chills, fever, sweats, weight loss  No change in vision, loss of vision or diplopia  No sinus congestion, purulent nasal discharge, post nasal drip or facial pain  No pain in mouth or throat. No problems with teeth, gums.  No chest pain, palpitations, syncope  Chronic productive cough, shortness of breath, dyspnea on exertion, pleurisy, hemoptysis  No dysphagia, odynophagia  No nausea, vomiting, diarrhea, constipation, blood in stool, or focal abd pain,    Increased urinary frequency, R flank pain, incontinence   No vaginal discharge, genital ulcers, risk for STD  No swelling of joints, redness of joints, injuries, or new focal pain  No unusual headaches, dizziness, vertigo, numbness, paresthesias, neuropathy, falls  No anxiety, depression, substance abuse, sleep disturbance  No bleeding, lymphadenopathy  No new rashes, lesions, or wounds    Outdoor activities:  Lives at home by herself.  Active smoker, no alcohol.  Travel:  None  Implants:  None  Antibiotic History:  Cefdinir and doxycycline.  Meropenem since 12/15    EXAM & DIAGNOSTICS REVIEWED:   Vitals:     Temp:  [97.8 °F (36.6 °C)-98.4 °F (36.9 °C)]   Temp: 97.8 °F (36.6 °C) (22)  Pulse: (!) 59 (22)  Resp: 18 (22)  BP: (!) 122/59 (22)  SpO2: 95 % (22)    Intake/Output Summary (Last 24 hours) at 2022 0909  Last data filed at 2022 0400  Gross per 24 hour   Intake  1151.12 ml   Output 1800 ml   Net -648.88 ml       General:  In NAD. Alert and attentive, cooperative, comfortable on room air  Eyes:  Anicteric, PERRL  ENT:  No ulcers, exudates, thrush, nares patent, edentulous  Neck:  Supple  Lungs: Left base rales  Heart:  S1/S2+, regular rhythm, no murmurs  Abd:  +BS, soft, non tender, non distended, no rebound  :  Voids, R CVA tenderness improved  Musc:  Joints without effusion, swelling,  erythema, synovitis, ambulatory  Skin:  Warm, no rash  Wound:   Neuro:  Following commands, no acute focal deficit   Psych:  Calm, cooperative  Lymphatic:       Extrem: No LE edema b/l  VAD:  Peripheral IV       Isolation: Contact ESBL      General Labs reviewed:  Recent Labs   Lab 12/17/22  0501 12/18/22  0510 12/19/22  0435   WBC 12.36 10.21 10.34   HGB 14.0 14.2 14.4   HCT 44.1 44.3 45.6    304 313       Recent Labs   Lab 12/17/22  0501 12/18/22  0510 12/19/22  0435    140 141   K 4.3 4.4 4.3   * 109 110   CO2 20* 22* 22*   BUN 16 10 12   CREATININE 0.8 0.7 0.8   CALCIUM 8.9 9.0 9.3   PROT 6.9 7.1 7.1   BILITOT 0.5 0.4 0.5   ALKPHOS 62 72 69   ALT 25 29 33   AST 13 15 20     Recent Labs   Lab 12/16/22  0654   CRP 29.2*     No results for input(s): SEDRATE in the last 168 hours.    Estimated Creatinine Clearance: 78.2 mL/min (based on SCr of 0.8 mg/dL).     Prior micro on Care everywhere:   Blood cultures 12/10/22 x2 sets ESBL E coli  Urine culture October 2021 ESBL E coli    Micro:  Microbiology Results (last 7 days)       Procedure Component Value Units Date/Time    Blood culture x two cultures. Draw prior to antibiotics. [[id number]] Collected: 12/15/22 1450    Order Status: Completed Specimen: Blood Updated: 12/19/22 0612     Blood Culture, Routine No Growth to date      No Growth to date      No Growth to date      No Growth to date    Narrative:      Aerobic and anaerobic    Blood culture x two cultures. Draw prior to antibiotics. [[id number]] Collected: 12/15/22  1450    Order Status: Completed Specimen: Blood Updated: 12/19/22 0612     Blood Culture, Routine No Growth to date      No Growth to date      No Growth to date      No Growth to date    Narrative:      Aerobic and anaerobic    Urine culture [[id number]]  (Abnormal)  (Susceptibility) Collected: 12/15/22 1429    Order Status: Completed Specimen: Urine Updated: 12/18/22 2155     Urine Culture, Routine ESCHERICHIA COLI ESBL  >100,000 cfu/ml      Throat Screen, Rapid [[id number]]     Order Status: No result Specimen: Throat     Urine Culture High Risk [[id number]] Collected: 12/16/22 1400    Order Status: Canceled Specimen: Urine     Culture, Respiratory with Gram Stain [[id number]]     Order Status: No result Specimen: Respiratory from Sputum     Clostridium difficile EIA [[id number]]     Order Status: Canceled Specimen: Stool             Imaging Reviewed:  CT renal: There are bilateral nonobstructing renal calculi with the largest stone at seen at the inferior pole on the right measuring 5 mm.     Cardiology:       IMPRESSION & PLAN     H/o recent ESBL E coli (12/10) in the setting of complicated UTI/pyelonephritis b/l kidney stones  Blood cultures x 2 no growth to date, patient was on abx at home   Urine culture same ESBL E coli    2. PMHx: COPD, bipolar    Recommendations:  Upon discharge, Midline to continue Ertapenem 1g IV daily for 7 days   Monitor CBC w/diff, CMP while on antibiotics   Follow up ID clinic/Dr Rosas in 3-4 weeks  Follow up with Urology to address b/l kidney stones  Incentive spirometry  Aspiration precautions     Will sign-off, call us back with any questions     D/w patient, NP, CM    Medical Decision Making during this encounter was  [_] Low Complexity  [_] Moderate Complexity  [xx] High Complexity

## 2022-12-19 NOTE — PT/OT/SLP PROGRESS
Physical Therapy Treatment    Patient Name:  Alma Rosa Helm   MRN:  [id number]    Recommendations:     Discharge Recommendations: home  Discharge Equipment Recommendations: walker, rolling  Barriers to discharge: None    Assessment:     Alma Rosa Helm is a 67 y.o. female admitted with a medical diagnosis of E coli bacteremia.  She presents with the following impairments/functional limitations: weakness, impaired endurance, gait instability, impaired functional mobility, decreased lower extremity function.  Ambulated 250' with RW support, supv-SBA, and reported mildly increased RLE pain due to increased ambulation distance today.  Remained with overall good tolerance and participation. Safe with transfers.     Rehab Prognosis: Good; patient would benefit from acute skilled PT services to address these deficits and reach maximum level of function.    Recent Surgery: * No surgery found *      Plan:     During this hospitalization, patient to be seen 6 x/week to address the identified rehab impairments via gait training, therapeutic activities, therapeutic exercises and progress toward the following goals:    Plan of Care Expires:  12/31/22    Subjective     Chief Complaint: R leg pain after walk  Patient/Family Comments/goals: none expressed  Pain/Comfort:  Pain Rating 1: 0/10  Location - Side 1: Right  Location - Orientation 1: generalized  Location 1: leg  Pain Addressed 1: Nurse notified, Reposition, Distraction, Cessation of Activity  Pain Rating Post-Intervention 1: other (see comments) (unrated)      Objective:     Communicated with nurses Alexander and Kelin prior to session.  Patient found up in chair with telemetry, peripheral IV upon PT entry to room.     General Precautions: Standard, contact, fall  Orthopedic Precautions: N/A  Braces: N/A  Respiratory Status: Room air     Functional Mobility:  Transfers:     Sit to Stand:  modified independence with rolling walker  Gait: 250' with RW, sup-SBA      AM-PAC 6  CLICK MOBILITY          Treatment & Education:      Patient left up in chair with all lines intact, call button in reach, and nurse Kelin notified..    GOALS:   Multidisciplinary Problems       Physical Therapy Goals          Problem: Physical Therapy    Goal Priority Disciplines Outcome Goal Variances Interventions   Physical Therapy Goal     PT, PT/OT Ongoing, Progressing     Description: Goals to be met by: 2022     Patient will increase functional independence with mobility by performin). Supine to sit with Modified [place]  2). Sit to supine with Modified [place]  3). Sit to stand transfer with Modified [place]  4). Gait  x > 150 feet with Modified [place] using Rolling Walker.                          Time Tracking:     PT Received On: 22  PT Start Time: 1131     PT Stop Time: 1139  PT Total Time (min): 8 min     Billable Minutes: Gait Training 8    Treatment Type: Treatment  PT/PTA: PT, PTA     PTA Visit Number: 1     2022

## 2022-12-19 NOTE — PLAN OF CARE
Per Delta with [employer], pt accepted onto their services. States she is off today but will have someone come teach pt today for home IVAB. Pt does have $164 copay for the week of IVAB. States she will have someone call me back to provide update about pt's teaching and if okay with cost       12/19/22 1312   Post-Acute Status   Post-Acute Authorization IV Infusion   Home Health Status  --    Patient choice form signed by patient/caregiver List from [facility]

## 2022-12-19 NOTE — PROGRESS NOTES
Pt d/c education provided, pt verbalized understanding. Tele removed. Per ID, pt to go home with midline to continue IV antibiotics. Discharge instructions in hand. Pt transported via wheelchair down to personal vehicle.

## 2022-12-19 NOTE — PLAN OF CARE
Per insurance website, [facility] in network for home IVAB. CM sent home IVAB order to [facility] via [employer]       12/19/22 5478   Post-Acute Status   Post-Acute Authorization IV Infusion   IV Infusion Status Referral(s) sent

## 2022-12-19 NOTE — PLAN OF CARE
In basket messages sent to Dr. Rosas and Dr. Jewell offices requesting follow up apts be scheduled

## 2022-12-19 NOTE — PLAN OF CARE
Pt not accepted by [facility] due to staffing  [facility]       12/19/22 5044   Post-Acute Status   Post-Acute Authorization Home Health   Home Health Status Referrals Sent

## 2022-12-19 NOTE — PLAN OF CARE
Per Delta with [employer], education has been completed with pt and payment has been worked out. Pt is okay to dC from their standpoint       12/19/22 1511   Post-Acute Status   Post-Acute Authorization IV Infusion   IV Infusion Status Set-up Complete/Auth obtained

## 2022-12-20 ENCOUNTER — PATIENT OUTREACH (OUTPATIENT)
Dept: ADMINISTRATIVE | Facility: CLINIC | Age: 67
End: 2022-12-20
Payer: MEDICARE

## 2022-12-20 ENCOUNTER — TELEPHONE (OUTPATIENT)
Dept: MEDSURG UNIT | Facility: HOSPITAL | Age: 67
End: 2022-12-20
Payer: MEDICARE

## 2022-12-20 NOTE — PROGRESS NOTES
C3 nurse spoke with Alma Rosa Helm's daughter for a TCC post hospital discharge follow up call. The patient has a scheduled [facility] appointment with Rhonda Klein NP on 12/23/22 @ 11am.

## 2022-12-20 NOTE — DISCHARGE SUMMARY
[facility] Medicine  Discharge Summary      Patient Name: Alma Rosa Helm  MRN: [id number]  ANH: [id number]  Patient Class: IP- Inpatient  Admission Date: 12/15/2022  Hospital Length of Stay: 4 days  Discharge Date and Time: 12/19/2022  3:50 PM  Attending Physician: Blanca att. providers found   Discharging Provider: Tabby Khalil NP  Primary Care Provider: Rhonda Klein NP    Primary Care Team: Networked reference to record PCT     HPI:   Alma Rosa Helm is a 67-year-old female with PMHx significant for HTN, COPD, nicotine dependence, kidney stones, and bipolar disease.  She presented to the ED for further evaluation of positive blood cultures drawn 12/10 at outside facility.  Pt was hospitalized at [facility] 12/10-12/12.  She was diagnosed with pyelonephritis and D/C to complete a course of oral cefdinir and doxycycline.  Her blood culture are revealing for E coli ESBL.  Pt reports since discharge she has continued to feel poorly.  Specifically, she notes generalized weakness, malaise, dizziness, and nausea with 1 episode of emesis prior to arrival.  She reports Hx of bacterial infection requiring 8 weeks of IV Abx at home via PICC line.  Review of records from [facility] revealed that she indeed had E coli ESBL at that time due to pyelonephritis with resultant bacteremia and was treated with Merrem.  She denies any chest pain, change in baseline SOB, fever, or chills.  She does note that she is been depressed lately and carries a bipolar diagnosis and is not currently taking any mood stabilizing medications.  She denies any suicidal ideations.  Her case was discussed with ID by ED physician who recommended hospitalization with IV meropenem because of her E coli ESBL bacteremia.  She was admitted to the [facility] Medicine for further evaluation management.  Other pertinent medical Hx as below:        * No surgery found *      Hospital Course:   Pt  was monitored closely during her stay.  She was initiated on IV antibiotic therapy.  PT/OT was consulted.  Her case was discussed with Infectious Disease and Urology was consulted to evaluate for complicated UTI with nephrolithiasis and recurrence resulting in bacteremia.  Pt felt symptomatically improved.  She has depression with bipolar disease and is not currently medicated.  We discussed tele psych consultation with which she was agreeable.  Tele psych recommended initiation of Abilify which was initiated on 12/16 and pt tolerated medication well. Outside records were obtained and revealed ESBL E coli bacteremia.  Repeat blood cultures were obtained and remained negative.  Repeat urine culture obtained and grew ESBL E coli sensitive to ertapenem.  ID recommended mid line for continued IV antibiotics at home to complete full treatment.  Patient underwent midline placement to left UE. Patient has received outpatient IV antibiotics in the past and was comfortable with this on d/c. CM was consulted and arranged outpatient IV antibiotics and home health.  Patient verbalized understanding of discharge instructions and return precautions.    Physical exam:  Awake alert oriented x3, pleasant, no acute distress   Heart-regular rate rhythm, no edema   Lungs-Clear to auscultation bilaterally, respirations even unlabored   Abdomen-soft nontender normoactive bowel sounds       Goals of Care Treatment Preferences:  Code Status: Full Code      Consults:   Consults (From admission, onward)        Status Ordering Provider     Inpatient consult to Urology  Once        Provider:  MD Casey Amaya Jr., JESSICA M.     Inpatient consult to Infectious Diseases  Once        Provider:  MD Casey Guillen JILL M.     Inpatient consult to Telemedicine - Psych  Once        Provider:  DO Casey Starks JESSICA M.          No new Assessment & Plan notes have been filed  under this hospital service since the last note was generated.  Service: Hospital Medicine    Final Active Diagnoses:    Diagnosis Date Noted POA    PRINCIPAL PROBLEM:  E coli bacteremia [R78.81, B96.20] 12/15/2022 Yes    Pyelonephritis [N12] 11/08/2020 Yes    Nephrolithiasis [N20.0] 01/26/2016 Yes    COPD (chronic obstructive pulmonary disease) [J44.9] 12/14/2015 Yes     Chronic    Bipolar disorder [F31.9] 12/14/2015 Yes     Chronic    Nicotine addiction [F17.200] 12/14/2015 Yes     Chronic    Hypertension, essential, benign [I10] 01/23/2012 Yes      Problems Resolved During this Admission:       Discharged Condition: good    Disposition: Home or Self Care    Follow Up:   Follow-up Information     Rhonda Klein NP Follow up on 12/23/2022.    Specialty: Family Medicine  Why: at 11 AM for hospital follow up  Contact information:  [address] [facility] [address]  [place] [address]  [phone]             Ivette Herrera MD Follow up on 1/19/2023.    Specialty: Infectious Diseases  Why: at 4 PM for hospital follow up  Contact information:  [address] [facility]  [address]  [facility] [address] [phone]             Kiara Rangel MD Follow up.    Specialty: Urology  Why: [place] office: [address] [place] [address] [address] [phone]    they would not let me schedule- i faxed referral to them at [phone]. they will call you to schedule but if you do not hear from them by end of week, please call to follow up on referral. states its been over a year since you last saw this MD so have to be new patient again.  Contact information:  [address]  [place] [address]  [phone]             [facility] Follow up.    Specialty: Home Health Services  Why: where your home IV antibiotic is from  Contact information:  [address] [facility] [address]  [phone]             Leyla Martin NP Follow up on 1/18/2023.    Specialty: Urology  Why: back up urology apt. you can cancel if  "able to get close apt with the urologist you want.     apt 1/18 at 11 AM  Contact information:  [address] [place] [address] Dr Michel Zane  [facility] [address]  [phone]             [facility] Follow up.    Specialties: Home Health Services, Home Therapy Services, Home Living Aide Services  Why: [facility] is not able to accept due to insurance  [facility] accepted and will see you tomorrow for home health  Contact information:  [address]  [facility] [address] [phone]                     Patient Instructions:      WALKER FOR HOME USE     Order Specific Question Answer Comments   Type of Walker: Adult (5'4"-6'6")    With wheels? Yes    Height: 5' 4" (1.626 m)    Weight: 94.1 kg (207 lb 7.3 oz)    Length of need (1-99 months): 99    Does patient have medical equipment at home? none    Please check all that apply: Patient's condition impairs ambulation.      Ambulatory referral/consult to Home Health   Standing Status: Future   Referral Priority: Routine Referral Type: Home Health Care   Referral Reason: Specialty Services Required   Requested Specialty: Home Health Services   Number of Visits Requested: 1     Ambulatory referral/consult to Urology   Standing Status: Future   Referral Priority: Urgent Referral Type: Consultation   Referral Reason: Specialty Services Required   Requested Specialty: Urology   Number of Visits Requested: 1     Diet Cardiac     Notify your health care provider if you experience any of the following:  temperature >100.4     Notify your health care provider if you experience any of the following:  persistent nausea and vomiting or diarrhea     Notify your health care provider if you experience any of the following:  severe uncontrolled pain     Notify your health care provider if you experience any of the following:  difficulty breathing or increased cough     Notify your health care provider if you experience any of the following:  persistent dizziness, " light-headedness, or visual disturbances     Notify your health care provider if you experience any of the following:  increased confusion or weakness     Activity as tolerated       Significant Diagnostic Studies: Labs:   CMP   Recent Labs   Lab 12/19/22  0435      K 4.3      CO2 22*      BUN 12   CREATININE 0.8   CALCIUM 9.3   PROT 7.1   ALBUMIN 2.9*   BILITOT 0.5   ALKPHOS 69   AST 20   ALT 33   ANIONGAP 9   , CBC   Recent Labs   Lab 12/19/22  0435   WBC 10.34   HGB 14.4   HCT 45.6       and All labs within the past 24 hours have been reviewed    Pending Diagnostic Studies:     None         Medications:  Reconciled Home Medications:      Medication List      START taking these medications    albuterol-ipratropium 2.5 mg-0.5 mg/3 mL nebulizer solution  Commonly known as: DUO-NEB  Take 3 mLs by nebulization every 6 (six) hours as needed for Wheezing. Rescue     ARIPiprazole 10 MG Tab  Commonly known as: ABILIFY  Take 1 tablet (10 mg total) by mouth once daily.     ERTAPENEM (INVANZ) 1 G/100 ML NS (READY TO MIX)  Inject 100 mLs (1 g total) into the vein once daily. for 7 days        CONTINUE taking these medications    * albuterol 1.25 mg/3 mL Nebu  Commonly known as: ACCUNEB  Inhale 1.25 mg into the lungs.     * albuterol 90 mcg/actuation inhaler  Commonly known as: PROVENTIL/VENTOLIN HFA  Inhale 2 puffs into the lungs every 6 (six) hours as needed.     alendronate 70 MG tablet  Commonly known as: FOSAMAX  Take one tablet weekly.     budesonide-formoterol 160-4.5 mcg 160-4.5 mcg/actuation Hfaa  Commonly known as: SYMBICORT  Inhale 2 puffs into the lungs every 12 (twelve) hours. Controller     cyclobenzaprine 5 MG tablet  Commonly known as: FLEXERIL  Take 5 mg by mouth 2 (two) times a day.     diclofenac 75 MG EC tablet  Commonly known as: VOLTAREN  Take 75 mg by mouth 2 (two) times daily.     HYDROcodone-acetaminophen  mg per tablet  Commonly known as: NORCO  Take 1 tablet by mouth 2  (two) times daily as needed.     metoprolol tartrate 25 MG tablet  Commonly known as: LOPRESSOR  Take 1 tablet (25 mg total) by mouth Daily.     mupirocin 2 % ointment  Commonly known as: BACTROBAN  mupirocin 2 % topical ointment   APPLY TO THE AFFECTED AREA OF THE TOENAIL TWICE DAILY FOR 3 WEEKS     ondansetron 4 MG Tbdl  Commonly known as: ZOFRAN-ODT  Take 2 tablets (8 mg total) by mouth every 6 (six) hours as needed (Vertigo).     oxybutynin 5 MG Tr24  Commonly known as: DITROPAN-XL  Take 1 tablet (5 mg total) by mouth once daily.     pravastatin 40 MG tablet  Commonly known as: PRAVACHOL  Take 1 tablet (40 mg total) by mouth nightly.     pregabalin 50 MG capsule  Commonly known as: LYRICA  Take 50 mg by mouth 3 (three) times daily.     promethazine-dextromethorphan 6.25-15 mg/5 mL Syrp  Commonly known as: PROMETHAZINE-DM  promethazine-DM 6.25 mg-15 mg/5 mL oral syrup   TAKE 5 ML BY MOUTH FOUR TIMES DAILY AS NEEDED         * This list has 2 medication(s) that are the same as other medications prescribed for you. Read the directions carefully, and ask your doctor or other care provider to review them with you.            STOP taking these medications    cefdinir 300 MG capsule  Commonly known as: OMNICEF     minocycline 100 MG capsule  Commonly known as: MINOCIN,DYNACIN            Indwelling Lines/Drains at time of discharge:   Lines/Drains/Airways     None                 Time spent on the discharge of patient: 56 minutes         Tabby Khalil NP  Department of Hospital Medicine  [facility]

## 2022-12-21 LAB
BACTERIA BLD CULT: NORMAL
BACTERIA BLD CULT: NORMAL

## 2022-12-28 ENCOUNTER — DOCUMENTATION ONLY (OUTPATIENT)
Dept: INFECTIOUS DISEASES | Facility: CLINIC | Age: 67
End: 2022-12-28

## 2022-12-28 ENCOUNTER — PATIENT MESSAGE (OUTPATIENT)
Dept: FAMILY MEDICINE | Facility: CLINIC | Age: 67
End: 2022-12-28

## 2022-12-28 ENCOUNTER — TELEPHONE (OUTPATIENT)
Dept: FAMILY MEDICINE | Facility: CLINIC | Age: 67
End: 2022-12-28

## 2022-12-28 ENCOUNTER — OFFICE VISIT (OUTPATIENT)
Dept: FAMILY MEDICINE | Facility: CLINIC | Age: 67
End: 2022-12-28
Payer: COMMERCIAL

## 2022-12-28 ENCOUNTER — TELEPHONE (OUTPATIENT)
Dept: INFECTIOUS DISEASES | Facility: CLINIC | Age: 67
End: 2022-12-28

## 2022-12-28 ENCOUNTER — PATIENT OUTREACH (OUTPATIENT)
Dept: ADMINISTRATIVE | Facility: HOSPITAL | Age: 67
End: 2022-12-28
Payer: MEDICARE

## 2022-12-28 VITALS
TEMPERATURE: 98 F | WEIGHT: 204.38 LBS | DIASTOLIC BLOOD PRESSURE: 72 MMHG | HEIGHT: 64 IN | OXYGEN SATURATION: 95 % | HEART RATE: 90 BPM | BODY MASS INDEX: 34.89 KG/M2 | SYSTOLIC BLOOD PRESSURE: 126 MMHG

## 2022-12-28 DIAGNOSIS — F31.75 BIPOLAR DISORDER, IN PARTIAL REMISSION, MOST RECENT EPISODE DEPRESSED: Primary | Chronic | ICD-10-CM

## 2022-12-28 DIAGNOSIS — B96.20 E COLI BACTEREMIA: ICD-10-CM

## 2022-12-28 DIAGNOSIS — R78.81 E COLI BACTEREMIA: ICD-10-CM

## 2022-12-28 DIAGNOSIS — F17.210 CIGARETTE NICOTINE DEPENDENCE WITHOUT COMPLICATION: Chronic | ICD-10-CM

## 2022-12-28 DIAGNOSIS — I10 HYPERTENSION, ESSENTIAL, BENIGN: ICD-10-CM

## 2022-12-28 DIAGNOSIS — J44.9 CHRONIC OBSTRUCTIVE PULMONARY DISEASE, UNSPECIFIED COPD TYPE: Chronic | ICD-10-CM

## 2022-12-28 DIAGNOSIS — N39.0 E-COLI UTI: ICD-10-CM

## 2022-12-28 DIAGNOSIS — B96.20 E-COLI UTI: ICD-10-CM

## 2022-12-28 PROCEDURE — 3288F PR FALLS RISK ASSESSMENT DOCUMENTED: ICD-10-PCS | Mod: CPTII,,,

## 2022-12-28 PROCEDURE — 3044F PR MOST RECENT HEMOGLOBIN A1C LEVEL <7.0%: ICD-10-PCS | Mod: CPTII,,,

## 2022-12-28 PROCEDURE — 99214 PR OFFICE/OUTPT VISIT, EST, LEVL IV, 30-39 MIN: ICD-10-PCS | Mod: ,,,

## 2022-12-28 PROCEDURE — 1101F PT FALLS ASSESS-DOCD LE1/YR: CPT | Mod: CPTII,,,

## 2022-12-28 PROCEDURE — 3078F PR MOST RECENT DIASTOLIC BLOOD PRESSURE < 80 MM HG: ICD-10-PCS | Mod: CPTII,,,

## 2022-12-28 PROCEDURE — 1101F PR PT FALLS ASSESS DOC 0-1 FALLS W/OUT INJ PAST YR: ICD-10-PCS | Mod: CPTII,,,

## 2022-12-28 PROCEDURE — 3288F FALL RISK ASSESSMENT DOCD: CPT | Mod: CPTII,,,

## 2022-12-28 PROCEDURE — 1160F RVW MEDS BY RX/DR IN RCRD: CPT | Mod: CPTII,,,

## 2022-12-28 PROCEDURE — 99214 OFFICE O/P EST MOD 30 MIN: CPT | Mod: ,,,

## 2022-12-28 PROCEDURE — 3078F DIAST BP <80 MM HG: CPT | Mod: CPTII,,,

## 2022-12-28 PROCEDURE — 1111F PR DISCHARGE MEDS RECONCILED W/ CURRENT OUTPATIENT MED LIST: ICD-10-PCS | Mod: CPTII,,,

## 2022-12-28 PROCEDURE — 1159F PR MEDICATION LIST DOCUMENTED IN MEDICAL RECORD: ICD-10-PCS | Mod: CPTII,,,

## 2022-12-28 PROCEDURE — 3074F PR MOST RECENT SYSTOLIC BLOOD PRESSURE < 130 MM HG: ICD-10-PCS | Mod: CPTII,,,

## 2022-12-28 PROCEDURE — 3008F PR BODY MASS INDEX (BMI) DOCUMENTED: ICD-10-PCS | Mod: CPTII,,,

## 2022-12-28 PROCEDURE — 1111F DSCHRG MED/CURRENT MED MERGE: CPT | Mod: CPTII,,,

## 2022-12-28 PROCEDURE — 1160F PR REVIEW ALL MEDS BY PRESCRIBER/CLIN PHARMACIST DOCUMENTED: ICD-10-PCS | Mod: CPTII,,,

## 2022-12-28 PROCEDURE — 3074F SYST BP LT 130 MM HG: CPT | Mod: CPTII,,,

## 2022-12-28 PROCEDURE — 1159F MED LIST DOCD IN RCRD: CPT | Mod: CPTII,,,

## 2022-12-28 PROCEDURE — 3008F BODY MASS INDEX DOCD: CPT | Mod: CPTII,,,

## 2022-12-28 PROCEDURE — 3044F HG A1C LEVEL LT 7.0%: CPT | Mod: CPTII,,,

## 2022-12-28 RX ORDER — ARIPIPRAZOLE 10 MG/1
10 TABLET ORAL DAILY
Qty: 30 TABLET | Refills: 1 | Status: SHIPPED | OUTPATIENT
Start: 2022-12-28 | End: 2022-12-29 | Stop reason: SDUPTHER

## 2022-12-28 RX ORDER — CLOTRIMAZOLE 1 %
CREAM (GRAM) TOPICAL 2 TIMES DAILY
Qty: 24 G | Refills: 0 | Status: SHIPPED | OUTPATIENT
Start: 2022-12-28 | End: 2023-02-09

## 2022-12-28 NOTE — TELEPHONE ENCOUNTER
----- Message from Alta Vila sent at 12/28/2022  1:28 PM CST -----  Contact: Ana Maria  Type: Needs Medical Advice  Who Called: Ana Maria with [facility]   Symptoms (please be specific):   How long has patient had these symptoms:    Pharmacy name and phone #:    Best Call Back Number: [phone]  Additional Information: Pt has completed her antibiotics and nurse wants to know do Rhonda want her  picc line removed.

## 2022-12-28 NOTE — TELEPHONE ENCOUNTER
Please remove line.      Pt has completed her antibiotics and nurse wants to know is PCP would like the Pic Line removed.     Please advise

## 2022-12-28 NOTE — TELEPHONE ENCOUNTER
I spoke with patient 's daughter and Mr Jayant ( pharmacist  With [facility] ) and Ms Autumn ( nurse with [facility])  to advise IV abx can be stopped and PICC can be removed; per Dr Rosas's orders      [facility]  12/28/22      ----- Message from Ivette Herrera MD sent at 12/28/2022  3:30 PM CST -----  Contact: pt at [phone]  Makeda, please let  they can remove the line and she can go back to work whenever she feels like it.  ----- Message -----  From: Makeda Parada MA  Sent: 12/28/2022   3:11 PM CST  To: Ivette Herrera MD      ----- Message -----  From: Marlee Parada  Sent: 12/28/2022   8:39 AM CST  To: [facility] Staff    Pt is calling the office to speak to the nurse regarding when she goes back to work it's URGENT she speaks to the nurse today ASAP. Please call back and advise.

## 2022-12-28 NOTE — PROGRESS NOTES
Subjective:       Patient ID: Alma Rosa Helm is a 67 y.o. female.    Chief Complaint: Hospital Follow Up (Positive blood cultures . Has pick line. Took last dose of antibiotics yesterday), Bladder Pain (Has improved but does continue some. No fever ), Fatigue (Hospital ordered p.t. started it yesterday), and Depression (Uncontrolled at present time . No longer on abilify and asking for either a referral or medication states abilify not covered on her insurance)    Patient presents to the clinic for a hospital follow up.   Transitional Care Note    Family and/or Caretaker present at visit?  Yes.  Diagnostic tests reviewed/disposition: No diagnosic tests pending after this hospitalization.  Disease/illness education: Bacteremia, UTI  Home health/community services discussion/referrals: Patient has home health established at [facility].   Establishment or re-establishment of referral orders for community resources: No other necessary community resources.   Discussion with other health care providers: No discussion with other health care providers necessary.       Admission Date: 12/15/2022  Hospital Length of Stay: 4 days  Discharge Date and Time: 12/19/2022  3:50 PM  Attending Physician: No att. providers found   Discharging Provider: Tabby Khalil NP  Primary Care Provider: Rhonda Klein NP     Primary Care Team: Networked reference to record PCT      HPI:   Alma Rosa Helm is a 67-year-old female with PMHx significant for HTN, COPD, nicotine dependence, kidney stones, and bipolar disease.  She presented to the ED for further evaluation of positive blood cultures drawn 12/10 at outside facility.  Pt was hospitalized at [facility] 12/10-12/12.  She was diagnosed with pyelonephritis and D/C to complete a course of oral cefdinir and doxycycline.  Her blood culture are revealing for E coli ESBL.  Pt reports since discharge she has continued to feel poorly.  Specifically, she notes generalized  weakness, malaise, dizziness, and nausea with 1 episode of emesis prior to arrival.  She reports Hx of bacterial infection requiring 8 weeks of IV Abx at home via PICC line.  Review of records from [facility] revealed that she indeed had E coli ESBL at that time due to pyelonephritis with resultant bacteremia and was treated with Merrem.  She denies any chest pain, change in baseline SOB, fever, or chills.  She does note that she is been depressed lately and carries a bipolar diagnosis and is not currently taking any mood stabilizing medications.  She denies any suicidal ideations.  Her case was discussed with ID by ED physician who recommended hospitalization with IV meropenem because of her E coli ESBL bacteremia.  She was admitted to the [facility] Medicine for further evaluation management.  Other pertinent medical Hx as below:           * No surgery found *       Hospital Course:   Pt was monitored closely during her stay.  She was initiated on IV antibiotic therapy.  PT/OT was consulted.  Her case was discussed with Infectious Disease and Urology was consulted to evaluate for complicated UTI with nephrolithiasis and recurrence resulting in bacteremia.  Pt felt symptomatically improved.  She has depression with bipolar disease and is not currently medicated.  We discussed tele psych consultation with which she was agreeable.  Tele psych recommended initiation of Abilify which was initiated on 12/16 and pt tolerated medication well. Outside records were obtained and revealed ESBL E coli bacteremia.  Repeat blood cultures were obtained and remained negative.  Repeat urine culture obtained and grew ESBL E coli sensitive to ertapenem.  ID recommended mid line for continued IV antibiotics at home to complete full treatment.  Patient underwent midline placement to left UE. Patient has received outpatient IV antibiotics in the past and was comfortable with this on d/c. CM was consulted and  arranged outpatient IV antibiotics and home health.  Patient verbalized understanding of discharge instructions and return precautions.      Patient presents today with her son present. She continues to have the midline line to left upper arm. She states she completed the IV antibiotics today. She is current with home health and getting [facility] with nursing and PT. She continues to have weakness. She also was started on Abilify in the hospital but states it was too expensive.     Patient educated on plan of care, verbalized understanding.        Review of Systems   Constitutional:  Positive for fatigue. Negative for activity change, appetite change, chills, diaphoresis and fever.   HENT:  Negative for congestion, ear pain, postnasal drip, sinus pressure, sneezing and sore throat.    Eyes:  Negative for pain, discharge, redness and itching.   Respiratory:  Negative for apnea, cough, chest tightness, shortness of breath and wheezing.    Cardiovascular:  Negative for chest pain and leg swelling.   Gastrointestinal:  Negative for abdominal distention, abdominal pain, constipation, diarrhea, nausea and vomiting.   Genitourinary:  Negative for difficulty urinating, dysuria, flank pain and frequency.   Musculoskeletal:  Positive for arthralgias and myalgias.   Skin:  Negative for color change, rash and wound.   Neurological:  Positive for weakness. Negative for dizziness.   Psychiatric/Behavioral:  Positive for dysphoric mood.    All other systems reviewed and are negative.    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   Constitutional:       General: She is not in acute distress.     Appearance: Normal appearance. She is well-developed.      Comments: Midline to left upper arm-dressing clean, dry, and intact.    HENT:      Head: Normocephalic.      Nose: Nose normal.   Eyes:      Conjunctiva/sclera: Conjunctivae normal.      Pupils: Pupils are equal, round, and reactive to light.   Cardiovascular:      Rate and Rhythm: Normal rate and regular rhythm.      Heart sounds: Normal heart sounds.   Pulmonary:      Effort: Pulmonary effort is normal. No respiratory distress.      Breath sounds: Normal breath sounds.   Musculoskeletal:      Cervical back: Normal range of motion and neck supple.   Skin:     General: Skin is warm and dry.      Findings: Rash present. Rash is scaling.             Comments: Small nickel sized scaling pink area noted.    Neurological:      Mental Status: She is alert and oriented to person, place, and time.   Psychiatric:         Behavior: Behavior normal.       Lab Results   Component Value Date    WBC 10.34 12/19/2022    HGB 14.4 12/19/2022    HCT 45.6 12/19/2022     12/19/2022    ALT 33 12/19/2022    AST 20 12/19/2022     12/19/2022    K 4.3 12/19/2022     12/19/2022    CREATININE 0.8 12/19/2022    BUN 12 12/19/2022    CO2 22 (L) 12/19/2022    TSH 0.545 10/04/2022    INR 0.9 12/11/2020    HGBA1C 5.9 (H) 04/18/2022     The ASCVD Risk score (Giselle DK, et al., 2019) failed to calculate for the following reasons:    Cannot find a previous HDL lab    Cannot find a previous total cholesterol lab  Visit Vitals  /72 (BP Location: Right arm, Patient Position:  "Sitting, BP Method: Medium (Manual))   Pulse 90   Temp 97.6 °F (36.4 °C) (Temporal)   Ht 5' 4" (1.626 m)   Wt 92.7 kg (204 lb 5.9 oz)   SpO2 95%   BMI 35.08 kg/m²      Assessment:       1. Bipolar disorder, in partial remission, most recent episode depressed    2. Hypertension, essential, benign    3. E coli bacteremia    4. E-coli UTI    5. Chronic obstructive pulmonary disease, unspecified COPD type    6. Cigarette nicotine dependence without complication        Plan:       1. Bipolar disorder, in partial remission, most recent episode depressed  -     ARIPiprazole (ABILIFY) 10 MG Tab; Take 1 tablet (10 mg total) by mouth once daily.  Dispense: 30 tablet; Refill: 1    2. Hypertension, essential, benign   - Stable   - Continue current plan of care    3. E coli bacteremia/E-coli UTI   - Improved   - Follow up with Urology    4. Chronic obstructive pulmonary disease, unspecified COPD type   - Stable   - Continue current plan of care    5. Cigarette nicotine dependence without complication   - Patient counseled on smoking cessation.     Other orders  -     clotrimazole (LOTRIMIN) 1 % cream; Apply topically 2 (two) times daily.  Dispense: 24 g; Refill: 0       Follow up in about 1 month (around 1/28/2023), or if symptoms worsen or fail to improve.      Future Appointments       Date Provider Specialty Appt Notes    1/18/2023 Leyla Martin NP Urology 4 week hospital follow up     1/26/2023 Koko Garrison MD Otolaryngology 6 wk f/u laryngoscopy w/ biopsy done 10/25/22    1/30/2023 Rhonda Klein NP Family Medicine 1 month follow up    2/9/2023 Rhonda Klein NP Family Medicine 3 month follow up    4/5/2023  Lab b    4/12/2023 CLAUDIA Burris PA-C Endocrinology 6 month f/u thyroid             "

## 2022-12-28 NOTE — LETTER
December 28, 2022      [address]  [place] [address]  Phone: [phone]  Fax: [phone]       Patient: Alma Rosa Helm   YOB: 1955  Date of Visit: 12/28/2022    To Whom It May Concern:    Rachid Helm  was at [facility] on 12/28/2022. The patient may not return to at this time. She will be seen back in the clinic in 4 weeks for re-evaluation. If you have any questions or concerns, or if I can be of further assistance, please do not hesitate to contact me.    Sincerely,    Rhonda Klein, NP

## 2022-12-29 ENCOUNTER — PATIENT MESSAGE (OUTPATIENT)
Dept: FAMILY MEDICINE | Facility: CLINIC | Age: 67
End: 2022-12-29
Payer: MEDICARE

## 2022-12-29 RX ORDER — ARIPIPRAZOLE 10 MG/1
10 TABLET ORAL DAILY
Qty: 30 TABLET | Refills: 1 | Status: SHIPPED | OUTPATIENT
Start: 2022-12-29 | End: 2023-06-28

## 2022-12-30 ENCOUNTER — PATIENT MESSAGE (OUTPATIENT)
Dept: FAMILY MEDICINE | Facility: CLINIC | Age: 67
End: 2022-12-30
Payer: MEDICARE

## 2023-01-25 ENCOUNTER — EXTERNAL HOME HEALTH (OUTPATIENT)
Dept: HOME HEALTH SERVICES | Facility: HOSPITAL | Age: 68
End: 2023-01-25
Payer: MEDICARE

## 2023-01-30 ENCOUNTER — TELEPHONE (OUTPATIENT)
Dept: FAMILY MEDICINE | Facility: CLINIC | Age: 68
End: 2023-01-30
Payer: MEDICARE

## 2023-01-31 ENCOUNTER — DOCUMENT SCAN (OUTPATIENT)
Dept: HOME HEALTH SERVICES | Facility: HOSPITAL | Age: 68
End: 2023-01-31
Payer: MEDICARE

## 2023-02-09 ENCOUNTER — OFFICE VISIT (OUTPATIENT)
Dept: FAMILY MEDICINE | Facility: CLINIC | Age: 68
End: 2023-02-09
Payer: COMMERCIAL

## 2023-02-09 VITALS
OXYGEN SATURATION: 96 % | HEIGHT: 64 IN | HEART RATE: 76 BPM | WEIGHT: 203.25 LBS | TEMPERATURE: 98 F | SYSTOLIC BLOOD PRESSURE: 110 MMHG | DIASTOLIC BLOOD PRESSURE: 70 MMHG | BODY MASS INDEX: 34.7 KG/M2

## 2023-02-09 DIAGNOSIS — F31.75 BIPOLAR DISORDER, IN PARTIAL REMISSION, MOST RECENT EPISODE DEPRESSED: ICD-10-CM

## 2023-02-09 DIAGNOSIS — Z12.11 COLON CANCER SCREENING: ICD-10-CM

## 2023-02-09 DIAGNOSIS — E66.01 SEVERE OBESITY (BMI 35.0-39.9) WITH COMORBIDITY: ICD-10-CM

## 2023-02-09 DIAGNOSIS — Z12.31 ENCOUNTER FOR SCREENING MAMMOGRAM FOR MALIGNANT NEOPLASM OF BREAST: ICD-10-CM

## 2023-02-09 DIAGNOSIS — J44.9 CHRONIC OBSTRUCTIVE PULMONARY DISEASE, UNSPECIFIED COPD TYPE: Chronic | ICD-10-CM

## 2023-02-09 DIAGNOSIS — E27.8 ADRENAL NODULE: ICD-10-CM

## 2023-02-09 DIAGNOSIS — I10 HYPERTENSION, ESSENTIAL, BENIGN: Primary | ICD-10-CM

## 2023-02-09 DIAGNOSIS — M79.89 LEG SWELLING: ICD-10-CM

## 2023-02-09 DIAGNOSIS — E04.1 NODULAR THYROID DISEASE: ICD-10-CM

## 2023-02-09 DIAGNOSIS — J37.0 CHRONIC LARYNGITIS: ICD-10-CM

## 2023-02-09 DIAGNOSIS — Z12.2 ENCOUNTER FOR SCREENING FOR LUNG CANCER: ICD-10-CM

## 2023-02-09 PROBLEM — E27.9 ADRENAL NODULE: Status: ACTIVE | Noted: 2023-02-09

## 2023-02-09 PROCEDURE — 1160F PR REVIEW ALL MEDS BY PRESCRIBER/CLIN PHARMACIST DOCUMENTED: ICD-10-PCS | Mod: CPTII,,,

## 2023-02-09 PROCEDURE — 1160F RVW MEDS BY RX/DR IN RCRD: CPT | Mod: CPTII,,,

## 2023-02-09 PROCEDURE — 3074F SYST BP LT 130 MM HG: CPT | Mod: CPTII,,,

## 2023-02-09 PROCEDURE — 3008F PR BODY MASS INDEX (BMI) DOCUMENTED: ICD-10-PCS | Mod: CPTII,,,

## 2023-02-09 PROCEDURE — 1126F PR PAIN SEVERITY QUANTIFIED, NO PAIN PRESENT: ICD-10-PCS | Mod: CPTII,,,

## 2023-02-09 PROCEDURE — 3288F FALL RISK ASSESSMENT DOCD: CPT | Mod: CPTII,,,

## 2023-02-09 PROCEDURE — 3074F PR MOST RECENT SYSTOLIC BLOOD PRESSURE < 130 MM HG: ICD-10-PCS | Mod: CPTII,,,

## 2023-02-09 PROCEDURE — 3008F BODY MASS INDEX DOCD: CPT | Mod: CPTII,,,

## 2023-02-09 PROCEDURE — 3078F DIAST BP <80 MM HG: CPT | Mod: CPTII,,,

## 2023-02-09 PROCEDURE — 1159F PR MEDICATION LIST DOCUMENTED IN MEDICAL RECORD: ICD-10-PCS | Mod: CPTII,,,

## 2023-02-09 PROCEDURE — 3078F PR MOST RECENT DIASTOLIC BLOOD PRESSURE < 80 MM HG: ICD-10-PCS | Mod: CPTII,,,

## 2023-02-09 PROCEDURE — 99214 OFFICE O/P EST MOD 30 MIN: CPT | Mod: ,,,

## 2023-02-09 PROCEDURE — 99214 PR OFFICE/OUTPT VISIT, EST, LEVL IV, 30-39 MIN: ICD-10-PCS | Mod: ,,,

## 2023-02-09 PROCEDURE — 1126F AMNT PAIN NOTED NONE PRSNT: CPT | Mod: CPTII,,,

## 2023-02-09 PROCEDURE — 1101F PR PT FALLS ASSESS DOC 0-1 FALLS W/OUT INJ PAST YR: ICD-10-PCS | Mod: CPTII,,,

## 2023-02-09 PROCEDURE — 1159F MED LIST DOCD IN RCRD: CPT | Mod: CPTII,,,

## 2023-02-09 PROCEDURE — 3288F PR FALLS RISK ASSESSMENT DOCUMENTED: ICD-10-PCS | Mod: CPTII,,,

## 2023-02-09 PROCEDURE — 1101F PT FALLS ASSESS-DOCD LE1/YR: CPT | Mod: CPTII,,,

## 2023-02-09 RX ORDER — ALBUTEROL SULFATE 90 UG/1
2 AEROSOL, METERED RESPIRATORY (INHALATION) EVERY 6 HOURS PRN
Qty: 8 G | Refills: 3 | Status: SHIPPED | OUTPATIENT
Start: 2023-02-09 | End: 2023-10-05

## 2023-02-09 RX ORDER — HYDROCHLOROTHIAZIDE 12.5 MG/1
12.5 TABLET ORAL DAILY
Qty: 30 TABLET | Refills: 0 | Status: SHIPPED | OUTPATIENT
Start: 2023-02-09 | End: 2023-02-28

## 2023-02-09 RX ORDER — BUDESONIDE AND FORMOTEROL FUMARATE DIHYDRATE 160; 4.5 UG/1; UG/1
2 AEROSOL RESPIRATORY (INHALATION) EVERY 12 HOURS
Qty: 10.2 G | Refills: 3 | Status: SHIPPED | OUTPATIENT
Start: 2023-02-09 | End: 2023-10-05

## 2023-02-09 NOTE — PROGRESS NOTES
Subjective:       Patient ID: Alma Rosa Helm is a 68 y.o. female.    Chief Complaint: Follow-up (3 months), Hypertension (Taking her medication as directed. Mentions checking her bp at home and has been elevated at times), Hyperlipidemia (Taking her medication as directed w/o any problems or concerns ), COPD (Controlled with her current medication. Denies any increase sob, wheezing.), Edema (Isaiah feet , ongoing for couple months now. States goes down at times.), and Knee Injury (Fell last week on it. Continues with pain and  mild swelling.)    Patient presents to the clinic for a 3 month follow up.     Hypertension-   BP Readings from Last 3 Encounters:  02/09/23 : 110/70  12/28/22 : 126/72  12/19/22 : 128/61  Taking Metoprolol as prescribed.   She does report elevated BP at home and leg swelling. She is on her feet a lot at work. She does have compression stockings.     Reports she had a fall last week and has complaint of right knee pain.     Hyperlipidemia-  Lipid panel previously ordered.   Taking Pravastatin as prescribed without any reported side effects.     Has no other complaints or concerns today.     Patient educated on plan of care, verbalized understanding.          Review of Systems   Constitutional:  Positive for fatigue. Negative for activity change, appetite change, chills, diaphoresis and fever.   HENT:  Negative for congestion, ear pain, postnasal drip, sinus pressure, sneezing and sore throat.    Eyes:  Negative for pain, discharge, redness and itching.   Respiratory:  Positive for cough and shortness of breath. Negative for apnea, chest tightness and wheezing.    Cardiovascular:  Negative for chest pain and leg swelling.   Gastrointestinal:  Negative for abdominal distention, abdominal pain, constipation, diarrhea, nausea and vomiting.   Genitourinary:  Negative for difficulty urinating, dysuria, flank pain and frequency.   Musculoskeletal:  Positive for arthralgias, back pain and myalgias.         Right knee pain   Skin:  Negative for color change, rash and wound.   Neurological:  Negative for dizziness.   All other systems reviewed and are negative.    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   Constitutional:       General: She is not in acute distress.     Appearance: Normal appearance. She is well-developed.   HENT:      Head: Normocephalic.      Nose: Nose normal.   Eyes:      Conjunctiva/sclera: Conjunctivae normal.      Pupils: Pupils are equal, round, and reactive to light.   Cardiovascular:      Rate and Rhythm: Normal rate and regular rhythm.      Heart sounds: Normal heart sounds.   Pulmonary:      Effort: Pulmonary effort is normal. No respiratory distress.      Breath sounds: Wheezing present.   Musculoskeletal:      Cervical back: Normal range of motion and neck supple.   Skin:     General: Skin is warm and dry.      Findings: No rash.   Neurological:      Mental Status: She is alert and oriented to person, place, and time.   Psychiatric:         Behavior: Behavior normal.       Lab Results   Component Value Date    WBC 10.34 12/19/2022    HGB 14.4 12/19/2022    HCT 45.6 12/19/2022      "12/19/2022    ALT 33 12/19/2022    AST 20 12/19/2022     12/19/2022    K 4.3 12/19/2022     12/19/2022    CREATININE 0.8 12/19/2022    BUN 12 12/19/2022    CO2 22 (L) 12/19/2022    TSH 0.545 10/04/2022    INR 0.9 12/11/2020    HGBA1C 5.9 (H) 04/18/2022     The ASCVD Risk score (Giselle CRAWFORD, et al., 2019) failed to calculate for the following reasons:    Cannot find a previous HDL lab    Cannot find a previous total cholesterol lab  Visit Vitals  /70 (BP Location: Left arm, Patient Position: Sitting, BP Method: Large (Manual))   Pulse 76   Temp 98.4 °F (36.9 °C) (Temporal)   Ht 5' 4" (1.626 m)   Wt 92.2 kg (203 lb 4.2 oz)   SpO2 96%   BMI 34.89 kg/m²      Assessment:       1. Hypertension, essential, benign    2. Chronic laryngitis    3. Chronic obstructive pulmonary disease, unspecified COPD type    4. Nodular thyroid disease    5. BMI 34.0-34.9,adult    6. Leg swelling    7. Severe obesity (BMI 35.0-39.9) with comorbidity    8. Bipolar disorder, in partial remission, most recent episode depressed    9. Adrenal nodule    10. Encounter for screening mammogram for malignant neoplasm of breast    11. Colon cancer screening    12. Encounter for screening for lung cancer        Plan:       1. Hypertension, essential, benign   - Stable-Continue Metoprolol   - Continue current plan of care    2. Chronic laryngitis   - Stable    - Continue current plan of care    3. Chronic obstructive pulmonary disease, unspecified COPD type  -     albuterol (PROVENTIL/VENTOLIN HFA) 90 mcg/actuation inhaler; Inhale 2 puffs into the lungs every 6 (six) hours as needed for Wheezing.  Dispense: 8 g; Refill: 3  -     budesonide-formoterol 160-4.5 mcg (SYMBICORT) 160-4.5 mcg/actuation HFAA; Inhale 2 puffs into the lungs every 12 (twelve) hours. Controller  Dispense: 10.2 g; Refill: 3   - Stable   - Continue current plan of care   - Patient counseled on smoking cessation. .     4. Nodular thyroid disease/Adrenal nodule   - " Stable   - Continue current plan of care   - Follow up with Endocrinology    5. BMI 34.0-34.9,adult/Severe obesity (BMI 35.0-39.9) with comorbidity   - Low fat, low cholesterol diet and exercise as tolerated.     6. Leg swelling  -     hydroCHLOROthiazide (HYDRODIURIL) 12.5 MG Tab; Take 1 tablet (12.5 mg total) by mouth once daily.  Dispense: 30 tablet; Refill: 0   - low sodium diet    6. Bipolar disorder, in partial remission, most recent episode depressed   - Stable-Continue Abililfy   - Continue current plan of care    7. Encounter for screening mammogram for malignant neoplasm of breast  -     Mammo Digital Screening Bilat w/ Alfredo; Future; Expected date: 02/09/2023    8. Colon cancer screening  -     Fecal Immunochemical Test (iFOBT); Future; Expected date: 02/09/2023    9. Encounter for screening for lung cancer  -     CT Chest Low Dose Diagnostic; Future; Expected date: 02/09/2023       Follow up in about 3 months (around 5/9/2023), or if symptoms worsen or fail to improve.      Future Appointments       Date Provider Specialty Appt Notes    4/5/2023  Lab b    4/12/2023 CLAUDIA Burris PA-C Endocrinology 6 month f/u thyroid    5/9/2023 Rhonda Klein NP Family Medicine f/u 3 months

## 2023-02-09 NOTE — LETTER
February 9, 2023      [address]  [place] [address]  Phone: [phone]  Fax: [phone]       Patient: Alma Roas Helm   YOB: 1955  Date of Visit: 02/09/2023    To Whom It May Concern:    Rachid Helm  was at [facility] on 02/09/2023. The patient may return to work/school on 2/11/23 with no restrictions. Please excuse her from 2/8/23. If you have any questions or concerns, or if I can be of further assistance, please do not hesitate to contact me.    Sincerely,    Rhonda Klein, NP

## 2023-03-08 NOTE — PROGRESS NOTES
I have reviewed the notes, assessments, and/or procedures performed by Rhonda Klein NP, I concur with her/his documentation of Alma Rosa Helm.

## 2023-03-20 PROBLEM — N12 PYELONEPHRITIS: Status: RESOLVED | Noted: 2020-11-08 | Resolved: 2023-03-20

## 2023-04-12 ENCOUNTER — PATIENT MESSAGE (OUTPATIENT)
Dept: ADMINISTRATIVE | Facility: HOSPITAL | Age: 68
End: 2023-04-12
Payer: COMMERCIAL

## 2023-05-05 ENCOUNTER — TELEPHONE (OUTPATIENT)
Dept: FAMILY MEDICINE | Facility: CLINIC | Age: 68
End: 2023-05-05
Payer: MEDICARE

## 2023-05-05 NOTE — TELEPHONE ENCOUNTER
Called and spoke to patient, assisted with scheduling patient on Monday per patient request. Patient is having UTI Sx and is concerned it will turn sepsis from past history. Advised patient if SX get worse over the weekend to go to Urgent care or ER for evaluation. Patient verbalized understanding to all.

## 2023-05-05 NOTE — TELEPHONE ENCOUNTER
----- Message from Araseli Morales, Patient Care Assistant sent at 5/5/2023  2:48 PM CDT -----  Regarding: ADVICE  Contact: pt  Type: Needs Medical Advice    Who Called:  pt     Best Call Back Number:

## 2023-05-05 NOTE — TELEPHONE ENCOUNTER
----- Message from Araseli Morales, Patient Care Assistant sent at 5/5/2023  2:46 PM CDT -----  Regarding: appointment  Contact: pt  Type:  Sooner Appointment Request    Caller is requesting a sooner appointment.  Caller declined first available appointment listed below.  Caller will not accept being placed on the waitlist and is requesting a message be sent to doctor.    Name of Caller:  pt   When is the first available appointment?  5/23/2023  Symptoms:  UTI   Best Call Back Number:  [phone] (home)     Additional Information:  please call pt to advise. Thanks!

## 2023-05-12 NOTE — ASSESSMENT & PLAN NOTE
"I have tired to reach out to pt and . I have no good numbers on them. Pt has not seen any provider in our office. I would advise pt to go to Ed to be seen as soon as possible.            ----- Message from Phill Rocha MD sent at 5/11/2023 10:01 PM CDT -----  Regarding: RE: Sooner Appt  She is not one of our patients.  Why do we get this message??    Phill Rocha MD  ----- Message -----  From: Anthony Jin MA  Sent: 5/11/2023   4:50 PM CDT  To: Phill Rocha MD, Carolina Hays MA  Subject: FW: Sooner Appt                                  I did call the pt. She is not accepting calls at this time.  ----- Message -----  From: Gogo Miller  Sent: 5/11/2023   3:22 PM CDT  To: [facility] Staff  Subject: Sooner Appt                                      .Type:  Sooner Appointment Request    Patient is requesting a sooner appointment.  Patient declined first available appointment listed as well as another facility and provider .  Patient will not accept being placed on the waitlist and is requesting a message be sent to doctor.    Name of Caller:    When is the first available appointment?    Symptoms: Pt states she feels its a "life or death" situation. Pt states her depression is getting worse, she's having increased hot flashes at night, and her testosterone levels are extremely low. She has been getting no sleep at all at night but her body wants to sleep during the day but she cant because she has a 3 year old. Her estrogen levels are low as well. Pt has had a hysterectomy and was told to give her body time to adjust but she feels something is wrong and wants to be seen as soon as possible. Please advise.    Would the patient rather a call back or a response via [facility]? Call back    Best Call Back Number: [phone]     Additional Information:        " Pt with known Hx of nephrolithiasis, present on CT today  -CT negative for any obstruction  -no ureterolithiasis   -discussed with ID: will consult urology for evaluation.    12/17 - urology rec no further inpt workup at this time

## 2023-05-17 ENCOUNTER — NURSE TRIAGE (OUTPATIENT)
Dept: ADMINISTRATIVE | Facility: CLINIC | Age: 68
End: 2023-05-17
Payer: MEDICARE

## 2023-05-17 ENCOUNTER — PATIENT MESSAGE (OUTPATIENT)
Dept: ADMINISTRATIVE | Facility: HOSPITAL | Age: 68
End: 2023-05-17
Payer: MEDICARE

## 2023-05-17 ENCOUNTER — TELEPHONE (OUTPATIENT)
Dept: FAMILY MEDICINE | Facility: CLINIC | Age: 68
End: 2023-05-17
Payer: MEDICARE

## 2023-05-17 NOTE — TELEPHONE ENCOUNTER
Spoke with patient, needing to be seen . Recent loss of  and son. Pt having really hard time. Appt made and pt aware.

## 2023-05-17 NOTE — TELEPHONE ENCOUNTER
----- Message from Rosalind Hopper sent at 2023  9:51 AM CDT -----  Contact: KESHA SAMUELS [phone]  Type: Needs Medical Advice    Who Called:  KESHA SAMUELS    Best Call Back Number: [phone] (home)       Additional Information: Patient is calling to speak with nurse/MA regarding the lost of her family members. Patient states she recently lost her  and just found out last night her son  as well.   Requesting to speak with nurse ASAP  Please call back and advise. Thanks

## 2023-05-17 NOTE — TELEPHONE ENCOUNTER
[place]    PCP:  Rhonda Klein NP    Pt is calling for something for anxiety and depression.  She reports lost her  on  and got a call yesterday that her Son was found .  Denies SI/HI.  C/O depression, nervousness, jittery, difficulty concentrating.  Per protocol, care advised is see in the office within 3 days.  NT was unable to schedule with PCP/Dept.  [facility] offered and declined therefore advised to [facility]/ED.  Pt VU.  Advised to call for worsening/questions/concerns.  VU.     Reason for Disposition   Depression is worsening (e.g.,sleeping poorly, less able to do activities of daily living)   Recent traumatic event (e.g., death of a loved one, job loss, victim/witness of crime)    Additional Information   Negative: Patient attempted suicide   Negative: Patient is threatening suicide now   Negative: Violent behavior, or threatening to physically hurt or kill someone   Negative: Patient is very confused (disoriented, slurred speech) and no other adult (e.g., friend or family member) available   Negative: Difficult to awaken or acting very confused (disoriented, slurred speech) and new-onset   Negative: Sounds like a life-threatening emergency to the triager   Negative: Depression and unable to do any of normal activities (e.g., self care, school, work; in comparison to baseline).   Negative: Very strange or confused behavior   Negative: Patient sounds very sick or weak to the triager   Negative: Fever > 101 F  (38.3 C)   Negative: Sometimes has thoughts of suicide   Negative: Symptoms interfere with work or school   Negative: SEVERE difficulty breathing (e.g., struggling for each breath, speaks in single words)   Negative: Bluish (or gray) lips or face   Negative: Difficult to awaken or acting confused (e.g., disoriented, slurred speech)   Negative: Hysterical or combative behavior   Negative: Sounds like a life-threatening emergency to the triager   Negative: Difficulty breathing and persists > 10 minutes  and not relieved by reassurance provided by triager   Negative: Lightheadedness or dizziness and persists > 10 minutes and not relieved by reassurance provided by triager   Negative: Substance use (drug use) or unhealthy alcohol use, known or suspected, and feeling very shaky (i.e., visible tremors of hands)   Negative: Patient sounds very sick or weak to the triager   Negative: Patient sounds very upset or troubled to the triager   Negative: Symptoms interfere with work or school   Negative: Symptoms of anxiety or panic and has not been evaluated for this by physician   Negative: Started on anti-anxiety medication and no relief   Negative: Significant weight loss (or gain) and not dieting   Negative: Taking thyroid medications   Negative: Substance use (drug use) or unhealthy alcohol use, known or suspected   Negative: Unhealthy alcohol use, known or suspected   Negative: Unhealthy caffeine use, known or suspected (e.g., > 2 cups of coffee/tea or > 4 cans of soda / day)   Negative: Taking herbal remedies    Protocols used: Depression-A-[place], Anxiety and Panic Attack-A-[place]

## 2023-05-26 ENCOUNTER — PATIENT OUTREACH (OUTPATIENT)
Dept: ADMINISTRATIVE | Facility: HOSPITAL | Age: 68
End: 2023-05-26
Payer: MEDICARE

## 2023-05-26 NOTE — PROGRESS NOTES
Population Health Chart Review & Patient Outreach Details:     Reason for Outreach Encounter:     [x]  Non-Compliant Report   []  Payor Report ([employer], PHN, BCBS, MSSP, MCIP, UHC, etc.)   []  Pre-Visit Chart Review     Updates Requested / Reviewed:     []  Care Everywhere    []     []  External Sources ([facility], [facility], DIS, etc.)   []  Care Team Updated    Patient Outreach Method:    [x]  Telephone Outreach Completed   [] Successful   [] Left Voicemail   [x] Unable to Contact (wrong number, no voicemail)  []  [facility] Outreach Sent  []  Letter Outreach Mailed  []  Fax Sent for External Records  []  External Records Upload    Health Maintenance Topics Addressed and Outreach Outcomes / Actions Taken:        [x]      Breast Cancer Screening []  Mammo Scheduled      []  External Records Requested     []  Added Reminder to Complete to Upcoming Primary Care Appt Notes     []  Patient Declined     []  Patient Will Call Back to Schedule     []  Patient Will Schedule with External Provider / Order Routed if Applicable             []       Cervical Cancer Screening []  Pap Scheduled      []  External Records Requested     []  Added Reminder to Complete to Upcoming Primary Care Appt Notes     []  Patient Declined     []  Patient Will Call Back to Schedule     []  Patient Will Schedule with External Provider               []          Colorectal Cancer Screening []  Colonoscopy Case Request or Referral Placed     []  External Records Requested     []  Added Reminder to Complete to Upcoming Primary Care Appt Notes     []  Patient Declined     []  Patient Will Call Back to Schedule     []  Patient Will Schedule with External Provider     []  Fit Kit Mailed (add the SmartPhrase under additional notes)     []  Reminded Patient to Complete Home Test             []      Diabetic Eye Exam []  Eye Camera Scheduled or Optometry Referral Placed     []  External Records Requested     []  Added Reminder to Complete to  Upcoming Primary Care Appt Notes     []  Patient Declined     []  Patient Will Call Back to Schedule     []  Patient Will Schedule with External Provider             []      Blood Pressure Control []  Primary Care Follow Up Visit Scheduled     []  Remote Blood Pressure Reading Captured     []  Added Reminder to Complete to Upcoming Primary Care Appt Notes     []  Patient Declined     []  Patient Will Call Back / Patient Will Send Portal Message with Reading     []  Patient Will Call Back to Schedule Provider Visit             []       HbA1c & Other Labs []  Lab Appt Scheduled for Due Labs     []  Primary Care Follow Up Visit Scheduled      []  Reminded Patient to Complete Home Test     []  Added Reminder to Complete to Upcoming Primary Care Appt Notes     []  Patient Declined     []  Patient Will Call Back to Schedule     []  Patient Will Schedule with External Provider / Order Routed if Applicable           []    Schedule Primary Care Appt []  Primary Care Appt Scheduled     []  Patient Declined     []  Patient Will Call Back to Schedule     []  Pt Established with External Provider & Updated Care Team             []      Medication Adherence []  Primary Care Appointment Scheduled     []  Added Reminder to Upcoming Primary Care Appt Notes     []  Patient Reminded to  Prescription     []  Patient Declined, Provider Notified if Needed     []  Sent Provider Message to Review and/or Add Exclusion to Problem List             []      Osteoporosis Screening []  DXA Appointment Scheduled     []  External Records Requested     []  Added Reminder to Complete to Upcoming Primary Care Appt Notes     []  Patient Declined     []  Patient Will Call Back to Schedule     []  Patient Will Schedule with External Provider / Order Routed if Applicable     Additional Care Coordinator Notes:         Further Action Needed If Patient Returns Outreach:

## 2023-05-30 ENCOUNTER — PATIENT MESSAGE (OUTPATIENT)
Dept: ADMINISTRATIVE | Facility: HOSPITAL | Age: 68
End: 2023-05-30
Payer: MEDICARE

## 2023-05-31 ENCOUNTER — OFFICE VISIT (OUTPATIENT)
Dept: UROLOGY | Facility: CLINIC | Age: 68
End: 2023-05-31
Payer: MEDICARE

## 2023-05-31 ENCOUNTER — TELEPHONE (OUTPATIENT)
Dept: UROLOGY | Facility: CLINIC | Age: 68
End: 2023-05-31

## 2023-05-31 ENCOUNTER — HOSPITAL ENCOUNTER (OUTPATIENT)
Dept: RADIOLOGY | Facility: HOSPITAL | Age: 68
Discharge: HOME | End: 2023-05-31
Attending: NURSE PRACTITIONER
Payer: MEDICARE

## 2023-05-31 VITALS
HEIGHT: 64 IN | DIASTOLIC BLOOD PRESSURE: 65 MMHG | HEART RATE: 68 BPM | WEIGHT: 200 LBS | SYSTOLIC BLOOD PRESSURE: 117 MMHG | BODY MASS INDEX: 34.15 KG/M2

## 2023-05-31 DIAGNOSIS — R31.0 GROSS HEMATURIA: ICD-10-CM

## 2023-05-31 DIAGNOSIS — R31.0 GROSS HEMATURIA: Primary | ICD-10-CM

## 2023-05-31 DIAGNOSIS — N20.1 OBSTRUCTION OF LEFT URETEROPELVIC JUNCTION (UPJ) DUE TO STONE: ICD-10-CM

## 2023-05-31 DIAGNOSIS — N20.0 KIDNEY STONES: ICD-10-CM

## 2023-05-31 LAB
BILIRUBIN, UA POC OHS: ABNORMAL
BLOOD, UA POC OHS: ABNORMAL
CLARITY, UA POC OHS: CLEAR
COLOR, UA POC OHS: YELLOW
GLUCOSE, UA POC OHS: NEGATIVE
KETONES, UA POC OHS: NEGATIVE
LEUKOCYTES, UA POC OHS: ABNORMAL
NITRITE, UA POC OHS: NEGATIVE
PH, UA POC OHS: 5
PROTEIN, UA POC OHS: 100
SPECIFIC GRAVITY, UA POC OHS: >=1.03
UROBILINOGEN, UA POC OHS: 0.2

## 2023-05-31 PROCEDURE — 87086 URINE CULTURE/COLONY COUNT: CPT | Performed by: NURSE PRACTITIONER

## 2023-05-31 PROCEDURE — 74176 CT ABD & PELVIS W/O CONTRAST: CPT | Mod: 26,,, | Performed by: RADIOLOGY

## 2023-05-31 PROCEDURE — 99214 PR OFFICE/OUTPT VISIT, EST, LEVL IV, 30-39 MIN: ICD-10-PCS | Mod: S$GLB,,, | Performed by: NURSE PRACTITIONER

## 2023-05-31 PROCEDURE — 74176 CT ABD & PELVIS W/O CONTRAST: CPT | Mod: TC

## 2023-05-31 PROCEDURE — 99999 PR PBB SHADOW E&M-EST. PATIENT-LVL V: CPT | Mod: PBBFAC,,, | Performed by: NURSE PRACTITIONER

## 2023-05-31 PROCEDURE — 81003 URINALYSIS AUTO W/O SCOPE: CPT | Mod: QW,S$GLB,, | Performed by: NURSE PRACTITIONER

## 2023-05-31 PROCEDURE — 99214 OFFICE O/P EST MOD 30 MIN: CPT | Mod: S$GLB,,, | Performed by: NURSE PRACTITIONER

## 2023-05-31 PROCEDURE — 74176 CT RENAL STONE STUDY ABD PELVIS WO: ICD-10-PCS | Mod: 26,,, | Performed by: RADIOLOGY

## 2023-05-31 PROCEDURE — 99999 PR PBB SHADOW E&M-EST. PATIENT-LVL V: ICD-10-PCS | Mod: PBBFAC,,, | Performed by: NURSE PRACTITIONER

## 2023-05-31 PROCEDURE — 99215 OFFICE O/P EST HI 40 MIN: CPT | Mod: PBBFAC,PO,25 | Performed by: NURSE PRACTITIONER

## 2023-05-31 PROCEDURE — 81003 URINALYSIS AUTO W/O SCOPE: CPT | Mod: PBBFAC,PO | Performed by: NURSE PRACTITIONER

## 2023-05-31 PROCEDURE — 81003 PR URINALYSIS, AUTO, W/O SCOPE: ICD-10-PCS | Mod: QW,S$GLB,, | Performed by: NURSE PRACTITIONER

## 2023-05-31 RX ORDER — ONDANSETRON 4 MG/1
4 TABLET, ORALLY DISINTEGRATING ORAL EVERY 8 HOURS PRN
Qty: 30 TABLET | Refills: 0 | Status: SHIPPED | OUTPATIENT
Start: 2023-05-31 | End: 2023-06-10

## 2023-05-31 NOTE — TELEPHONE ENCOUNTER
Dr. Jewell reviewed CT RSS from today  Stone seen on CT at [facility] no longer present     Several stones in the right renal collecting system largest measuring 5 mm.  Several 1-3 mm stones in the left renal collecting system.  Simple cyst right renal midpole cortex.  No hydroureteronephrosis or calcified ureterolith.    Pt will need to follow up with PCP for additional findings not related to .    Will call patient with urine culture results once completed. Can continue cipro.    Pt verbalized understanding.

## 2023-05-31 NOTE — PATIENT INSTRUCTIONS
6/6/23 surgery for left UPJ stone      Get prompt medical attention (ER) if any of the following occur:  Severe pain that returns and not relieved by pain medicines  Repeated vomiting or unable to keep down fluids  Weakness, dizziness or fainting  Fever of 100.4ºF (38ºC) or higher, or as directed by your healthcare provider  Blood clots in urine  Foul smelling or cloudy urine  Unable to pass urine for 8 hours or increasing bladder pressure           Continue cipro until culture results. Will adjust as needed based on culture results    Good water intake, at least 2 L per day    Strain every urine. If passes stone, please notify clinic.

## 2023-05-31 NOTE — PROGRESS NOTES
CHIEF COMPLAINT:    Mrs Helm is a 68 y.o. female presenting for left UPJ stone.  PRESENTING ILLNESS:    Alma Rosa Helm is a 68 y.o. female who presents for left UPJ stone.    Last consult with Urology 12/16/22 with Dr. Jewell.     history  Patient with a history of COPD, bipolar disorder and kidney stones who presented to the [facility] emergency department on 12/15/22 with right flank pain and abnormal lab results.      Patient had previously been admitted at [facility] on 12/10/22 for pyelonephritis with positive urine and blood cultures. Started on Abx and discharged home on 12/12/22.      She was subsequently evaluated by her PCP on 12/15/22 and found to have ESBL E. Coli on her blood culture results that was very resistant. Referred to the ED for evaluation as patient continued to report abdominal pain, flank pain, nausea and vomiting with fever. On admission, WBC 13, Creatinine 0.8. and UA with positive nitrite and many bacteria. No urine culture.      CT renal stone with bilateral non-obstructing stones - largest in right lower pole measuring 5 mm. There is scarring in the left mid kidney and bilateral adrenal adenomas - largest on right measuring 2.2. cm.      She has previously been managed by Dr. Lauren in the past for her stone disease. Left ESWL in 1/2016 and bilateral ureteral stent placement in 12/2015.      Denies any bone pain, unintentional weight loss,  trauma or history of  malignancy.    5/31/23  Patient was seen at [place] ED 5/6/23 for left flank pain, dysuria and urinary frequency. CT resulted bilateral renal stone, no hydronephrosis. left ureter demonstrates some slight prominence and currently no stone identified at this time in the left ureter or the bladder but a stone likely has passed. She was discharged home.  She returned to ED 5/29/23 for no improvement to left flank pain, dysuria and frequency. CT performed resulting Bilateral nephrolithiasis. Left  hydronephrosis due to a 5.4 mm stone in the left ureteropelvic junction.  Pt was discharged home with nausea medication and cipro.  No culture. Creatinine 0.93/GFR68    Today patient continues to have left flank pain. She had fever of 101 on 5/28 prior to starting cipro and ED visit. She continues to have chills but no documented fever. C/o nausea but no vomiting. She is tolerating lots of water intake, ~1 gallon per day per patient. She typically is not able to pass stones on her own. Denies difficulty voiding. Does have brown/reddish colored urine, no blood clots.    Pt was prescribed phenergan for nausea but zofran works better. Needs refill.  Pt takes norco 10-325mg TID, chronic    UA today large blood, 100 protein, small WBC, otherwise negative      Urine cultures:   Lab Results   Component Value Date    LABURIN ESCHERICHIA COLI ESBL  >100,000 cfu/ml   (A) 12/15/2022    LABURIN ESCHERICHIA COLI  50,000 - 99,999 cfu/ml   (A) 11/07/2020    LABURIN ESCHERICHIA COLI  > 100,000 cfu/ml   01/24/2019    LABURIN ESCHERICHIA COLI  >100,000 cfu/ml   02/03/2018         REVIEW OF SYSTEMS:    Review of Systems    HENT: Negative for hearing loss.   Eyes: Negative for visual disturbance.   Respiratory: Negative for shortness of breath.   Cardiovascular: Negative for chest pain.   Gastrointestinal: +nausea, Negative for vomiting.   Genitourinary:  See above  Neurological: Negative for dizziness.   Hematological: Does not bruise/bleed easily.   Psychiatric/Behavioral: Negative for confusion.     PATIENT HISTORY:    Past Medical History:   Diagnosis Date    Anxiety     Bipolar disorder     COPD, mild     Nephrolithiasis        Past Surgical History:   Procedure Laterality Date    BACK SURGERY      CYSTOSCOPY      HYSTERECTOMY      LITHOTRIPSY      TUBAL LIGATION         Family History   Problem Relation Age of Onset    Cancer Mother     Diabetes Mother     COPD Father     Hypertension Father        Social History      Socioeconomic History    Marital status:    Tobacco Use    Smoking status: Every Day     Packs/day: 0.75     Years: 45.00     Pack years: 33.75     Types: Cigarettes     Start date: 1976     Last attempt to quit: 4/8/2020     Years since quitting: 3.1    Smokeless tobacco: Never    Tobacco comments:     now smoking 1/4 ppd   Substance and Sexual Activity    Alcohol use: Never    Drug use: No     Social Determinants of Health     Financial Resource Strain: Unknown    Difficulty of Paying Living Expenses: Patient refused   Food Insecurity: Unknown    Worried About Running Out of Food in the Last Year: Patient refused    Ran Out of Food in the Last Year: Patient refused   Transportation Needs: Unknown    Lack of Transportation (Medical): Patient refused    Lack of Transportation (Non-Medical): Patient refused   Physical Activity: Unknown    Days of Exercise per Week: Patient refused    Minutes of Exercise per Session: Patient refused   Stress: Unknown    Feeling of Stress : Patient refused   Social Connections: Unknown    Frequency of Communication with Friends and Family: Patient refused    Frequency of Social Gatherings with Friends and Family: Patient refused    Attends Orthodoxy Services: Patient refused    Active Member of Clubs or Organizations: Patient refused    Attends Club or Organization Meetings: Patient refused    Marital Status: Patient refused   Housing Stability: Unknown    Unable to Pay for Housing in the Last Year: Patient refused    Unstable Housing in the Last Year: Patient refused       Allergies:  Tramadol, Imitrex [sumatriptan succinate], Penicillins, Sulfa (sulfonamide antibiotics), and Toradol [ketorolac]    Medications:    Current Outpatient Medications:     albuterol (PROVENTIL/VENTOLIN HFA) 90 mcg/actuation inhaler, Inhale 2 puffs into the lungs every 6 (six) hours as needed for Wheezing., Disp: 8 g, Rfl: 3    albuterol-ipratropium (DUO-NEB) 2.5 mg-0.5 mg/3 mL nebulizer solution,  Take 3 mLs by nebulization every 6 (six) hours as needed for Wheezing. Rescue, Disp: 120 each, Rfl: 5    alendronate (FOSAMAX) 70 MG tablet, Take one tablet weekly., Disp: 4 tablet, Rfl: 11    ARIPiprazole (ABILIFY) 10 MG Tab, Take 1 tablet (10 mg total) by mouth once daily., Disp: 30 tablet, Rfl: 1    budesonide-formoterol 160-4.5 mcg (SYMBICORT) 160-4.5 mcg/actuation HFAA, Inhale 2 puffs into the lungs every 12 (twelve) hours. Controller, Disp: 10.2 g, Rfl: 3    cyclobenzaprine (FLEXERIL) 5 MG tablet, Take 5 mg by mouth 2 (two) times a day., Disp: , Rfl:     diclofenac (VOLTAREN) 75 MG EC tablet, Take 75 mg by mouth 2 (two) times daily., Disp: , Rfl:     hydroCHLOROthiazide (HYDRODIURIL) 12.5 MG Tab, TAKE 1 TABLET(12.5 MG) BY MOUTH EVERY DAY, Disp: 30 tablet, Rfl: 0    HYDROcodone-acetaminophen (NORCO)  mg per tablet, Take 1 tablet by mouth 2 (two) times daily as needed., Disp: , Rfl:     metoprolol tartrate (LOPRESSOR) 25 MG tablet, Take 1 tablet (25 mg total) by mouth Daily., Disp: 30 tablet, Rfl: 11    mupirocin (BACTROBAN) 2 % ointment, mupirocin 2 % topical ointment  APPLY TO THE AFFECTED AREA OF THE TOENAIL TWICE DAILY FOR 3 WEEKS, Disp: , Rfl:     oxybutynin (DITROPAN-XL) 5 MG TR24, Take 1 tablet (5 mg total) by mouth once daily., Disp: 30 tablet, Rfl: 11    pravastatin (PRAVACHOL) 40 MG tablet, Take 1 tablet (40 mg total) by mouth nightly., Disp: 90 tablet, Rfl: 3    pregabalin (LYRICA) 50 MG capsule, Take 50 mg by mouth 3 (three) times daily., Disp: , Rfl:     promethazine-dextromethorphan (PROMETHAZINE-DM) 6.25-15 mg/5 mL Syrp, promethazine-DM 6.25 mg-15 mg/5 mL oral syrup  TAKE 5 ML BY MOUTH FOUR TIMES DAILY AS NEEDED, Disp: , Rfl:     ondansetron (ZOFRAN-ODT) 4 MG TbDL, Take 1 tablet (4 mg total) by mouth every 8 (eight) hours as needed (nausea)., Disp: 30 tablet, Rfl: 0    PHYSICAL EXAMINATION:    Constitutional: She is oriented to person, place, and time. She appears well-developed and  well-nourished.  She is in no apparent distress.    Neck: Normal ROM.     Cardiovascular: Normal rate.      Pulmonary/Chest: Effort normal. No respiratory distress.     Abdominal:  She exhibits no distension. L flank tenderness    Neurological: She is alert and oriented to person, place, and time.     Skin: Skin is warm and dry.     Psych: Cooperative with normal affect.      Physical Exam      LABS:      Lab Results   Component Value Date    CREATININE 0.8 12/19/2022       IMPRESSION:    Encounter Diagnoses   Name Primary?    Gross hematuria Yes    Obstruction of left ureteropelvic junction (UPJ) due to stone     Kidney stones        PLAN:  -Dr. Jewell agrees to proceed with surgery to remove stone. Plan for 6/6/23  -Stat CT RSS today     -Urine sent for UA and culture.  Continue cipro as ordered. Will adjust based on culture results    -Drink at least 2 liters of water daily  Continue norco as prescribed. If refill needed, notify prescribing provider.  Refilled zofran to take as needed for nausea  Strain every urine.  Strainer provided.    Patient instructed to bring in stone for stone analysis.  Sterile cup provided.      -Get prompt medical attention (ER) if any of the following occur:  Severe pain that returns and not relieved by pain medicines  Repeated vomiting or unable to keep down fluids  Weakness, dizziness or fainting  Fever of 100.4ºF (38ºC) or higher, or as directed by your healthcare provider  Blood clots in urine  Foul smelling or cloudy urine  Unable to pass urine for 8 hours or increasing bladder pressure     -F/u for surgery as planned     I encouraged her or any of her family members to call or email me with questions and/or concerns.      30 minutes of total time spent on the encounter, which includes face to face time and non-face to face time preparing to see the patient (eg, review of tests), Obtaining and/or reviewing separately obtained history, Documenting clinical information in the  electronic or other health record, Independently interpreting results (not separately reported) and communicating results to the patient/family/caregiver, or Care coordination (not separately reported).

## 2023-06-01 ENCOUNTER — HOSPITAL ENCOUNTER (OUTPATIENT)
Dept: PREADMISSION TESTING | Facility: HOSPITAL | Age: 68
Discharge: HOME | End: 2023-06-01
Payer: MEDICARE

## 2023-06-01 DIAGNOSIS — Z01.818 PREOP TESTING: Primary | ICD-10-CM

## 2023-06-01 LAB — BACTERIA UR CULT: NO GROWTH

## 2023-06-07 ENCOUNTER — TELEPHONE (OUTPATIENT)
Dept: UROLOGY | Facility: CLINIC | Age: 68
End: 2023-06-07
Payer: MEDICARE

## 2023-06-07 ENCOUNTER — TELEPHONE (OUTPATIENT)
Dept: FAMILY MEDICINE | Facility: CLINIC | Age: 68
End: 2023-06-07
Payer: MEDICARE

## 2023-06-07 DIAGNOSIS — Z12.31 SCREENING MAMMOGRAM FOR BREAST CANCER: Primary | ICD-10-CM

## 2023-06-07 NOTE — TELEPHONE ENCOUNTER
Spoke with the daughter . Last mammogram about 4 years ago. States patient has been sick with kidney stones and does not want to address at present time but will schedule one asap.

## 2023-06-07 NOTE — TELEPHONE ENCOUNTER
----- Message from Leyla Martin NP sent at 6/7/2023  7:52 AM CDT -----  Regarding: appt  Can we get patient follow up appt with Dr. Jewell (next available) to discuss stone management, per patient request    Thanks  Leyla

## 2023-06-27 ENCOUNTER — TELEPHONE (OUTPATIENT)
Dept: FAMILY MEDICINE | Facility: CLINIC | Age: 68
End: 2023-06-27
Payer: MEDICARE

## 2023-06-27 NOTE — TELEPHONE ENCOUNTER
Spoke with the daughter due to the nature of illness encouraged hospital er asap. Daughter refuses .appt scheduled per her request with provider for tomorrow morning.

## 2023-06-27 NOTE — TELEPHONE ENCOUNTER
----- Message from Rafamed King sent at 6/27/2023  1:40 PM CDT -----  Type:  Sooner Appointment Request    Caller is requesting a sooner appointment.  Caller declined first available appointment listed below.  Caller will not accept being placed on the waitlist and is requesting a message be sent to doctor.    Name of Caller:  pt's daughter  When is the first available appointment?  7/13/23  Symptoms:  UTI/kidney stones/feeling sick/fluid pockets in arms and legs  Best Call Back Number:  [phone]  Additional Information:  pt needs to be seen tomorrow of Friday. Please call back to advise. Thanks!

## 2023-06-28 ENCOUNTER — LAB VISIT (OUTPATIENT)
Dept: LAB | Facility: CLINIC | Age: 68
End: 2023-06-28
Payer: MEDICARE

## 2023-06-28 ENCOUNTER — PATIENT MESSAGE (OUTPATIENT)
Dept: FAMILY MEDICINE | Facility: CLINIC | Age: 68
End: 2023-06-28

## 2023-06-28 ENCOUNTER — OFFICE VISIT (OUTPATIENT)
Dept: FAMILY MEDICINE | Facility: CLINIC | Age: 68
End: 2023-06-28
Payer: MEDICARE

## 2023-06-28 VITALS
OXYGEN SATURATION: 95 % | DIASTOLIC BLOOD PRESSURE: 68 MMHG | SYSTOLIC BLOOD PRESSURE: 112 MMHG | BODY MASS INDEX: 33.46 KG/M2 | HEART RATE: 90 BPM | HEIGHT: 64 IN | TEMPERATURE: 97 F | WEIGHT: 196 LBS

## 2023-06-28 DIAGNOSIS — R11.2 NAUSEA AND VOMITING, UNSPECIFIED VOMITING TYPE: ICD-10-CM

## 2023-06-28 DIAGNOSIS — J44.9 CHRONIC OBSTRUCTIVE PULMONARY DISEASE, UNSPECIFIED COPD TYPE: Chronic | ICD-10-CM

## 2023-06-28 DIAGNOSIS — N30.01 ACUTE CYSTITIS WITH HEMATURIA: ICD-10-CM

## 2023-06-28 DIAGNOSIS — F32.1 CURRENT MODERATE EPISODE OF MAJOR DEPRESSIVE DISORDER WITHOUT PRIOR EPISODE: ICD-10-CM

## 2023-06-28 DIAGNOSIS — R30.0 DYSURIA: Primary | ICD-10-CM

## 2023-06-28 DIAGNOSIS — R10.11 RUQ ABDOMINAL PAIN: ICD-10-CM

## 2023-06-28 DIAGNOSIS — R19.7 DIARRHEA, UNSPECIFIED TYPE: ICD-10-CM

## 2023-06-28 DIAGNOSIS — I10 HYPERTENSION, ESSENTIAL, BENIGN: ICD-10-CM

## 2023-06-28 DIAGNOSIS — R10.9 ABDOMINAL PAIN, UNSPECIFIED ABDOMINAL LOCATION: ICD-10-CM

## 2023-06-28 LAB
ALBUMIN SERPL BCP-MCNC: 3.8 G/DL (ref 3.5–5.2)
ALP SERPL-CCNC: 97 U/L (ref 55–135)
ALT SERPL W/O P-5'-P-CCNC: 28 U/L (ref 10–44)
ANION GAP SERPL CALC-SCNC: 14 MMOL/L (ref 8–16)
AST SERPL-CCNC: 16 U/L (ref 10–40)
BASOPHILS # BLD AUTO: 0.06 K/UL (ref 0–0.2)
BASOPHILS NFR BLD: 0.6 % (ref 0–1.9)
BILIRUB SERPL-MCNC: 0.5 MG/DL (ref 0.1–1)
BILIRUBIN, UA POC OHS: NEGATIVE
BLOOD, UA POC OHS: ABNORMAL
BUN SERPL-MCNC: 15 MG/DL (ref 8–23)
CALCIUM SERPL-MCNC: 10.1 MG/DL (ref 8.7–10.5)
CHLORIDE SERPL-SCNC: 105 MMOL/L (ref 95–110)
CLARITY, UA POC OHS: ABNORMAL
CO2 SERPL-SCNC: 20 MMOL/L (ref 23–29)
COLOR, UA POC OHS: ABNORMAL
CREAT SERPL-MCNC: 1.1 MG/DL (ref 0.5–1.4)
DIFFERENTIAL METHOD: ABNORMAL
EOSINOPHIL # BLD AUTO: 0.3 K/UL (ref 0–0.5)
EOSINOPHIL NFR BLD: 3.2 % (ref 0–8)
ERYTHROCYTE [DISTWIDTH] IN BLOOD BY AUTOMATED COUNT: 14.9 % (ref 11.5–14.5)
EST. GFR  (NO RACE VARIABLE): 55 ML/MIN/1.73 M^2
GLUCOSE SERPL-MCNC: 152 MG/DL (ref 70–110)
GLUCOSE, UA POC OHS: NEGATIVE
HCT VFR BLD AUTO: 48.7 % (ref 37–48.5)
HGB BLD-MCNC: 15.6 G/DL (ref 12–16)
IMM GRANULOCYTES # BLD AUTO: 0.03 K/UL (ref 0–0.04)
IMM GRANULOCYTES NFR BLD AUTO: 0.3 % (ref 0–0.5)
KETONES, UA POC OHS: NEGATIVE
LEUKOCYTES, UA POC OHS: ABNORMAL
LYMPHOCYTES # BLD AUTO: 2.8 K/UL (ref 1–4.8)
LYMPHOCYTES NFR BLD: 27.5 % (ref 18–48)
MCH RBC QN AUTO: 26.7 PG (ref 27–31)
MCHC RBC AUTO-ENTMCNC: 32 G/DL (ref 32–36)
MCV RBC AUTO: 83 FL (ref 82–98)
MONOCYTES # BLD AUTO: 0.7 K/UL (ref 0.3–1)
MONOCYTES NFR BLD: 7 % (ref 4–15)
NEUTROPHILS # BLD AUTO: 6.3 K/UL (ref 1.8–7.7)
NEUTROPHILS NFR BLD: 61.4 % (ref 38–73)
NITRITE, UA POC OHS: POSITIVE
NRBC BLD-RTO: 0 /100 WBC
PH, UA POC OHS: 6
PLATELET # BLD AUTO: 369 K/UL (ref 150–450)
PMV BLD AUTO: 9.9 FL (ref 9.2–12.9)
POTASSIUM SERPL-SCNC: 3.6 MMOL/L (ref 3.5–5.1)
PROT SERPL-MCNC: 8.4 G/DL (ref 6–8.4)
PROTEIN, UA POC OHS: 30
RBC # BLD AUTO: 5.85 M/UL (ref 4–5.4)
SODIUM SERPL-SCNC: 139 MMOL/L (ref 136–145)
SPECIFIC GRAVITY, UA POC OHS: >=1.03
UROBILINOGEN, UA POC OHS: 0.2
WBC # BLD AUTO: 10.24 K/UL (ref 3.9–12.7)

## 2023-06-28 PROCEDURE — 1101F PR PT FALLS ASSESS DOC 0-1 FALLS W/OUT INJ PAST YR: ICD-10-PCS | Mod: CPTII,,,

## 2023-06-28 PROCEDURE — 87186 SC STD MICRODIL/AGAR DIL: CPT

## 2023-06-28 PROCEDURE — 81003 POCT URINALYSIS(INSTRUMENT): ICD-10-PCS | Mod: QW,,,

## 2023-06-28 PROCEDURE — 3288F FALL RISK ASSESSMENT DOCD: CPT | Mod: CPTII,,,

## 2023-06-28 PROCEDURE — 1159F MED LIST DOCD IN RCRD: CPT | Mod: CPTII,,,

## 2023-06-28 PROCEDURE — 3074F SYST BP LT 130 MM HG: CPT | Mod: CPTII,,,

## 2023-06-28 PROCEDURE — 85025 COMPLETE CBC W/AUTO DIFF WBC: CPT

## 2023-06-28 PROCEDURE — 81003 URINALYSIS AUTO W/O SCOPE: CPT | Mod: QW,,,

## 2023-06-28 PROCEDURE — 3008F BODY MASS INDEX DOCD: CPT | Mod: CPTII,,,

## 2023-06-28 PROCEDURE — 1160F PR REVIEW ALL MEDS BY PRESCRIBER/CLIN PHARMACIST DOCUMENTED: ICD-10-PCS | Mod: CPTII,,,

## 2023-06-28 PROCEDURE — 1101F PT FALLS ASSESS-DOCD LE1/YR: CPT | Mod: CPTII,,,

## 2023-06-28 PROCEDURE — 1159F PR MEDICATION LIST DOCUMENTED IN MEDICAL RECORD: ICD-10-PCS | Mod: CPTII,,,

## 2023-06-28 PROCEDURE — 3288F PR FALLS RISK ASSESSMENT DOCUMENTED: ICD-10-PCS | Mod: CPTII,,,

## 2023-06-28 PROCEDURE — 80053 COMPREHEN METABOLIC PANEL: CPT

## 2023-06-28 PROCEDURE — 3078F DIAST BP <80 MM HG: CPT | Mod: CPTII,,,

## 2023-06-28 PROCEDURE — 3008F PR BODY MASS INDEX (BMI) DOCUMENTED: ICD-10-PCS | Mod: CPTII,,,

## 2023-06-28 PROCEDURE — 87086 URINE CULTURE/COLONY COUNT: CPT

## 2023-06-28 PROCEDURE — 1160F RVW MEDS BY RX/DR IN RCRD: CPT | Mod: CPTII,,,

## 2023-06-28 PROCEDURE — 99215 OFFICE O/P EST HI 40 MIN: CPT | Mod: ,,,

## 2023-06-28 PROCEDURE — 87088 URINE BACTERIA CULTURE: CPT

## 2023-06-28 PROCEDURE — 3078F PR MOST RECENT DIASTOLIC BLOOD PRESSURE < 80 MM HG: ICD-10-PCS | Mod: CPTII,,,

## 2023-06-28 PROCEDURE — 87077 CULTURE AEROBIC IDENTIFY: CPT

## 2023-06-28 PROCEDURE — 99215 PR OFFICE/OUTPT VISIT, EST, LEVL V, 40-54 MIN: ICD-10-PCS | Mod: ,,,

## 2023-06-28 PROCEDURE — 3074F PR MOST RECENT SYSTOLIC BLOOD PRESSURE < 130 MM HG: ICD-10-PCS | Mod: CPTII,,,

## 2023-06-28 RX ORDER — SERTRALINE HYDROCHLORIDE 25 MG/1
25 TABLET, FILM COATED ORAL DAILY
Qty: 30 TABLET | Refills: 2 | Status: SHIPPED | OUTPATIENT
Start: 2023-06-28 | End: 2023-08-15 | Stop reason: DRUGHIGH

## 2023-06-28 RX ORDER — CIPROFLOXACIN 500 MG/1
500 TABLET ORAL 2 TIMES DAILY
Qty: 20 TABLET | Refills: 0 | Status: SHIPPED | OUTPATIENT
Start: 2023-06-28 | End: 2023-07-03

## 2023-06-28 RX ORDER — ONDANSETRON 8 MG/1
8 TABLET, ORALLY DISINTEGRATING ORAL EVERY 8 HOURS PRN
Qty: 30 TABLET | Refills: 0 | Status: SHIPPED | OUTPATIENT
Start: 2023-06-28 | End: 2023-08-13 | Stop reason: SDUPTHER

## 2023-06-28 NOTE — PROGRESS NOTES
Subjective:       Patient ID: Alma Rosa Helm is a 68 y.o. female.    Chief Complaint: Dysuria, Fatigue, and Nausea (With vomiting , diarrhea . Pt denies any fever. Ongoing for over 1 month now. )    Patient presents to the clinic with complaint of nausea, vomiting, diarrhea, and weakness.     States this has been ongoing for the last month. Denies fever.   She was seen at [place] ER on 5/6/23 and 5/29/23 with complaint of UTI and kidney stones. She was on Minocycline and Cipro.   She recently saw Urology- Leyla Martin NP on 5/31/23. She is scheduled to see Dr. Jewell in August to discuss stone options.     States she is vomiting at least twice daily. Taking Zofran PRN.   States she is having 5-6 episodes of diarrhea daily. Reports no appetite.   Wt Readings from Last 3 Encounters:  06/28/23 0926 : 88.9 kg (195 lb 15.8 oz)  05/31/23 0957 : 90.7 kg (200 lb)  02/09/23 0811 : 92.2 kg (203 lb 4.2 oz)    Depression- States she has not been on the Abilify. States unable to afford. She would like to start something for depression. She has been depressed over the last few months with her ongoing health issues. Denies suicidal thoughts or plan.     Patient educated on plan of care, verbalized understanding.          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    Chronic eczematous otitis externa of both ears    Acute infective otitis externa, right    Leukoplakia of vocal cords    Hypertension, essential, benign    E coli bacteremia    Severe  "obesity (BMI 35.0-39.9) with comorbidity    Adrenal nodule       Objective:      Physical Exam    Lab Results   Component Value Date    WBC 10.24 06/28/2023    HGB 15.6 06/28/2023    HCT 48.7 (H) 06/28/2023     06/28/2023    ALT 28 06/28/2023    AST 16 06/28/2023     06/28/2023    K 3.6 06/28/2023     06/28/2023    CREATININE 1.1 06/28/2023    BUN 15 06/28/2023    CO2 20 (L) 06/28/2023    TSH 0.545 10/04/2022    INR 0.9 12/11/2020    HGBA1C 5.9 (H) 04/18/2022     The ASCVD Risk score (Giselle CRAWFORD, et al., 2019) failed to calculate for the following reasons:    Cannot find a previous HDL lab    Cannot find a previous total cholesterol lab  Visit Vitals  /68 (BP Location: Left arm, Patient Position: Sitting, BP Method: Large (Manual))   Pulse 90   Temp 97 °F (36.1 °C) (Temporal)   Ht 5' 4" (1.626 m)   Wt 88.9 kg (195 lb 15.8 oz)   SpO2 95%   BMI 33.64 kg/m²      Assessment:       1. Dysuria    2. Diarrhea, unspecified type    3. Nausea and vomiting, unspecified vomiting type    4. RUQ abdominal pain    5. Current moderate episode of major depressive disorder without prior episode    6. Abdominal pain, unspecified abdominal location    7. Chronic obstructive pulmonary disease, unspecified COPD type    8. Hypertension, essential, benign    9. Acute cystitis with hematuria        Plan:       1. Dysuria/Acute cystitis with hematuria  -     POCT Urinalysis(Instrument)  -     Urine culture  -     ciprofloxacin HCl (CIPRO) 500 MG tablet; Take 1 tablet (500 mg total) by mouth 2 (two) times daily. for 10 days  Dispense: 20 tablet; Refill: 0   - CC with Leyla Martin NP- she recommended Cipro and wait on culture.    - The diagnosis, treatment plan, instructions for follow-up and reevaluation as well as ED precautions were discussed and understanding was verbalized. All questions or concerns have been addressed.     2. Diarrhea, unspecified type  -     Clostridium difficile EIA; Future; Expected date: " 06/28/2023  -     US Abdomen Limited_Gallbladder; Future; Expected date: 06/28/2023   - The diagnosis, treatment plan, instructions for follow-up and reevaluation as well as ED precautions were discussed and understanding was verbalized. All questions or concerns have been addressed.     3. Nausea and vomiting, unspecified vomiting type  -     CBC auto differential; Future; Expected date: 06/28/2023  -     Comprehensive metabolic panel; Future; Expected date: 06/28/2023  -     US Abdomen Limited_Gallbladder; Future; Expected date: 06/28/2023  -     ondansetron (ZOFRAN-ODT) 8 MG TbDL; Take 1 tablet (8 mg total) by mouth every 8 (eight) hours as needed (nausea).  Dispense: 30 tablet; Refill: 0  -     CT Renal Stone Study ABD Pelvis WO; Future; Expected date: 06/28/2023   - The diagnosis, treatment plan, instructions for follow-up and reevaluation as well as ED precautions were discussed and understanding was verbalized. All questions or concerns have been addressed.     4. RUQ abdominal pain  -     US Abdomen Limited_Gallbladder; Future; Expected date: 06/28/2023    5. Current moderate episode of major depressive disorder without prior episode  -     sertraline (ZOLOFT) 25 MG tablet; Take 1 tablet (25 mg total) by mouth once daily.  Dispense: 30 tablet; Refill: 2  - I discussed with the patient the risks, side effects and the benefits of the medication including the black box warning regarding suicidal ideation/risk if applicable.  I counseled the patient on medication titration, length of time before maximum benefits are reached, and duration of treatment expected.  I advised the patient to return to clinic or go to the emergency department if suicidal thoughts occur, thought of hurting others, hallucinations, or other serious symptoms.  Patient voiced no intention of self-harm.  The patient expressed verbal understanding and elected to proceed with treatment.  All questions were answered.       6. Abdominal pain,  unspecified abdominal location  -     CT Renal Stone Study ABD Pelvis WO; Future; Expected date: 06/28/2023    7. Chronic obstructive pulmonary disease, unspecified COPD type   - Stable-Continue Albuterol, Symbicort,  and Duoneb   - Continue current plan of care    8. Hypertension, essential, benign   - Stable-Continue Metoprolol   - Continue current plan of care           Follow up if symptoms worsen or fail to improve.      Future Appointments       Date Provider Specialty Appt Notes    8/3/2023 Dot Jewell Jr., MD Urology discuss stone mangement

## 2023-06-28 NOTE — PATIENT INSTRUCTIONS

## 2023-07-01 ENCOUNTER — NURSE TRIAGE (OUTPATIENT)
Dept: ADMINISTRATIVE | Facility: CLINIC | Age: 68
End: 2023-07-01
Payer: MEDICARE

## 2023-07-01 LAB — BACTERIA UR CULT: ABNORMAL

## 2023-07-01 NOTE — TELEPHONE ENCOUNTER
[place]    PCP:  Rhonda Klein NP    Spoke with Dtr, Naomi Cortez, on pts behalf.  She reports that they got her urine culture results back to day.  She states that her results are the same as they were when she became septic and had to hospitalized in 2021.  Escherichia Coli ESBL > 100,000 cfu/ml.  C/O N/V/D, abdominal pain, chills/sweating, weakness, and loss of appetite.  Dtr does not know if she's running fever or not.  Taking Cipro.  Dtr reports that Cipro and Bactrim was not effective the last time her results showed the same thing.  Per protocol, care advised is call PCP now.  Pt VU.  No ID MD on-call at [facility] therefore NT attempted to contact OCP for PCP; MARTY on VM; # verified.  Dr. Dyer, OCP, returned call to NT.  OCP recommends that pt go to the ED.  NT spoke with Dtr and advised of OCP recommendations.  Dtr VU.  Advised to call for worsening/questions/concerns.  VU.     Reason for Disposition   [1] Follow-up call from patient regarding patient's clinical status AND [2] information urgent    Additional Information   Negative: ED call to PCP (i.e., primary care provider; doctor, NP, or PA)   Negative: Doctor (or NP/PA) call to PCP   Negative: Call about patient who is currently hospitalized   Negative: Lab or radiology calling with CRITICAL test results    Protocols used: [facility]

## 2023-07-01 NOTE — TELEPHONE ENCOUNTER
Anne-Marie, Nurse Sup at [facility], returned call to NT with ID MD OCP name but notified Nurse Sup that OCP for PCP had recommended pt go to the ED already.

## 2023-07-03 ENCOUNTER — TELEPHONE (OUTPATIENT)
Dept: FAMILY MEDICINE | Facility: CLINIC | Age: 68
End: 2023-07-03
Payer: MEDICARE

## 2023-07-03 DIAGNOSIS — N30.01 ACUTE CYSTITIS WITH HEMATURIA: Primary | ICD-10-CM

## 2023-07-03 RX ORDER — NITROFURANTOIN 25; 75 MG/1; MG/1
100 CAPSULE ORAL 2 TIMES DAILY
Qty: 20 CAPSULE | Refills: 0 | Status: SHIPPED | OUTPATIENT
Start: 2023-07-03 | End: 2023-07-13

## 2023-07-03 NOTE — TELEPHONE ENCOUNTER
I did not contact anyone but you and Christiane Vera MA   Who had contacted us .  I do not know what office Ms Vera is in .  J s CCMA  7/03/23

## 2023-07-03 NOTE — TELEPHONE ENCOUNTER
Had already spoke with the daughter . She had no other questions or concerns. Handled in a separate encounter

## 2023-07-03 NOTE — TELEPHONE ENCOUNTER
----- Message from Chantell Alegria sent at 7/3/2023  9:07 AM CDT -----  Contact: self  Type:  Needs Medical Advice    Who Called: self  Would the patient rather a call back or a response via [facility]? call  Best Call Back Number: [phone] (home)     Additional Information: pt would like to speak to nurse concerning test results (urine). Please advise and thank you.

## 2023-07-03 NOTE — TELEPHONE ENCOUNTER
Spoke with the daughter, the patient did not go to er. She states had gotten to feeling better. Instructions given daughter verbalized understanding will pick antibiotic and have her start it. Pt is not with any fever and not feeling any worse right now.

## 2023-07-03 NOTE — TELEPHONE ENCOUNTER
Please call patient and see if she went to the ER over the weekend. The on call provider sent her but I cannot see where she went.   Her Urine culture grew a resistance ESBL Ecoli- The only oral antibiotic that will work on this is Macrobid. I have sent this in.   Also she will need to follow up with Urology.   If she is having fevers or any worsening symptoms I recommend she go to the ER for IV antibiotics.    Thanks,   Rhonda

## 2023-07-03 NOTE — TELEPHONE ENCOUNTER
OK. If she cannot get in with Urology, we need to repeat the culture when she completes the Macrobid. Urine culture order placed.   Thanks,   Rhonda

## 2023-07-03 NOTE — TELEPHONE ENCOUNTER
Good morning  Dr Alexa Terrell was on call for [facility] netwk ID  this weekend covering both  [facility] and [facility] ( [facility] )  Dr Terrell has reviewed the messages and advises patient needs to follow up with her PCP.  If anything else is needed please feel free to contact Dr Terrell ; per Dr Terrell's orders   Thank you ,  CLARA Nieto CCMA /assistant with Dr Terrell and Dr Rosas  7/03/23

## 2023-07-03 NOTE — TELEPHONE ENCOUNTER
Spoke with the daughter once again. Instructions given per provider. [facility] urology appt in august. So instructed her to contact us back to schedule a nurse visit to repeat urine cx as soon as she completes the antibiotics she verbalized understanding and will schedule .

## 2023-07-11 ENCOUNTER — PATIENT MESSAGE (OUTPATIENT)
Dept: ADMINISTRATIVE | Facility: HOSPITAL | Age: 68
End: 2023-07-11
Payer: MEDICARE

## 2023-07-14 ENCOUNTER — LAB VISIT (OUTPATIENT)
Dept: LAB | Facility: CLINIC | Age: 68
End: 2023-07-14
Payer: MEDICARE

## 2023-07-14 DIAGNOSIS — R73.03 PREDIABETES: ICD-10-CM

## 2023-07-14 LAB
ESTIMATED AVG GLUCOSE: 117 MG/DL (ref 68–131)
HBA1C MFR BLD: 5.7 % (ref 4–5.6)

## 2023-07-14 PROCEDURE — 83036 HEMOGLOBIN GLYCOSYLATED A1C: CPT

## 2023-07-31 ENCOUNTER — TELEPHONE (OUTPATIENT)
Dept: UROLOGY | Facility: CLINIC | Age: 68
End: 2023-07-31
Payer: MEDICARE

## 2023-07-31 NOTE — TELEPHONE ENCOUNTER
Patient has appt on 8/3/2023. Provider out of office. Called patient to inform and reschedule. No answer. LMOM to give the office a call back.

## 2023-08-01 ENCOUNTER — TELEPHONE (OUTPATIENT)
Dept: UROLOGY | Facility: CLINIC | Age: 68
End: 2023-08-01
Payer: MEDICARE

## 2023-08-01 NOTE — TELEPHONE ENCOUNTER
Called and spoke to patient regarding appt scheduled on 8/3/2023. Informed that MD will be out of office that date and appt will need to be rescheduled. Offered appt on 8/4/2023. Patient states that she would have to talk to her daughter and see if appt date works well and will call the office back to schedule. Appt canceled.

## 2023-08-13 DIAGNOSIS — R11.2 NAUSEA AND VOMITING, UNSPECIFIED VOMITING TYPE: ICD-10-CM

## 2023-08-13 DIAGNOSIS — M79.89 LEG SWELLING: ICD-10-CM

## 2023-08-14 RX ORDER — HYDROCHLOROTHIAZIDE 12.5 MG/1
12.5 TABLET ORAL DAILY
Qty: 30 TABLET | Refills: 0 | Status: SHIPPED | OUTPATIENT
Start: 2023-08-14 | End: 2023-08-15 | Stop reason: SDUPTHER

## 2023-08-14 RX ORDER — ONDANSETRON 8 MG/1
8 TABLET, ORALLY DISINTEGRATING ORAL EVERY 8 HOURS PRN
Qty: 30 TABLET | Refills: 0 | Status: SHIPPED | OUTPATIENT
Start: 2023-08-14 | End: 2023-08-15 | Stop reason: SDUPTHER

## 2023-08-14 NOTE — TELEPHONE ENCOUNTER
LOV:6/28/23 Haily    NOV: None     Preffered Pharmacy:[id number] - [place], [place] - [address] AT [facility] [address] [facility] [address]

## 2023-08-15 ENCOUNTER — TELEPHONE (OUTPATIENT)
Dept: FAMILY MEDICINE | Facility: CLINIC | Age: 68
End: 2023-08-15
Payer: MEDICARE

## 2023-08-15 ENCOUNTER — OFFICE VISIT (OUTPATIENT)
Dept: FAMILY MEDICINE | Facility: CLINIC | Age: 68
End: 2023-08-15
Payer: MEDICARE

## 2023-08-15 ENCOUNTER — LAB VISIT (OUTPATIENT)
Dept: LAB | Facility: CLINIC | Age: 68
End: 2023-08-15
Payer: MEDICARE

## 2023-08-15 VITALS
DIASTOLIC BLOOD PRESSURE: 78 MMHG | OXYGEN SATURATION: 94 % | BODY MASS INDEX: 33.13 KG/M2 | WEIGHT: 187 LBS | HEIGHT: 63 IN | SYSTOLIC BLOOD PRESSURE: 116 MMHG | HEART RATE: 104 BPM | TEMPERATURE: 98 F

## 2023-08-15 DIAGNOSIS — G47.00 INSOMNIA, UNSPECIFIED TYPE: ICD-10-CM

## 2023-08-15 DIAGNOSIS — Z12.31 ENCOUNTER FOR SCREENING MAMMOGRAM FOR MALIGNANT NEOPLASM OF BREAST: ICD-10-CM

## 2023-08-15 DIAGNOSIS — I10 HYPERTENSION, ESSENTIAL, BENIGN: ICD-10-CM

## 2023-08-15 DIAGNOSIS — R11.0 NAUSEA: ICD-10-CM

## 2023-08-15 DIAGNOSIS — R11.0 NAUSEA: Primary | ICD-10-CM

## 2023-08-15 DIAGNOSIS — R61 NIGHT SWEATS: ICD-10-CM

## 2023-08-15 DIAGNOSIS — M79.89 LEG SWELLING: ICD-10-CM

## 2023-08-15 DIAGNOSIS — F33.1 MODERATE EPISODE OF RECURRENT MAJOR DEPRESSIVE DISORDER: ICD-10-CM

## 2023-08-15 DIAGNOSIS — K59.00 CONSTIPATION, UNSPECIFIED CONSTIPATION TYPE: ICD-10-CM

## 2023-08-15 DIAGNOSIS — F17.210 CIGARETTE NICOTINE DEPENDENCE WITHOUT COMPLICATION: ICD-10-CM

## 2023-08-15 DIAGNOSIS — J44.1 CHRONIC OBSTRUCTIVE PULMONARY DISEASE WITH ACUTE EXACERBATION: ICD-10-CM

## 2023-08-15 LAB
ALBUMIN SERPL BCP-MCNC: 4.1 G/DL (ref 3.5–5.2)
ALP SERPL-CCNC: 105 U/L (ref 55–135)
ALT SERPL W/O P-5'-P-CCNC: 24 U/L (ref 10–44)
ANION GAP SERPL CALC-SCNC: 12 MMOL/L (ref 8–16)
AST SERPL-CCNC: 21 U/L (ref 10–40)
BASOPHILS # BLD AUTO: 0.04 K/UL (ref 0–0.2)
BASOPHILS NFR BLD: 0.4 % (ref 0–1.9)
BILIRUB SERPL-MCNC: 0.5 MG/DL (ref 0.1–1)
BUN SERPL-MCNC: 14 MG/DL (ref 8–23)
CALCIUM SERPL-MCNC: 10.5 MG/DL (ref 8.7–10.5)
CHLORIDE SERPL-SCNC: 106 MMOL/L (ref 95–110)
CO2 SERPL-SCNC: 22 MMOL/L (ref 23–29)
CREAT SERPL-MCNC: 1 MG/DL (ref 0.5–1.4)
DIFFERENTIAL METHOD: ABNORMAL
EOSINOPHIL # BLD AUTO: 0.1 K/UL (ref 0–0.5)
EOSINOPHIL NFR BLD: 1.4 % (ref 0–8)
ERYTHROCYTE [DISTWIDTH] IN BLOOD BY AUTOMATED COUNT: 14.7 % (ref 11.5–14.5)
EST. GFR  (NO RACE VARIABLE): >60 ML/MIN/1.73 M^2
GLUCOSE SERPL-MCNC: 107 MG/DL (ref 70–110)
HCT VFR BLD AUTO: 51.1 % (ref 37–48.5)
HGB BLD-MCNC: 16.5 G/DL (ref 12–16)
IMM GRANULOCYTES # BLD AUTO: 0.03 K/UL (ref 0–0.04)
IMM GRANULOCYTES NFR BLD AUTO: 0.3 % (ref 0–0.5)
LYMPHOCYTES # BLD AUTO: 2.7 K/UL (ref 1–4.8)
LYMPHOCYTES NFR BLD: 29.5 % (ref 18–48)
MCH RBC QN AUTO: 27.1 PG (ref 27–31)
MCHC RBC AUTO-ENTMCNC: 32.3 G/DL (ref 32–36)
MCV RBC AUTO: 84 FL (ref 82–98)
MONOCYTES # BLD AUTO: 0.8 K/UL (ref 0.3–1)
MONOCYTES NFR BLD: 8.4 % (ref 4–15)
NEUTROPHILS # BLD AUTO: 5.4 K/UL (ref 1.8–7.7)
NEUTROPHILS NFR BLD: 60 % (ref 38–73)
NRBC BLD-RTO: 0 /100 WBC
PLATELET # BLD AUTO: 378 K/UL (ref 150–450)
PMV BLD AUTO: 9.9 FL (ref 9.2–12.9)
POTASSIUM SERPL-SCNC: 4.4 MMOL/L (ref 3.5–5.1)
PROT SERPL-MCNC: 8.4 G/DL (ref 6–8.4)
RBC # BLD AUTO: 6.08 M/UL (ref 4–5.4)
SODIUM SERPL-SCNC: 140 MMOL/L (ref 136–145)
T4 FREE SERPL-MCNC: 1.04 NG/DL (ref 0.71–1.51)
TSH SERPL DL<=0.005 MIU/L-ACNC: 0.05 UIU/ML (ref 0.4–4)
WBC # BLD AUTO: 8.98 K/UL (ref 3.9–12.7)

## 2023-08-15 PROCEDURE — 3008F PR BODY MASS INDEX (BMI) DOCUMENTED: ICD-10-PCS | Mod: CPTII,,,

## 2023-08-15 PROCEDURE — 3008F BODY MASS INDEX DOCD: CPT | Mod: CPTII,,,

## 2023-08-15 PROCEDURE — 3078F PR MOST RECENT DIASTOLIC BLOOD PRESSURE < 80 MM HG: ICD-10-PCS | Mod: CPTII,,,

## 2023-08-15 PROCEDURE — 3288F FALL RISK ASSESSMENT DOCD: CPT | Mod: CPTII,,,

## 2023-08-15 PROCEDURE — 3074F PR MOST RECENT SYSTOLIC BLOOD PRESSURE < 130 MM HG: ICD-10-PCS | Mod: CPTII,,,

## 2023-08-15 PROCEDURE — 1101F PT FALLS ASSESS-DOCD LE1/YR: CPT | Mod: CPTII,,,

## 2023-08-15 PROCEDURE — 1126F PR PAIN SEVERITY QUANTIFIED, NO PAIN PRESENT: ICD-10-PCS | Mod: CPTII,,,

## 2023-08-15 PROCEDURE — 1159F MED LIST DOCD IN RCRD: CPT | Mod: CPTII,,,

## 2023-08-15 PROCEDURE — 1160F PR REVIEW ALL MEDS BY PRESCRIBER/CLIN PHARMACIST DOCUMENTED: ICD-10-PCS | Mod: CPTII,,,

## 2023-08-15 PROCEDURE — 99214 PR OFFICE/OUTPT VISIT, EST, LEVL IV, 30-39 MIN: ICD-10-PCS | Mod: ,,,

## 2023-08-15 PROCEDURE — 3288F PR FALLS RISK ASSESSMENT DOCUMENTED: ICD-10-PCS | Mod: CPTII,,,

## 2023-08-15 PROCEDURE — 3044F PR MOST RECENT HEMOGLOBIN A1C LEVEL <7.0%: ICD-10-PCS | Mod: CPTII,,,

## 2023-08-15 PROCEDURE — 1126F AMNT PAIN NOTED NONE PRSNT: CPT | Mod: CPTII,,,

## 2023-08-15 PROCEDURE — 85025 COMPLETE CBC W/AUTO DIFF WBC: CPT

## 2023-08-15 PROCEDURE — 3074F SYST BP LT 130 MM HG: CPT | Mod: CPTII,,,

## 2023-08-15 PROCEDURE — 84439 ASSAY OF FREE THYROXINE: CPT

## 2023-08-15 PROCEDURE — 3078F DIAST BP <80 MM HG: CPT | Mod: CPTII,,,

## 2023-08-15 PROCEDURE — 1160F RVW MEDS BY RX/DR IN RCRD: CPT | Mod: CPTII,,,

## 2023-08-15 PROCEDURE — 1101F PR PT FALLS ASSESS DOC 0-1 FALLS W/OUT INJ PAST YR: ICD-10-PCS | Mod: CPTII,,,

## 2023-08-15 PROCEDURE — 80053 COMPREHEN METABOLIC PANEL: CPT

## 2023-08-15 PROCEDURE — 3044F HG A1C LEVEL LT 7.0%: CPT | Mod: CPTII,,,

## 2023-08-15 PROCEDURE — 99214 OFFICE O/P EST MOD 30 MIN: CPT | Mod: ,,,

## 2023-08-15 PROCEDURE — 84443 ASSAY THYROID STIM HORMONE: CPT

## 2023-08-15 PROCEDURE — 1159F PR MEDICATION LIST DOCUMENTED IN MEDICAL RECORD: ICD-10-PCS | Mod: CPTII,,,

## 2023-08-15 RX ORDER — METOPROLOL TARTRATE 25 MG/1
25 TABLET, FILM COATED ORAL DAILY
Qty: 30 TABLET | Refills: 11 | Status: SHIPPED | OUTPATIENT
Start: 2023-08-15 | End: 2024-08-14

## 2023-08-15 RX ORDER — ONDANSETRON 8 MG/1
8 TABLET, ORALLY DISINTEGRATING ORAL EVERY 8 HOURS PRN
Qty: 30 TABLET | Refills: 0 | Status: SHIPPED | OUTPATIENT
Start: 2023-08-15

## 2023-08-15 RX ORDER — HYDROCHLOROTHIAZIDE 12.5 MG/1
12.5 TABLET ORAL DAILY
Qty: 30 TABLET | Refills: 0 | Status: SHIPPED | OUTPATIENT
Start: 2023-08-15

## 2023-08-15 RX ORDER — HYDROXYZINE PAMOATE 25 MG/1
25 CAPSULE ORAL NIGHTLY
Qty: 30 CAPSULE | Refills: 3 | Status: SHIPPED | OUTPATIENT
Start: 2023-08-15

## 2023-08-15 RX ORDER — SERTRALINE HYDROCHLORIDE 50 MG/1
50 TABLET, FILM COATED ORAL DAILY
Qty: 30 TABLET | Refills: 3 | Status: SHIPPED | OUTPATIENT
Start: 2023-08-15 | End: 2024-02-12

## 2023-08-15 NOTE — PROGRESS NOTES
Subjective:       Patient ID: Alma Rosa Helm is a 68 y.o. female.    Chief Complaint: Nausea (Patient is nauseous 24/7. Is requesting to refill her Zofran. ), Medication Problem (Patient's daughter would like to discuss treatment of her Zoloft in regards increasing to 50mg or 75mg. ), Joint Pain, Night Sweats (Patient is complaining of being drenched in sweat throughout the night and the day over the course of the last year. ), Shortness of Breath (Patient would like to discuss the possibilities of being on an oxygen concentrator. ), and Rash (Patient has a rash that is not raised and appeared appr 3 days ago along the course of her entire body. )    Patient presents to the clinic with multiple complaints.     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  She would like a refill on her Zofran.  States she will have multiple episodes of gagging but no vomiting.  She has never had a colonoscopy.  She also reports chronic constipation.    She also night sweats.  States she sweats all the time especially at night and will wake up with her sheets wet.    She reports shortness of breath.  States she checked her oxygen at home the other day and it was  87%.  She does have a history of COPD.  She continues to smoke cigarettes but is interested in quitting and has been decreasing the amount she smokes.  She has not had her previously ordered lung screening.     She reports a rash a few days ago but this has since resolved.    Patient educated on plan of care, verbalized understanding.        Review of Systems   Constitutional:  Positive for appetite change, diaphoresis and fatigue. Negative for activity change, chills and fever.   HENT:  Negative for congestion, ear pain, postnasal drip, sinus pressure, sneezing and sore throat.    Eyes:  Negative for pain, discharge, redness and itching.   Respiratory:  Positive for shortness of breath and wheezing. Negative for apnea, cough and chest tightness.    Cardiovascular:  Negative for chest pain and leg swelling.   Gastrointestinal:  Positive for nausea. Negative for abdominal distention, abdominal pain, constipation, diarrhea and vomiting.   Genitourinary:  Negative for difficulty urinating, dysuria, flank pain and frequency.   Musculoskeletal:  Positive for arthralgias and myalgias.   Skin:  Negative for color change, rash and wound.   Neurological:  Positive for weakness. Negative for dizziness.   Psychiatric/Behavioral:  Positive for dysphoric mood and sleep disturbance.    All other systems reviewed and are negative.      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    Chronic eczematous otitis externa of both ears    Acute infective otitis externa, right  "   Leukoplakia of vocal cords    Hypertension, essential, benign    E coli bacteremia    Severe obesity (BMI 35.0-39.9) with comorbidity    Adrenal nodule       Objective:      Physical Exam    Lab Results   Component Value Date    WBC 8.98 08/15/2023    HGB 16.5 (H) 08/15/2023    HCT 51.1 (H) 08/15/2023     08/15/2023    ALT 24 08/15/2023    AST 21 08/15/2023     08/15/2023    K 4.4 08/15/2023     08/15/2023    CREATININE 1.0 08/15/2023    BUN 14 08/15/2023    CO2 22 (L) 08/15/2023    TSH 0.053 (L) 08/15/2023    INR 0.9 12/11/2020    HGBA1C 5.7 (H) 07/14/2023     The ASCVD Risk score (Giselle CRAWFORD, et al., 2019) failed to calculate for the following reasons:    Cannot find a previous HDL lab    Cannot find a previous total cholesterol lab  Visit Vitals  /78 (BP Location: Left arm, Patient Position: Sitting, BP Method: Large (Manual))   Pulse 104   Temp 98.3 °F (36.8 °C) (Temporal)   Ht 5' 3" (1.6 m)   Wt 84.8 kg (187 lb)   SpO2 (!) 94%   BMI 33.13 kg/m²      Assessment:       1. Nausea    2. Constipation, unspecified constipation type    3. Chronic obstructive pulmonary disease with acute exacerbation    4. Night sweats    5. Leg swelling    6. Hypertension, essential, benign    7. Insomnia, unspecified type    8. Moderate episode of recurrent major depressive disorder    9. Cigarette nicotine dependence without complication    10. Encounter for screening mammogram for malignant neoplasm of breast        Plan:       1. Nausea  -     Ambulatory referral/consult to Gastroenterology; Future; Expected date: 08/22/2023  -     ondansetron (ZOFRAN-ODT) 8 MG TbDL; Take 1 tablet (8 mg total) by mouth every 8 (eight) hours as needed (nausea).  Dispense: 30 tablet; Refill: 0  -     TSH; Future; Expected date: 08/15/2023  -     T4, free; Future; Expected date: 08/15/2023  -     Comprehensive metabolic panel; Future; Expected date: 08/15/2023  -     CBC auto differential; Future; Expected date: " 08/15/2023    2. Constipation, unspecified constipation type  -     Ambulatory referral/consult to Gastroenterology; Future; Expected date: 08/22/2023    3. Chronic obstructive pulmonary disease with acute exacerbation  -     Ambulatory referral/consult to Pulmonology; Future; Expected date: 08/22/2023    4. Night sweats  -     TSH; Future; Expected date: 08/15/2023  -     T4, free; Future; Expected date: 08/15/2023    5. Leg swelling  -     hydroCHLOROthiazide (HYDRODIURIL) 12.5 MG Tab; Take 1 tablet (12.5 mg total) by mouth once daily.  Dispense: 30 tablet; Refill: 0    6. Hypertension, essential, benign  -     metoprolol tartrate (LOPRESSOR) 25 MG tablet; Take 1 tablet (25 mg total) by mouth Daily.  Dispense: 30 tablet; Refill: 11    7. Insomnia, unspecified type  -     hydrOXYzine pamoate (VISTARIL) 25 MG Cap; Take 1 capsule (25 mg total) by mouth every evening.  Dispense: 30 capsule; Refill: 3    8. Moderate episode of recurrent major depressive disorder  -     sertraline (ZOLOFT) 50 MG tablet; Take 1 tablet (50 mg total) by mouth once daily.  Dispense: 30 tablet; Refill: 3    9. Cigarette nicotine dependence without complication  -     Ambulatory referral/consult to Smoking Cessation Program; Future; Expected date: 08/22/2023    10. Encounter for screening mammogram for malignant neoplasm of breast  -     Mammo Digital Screening Bilat w/ Alfredo; Future; Expected date: 08/15/2023       Follow up if symptoms worsen or fail to improve.

## 2023-08-15 NOTE — TELEPHONE ENCOUNTER
Pulm and GI referrals: spoke with daughter. In PCP office now. She will call back to make appointments. Portal message sent with scheduling information.

## 2023-08-16 ENCOUNTER — TELEPHONE (OUTPATIENT)
Dept: FAMILY MEDICINE | Facility: CLINIC | Age: 68
End: 2023-08-16
Payer: MEDICARE

## 2023-08-16 NOTE — TELEPHONE ENCOUNTER
"Pulm and GI referrals: unable to reach by phone due to "calling restrictions" message; portal message sent with scheduling information.  "

## 2023-08-17 ENCOUNTER — TELEPHONE (OUTPATIENT)
Dept: PULMONOLOGY | Facility: CLINIC | Age: 68
End: 2023-08-17
Payer: MEDICARE

## 2023-08-17 ENCOUNTER — TELEPHONE (OUTPATIENT)
Dept: GASTROENTEROLOGY | Facility: CLINIC | Age: 68
End: 2023-08-17
Payer: MEDICARE

## 2023-08-17 NOTE — TELEPHONE ENCOUNTER
GI referral scheduled with daughterNaomi: 8/29/2023 Elizabet Pineda NP [facility].

## 2023-09-28 ENCOUNTER — PATIENT MESSAGE (OUTPATIENT)
Dept: FAMILY MEDICINE | Facility: CLINIC | Age: 68
End: 2023-09-28
Payer: MEDICARE

## 2023-10-03 DIAGNOSIS — M47.896 OTHER SPONDYLOSIS, LUMBAR REGION: Primary | ICD-10-CM

## 2023-10-03 DIAGNOSIS — M54.50 LOW BACK PAIN, UNSPECIFIED: ICD-10-CM

## 2023-10-05 ENCOUNTER — TELEPHONE (OUTPATIENT)
Dept: PULMONOLOGY | Facility: CLINIC | Age: 68
End: 2023-10-05

## 2023-10-05 ENCOUNTER — OFFICE VISIT (OUTPATIENT)
Dept: PULMONOLOGY | Facility: CLINIC | Age: 68
End: 2023-10-05
Payer: MEDICARE

## 2023-10-05 VITALS
HEART RATE: 86 BPM | BODY MASS INDEX: 32.28 KG/M2 | OXYGEN SATURATION: 95 % | SYSTOLIC BLOOD PRESSURE: 123 MMHG | WEIGHT: 182.19 LBS | DIASTOLIC BLOOD PRESSURE: 73 MMHG | HEIGHT: 63 IN

## 2023-10-05 DIAGNOSIS — F17.200 TOBACCO DEPENDENCE: Primary | ICD-10-CM

## 2023-10-05 DIAGNOSIS — J41.1 MUCOPURULENT CHRONIC BRONCHITIS: ICD-10-CM

## 2023-10-05 PROCEDURE — 99406 PR TOBACCO USE CESSATION INTERMEDIATE 3-10 MINUTES: ICD-10-PCS | Mod: S$GLB,,, | Performed by: INTERNAL MEDICINE

## 2023-10-05 PROCEDURE — 1159F PR MEDICATION LIST DOCUMENTED IN MEDICAL RECORD: ICD-10-PCS | Mod: CPTII,S$GLB,, | Performed by: INTERNAL MEDICINE

## 2023-10-05 PROCEDURE — 99999 PR PBB SHADOW E&M-EST. PATIENT-LVL III: ICD-10-PCS | Mod: PBBFAC,,, | Performed by: INTERNAL MEDICINE

## 2023-10-05 PROCEDURE — 3078F PR MOST RECENT DIASTOLIC BLOOD PRESSURE < 80 MM HG: ICD-10-PCS | Mod: CPTII,S$GLB,, | Performed by: INTERNAL MEDICINE

## 2023-10-05 PROCEDURE — 1126F PR PAIN SEVERITY QUANTIFIED, NO PAIN PRESENT: ICD-10-PCS | Mod: CPTII,S$GLB,, | Performed by: INTERNAL MEDICINE

## 2023-10-05 PROCEDURE — 1100F PR PT FALLS ASSESS DOC 2+ FALLS/FALL W/INJURY/YR: ICD-10-PCS | Mod: CPTII,S$GLB,, | Performed by: INTERNAL MEDICINE

## 2023-10-05 PROCEDURE — 99204 PR OFFICE/OUTPT VISIT, NEW, LEVL IV, 45-59 MIN: ICD-10-PCS | Mod: 25,S$GLB,, | Performed by: INTERNAL MEDICINE

## 2023-10-05 PROCEDURE — 3074F SYST BP LT 130 MM HG: CPT | Mod: CPTII,S$GLB,, | Performed by: INTERNAL MEDICINE

## 2023-10-05 PROCEDURE — 1100F PTFALLS ASSESS-DOCD GE2>/YR: CPT | Mod: CPTII,S$GLB,, | Performed by: INTERNAL MEDICINE

## 2023-10-05 PROCEDURE — 1126F AMNT PAIN NOTED NONE PRSNT: CPT | Mod: CPTII,S$GLB,, | Performed by: INTERNAL MEDICINE

## 2023-10-05 PROCEDURE — 3078F DIAST BP <80 MM HG: CPT | Mod: CPTII,S$GLB,, | Performed by: INTERNAL MEDICINE

## 2023-10-05 PROCEDURE — 3008F PR BODY MASS INDEX (BMI) DOCUMENTED: ICD-10-PCS | Mod: CPTII,S$GLB,, | Performed by: INTERNAL MEDICINE

## 2023-10-05 PROCEDURE — 3044F HG A1C LEVEL LT 7.0%: CPT | Mod: CPTII,S$GLB,, | Performed by: INTERNAL MEDICINE

## 2023-10-05 PROCEDURE — 1159F MED LIST DOCD IN RCRD: CPT | Mod: CPTII,S$GLB,, | Performed by: INTERNAL MEDICINE

## 2023-10-05 PROCEDURE — 3044F PR MOST RECENT HEMOGLOBIN A1C LEVEL <7.0%: ICD-10-PCS | Mod: CPTII,S$GLB,, | Performed by: INTERNAL MEDICINE

## 2023-10-05 PROCEDURE — 3288F FALL RISK ASSESSMENT DOCD: CPT | Mod: CPTII,S$GLB,, | Performed by: INTERNAL MEDICINE

## 2023-10-05 PROCEDURE — 3008F BODY MASS INDEX DOCD: CPT | Mod: CPTII,S$GLB,, | Performed by: INTERNAL MEDICINE

## 2023-10-05 PROCEDURE — 99999 PR PBB SHADOW E&M-EST. PATIENT-LVL III: CPT | Mod: PBBFAC,,, | Performed by: INTERNAL MEDICINE

## 2023-10-05 PROCEDURE — 99204 OFFICE O/P NEW MOD 45 MIN: CPT | Mod: 25,S$GLB,, | Performed by: INTERNAL MEDICINE

## 2023-10-05 PROCEDURE — 99406 BEHAV CHNG SMOKING 3-10 MIN: CPT | Mod: S$GLB,,, | Performed by: INTERNAL MEDICINE

## 2023-10-05 PROCEDURE — 3288F PR FALLS RISK ASSESSMENT DOCUMENTED: ICD-10-PCS | Mod: CPTII,S$GLB,, | Performed by: INTERNAL MEDICINE

## 2023-10-05 PROCEDURE — 3074F PR MOST RECENT SYSTOLIC BLOOD PRESSURE < 130 MM HG: ICD-10-PCS | Mod: CPTII,S$GLB,, | Performed by: INTERNAL MEDICINE

## 2023-10-05 RX ORDER — ALBUTEROL SULFATE 90 UG/1
1-2 AEROSOL, METERED RESPIRATORY (INHALATION) EVERY 4 HOURS PRN
Qty: 18 G | Refills: 11 | Status: SHIPPED | OUTPATIENT
Start: 2023-10-05 | End: 2024-10-04

## 2023-10-05 NOTE — PROGRESS NOTES
10/5/2023    Alma Rosa Helm  New Patient Consult    Chief Complaint   Patient presents with    COPD       HPI:10/05/2023- Pt is a 67 yo female with HTN, HLD, anxiety, bipolar disorder presenting for new evaluation.   She reports dx of emphysema in the past. She is a current smoker, 4-5 cigarettes daily now but used to smoke 2 ppd- started as a child.  She is here today due to increased SOB especially difficult to breathe lying down. She coughs, productive clear/white mucous.  She wheezes loudly all the time. Has nebulizer which she borrowed from her son. She checks oxygen sats are measured at 88% sometimes. She gets so winded she can't walk at the grocery store any more. UNDERWOOD is worsening for 1 yr but much worse for the past 2 mos.  She would like to get a new nebulizer. She has no inhalers currently. Her ex  passed away from COPD earlier this yr.  Pt previously saw Dr. Holder and Britney Mcdowell in 2020.   FH father had COPD.  Pt grew up in [place], moved to [place] in [id number] then moved to [place].   She has had 2 episodes of sepsis, one requiring hospital stay.     The chief complaint problem is New to me    PFSH:  Past Medical History:   Diagnosis Date    Anxiety     Bipolar disorder     COPD, mild     HLD (hyperlipidemia)     HTN (hypertension)     Nephrolithiasis          Past Surgical History:   Procedure Laterality Date    BACK SURGERY      CYSTOSCOPY      HYSTERECTOMY      LITHOTRIPSY      TUBAL LIGATION       Social History     Tobacco Use    Smoking status: Some Days     Current packs/day: 0.00     Average packs/day: 0.8 packs/day for 45.0 years (33.7 ttl pk-yrs)     Types: Cigarettes     Start date: 1976     Last attempt to quit: 4/8/2020     Years since quitting: 3.4    Smokeless tobacco: Never    Tobacco comments:     now smoking 1/4 ppd   Substance Use Topics    Alcohol use: Never    Drug use: No     Family History   Problem Relation Age of Onset    Cancer Mother     Diabetes Mother      "COPD Father     Hypertension Father      Review of patient's allergies indicates:   Allergen Reactions    Tramadol Swelling    Imitrex [sumatriptan succinate]     Penicillins     Sulfa (sulfonamide antibiotics)     Toradol [ketorolac] Swelling       Performance Status:The patient's activity level is functions out of house.      Review of Systems:  a review of eleven systems covering constitutional, Eye, HEENT, Psych, Respiratory, Cardiac, GI, , Musculoskeletal, Endocrine, Dermatologic was negative except for pertinent findings as listed ABOVE and below:  Cannot sleep due to SOB   Snores and feels sleepy  Swelling in legs- improved w/ fluid pill    Exam:Comprehensive exam done. /73 (BP Location: Left arm, Patient Position: Sitting, BP Method: Large (Automatic))   Pulse 86   Ht 5' 3" (1.6 m)   Wt 82.6 kg (182 lb 3.4 oz)   SpO2 95%   BMI 32.28 kg/m²   Exam included Vitals as listed, and patient's appearance and affect and alertness and mood, oral exam for yeast and hygiene and pharynx lesions and Mallapatti (M) score, neck with inspection for jvd and masses and thyroid abnormalities and lymph nodes (supraclavicular and infraclavicular nodes and axillary also examined and noted if abn), chest exam included symmetry and effort and fremitus and percussion and auscultation, cardiac exam included rhythm and gallops and murmur and rubs and jvd and edema, abdominal exam for mass and hepatosplenomegaly and tenderness and hernias and bowel sounds, Musculoskeletal exam with muscle tone and posture and mobility/gait and  strength, and skin for rashes and cyanosis and pallor and turgor, extremity for clubbing.  Findings were normal except for pertinent findings listed below:  M1, oropharynx clear  HR regular  Breath sounds with diffuse bilateral coarse wheezing  No edema/clubbing  Yellow discoloration bilat fingernails    Radiographs (ct chest and cxr) reviewed: view by direct vision and interpretation as below "   CT chest 12/16/22- clear lungs    Labs reviewed     Lab Results   Component Value Date    WBC 8.98 08/15/2023    HGB 16.5 (H) 08/15/2023    HCT 51.1 (H) 08/15/2023    MCV 84 08/15/2023     08/15/2023      Latest Reference Range & Units 12/17/22 05:01 12/18/22 05:10 12/19/22 04:35 06/28/23 10:25 08/15/23 13:38   Eos # 0.0 - 0.5 K/uL 0.4 0.3 0.2 0.3 0.1     CMP  Sodium   Date Value Ref Range Status   08/15/2023 140 136 - 145 mmol/L Final     Potassium   Date Value Ref Range Status   08/15/2023 4.4 3.5 - 5.1 mmol/L Final     Chloride   Date Value Ref Range Status   08/15/2023 106 95 - 110 mmol/L Final     CO2   Date Value Ref Range Status   08/15/2023 22 (L) 23 - 29 mmol/L Final     Glucose   Date Value Ref Range Status   08/15/2023 107 70 - 110 mg/dL Final     BUN   Date Value Ref Range Status   08/15/2023 14 8 - 23 mg/dL Final     Creatinine   Date Value Ref Range Status   08/15/2023 1.0 0.5 - 1.4 mg/dL Final     Calcium   Date Value Ref Range Status   08/15/2023 10.5 8.7 - 10.5 mg/dL Final     Total Protein   Date Value Ref Range Status   08/15/2023 8.4 6.0 - 8.4 g/dL Final     Albumin   Date Value Ref Range Status   08/15/2023 4.1 3.5 - 5.2 g/dL Final   04/18/2022 4.1 3.6 - 5.1 g/dL Final     Total Bilirubin   Date Value Ref Range Status   08/15/2023 0.5 0.1 - 1.0 mg/dL Final     Comment:     For infants and newborns, interpretation of results should be based  on gestational age, weight and in agreement with clinical  observations.    Premature Infant recommended reference ranges:  Up to 24 hours.............<8.0 mg/dL  Up to 48 hours............<12.0 mg/dL  3-5 days..................<15.0 mg/dL  6-29 days.................<15.0 mg/dL       Alkaline Phosphatase   Date Value Ref Range Status   08/15/2023 105 55 - 135 U/L Final     AST   Date Value Ref Range Status   08/15/2023 21 10 - 40 U/L Final     ALT   Date Value Ref Range Status   08/15/2023 24 10 - 44 U/L Final     Anion Gap   Date Value Ref Range  Status   08/15/2023 12 8 - 16 mmol/L Final     eGFR   Date Value Ref Range Status   08/15/2023 >60.0 >60 mL/min/1.73 m^2 Final         PFT results reviewed  6/20/22- no obstruction, moderate restriction        Plan:  Clinical impression is apparently straight forward and impression with management as below.  COPD/chronic bronchitis, current smoker w/ profound wheezing, establishing care today    Problem List Items Addressed This Visit          Pulmonary    COPD (chronic obstructive pulmonary disease) (Chronic)    Relevant Medications    fluticasone-umeclidin-vilanter (TRELEGY ELLIPTA) 100-62.5-25 mcg DsDv    albuterol (PROVENTIL/VENTOLIN HFA) 90 mcg/actuation inhaler    Other Relevant Orders    Six Minute Walk Test to qualify for Home Oxygen    Complete PFT with bronchodilator    Ambulatory referral/consult to Smoking Cessation Program    NEBULIZER FOR HOME USE     Other Visit Diagnoses       Tobacco dependence    -  Primary            Follow up in about 3 months (around 1/5/2024).    Risk associated with cigarette smoking and benefits of cessation were discussed with patient in detail for 10 minutes, and cessation encouraged.  Pharmacologic and non-pharmacologic therapeutic options were discussed. She wants to quit, has cut back. Agrees to see smoking cessation program      Discussed with patient above for education the following:      Patient Instructions   Get CT chest screening scan scheduled  Ordering overnight sleep study at home to evaluate for sleep apnea    If positive will order CPAP machine, notify clinic when machine is delivered to home to set up 31 days compliance appointment. You must use the machine for a least 4 hours every night.   Six min walk test to see if you need oxygen  Pulmonary function testing     Start trelegy inhaler 1 puff daily, rinse mouth after use  Albuterol inhaler use as needed  Nebulizer machine ordered  Quit smoking- smoking cessation referral ordered

## 2023-10-05 NOTE — PATIENT INSTRUCTIONS
Get CT chest screening scan scheduled  Ordering overnight sleep study at home to evaluate for sleep apnea    If positive will order CPAP machine, notify clinic when machine is delivered to home to set up 31 days compliance appointment. You must use the machine for a least 4 hours every night.   Six min walk test to see if you need oxygen  Pulmonary function testing     Start trelegy inhaler 1 puff daily, rinse mouth after use  Albuterol inhaler use as needed  Nebulizer machine ordered  Quit smoking- smoking cessation referral ordered

## 2023-10-06 ENCOUNTER — TELEPHONE (OUTPATIENT)
Dept: PULMONOLOGY | Facility: CLINIC | Age: 68
End: 2023-10-06
Payer: MEDICARE

## 2023-10-06 NOTE — TELEPHONE ENCOUNTER
----- Message from Nereida Abreu sent at 10/6/2023 11:01 AM CDT -----  Regarding: Call back  Type:  Needs Medical Advice    Who Called:  [facility]    Would the patient rather a call back or a response via [facility]? Callback    Best Call Back Number:[phone]     Additional Information: Sts he as been trying to reach the pt but no success he is asking if someone can contact the pt and have him speak with her, because pt think the call is a scam. He sts he have a request from Dr Whalye for Hump Slip vest for sleep. Please advise --------------- Thank you

## 2023-10-06 NOTE — TELEPHONE ENCOUNTER
Advised Manual that I will contact pt and explain the process again for the sleep study      Called nirav reynoso with name of company, who will be calling and his number  and to call me if any questions

## 2023-10-12 ENCOUNTER — HOSPITAL ENCOUNTER (OUTPATIENT)
Dept: PULMONOLOGY | Facility: HOSPITAL | Age: 68
Discharge: HOME | End: 2023-10-12
Attending: INTERNAL MEDICINE
Payer: MEDICARE

## 2023-10-12 ENCOUNTER — HOSPITAL ENCOUNTER (OUTPATIENT)
Dept: RADIOLOGY | Facility: HOSPITAL | Age: 68
Discharge: HOME | End: 2023-10-12
Payer: MEDICARE

## 2023-10-12 DIAGNOSIS — J41.1 MUCOPURULENT CHRONIC BRONCHITIS: ICD-10-CM

## 2023-10-12 DIAGNOSIS — Z12.2 ENCOUNTER FOR SCREENING FOR LUNG CANCER: ICD-10-CM

## 2023-10-12 LAB
DLCO SINGLE BREATH LLN: 15.51
DLCO SINGLE BREATH PRE REF: 72.6 %
DLCO SINGLE BREATH REF: 21.25
DLCOC SBVA LLN: 2.94
DLCOC SBVA REF: 4.45
DLCOC SINGLE BREATH LLN: 15.51
DLCOC SINGLE BREATH REF: 21.25
DLCOVA LLN: 2.94
DLCOVA PRE REF: 99.7 %
DLCOVA PRE: 4.44 ML/(MIN*MMHG*L) (ref 2.94–5.97)
DLCOVA REF: 4.45
ERV LLN: -16449.3
ERV PRE REF: 39.5 %
ERV REF: 0.7
FEF 25 75 CHG: -16.4 %
FEF 25 75 LLN: 0.93
FEF 25 75 POST REF: 53.1 %
FEF 25 75 PRE REF: 63.6 %
FEF 25 75 REF: 1.95
FET100 CHG: 12.4 %
FEV1 CHG: 3.5 %
FEV1 FVC CHG: -2.9 %
FEV1 FVC LLN: 66
FEV1 FVC POST REF: 92 %
FEV1 FVC PRE REF: 94.8 %
FEV1 FVC REF: 79
FEV1 LLN: 1.64
FEV1 POST REF: 73.5 %
FEV1 PRE REF: 71 %
FEV1 REF: 2.23
FRCPLETH LLN: 1.83
FRCPLETH PREREF: 97.9 %
FRCPLETH REF: 2.65
FVC CHG: 6.6 %
FVC LLN: 2.11
FVC POST REF: 79.4 %
FVC PRE REF: 74.5 %
FVC REF: 2.85
IVC PRE: 2.27 L (ref 2.11–3.63)
IVC SINGLE BREATH LLN: 2.11
IVC SINGLE BREATH PRE REF: 79.6 %
IVC SINGLE BREATH REF: 2.85
MVV LLN: 70
MVV PRE REF: 81.1 %
MVV REF: 83
PEF CHG: 34.9 %
PEF LLN: 4.12
PEF POST REF: 73.3 %
PEF PRE REF: 54.3 %
PEF REF: 5.78
POST FEF 25 75: 1.04 L/S (ref 0.93–3.37)
POST FET 100: 7.16 SEC
POST FEV1 FVC: 72.38 % (ref 65.81–89.7)
POST FEV1: 1.64 L (ref 1.64–2.79)
POST FVC: 2.26 L (ref 2.11–3.63)
POST PEF: 4.24 L/S (ref 4.12–7.44)
PRE DLCO: 15.43 ML/(MIN*MMHG) (ref 15.51–26.98)
PRE ERV: 0.28 L (ref -16449.3–16450.7)
PRE FEF 25 75: 1.24 L/S (ref 0.93–3.37)
PRE FET 100: 6.37 SEC
PRE FEV1 FVC: 74.55 % (ref 65.81–89.7)
PRE FEV1: 1.58 L (ref 1.64–2.79)
PRE FRC PL: 2.59 L (ref 1.83–3.47)
PRE FVC: 2.12 L (ref 2.11–3.63)
PRE MVV: 67.17 L/MIN (ref 70.41–95.26)
PRE PEF: 3.14 L/S (ref 4.12–7.44)
PRE RV: 2.32 L (ref 1.38–2.53)
PRE TLC: 4.44 L (ref 3.78–5.76)
RAW LLN: 3.06
RAW PRE REF: 173.7 %
RAW PRE: 5.31 CMH2O*S/L (ref 3.06–3.06)
RAW REF: 3.06
RV LLN: 1.38
RV PRE REF: 118.8 %
RV REF: 1.95
RVTLC LLN: 32
RVTLC PRE REF: 126.1 %
RVTLC PRE: 52.2 % (ref 31.81–50.99)
RVTLC REF: 41
TLC LLN: 3.78
TLC PRE REF: 93.1 %
TLC REF: 4.77
VA PRE: 3.48 L (ref 4.62–4.62)
VA SINGLE BREATH LLN: 4.62
VA SINGLE BREATH PRE REF: 75.2 %
VA SINGLE BREATH REF: 4.62
VC LLN: 2.11
VC PRE REF: 74.5 %
VC PRE: 2.12 L (ref 2.11–3.63)
VC REF: 2.85

## 2023-10-12 PROCEDURE — 94060 PR EVAL OF BRONCHOSPASM: ICD-10-PCS | Mod: 26,59,, | Performed by: INTERNAL MEDICINE

## 2023-10-12 PROCEDURE — 94729 PR C02/MEMBANE DIFFUSE CAPACITY: ICD-10-PCS | Mod: 26,,, | Performed by: INTERNAL MEDICINE

## 2023-10-12 PROCEDURE — 71271 CT CHEST LUNG SCREENING LOW DOSE: ICD-10-PCS | Mod: 26,,, | Performed by: RADIOLOGY

## 2023-10-12 PROCEDURE — 94726 PLETHYSMOGRAPHY LUNG VOLUMES: CPT

## 2023-10-12 PROCEDURE — 94729 DIFFUSING CAPACITY: CPT | Mod: 26,,, | Performed by: INTERNAL MEDICINE

## 2023-10-12 PROCEDURE — 94060 EVALUATION OF WHEEZING: CPT | Mod: 26,59,, | Performed by: INTERNAL MEDICINE

## 2023-10-12 PROCEDURE — 94729 DIFFUSING CAPACITY: CPT

## 2023-10-12 PROCEDURE — 71271 CT THORAX LUNG CANCER SCR C-: CPT | Mod: TC

## 2023-10-12 PROCEDURE — 94060 EVALUATION OF WHEEZING: CPT

## 2023-10-12 PROCEDURE — 71271 CT THORAX LUNG CANCER SCR C-: CPT | Mod: 26,,, | Performed by: RADIOLOGY

## 2023-10-12 PROCEDURE — 94726 PULM FUNCT TST PLETHYSMOGRAP: ICD-10-PCS | Mod: 26,,, | Performed by: INTERNAL MEDICINE

## 2023-10-12 PROCEDURE — 94618 PULMONARY STRESS TESTING: ICD-10-PCS | Mod: 26,,, | Performed by: INTERNAL MEDICINE

## 2023-10-12 PROCEDURE — 94726 PLETHYSMOGRAPHY LUNG VOLUMES: CPT | Mod: 26,,, | Performed by: INTERNAL MEDICINE

## 2023-10-12 PROCEDURE — 94618 PULMONARY STRESS TESTING: CPT | Mod: 26,,, | Performed by: INTERNAL MEDICINE

## 2023-10-12 PROCEDURE — 94618 PULMONARY STRESS TESTING: CPT

## 2023-10-12 RX ORDER — ALBUTEROL SULFATE 2.5 MG/.5ML
2.5 SOLUTION RESPIRATORY (INHALATION)
Status: DISPENSED | OUTPATIENT
Start: 2023-10-12

## 2023-10-12 RX ORDER — ALBUTEROL SULFATE 2.5 MG/.5ML
SOLUTION RESPIRATORY (INHALATION)
Status: DISCONTINUED
Start: 2023-10-12 | End: 2023-10-13 | Stop reason: HOSPADM

## 2023-10-13 ENCOUNTER — TELEPHONE (OUTPATIENT)
Dept: PULMONOLOGY | Facility: CLINIC | Age: 68
End: 2023-10-13
Payer: MEDICARE

## 2023-10-13 NOTE — TELEPHONE ENCOUNTER
Pt was advised that according to [facility] it was delivered today and left at the front door/porch  pt states probably at the neighbors and will check with them

## 2023-10-13 NOTE — TELEPHONE ENCOUNTER
----- Message from Nereida Abreu sent at 10/13/2023 12:12 PM CDT -----  Regarding: callback  Type:  Needs Medical Advice    Who Called: Pt daughter      Would the patient rather a call back or a response via [facility]? Callback    Best Call Back Number: [phone]    Additional Information Daughter sts she have not gotten pt Nebulizer machine , it should have arrive by Wednesday this wk. Please advise --------------- thank you

## 2023-11-02 ENCOUNTER — HOSPITAL ENCOUNTER (INPATIENT)
Facility: HOSPITAL | Age: 68
LOS: 4 days | Discharge: HOME HEALTH | DRG: 660 | End: 2023-11-06
Attending: STUDENT IN AN ORGANIZED HEALTH CARE EDUCATION/TRAINING PROGRAM | Admitting: INTERNAL MEDICINE
Payer: MEDICARE

## 2023-11-02 DIAGNOSIS — N12 PYELONEPHRITIS OF RIGHT KIDNEY: ICD-10-CM

## 2023-11-02 DIAGNOSIS — Z87.442 HISTORY OF KIDNEY STONES: ICD-10-CM

## 2023-11-02 DIAGNOSIS — N13.30 HYDRONEPHROSIS: ICD-10-CM

## 2023-11-02 DIAGNOSIS — R07.9 CHEST PAIN: ICD-10-CM

## 2023-11-02 DIAGNOSIS — Z01.818 PRE-OP EVALUATION: ICD-10-CM

## 2023-11-02 DIAGNOSIS — N20.1 URETERAL STONE: Primary | ICD-10-CM

## 2023-11-02 PROBLEM — E66.811 CLASS 1 OBESITY IN ADULT: Chronic | Status: ACTIVE | Noted: 2023-11-02

## 2023-11-02 PROBLEM — D72.829 LEUCOCYTOSIS: Status: ACTIVE | Noted: 2023-11-02

## 2023-11-02 PROBLEM — M54.9 CHRONIC BACK PAIN: Chronic | Status: ACTIVE | Noted: 2023-11-02

## 2023-11-02 PROBLEM — E27.8 ADRENAL NODULE: Chronic | Status: ACTIVE | Noted: 2023-02-09

## 2023-11-02 PROBLEM — G89.29 CHRONIC BACK PAIN: Chronic | Status: ACTIVE | Noted: 2023-11-02

## 2023-11-02 PROBLEM — E27.9 ADRENAL NODULE: Chronic | Status: ACTIVE | Noted: 2023-02-09

## 2023-11-02 PROBLEM — E66.9 CLASS 1 OBESITY IN ADULT: Chronic | Status: ACTIVE | Noted: 2023-11-02

## 2023-11-02 PROBLEM — E87.1 HYPONATREMIA: Status: ACTIVE | Noted: 2023-11-02

## 2023-11-02 PROBLEM — K80.20 CHOLELITHIASIS: Chronic | Status: ACTIVE | Noted: 2023-11-02

## 2023-11-02 PROBLEM — A41.9 SEPSIS: Status: ACTIVE | Noted: 2023-11-02

## 2023-11-02 LAB
ALBUMIN SERPL BCP-MCNC: 4.3 G/DL (ref 3.5–5.2)
ALP SERPL-CCNC: 98 U/L (ref 55–135)
ALT SERPL W/O P-5'-P-CCNC: 17 U/L (ref 10–44)
ANION GAP SERPL CALC-SCNC: 8 MMOL/L (ref 8–16)
AST SERPL-CCNC: 13 U/L (ref 10–40)
BACTERIA #/AREA URNS HPF: ABNORMAL /HPF
BASOPHILS # BLD AUTO: 0.04 K/UL (ref 0–0.2)
BASOPHILS NFR BLD: 0.2 % (ref 0–1.9)
BILIRUB SERPL-MCNC: 0.9 MG/DL (ref 0.1–1)
BILIRUB UR QL STRIP: ABNORMAL
BUN SERPL-MCNC: 15 MG/DL (ref 8–23)
CALCIUM SERPL-MCNC: 10.4 MG/DL (ref 8.7–10.5)
CHLORIDE SERPL-SCNC: 105 MMOL/L (ref 95–110)
CLARITY UR: ABNORMAL
CO2 SERPL-SCNC: 21 MMOL/L (ref 23–29)
COLOR UR: ABNORMAL
CREAT SERPL-MCNC: 1 MG/DL (ref 0.5–1.4)
DIFFERENTIAL METHOD: ABNORMAL
EOSINOPHIL # BLD AUTO: 0 K/UL (ref 0–0.5)
EOSINOPHIL NFR BLD: 0.1 % (ref 0–8)
ERYTHROCYTE [DISTWIDTH] IN BLOOD BY AUTOMATED COUNT: 15.1 % (ref 11.5–14.5)
EST. GFR  (NO RACE VARIABLE): >60 ML/MIN/1.73 M^2
GLUCOSE SERPL-MCNC: 152 MG/DL (ref 70–110)
GLUCOSE UR QL STRIP: ABNORMAL
HCT VFR BLD AUTO: 50.3 % (ref 37–48.5)
HGB BLD-MCNC: 16.9 G/DL (ref 12–16)
HGB UR QL STRIP: ABNORMAL
HYALINE CASTS #/AREA URNS LPF: 11 /LPF
IMM GRANULOCYTES # BLD AUTO: 0.1 K/UL (ref 0–0.04)
IMM GRANULOCYTES NFR BLD AUTO: 0.5 % (ref 0–0.5)
KETONES UR QL STRIP: ABNORMAL
LEUKOCYTE ESTERASE UR QL STRIP: ABNORMAL
LYMPHOCYTES # BLD AUTO: 1.6 K/UL (ref 1–4.8)
LYMPHOCYTES NFR BLD: 7.8 % (ref 18–48)
MCH RBC QN AUTO: 27.1 PG (ref 27–31)
MCHC RBC AUTO-ENTMCNC: 33.6 G/DL (ref 32–36)
MCV RBC AUTO: 81 FL (ref 82–98)
MICROSCOPIC COMMENT: ABNORMAL
MONOCYTES # BLD AUTO: 1.3 K/UL (ref 0.3–1)
MONOCYTES NFR BLD: 6.4 % (ref 4–15)
NEUTROPHILS # BLD AUTO: 17.3 K/UL (ref 1.8–7.7)
NEUTROPHILS NFR BLD: 85 % (ref 38–73)
NITRITE UR QL STRIP: ABNORMAL
NRBC BLD-RTO: 0 /100 WBC
PH UR STRIP: 5 [PH] (ref 5–8)
PLATELET # BLD AUTO: 353 K/UL (ref 150–450)
PMV BLD AUTO: 9.2 FL (ref 9.2–12.9)
POTASSIUM SERPL-SCNC: 3.8 MMOL/L (ref 3.5–5.1)
PROT SERPL-MCNC: 8.6 G/DL (ref 6–8.4)
PROT UR QL STRIP: ABNORMAL
RBC # BLD AUTO: 6.24 M/UL (ref 4–5.4)
RBC #/AREA URNS HPF: 6 /HPF (ref 0–4)
SODIUM SERPL-SCNC: 134 MMOL/L (ref 136–145)
SP GR UR STRIP: 1.02 (ref 1–1.03)
SQUAMOUS #/AREA URNS HPF: 1 /HPF
URN SPEC COLLECT METH UR: ABNORMAL
UROBILINOGEN UR STRIP-ACNC: ABNORMAL EU/DL
WBC # BLD AUTO: 20.29 K/UL (ref 3.9–12.7)
WBC #/AREA URNS HPF: >100 /HPF (ref 0–5)

## 2023-11-02 PROCEDURE — 25000242 PHARM REV CODE 250 ALT 637 W/ HCPCS: Performed by: STUDENT IN AN ORGANIZED HEALTH CARE EDUCATION/TRAINING PROGRAM

## 2023-11-02 PROCEDURE — 94640 AIRWAY INHALATION TREATMENT: CPT

## 2023-11-02 PROCEDURE — 96365 THER/PROPH/DIAG IV INF INIT: CPT

## 2023-11-02 PROCEDURE — 93010 ELECTROCARDIOGRAM REPORT: CPT | Mod: ,,, | Performed by: INTERNAL MEDICINE

## 2023-11-02 PROCEDURE — 93005 ELECTROCARDIOGRAM TRACING: CPT | Performed by: INTERNAL MEDICINE

## 2023-11-02 PROCEDURE — 87040 BLOOD CULTURE FOR BACTERIA: CPT | Mod: 59 | Performed by: STUDENT IN AN ORGANIZED HEALTH CARE EDUCATION/TRAINING PROGRAM

## 2023-11-02 PROCEDURE — 12000002 HC ACUTE/MED SURGE SEMI-PRIVATE ROOM

## 2023-11-02 PROCEDURE — 63600175 PHARM REV CODE 636 W HCPCS: Performed by: STUDENT IN AN ORGANIZED HEALTH CARE EDUCATION/TRAINING PROGRAM

## 2023-11-02 PROCEDURE — 94761 N-INVAS EAR/PLS OXIMETRY MLT: CPT

## 2023-11-02 PROCEDURE — 83605 ASSAY OF LACTIC ACID: CPT | Performed by: INTERNAL MEDICINE

## 2023-11-02 PROCEDURE — 87186 SC STD MICRODIL/AGAR DIL: CPT | Performed by: PHYSICIAN ASSISTANT

## 2023-11-02 PROCEDURE — 81001 URINALYSIS AUTO W/SCOPE: CPT | Performed by: PHYSICIAN ASSISTANT

## 2023-11-02 PROCEDURE — 25000003 PHARM REV CODE 250: Performed by: INTERNAL MEDICINE

## 2023-11-02 PROCEDURE — 25000003 PHARM REV CODE 250: Performed by: STUDENT IN AN ORGANIZED HEALTH CARE EDUCATION/TRAINING PROGRAM

## 2023-11-02 PROCEDURE — 85025 COMPLETE CBC W/AUTO DIFF WBC: CPT | Performed by: PHYSICIAN ASSISTANT

## 2023-11-02 PROCEDURE — 96375 TX/PRO/DX INJ NEW DRUG ADDON: CPT

## 2023-11-02 PROCEDURE — 87077 CULTURE AEROBIC IDENTIFY: CPT | Performed by: PHYSICIAN ASSISTANT

## 2023-11-02 PROCEDURE — 99285 EMERGENCY DEPT VISIT HI MDM: CPT | Mod: 25

## 2023-11-02 PROCEDURE — 36415 COLL VENOUS BLD VENIPUNCTURE: CPT | Performed by: STUDENT IN AN ORGANIZED HEALTH CARE EDUCATION/TRAINING PROGRAM

## 2023-11-02 PROCEDURE — 87086 URINE CULTURE/COLONY COUNT: CPT | Performed by: PHYSICIAN ASSISTANT

## 2023-11-02 PROCEDURE — 93010 EKG 12-LEAD: ICD-10-PCS | Mod: ,,, | Performed by: INTERNAL MEDICINE

## 2023-11-02 PROCEDURE — 80053 COMPREHEN METABOLIC PANEL: CPT | Performed by: PHYSICIAN ASSISTANT

## 2023-11-02 RX ORDER — PRAVASTATIN SODIUM 40 MG/1
40 TABLET ORAL NIGHTLY
Status: DISCONTINUED | OUTPATIENT
Start: 2023-11-02 | End: 2023-11-06 | Stop reason: HOSPADM

## 2023-11-02 RX ORDER — ENOXAPARIN SODIUM 100 MG/ML
40 INJECTION SUBCUTANEOUS EVERY 24 HOURS
Status: DISCONTINUED | OUTPATIENT
Start: 2023-11-03 | End: 2023-11-06 | Stop reason: HOSPADM

## 2023-11-02 RX ORDER — ACETAMINOPHEN 325 MG/1
650 TABLET ORAL EVERY 4 HOURS PRN
Status: DISCONTINUED | OUTPATIENT
Start: 2023-11-02 | End: 2023-11-06 | Stop reason: HOSPADM

## 2023-11-02 RX ORDER — IBUPROFEN 200 MG
16 TABLET ORAL
Status: DISCONTINUED | OUTPATIENT
Start: 2023-11-02 | End: 2023-11-06 | Stop reason: HOSPADM

## 2023-11-02 RX ORDER — IPRATROPIUM BROMIDE AND ALBUTEROL SULFATE 2.5; .5 MG/3ML; MG/3ML
3 SOLUTION RESPIRATORY (INHALATION) EVERY 6 HOURS PRN
Status: DISCONTINUED | OUTPATIENT
Start: 2023-11-02 | End: 2023-11-06 | Stop reason: HOSPADM

## 2023-11-02 RX ORDER — NALOXONE HCL 0.4 MG/ML
0.02 VIAL (ML) INJECTION
Status: DISCONTINUED | OUTPATIENT
Start: 2023-11-02 | End: 2023-11-06 | Stop reason: HOSPADM

## 2023-11-02 RX ORDER — BUDESONIDE 0.5 MG/2ML
0.5 INHALANT ORAL EVERY 12 HOURS
Status: DISCONTINUED | OUTPATIENT
Start: 2023-11-02 | End: 2023-11-06 | Stop reason: HOSPADM

## 2023-11-02 RX ORDER — CYCLOBENZAPRINE HCL 5 MG
5 TABLET ORAL 3 TIMES DAILY PRN
COMMUNITY

## 2023-11-02 RX ORDER — SODIUM CHLORIDE 0.9 % (FLUSH) 0.9 %
10 SYRINGE (ML) INJECTION EVERY 6 HOURS PRN
Status: DISCONTINUED | OUTPATIENT
Start: 2023-11-02 | End: 2023-11-06 | Stop reason: HOSPADM

## 2023-11-02 RX ORDER — PREGABALIN 50 MG/1
50 CAPSULE ORAL 3 TIMES DAILY
Status: DISCONTINUED | OUTPATIENT
Start: 2023-11-02 | End: 2023-11-06 | Stop reason: HOSPADM

## 2023-11-02 RX ORDER — TAMSULOSIN HYDROCHLORIDE 0.4 MG/1
0.4 CAPSULE ORAL
Status: COMPLETED | OUTPATIENT
Start: 2023-11-02 | End: 2023-11-02

## 2023-11-02 RX ORDER — MORPHINE SULFATE 2 MG/ML
2 INJECTION, SOLUTION INTRAMUSCULAR; INTRAVENOUS EVERY 4 HOURS PRN
Status: DISCONTINUED | OUTPATIENT
Start: 2023-11-02 | End: 2023-11-06 | Stop reason: HOSPADM

## 2023-11-02 RX ORDER — FLUTICASONE FUROATE AND VILANTEROL 100; 25 UG/1; UG/1
1 POWDER RESPIRATORY (INHALATION) DAILY
Status: DISCONTINUED | OUTPATIENT
Start: 2023-11-03 | End: 2023-11-02

## 2023-11-02 RX ORDER — METOPROLOL TARTRATE 25 MG/1
25 TABLET, FILM COATED ORAL DAILY
Status: DISCONTINUED | OUTPATIENT
Start: 2023-11-03 | End: 2023-11-06 | Stop reason: HOSPADM

## 2023-11-02 RX ORDER — ONDANSETRON 2 MG/ML
4 INJECTION INTRAMUSCULAR; INTRAVENOUS EVERY 8 HOURS PRN
Status: DISCONTINUED | OUTPATIENT
Start: 2023-11-02 | End: 2023-11-06 | Stop reason: HOSPADM

## 2023-11-02 RX ORDER — TAMSULOSIN HYDROCHLORIDE 0.4 MG/1
0.4 CAPSULE ORAL DAILY
Status: DISCONTINUED | OUTPATIENT
Start: 2023-11-03 | End: 2023-11-06 | Stop reason: HOSPADM

## 2023-11-02 RX ORDER — ARFORMOTEROL TARTRATE 15 UG/2ML
15 SOLUTION RESPIRATORY (INHALATION) 2 TIMES DAILY
Status: DISCONTINUED | OUTPATIENT
Start: 2023-11-02 | End: 2023-11-06 | Stop reason: HOSPADM

## 2023-11-02 RX ORDER — HYDROCODONE BITARTRATE AND ACETAMINOPHEN 5; 325 MG/1; MG/1
1 TABLET ORAL EVERY 4 HOURS PRN
Status: DISCONTINUED | OUTPATIENT
Start: 2023-11-02 | End: 2023-11-03

## 2023-11-02 RX ORDER — HYDROCODONE BITARTRATE AND ACETAMINOPHEN 5; 325 MG/1; MG/1
1 TABLET ORAL EVERY 6 HOURS PRN
Status: DISCONTINUED | OUTPATIENT
Start: 2023-11-02 | End: 2023-11-02

## 2023-11-02 RX ORDER — HYDROMORPHONE HYDROCHLORIDE 1 MG/ML
0.5 INJECTION, SOLUTION INTRAMUSCULAR; INTRAVENOUS; SUBCUTANEOUS
Status: COMPLETED | OUTPATIENT
Start: 2023-11-02 | End: 2023-11-02

## 2023-11-02 RX ORDER — IBUPROFEN 200 MG
24 TABLET ORAL
Status: DISCONTINUED | OUTPATIENT
Start: 2023-11-02 | End: 2023-11-06 | Stop reason: HOSPADM

## 2023-11-02 RX ORDER — PROCHLORPERAZINE EDISYLATE 5 MG/ML
5 INJECTION INTRAMUSCULAR; INTRAVENOUS EVERY 6 HOURS PRN
Status: DISCONTINUED | OUTPATIENT
Start: 2023-11-02 | End: 2023-11-06 | Stop reason: HOSPADM

## 2023-11-02 RX ORDER — OXYBUTYNIN CHLORIDE 5 MG/1
5 TABLET, EXTENDED RELEASE ORAL DAILY
Status: DISCONTINUED | OUTPATIENT
Start: 2023-11-03 | End: 2023-11-06 | Stop reason: HOSPADM

## 2023-11-02 RX ORDER — SODIUM CHLORIDE, SODIUM LACTATE, POTASSIUM CHLORIDE, CALCIUM CHLORIDE 600; 310; 30; 20 MG/100ML; MG/100ML; MG/100ML; MG/100ML
1000 INJECTION, SOLUTION INTRAVENOUS
Status: DISCONTINUED | OUTPATIENT
Start: 2023-11-02 | End: 2023-11-02

## 2023-11-02 RX ORDER — SERTRALINE HYDROCHLORIDE 50 MG/1
50 TABLET, FILM COATED ORAL DAILY
Status: DISCONTINUED | OUTPATIENT
Start: 2023-11-03 | End: 2023-11-06 | Stop reason: HOSPADM

## 2023-11-02 RX ORDER — GLUCAGON 1 MG
1 KIT INJECTION
Status: DISCONTINUED | OUTPATIENT
Start: 2023-11-02 | End: 2023-11-06 | Stop reason: HOSPADM

## 2023-11-02 RX ORDER — ONDANSETRON 2 MG/ML
4 INJECTION INTRAMUSCULAR; INTRAVENOUS
Status: COMPLETED | OUTPATIENT
Start: 2023-11-02 | End: 2023-11-02

## 2023-11-02 RX ORDER — SODIUM CHLORIDE 9 MG/ML
1000 INJECTION, SOLUTION INTRAVENOUS
Status: COMPLETED | OUTPATIENT
Start: 2023-11-02 | End: 2023-11-02

## 2023-11-02 RX ORDER — AMOXICILLIN 250 MG
2 CAPSULE ORAL 2 TIMES DAILY
Status: DISCONTINUED | OUTPATIENT
Start: 2023-11-02 | End: 2023-11-06 | Stop reason: HOSPADM

## 2023-11-02 RX ORDER — MAGNESIUM SULFATE HEPTAHYDRATE 40 MG/ML
2 INJECTION, SOLUTION INTRAVENOUS ONCE
Status: DISCONTINUED | OUTPATIENT
Start: 2023-11-02 | End: 2023-11-06 | Stop reason: HOSPADM

## 2023-11-02 RX ORDER — SODIUM CHLORIDE 9 MG/ML
INJECTION, SOLUTION INTRAVENOUS CONTINUOUS
Status: DISCONTINUED | OUTPATIENT
Start: 2023-11-02 | End: 2023-11-06

## 2023-11-02 RX ADMIN — SODIUM CHLORIDE: 900 INJECTION INTRAVENOUS at 08:11

## 2023-11-02 RX ADMIN — BUDESONIDE INHALATION 0.5 MG: 0.5 SUSPENSION RESPIRATORY (INHALATION) at 08:11

## 2023-11-02 RX ADMIN — ONDANSETRON 4 MG: 2 INJECTION INTRAMUSCULAR; INTRAVENOUS at 07:11

## 2023-11-02 RX ADMIN — MEROPENEM 1 G: 1 INJECTION, POWDER, FOR SOLUTION INTRAVENOUS at 06:11

## 2023-11-02 RX ADMIN — PRAVASTATIN SODIUM 40 MG: 40 TABLET ORAL at 11:11

## 2023-11-02 RX ADMIN — HYDROCODONE BITARTRATE AND ACETAMINOPHEN 1 TABLET: 5; 325 TABLET ORAL at 08:11

## 2023-11-02 RX ADMIN — TAMSULOSIN HYDROCHLORIDE 0.4 MG: 0.4 CAPSULE ORAL at 07:11

## 2023-11-02 RX ADMIN — HYDROMORPHONE HYDROCHLORIDE 0.5 MG: 0.5 INJECTION, SOLUTION INTRAMUSCULAR; INTRAVENOUS; SUBCUTANEOUS at 07:11

## 2023-11-02 RX ADMIN — POTASSIUM BICARBONATE 50 MEQ: 977.5 TABLET, EFFERVESCENT ORAL at 07:11

## 2023-11-02 RX ADMIN — SODIUM CHLORIDE 1000 ML: 0.9 INJECTION, SOLUTION INTRAVENOUS at 08:11

## 2023-11-02 RX ADMIN — SENNOSIDES AND DOCUSATE SODIUM 2 TABLET: 50; 8.6 TABLET ORAL at 11:11

## 2023-11-02 RX ADMIN — PREGABALIN 50 MG: 50 CAPSULE ORAL at 11:11

## 2023-11-02 NOTE — FIRST PROVIDER EVALUATION
Emergency Department TeleTriage Encounter Note      CHIEF COMPLAINT    Chief Complaint   Patient presents with    Flank Pain     Right, history of kidney stones    Urinary Retention       VITAL SIGNS   Initial Vitals [11/02/23 1230]   BP Pulse Resp Temp SpO2   138/71 108 20 98.7 °F (37.1 °C) 95 %      MAP       --            ALLERGIES    Review of patient's allergies indicates:   Allergen Reactions    Tramadol Swelling    Imitrex [sumatriptan succinate]     Penicillins     Sulfa (sulfonamide antibiotics)     Toradol [ketorolac] Swelling       PROVIDER TRIAGE NOTE  This is a teletriage evaluation of a 68 y.o. female presenting to the ED complaining of flank pain. Patient reports right flank pain and urinary retention for the past few days. She denies dysuria.     Patient is alert and oriented. She speaks in complete sentences. She is sitting upright in the chair in no distress.     Initial orders will be placed and care will be transferred to an alternate provider when patient is roomed for a full evaluation. Any additional orders and the final disposition will be determined by that provider.         ORDERS  Labs Reviewed   CBC W/ AUTO DIFFERENTIAL   COMPREHENSIVE METABOLIC PANEL   URINALYSIS, REFLEX TO URINE CULTURE       ED Orders (720h ago, onward)      Start Ordered     Status Ordering Provider    11/02/23 1252 11/02/23 1251  Saline lock IV  Once         Ordered OLMAN, JESUS ALBERTO G.    11/02/23 1252 11/02/23 1251  CBC auto differential  STAT         Ordered OLMAN, JESUS ALBERTO G.    11/02/23 1252 11/02/23 1251  Comprehensive metabolic panel  STAT         Ordered OLMAN, JESUS ALBERTO G.    11/02/23 1252 11/02/23 1251  Urinalysis, Reflex to Urine Culture Urine, Clean Catch  STAT         Ordered OLMAN, JESUS ALBERTO G.    11/02/23 1252 11/02/23 1251  CT Renal Stone Study ABD Pelvis WO  1 time imaging         Ordered OLMAN, JESUS ALBERTO G.              Virtual Visit Note: The provider triage portion of this emergency department evaluation and  documentation was performed via [email or web address], a HIPAA-compliant telemedicine application, in concert with a tele-presenter in the room. A face to face patient evaluation with one of my colleagues will occur once the patient is placed in an emergency department room.      DISCLAIMER: This note was prepared with "[employer] voice recognition transcription software. Garbled syntax, mangled pronouns, and other bizarre constructions may be attributed to that software system.

## 2023-11-02 NOTE — ED PROVIDER NOTES
Encounter Date: 11/2/2023       History     Chief Complaint   Patient presents with    Flank Pain     Right, history of kidney stones    Urinary Retention     HPI  68-year-old presenting with a few days of abdominal pain, nausea, vomiting, reports his like when she is had kidney stones in the past that have been infected reports that she is had sepsis from this as well as drug-resistant bacteria.  Per chart has ESBL an MDRO  Review of patient's allergies indicates:   Allergen Reactions    Tramadol Swelling    Imitrex [sumatriptan succinate]     Penicillins     Sulfa (sulfonamide antibiotics)     Toradol [ketorolac] Swelling     Past Medical History:   Diagnosis Date    Anxiety     Bipolar disorder     COPD, mild     HLD (hyperlipidemia)     HTN (hypertension)     Nephrolithiasis      Past Surgical History:   Procedure Laterality Date    BACK SURGERY      CYSTOSCOPY      HYSTERECTOMY      LITHOTRIPSY      TUBAL LIGATION       Family History   Problem Relation Age of Onset    Cancer Mother     Diabetes Mother     COPD Father     Hypertension Father      Social History     Tobacco Use    Smoking status: Every Day     Current packs/day: 0.00     Average packs/day: 0.8 packs/day for 45.0 years (33.7 ttl pk-yrs)     Types: Cigarettes     Start date: 1976     Last attempt to quit: 4/8/2020     Years since quitting: 3.5    Smokeless tobacco: Never    Tobacco comments:     now smoking 1/4 ppd   Substance Use Topics    Alcohol use: Not Currently    Drug use: No     Review of Systems   Gastrointestinal:  Positive for abdominal pain, nausea and vomiting.       Physical Exam     Initial Vitals [11/02/23 1230]   BP Pulse Resp Temp SpO2   138/71 108 20 98.7 °F (37.1 °C) 95 %      MAP       --         Physical Exam    Nursing note and vitals reviewed.  Constitutional: She appears well-developed. She is not diaphoretic.   HENT:   Head: Normocephalic.   Eyes: Right eye exhibits no discharge. Left eye exhibits no discharge. No scleral  icterus.   Neck: Neck supple. No tracheal deviation present.   Cardiovascular:  Normal rate and regular rhythm.           Pulmonary/Chest: Breath sounds normal. No stridor. No respiratory distress. She has no wheezes. She has no rhonchi. She has no rales.   Abdominal: Abdomen is soft. She exhibits no distension. There is abdominal tenderness (mild, RLQ.). There is no rebound and no guarding.   Musculoskeletal:         General: No edema.      Cervical back: Neck supple.     Neurological: She is alert and oriented to person, place, and time.   Skin: Skin is warm and dry.         ED Course   Procedures  Labs Reviewed   CBC W/ AUTO DIFFERENTIAL - Abnormal; Notable for the following components:       Result Value    WBC 20.29 (*)     RBC 6.24 (*)     Hemoglobin 16.9 (*)     Hematocrit 50.3 (*)     MCV 81 (*)     RDW 15.1 (*)     Gran # (ANC) 17.3 (*)     Immature Grans (Abs) 0.10 (*)     Mono # 1.3 (*)     Gran % 85.0 (*)     Lymph % 7.8 (*)     All other components within normal limits   COMPREHENSIVE METABOLIC PANEL - Abnormal; Notable for the following components:    Sodium 134 (*)     CO2 21 (*)     Glucose 152 (*)     Total Protein 8.6 (*)     All other components within normal limits   URINALYSIS, REFLEX TO URINE CULTURE - Abnormal; Notable for the following components:    Color, UA Orange (*)     Appearance, UA Hazy (*)     Occult Blood UA 1+ (*)     All other components within normal limits    Narrative:     Specimen Source->Urine   URINALYSIS MICROSCOPIC - Abnormal; Notable for the following components:    RBC, UA 6 (*)     WBC, UA >100 (*)     Bacteria Few (*)     Hyaline Casts, UA 11 (*)     All other components within normal limits    Narrative:     Specimen Source->Urine   CULTURE, URINE   CULTURE, BLOOD   CULTURE, BLOOD   LACTIC ACID, PLASMA          Imaging Results              CT Renal Stone Study ABD Pelvis WO (Final result)  Result time 11/02/23 14:59:49      Final result by Oseas Buenrostro MD (11/02/23  14:59:49)                   Narrative:    CMS MANDATED QUALITY DATA - CT RADIATION - 436    All CT scans at this facility utilize dose modulation, iterative reconstruction, and/or weight based dosing when appropriate to reduce radiation dose to as low as reasonably achievable.        Reason: Flank pain, kidney stone suspected Right side flank pian, hx of kidney stones    TECHNIQUE: CT abdomen and pelvis without IV or oral contrast. Study is tailored for detection of urinary tract calculi and evaluation of solid organs, hollow viscera, and vascular structures is limited.    COMPARISON: None    CT ABDOMEN:  Partially visualized lung bases are clear.    Mild diffuse hepatic steatosis is present. Peripherally hyperdense cholelithiasis lies in otherwise unremarkable gallbladder. Noncontrast pancreas and spleen are normal.    18 mm right adrenal nodule and 14 mm left adrenal nodule are characteristic of benign adrenal adenomas.    Superior left renal cortical thinning suggesting scarring, and hyperdensity near the medullary pyramids bilaterally suggest medullary nephrocalcinosis. 3 x 5 mm right UVJ calculus causes moderate right hydronephrosis and moderate right perinephric fat stranding. Negative for left hydronephrosis or ureteral calculus.    Diverticula arise from the colon. Large and small intestines are otherwise unremarkable. A normal appendix is present. No free intraperitoneal gas.    Disc degeneration and facet joint osteoarthrosis noted throughout both thoracolumbar spine.    CT PELVIS:  Has been removed. Noncontrast ovaries are normal. Bladder is normal. No free pelvic fluid.    IMPRESSION:    1. Right UVJ 3 x 5 mm calculus causing moderate right hydronephrosis.  2. Findings suggesting medullary nephrocalcinosis.  3. Hepatic steatosis.  4. Cholelithiasis.  5. Diverticulosis.    Electronically signed by:  Oseas Buenrostro MD  11/02/2023 02:59 PM CDT Workstation: [id number]                                      Medications   0.9%  NaCl infusion (has no administration in time range)   meropenem 1 g in sodium chloride 0.9 % 100 mL IVPB (ready to mix system) (has no administration in time range)   tamsulosin 24 hr capsule 0.4 mg (has no administration in time range)   magnesium sulfate 2g in water 50mL IVPB (premix) (has no administration in time range)   albuterol-ipratropium 2.5 mg-0.5 mg/3 mL nebulizer solution 3 mL (has no administration in time range)   metoprolol tartrate (LOPRESSOR) tablet 25 mg (has no administration in time range)   oxybutynin 24 hr tablet 5 mg (has no administration in time range)   pravastatin tablet 40 mg (has no administration in time range)   pregabalin capsule 50 mg (has no administration in time range)   sertraline tablet 50 mg (has no administration in time range)   sodium chloride 0.9% flush 10 mL (has no administration in time range)   naloxone 0.4 mg/mL injection 0.02 mg (has no administration in time range)   glucose chewable tablet 16 g (has no administration in time range)   glucose chewable tablet 24 g (has no administration in time range)   dextrose 50% injection 12.5 g (has no administration in time range)   dextrose 50% injection 25 g (has no administration in time range)   glucagon (human recombinant) injection 1 mg (has no administration in time range)   enoxaparin injection 40 mg (has no administration in time range)   acetaminophen tablet 650 mg (has no administration in time range)   morphine injection 2 mg (has no administration in time range)   senna-docusate 8.6-50 mg per tablet 2 tablet (has no administration in time range)   ondansetron injection 4 mg (has no administration in time range)   HYDROcodone-acetaminophen 5-325 mg per tablet 1 tablet (has no administration in time range)   arformoteroL nebulizer solution 15 mcg (has no administration in time range)   budesonide nebulizer solution 0.5 mg (has no administration in time range)   0.9%  NaCl infusion (has no  administration in time range)   meropenem 1 g in sodium chloride 0.9 % 100 mL IVPB (ready to mix system) (1 g Intravenous New Bag 11/2/23 1823)   tamsulosin 24 hr capsule 0.4 mg (0.4 mg Oral Given 11/2/23 1901)   HYDROmorphone injection 0.5 mg (0.5 mg Intravenous Given 11/2/23 1916)   ondansetron injection 4 mg (4 mg Intravenous Given 11/2/23 1932)   potassium bicarbonate disintegrating tablet 50 mEq (50 mEq Oral Given 11/2/23 1934)     Medical Decision Making  Amount and/or Complexity of Data Reviewed  Labs: ordered.    Risk  Prescription drug management.  Decision regarding hospitalization.    68-year-old woman presenting with a few days of abdominal pain, nausea, vomiting, reports his like when she is had kidney stones in the past that have been infected reports that she is had sepsis from this as well as drug-resistant bacteria.  Per chart has ESBL an MDRO    Vitals within acceptable limits on presentation except for mild tachycardia in the 100s which had resolved to the 90s when I saw patient    Differential includes ureterolithiasis, pyelonephritis, appendicitis, infected kidney stone    When I evaluated patient she had all of her triage labs and imaging completed with CBC, CMP, urinalysis, CT renal protocol which were all within acceptable limits except for WBC of 20, urinalysis indicating infection, Right UVJ 3 x 5 mm calculus causing moderate right hydronephrosis.  Urology was consulted and they recommended straining all of patient's voids, 1-2 L fluid bolus followed by maintenance fluids, Flomax, meropenem based on patient's most recent micro and sensitivities, and EKG for preop.  Yes to be updated with any clinical change or if patient passes stone in the strainer.  I discussed this with admitting physician  as well and started all of Dr. Staples's recommendations as above. Blood cx obtained prior to honorio. EKG for preop within acceptable limits except for a QTC prolonged at 576 therefore  repleting electrolytes and discussed with  for qtc monitoring.   Ivette Blanchard MD  Emergency Medicine Staff Physician  8:00 PM                              Clinical Impression:   Final diagnoses:  [Z01.818] Pre-op evaluation  [N13.30] Hydronephrosis        ED Disposition Condition    Admit Stable                Ivette Blanchard MD  11/02/23 2001

## 2023-11-03 ENCOUNTER — ANESTHESIA EVENT (OUTPATIENT)
Dept: SURGERY | Facility: HOSPITAL | Age: 68
DRG: 660 | End: 2023-11-03
Payer: MEDICARE

## 2023-11-03 ENCOUNTER — TELEPHONE (OUTPATIENT)
Dept: UROLOGY | Facility: CLINIC | Age: 68
End: 2023-11-03

## 2023-11-03 ENCOUNTER — ANESTHESIA (OUTPATIENT)
Dept: SURGERY | Facility: HOSPITAL | Age: 68
DRG: 660 | End: 2023-11-03
Payer: MEDICARE

## 2023-11-03 DIAGNOSIS — N20.0 KIDNEY STONES: Primary | ICD-10-CM

## 2023-11-03 LAB
ALBUMIN SERPL BCP-MCNC: 3.8 G/DL (ref 3.5–5.2)
ALP SERPL-CCNC: 79 U/L (ref 55–135)
ALT SERPL W/O P-5'-P-CCNC: 14 U/L (ref 10–44)
ANION GAP SERPL CALC-SCNC: 6 MMOL/L (ref 8–16)
AST SERPL-CCNC: 11 U/L (ref 10–40)
BASOPHILS # BLD AUTO: 0.04 K/UL (ref 0–0.2)
BASOPHILS NFR BLD: 0.2 % (ref 0–1.9)
BILIRUB SERPL-MCNC: 1.1 MG/DL (ref 0.1–1)
BUN SERPL-MCNC: 18 MG/DL (ref 8–23)
CALCIUM SERPL-MCNC: 9.7 MG/DL (ref 8.7–10.5)
CHLORIDE SERPL-SCNC: 104 MMOL/L (ref 95–110)
CO2 SERPL-SCNC: 25 MMOL/L (ref 23–29)
CREAT SERPL-MCNC: 1.2 MG/DL (ref 0.5–1.4)
DIFFERENTIAL METHOD: ABNORMAL
EOSINOPHIL # BLD AUTO: 0 K/UL (ref 0–0.5)
EOSINOPHIL NFR BLD: 0 % (ref 0–8)
ERYTHROCYTE [DISTWIDTH] IN BLOOD BY AUTOMATED COUNT: 14.7 % (ref 11.5–14.5)
EST. GFR  (NO RACE VARIABLE): 49.9 ML/MIN/1.73 M^2
GLUCOSE SERPL-MCNC: 182 MG/DL (ref 70–110)
HCT VFR BLD AUTO: 45.4 % (ref 37–48.5)
HGB BLD-MCNC: 15.1 G/DL (ref 12–16)
IMM GRANULOCYTES # BLD AUTO: 0.13 K/UL (ref 0–0.04)
IMM GRANULOCYTES NFR BLD AUTO: 0.6 % (ref 0–0.5)
LYMPHOCYTES # BLD AUTO: 1.2 K/UL (ref 1–4.8)
LYMPHOCYTES NFR BLD: 5.9 % (ref 18–48)
MAGNESIUM SERPL-MCNC: 1.8 MG/DL (ref 1.6–2.6)
MCH RBC QN AUTO: 27.3 PG (ref 27–31)
MCHC RBC AUTO-ENTMCNC: 33.3 G/DL (ref 32–36)
MCV RBC AUTO: 82 FL (ref 82–98)
MONOCYTES # BLD AUTO: 1.7 K/UL (ref 0.3–1)
MONOCYTES NFR BLD: 8.1 % (ref 4–15)
NEUTROPHILS # BLD AUTO: 17.8 K/UL (ref 1.8–7.7)
NEUTROPHILS NFR BLD: 85.2 % (ref 38–73)
NRBC BLD-RTO: 0 /100 WBC
PLATELET # BLD AUTO: 287 K/UL (ref 150–450)
PMV BLD AUTO: 9.6 FL (ref 9.2–12.9)
POTASSIUM SERPL-SCNC: 4.5 MMOL/L (ref 3.5–5.1)
PROT SERPL-MCNC: 7.4 G/DL (ref 6–8.4)
RBC # BLD AUTO: 5.53 M/UL (ref 4–5.4)
SODIUM SERPL-SCNC: 135 MMOL/L (ref 136–145)
WBC # BLD AUTO: 20.83 K/UL (ref 3.9–12.7)

## 2023-11-03 PROCEDURE — 52332 CYSTOSCOPY AND TREATMENT: CPT | Mod: 51,RT,, | Performed by: UROLOGY

## 2023-11-03 PROCEDURE — 99900031 HC PATIENT EDUCATION (STAT)

## 2023-11-03 PROCEDURE — 37000009 HC ANESTHESIA EA ADD 15 MINS: Performed by: UROLOGY

## 2023-11-03 PROCEDURE — 25000003 PHARM REV CODE 250: Performed by: INTERNAL MEDICINE

## 2023-11-03 PROCEDURE — 99223 1ST HOSP IP/OBS HIGH 75: CPT | Mod: ,,, | Performed by: STUDENT IN AN ORGANIZED HEALTH CARE EDUCATION/TRAINING PROGRAM

## 2023-11-03 PROCEDURE — 88300 SURGICAL PATH GROSS: CPT | Mod: TC | Performed by: PATHOLOGY

## 2023-11-03 PROCEDURE — 27201423 OPTIME MED/SURG SUP & DEVICES STERILE SUPPLY: Performed by: UROLOGY

## 2023-11-03 PROCEDURE — 99900035 HC TECH TIME PER 15 MIN (STAT)

## 2023-11-03 PROCEDURE — 85025 COMPLETE CBC W/AUTO DIFF WBC: CPT | Performed by: INTERNAL MEDICINE

## 2023-11-03 PROCEDURE — 25000003 PHARM REV CODE 250: Performed by: NURSE ANESTHETIST, CERTIFIED REGISTERED

## 2023-11-03 PROCEDURE — 25000003 PHARM REV CODE 250: Performed by: STUDENT IN AN ORGANIZED HEALTH CARE EDUCATION/TRAINING PROGRAM

## 2023-11-03 PROCEDURE — 63600175 PHARM REV CODE 636 W HCPCS: Performed by: UROLOGY

## 2023-11-03 PROCEDURE — 52352 PR CYSTO/URETERO/PYELOSCOPY, CALCULUS TX: ICD-10-PCS | Mod: RT,,, | Performed by: UROLOGY

## 2023-11-03 PROCEDURE — 63600175 PHARM REV CODE 636 W HCPCS: Performed by: NURSE ANESTHETIST, CERTIFIED REGISTERED

## 2023-11-03 PROCEDURE — 74420 UROGRAPHY RTRGR +-KUB: CPT | Mod: 26,,, | Performed by: UROLOGY

## 2023-11-03 PROCEDURE — D9220A PRA ANESTHESIA: ICD-10-PCS | Mod: CRNA,,, | Performed by: NURSE ANESTHETIST, CERTIFIED REGISTERED

## 2023-11-03 PROCEDURE — D9220A PRA ANESTHESIA: ICD-10-PCS | Mod: ANES,,, | Performed by: STUDENT IN AN ORGANIZED HEALTH CARE EDUCATION/TRAINING PROGRAM

## 2023-11-03 PROCEDURE — 99223 PR INITIAL HOSPITAL CARE,LEVL III: ICD-10-PCS | Mod: 25,,, | Performed by: UROLOGY

## 2023-11-03 PROCEDURE — 36000706: Performed by: UROLOGY

## 2023-11-03 PROCEDURE — 37000008 HC ANESTHESIA 1ST 15 MINUTES: Performed by: UROLOGY

## 2023-11-03 PROCEDURE — C1751 CATH, INF, PER/CENT/MIDLINE: HCPCS

## 2023-11-03 PROCEDURE — 83735 ASSAY OF MAGNESIUM: CPT | Performed by: INTERNAL MEDICINE

## 2023-11-03 PROCEDURE — 82365 CALCULUS SPECTROSCOPY: CPT | Performed by: INTERNAL MEDICINE

## 2023-11-03 PROCEDURE — 94761 N-INVAS EAR/PLS OXIMETRY MLT: CPT

## 2023-11-03 PROCEDURE — 74420 PR  X-RAY RETROGRADE PYELOGRAM: ICD-10-PCS | Mod: 26,,, | Performed by: UROLOGY

## 2023-11-03 PROCEDURE — 12000002 HC ACUTE/MED SURGE SEMI-PRIVATE ROOM

## 2023-11-03 PROCEDURE — 80053 COMPREHEN METABOLIC PANEL: CPT | Performed by: INTERNAL MEDICINE

## 2023-11-03 PROCEDURE — 25000242 PHARM REV CODE 250 ALT 637 W/ HCPCS: Performed by: UROLOGY

## 2023-11-03 PROCEDURE — 52332 PR CYSTOSCOPY,INSERT URETERAL STENT: ICD-10-PCS | Mod: 51,RT,, | Performed by: UROLOGY

## 2023-11-03 PROCEDURE — A4216 STERILE WATER/SALINE, 10 ML: HCPCS | Performed by: UROLOGY

## 2023-11-03 PROCEDURE — 99223 1ST HOSP IP/OBS HIGH 75: CPT | Mod: 25,,, | Performed by: UROLOGY

## 2023-11-03 PROCEDURE — C2617 STENT, NON-COR, TEM W/O DEL: HCPCS | Performed by: UROLOGY

## 2023-11-03 PROCEDURE — 27000221 HC OXYGEN, UP TO 24 HOURS

## 2023-11-03 PROCEDURE — 63600175 PHARM REV CODE 636 W HCPCS: Performed by: STUDENT IN AN ORGANIZED HEALTH CARE EDUCATION/TRAINING PROGRAM

## 2023-11-03 PROCEDURE — 99223 PR INITIAL HOSPITAL CARE,LEVL III: ICD-10-PCS | Mod: ,,, | Performed by: STUDENT IN AN ORGANIZED HEALTH CARE EDUCATION/TRAINING PROGRAM

## 2023-11-03 PROCEDURE — 94640 AIRWAY INHALATION TREATMENT: CPT

## 2023-11-03 PROCEDURE — 63600175 PHARM REV CODE 636 W HCPCS: Performed by: INTERNAL MEDICINE

## 2023-11-03 PROCEDURE — 36000707: Performed by: UROLOGY

## 2023-11-03 PROCEDURE — D9220A PRA ANESTHESIA: Mod: ANES,,, | Performed by: STUDENT IN AN ORGANIZED HEALTH CARE EDUCATION/TRAINING PROGRAM

## 2023-11-03 PROCEDURE — 52352 CYSTOURETERO W/STONE REMOVE: CPT | Mod: RT,,, | Performed by: UROLOGY

## 2023-11-03 PROCEDURE — C1769 GUIDE WIRE: HCPCS | Performed by: UROLOGY

## 2023-11-03 PROCEDURE — 71000033 HC RECOVERY, INTIAL HOUR: Performed by: UROLOGY

## 2023-11-03 PROCEDURE — 25000003 PHARM REV CODE 250: Performed by: UROLOGY

## 2023-11-03 PROCEDURE — 36410 VNPNXR 3YR/> PHY/QHP DX/THER: CPT

## 2023-11-03 PROCEDURE — D9220A PRA ANESTHESIA: Mod: CRNA,,, | Performed by: NURSE ANESTHETIST, CERTIFIED REGISTERED

## 2023-11-03 PROCEDURE — 94799 UNLISTED PULMONARY SVC/PX: CPT

## 2023-11-03 PROCEDURE — C1773 RET DEV, INSERTABLE: HCPCS | Performed by: UROLOGY

## 2023-11-03 PROCEDURE — C1729 CATH, DRAINAGE: HCPCS | Performed by: UROLOGY

## 2023-11-03 DEVICE — IMPLANTABLE DEVICE: Type: IMPLANTABLE DEVICE | Site: URETER | Status: FUNCTIONAL

## 2023-11-03 RX ORDER — MUPIROCIN 20 MG/G
OINTMENT TOPICAL 2 TIMES DAILY
Status: DISCONTINUED | OUTPATIENT
Start: 2023-11-03 | End: 2023-11-06 | Stop reason: HOSPADM

## 2023-11-03 RX ORDER — SODIUM CHLORIDE 0.9 % (FLUSH) 0.9 %
10 SYRINGE (ML) INJECTION EVERY 6 HOURS
Status: DISCONTINUED | OUTPATIENT
Start: 2023-11-03 | End: 2023-11-06 | Stop reason: HOSPADM

## 2023-11-03 RX ORDER — FAMOTIDINE 10 MG/ML
INJECTION INTRAVENOUS
Status: DISCONTINUED | OUTPATIENT
Start: 2023-11-03 | End: 2023-11-03

## 2023-11-03 RX ORDER — MUPIROCIN 20 MG/G
OINTMENT TOPICAL 2 TIMES DAILY
Status: DISCONTINUED | OUTPATIENT
Start: 2023-11-03 | End: 2023-11-03

## 2023-11-03 RX ORDER — SODIUM CHLORIDE 0.9 % (FLUSH) 0.9 %
10 SYRINGE (ML) INJECTION
Status: DISCONTINUED | OUTPATIENT
Start: 2023-11-03 | End: 2023-11-06 | Stop reason: HOSPADM

## 2023-11-03 RX ORDER — ONDANSETRON 2 MG/ML
INJECTION INTRAMUSCULAR; INTRAVENOUS
Status: DISCONTINUED | OUTPATIENT
Start: 2023-11-03 | End: 2023-11-03

## 2023-11-03 RX ORDER — OXYCODONE HYDROCHLORIDE 5 MG/1
5 TABLET ORAL
Status: DISCONTINUED | OUTPATIENT
Start: 2023-11-03 | End: 2023-11-03

## 2023-11-03 RX ORDER — HYDROMORPHONE HYDROCHLORIDE 1 MG/ML
0.2 INJECTION, SOLUTION INTRAMUSCULAR; INTRAVENOUS; SUBCUTANEOUS EVERY 5 MIN PRN
Status: DISCONTINUED | OUTPATIENT
Start: 2023-11-03 | End: 2023-11-03

## 2023-11-03 RX ORDER — PROPOFOL 10 MG/ML
VIAL (ML) INTRAVENOUS
Status: DISCONTINUED | OUTPATIENT
Start: 2023-11-03 | End: 2023-11-03

## 2023-11-03 RX ORDER — PHENYLEPHRINE HYDROCHLORIDE 10 MG/ML
INJECTION INTRAVENOUS
Status: DISCONTINUED | OUTPATIENT
Start: 2023-11-03 | End: 2023-11-03

## 2023-11-03 RX ORDER — ONDANSETRON 2 MG/ML
4 INJECTION INTRAMUSCULAR; INTRAVENOUS DAILY PRN
Status: DISCONTINUED | OUTPATIENT
Start: 2023-11-03 | End: 2023-11-03

## 2023-11-03 RX ORDER — HYDROCODONE BITARTRATE AND ACETAMINOPHEN 5; 325 MG/1; MG/1
1 TABLET ORAL EVERY 4 HOURS PRN
Status: DISCONTINUED | OUTPATIENT
Start: 2023-11-03 | End: 2023-11-06 | Stop reason: HOSPADM

## 2023-11-03 RX ORDER — LIDOCAINE HYDROCHLORIDE 20 MG/ML
INJECTION, SOLUTION EPIDURAL; INFILTRATION; INTRACAUDAL; PERINEURAL
Status: DISCONTINUED | OUTPATIENT
Start: 2023-11-03 | End: 2023-11-03

## 2023-11-03 RX ORDER — DIPHENHYDRAMINE HYDROCHLORIDE 50 MG/ML
12.5 INJECTION INTRAMUSCULAR; INTRAVENOUS
Status: DISCONTINUED | OUTPATIENT
Start: 2023-11-03 | End: 2023-11-03

## 2023-11-03 RX ADMIN — MEROPENEM 1 G: 1 INJECTION, POWDER, FOR SOLUTION INTRAVENOUS at 02:11

## 2023-11-03 RX ADMIN — TAMSULOSIN HYDROCHLORIDE 0.4 MG: 0.4 CAPSULE ORAL at 09:11

## 2023-11-03 RX ADMIN — PHENYLEPHRINE HYDROCHLORIDE 100 MCG: 10 INJECTION INTRAVENOUS at 04:11

## 2023-11-03 RX ADMIN — ENOXAPARIN SODIUM 40 MG: 40 INJECTION SUBCUTANEOUS at 04:11

## 2023-11-03 RX ADMIN — PREGABALIN 50 MG: 50 CAPSULE ORAL at 09:11

## 2023-11-03 RX ADMIN — LIDOCAINE HYDROCHLORIDE 50 MG: 20 INJECTION, SOLUTION INTRAVENOUS at 04:11

## 2023-11-03 RX ADMIN — PHENYLEPHRINE HYDROCHLORIDE 100 MCG: 10 INJECTION INTRAVENOUS at 05:11

## 2023-11-03 RX ADMIN — BUDESONIDE INHALATION 0.5 MG: 0.5 SUSPENSION RESPIRATORY (INHALATION) at 08:11

## 2023-11-03 RX ADMIN — PREGABALIN 50 MG: 50 CAPSULE ORAL at 08:11

## 2023-11-03 RX ADMIN — PRAVASTATIN SODIUM 40 MG: 40 TABLET ORAL at 08:11

## 2023-11-03 RX ADMIN — HYDROCODONE BITARTRATE AND ACETAMINOPHEN 1 TABLET: 5; 325 TABLET ORAL at 08:11

## 2023-11-03 RX ADMIN — ARFORMOTEROL TARTRATE 15 MCG: 15 SOLUTION RESPIRATORY (INHALATION) at 08:11

## 2023-11-03 RX ADMIN — SENNOSIDES AND DOCUSATE SODIUM 2 TABLET: 50; 8.6 TABLET ORAL at 09:11

## 2023-11-03 RX ADMIN — HYDROMORPHONE HYDROCHLORIDE 0.2 MG: 1 INJECTION, SOLUTION INTRAMUSCULAR; INTRAVENOUS; SUBCUTANEOUS at 05:11

## 2023-11-03 RX ADMIN — MUPIROCIN 1 G: 20 OINTMENT TOPICAL at 09:11

## 2023-11-03 RX ADMIN — FAMOTIDINE 20 MG: 10 INJECTION, SOLUTION INTRAVENOUS at 04:11

## 2023-11-03 RX ADMIN — SODIUM CHLORIDE, PRESERVATIVE FREE 10 ML: 5 INJECTION INTRAVENOUS at 06:11

## 2023-11-03 RX ADMIN — ONDANSETRON 4 MG: 2 INJECTION INTRAMUSCULAR; INTRAVENOUS at 04:11

## 2023-11-03 RX ADMIN — SODIUM CHLORIDE: 900 INJECTION INTRAVENOUS at 09:11

## 2023-11-03 RX ADMIN — MEROPENEM 1 G: 1 INJECTION, POWDER, FOR SOLUTION INTRAVENOUS at 04:11

## 2023-11-03 RX ADMIN — OXYBUTYNIN CHLORIDE 5 MG: 5 TABLET, EXTENDED RELEASE ORAL at 09:11

## 2023-11-03 RX ADMIN — MEROPENEM 1 G: 1 INJECTION, POWDER, FOR SOLUTION INTRAVENOUS at 09:11

## 2023-11-03 RX ADMIN — METOPROLOL TARTRATE 25 MG: 25 TABLET, FILM COATED ORAL at 04:11

## 2023-11-03 RX ADMIN — HYDROCODONE BITARTRATE AND ACETAMINOPHEN 1 TABLET: 5; 325 TABLET ORAL at 01:11

## 2023-11-03 RX ADMIN — SERTRALINE HYDROCHLORIDE 50 MG: 50 TABLET ORAL at 09:11

## 2023-11-03 RX ADMIN — OXYCODONE HYDROCHLORIDE 5 MG: 5 TABLET ORAL at 05:11

## 2023-11-03 RX ADMIN — SODIUM CHLORIDE, PRESERVATIVE FREE 10 ML: 5 INJECTION INTRAVENOUS at 05:11

## 2023-11-03 RX ADMIN — AMIKACIN SULFATE 1000 MG: 250 INJECTION, SOLUTION INTRAMUSCULAR; INTRAVENOUS at 10:11

## 2023-11-03 RX ADMIN — MUPIROCIN 1 G: 20 OINTMENT TOPICAL at 08:11

## 2023-11-03 RX ADMIN — SODIUM CHLORIDE, SODIUM LACTATE, POTASSIUM CHLORIDE, AND CALCIUM CHLORIDE: .6; .31; .03; .02 INJECTION, SOLUTION INTRAVENOUS at 04:11

## 2023-11-03 RX ADMIN — PREGABALIN 50 MG: 50 CAPSULE ORAL at 04:11

## 2023-11-03 RX ADMIN — PROPOFOL 150 MG: 10 INJECTION, EMULSION INTRAVENOUS at 04:11

## 2023-11-03 RX ADMIN — SENNOSIDES AND DOCUSATE SODIUM 2 TABLET: 50; 8.6 TABLET ORAL at 08:11

## 2023-11-03 NOTE — PLAN OF CARE
Problem: Adult Inpatient Plan of Care  Goal: Plan of Care Review  11/3/2023 1753 by Timoteo Madden RN  Outcome: Ongoing, Progressing  11/3/2023 1742 by Timoteo Madden RN  Outcome: Ongoing, Progressing  Goal: Patient-Specific Goal (Individualized)  11/3/2023 1753 by Timoteo Madden RN  Outcome: Ongoing, Progressing  11/3/2023 1742 by Timoteo Madden RN  Outcome: Ongoing, Progressing  Goal: Absence of Hospital-Acquired Illness or Injury  11/3/2023 1753 by Timoteo Madden RN  Outcome: Ongoing, Progressing  11/3/2023 1742 by Timoteo Madden RN  Outcome: Ongoing, Progressing  Goal: Optimal Comfort and Wellbeing  11/3/2023 1753 by Timoteo Madden RN  Outcome: Ongoing, Progressing  11/3/2023 1742 by Timoteo Madden RN  Outcome: Ongoing, Progressing  Goal: Readiness for Transition of Care  11/3/2023 1753 by Timoteo Madden RN  Outcome: Ongoing, Progressing  11/3/2023 1742 by Timoteo Madden RN  Outcome: Ongoing, Progressing     Problem: Infection  Goal: Absence of Infection Signs and Symptoms  11/3/2023 1753 by Timoteo Madden RN  Outcome: Ongoing, Progressing  11/3/2023 1742 by Timoteo Madden RN  Outcome: Ongoing, Progressing     Problem: Adjustment to Illness (Sepsis/Septic Shock)  Goal: Optimal Coping  11/3/2023 1753 by Timoteo Madden RN  Outcome: Ongoing, Progressing  11/3/2023 1742 by Timoteo Madden RN  Outcome: Ongoing, Progressing     Problem: Bleeding (Sepsis/Septic Shock)  Goal: Absence of Bleeding  11/3/2023 1753 by Timoteo Madden RN  Outcome: Ongoing, Progressing  11/3/2023 1742 by Timoteo Madden RN  Outcome: Ongoing, Progressing     Problem: Glycemic Control Impaired (Sepsis/Septic Shock)  Goal: Blood Glucose Level Within Desired Range  11/3/2023 1753 by Timoteo Madden RN  Outcome: Ongoing, Progressing  11/3/2023 1742 by Timoteo Madden, RN  Outcome: Ongoing, Progressing

## 2023-11-03 NOTE — ANESTHESIA POSTPROCEDURE EVALUATION
Anesthesia Post Evaluation    Patient: Alma Rosa Helm    Procedure(s) Performed: Procedure(s) (LRB):  CYSTOSCOPY, WITH URETERAL STENT INSERTION (Right)  CYSTOSCOPY, WITH CALCULUS REMOVAL (Right)    Final Anesthesia Type: general      Patient location during evaluation: floor  Patient participation: Yes- Able to Participate  Level of consciousness: awake and alert  Post-procedure vital signs: reviewed and stable  Pain management: adequate  Airway patency: patent    PONV status at discharge: No PONV  Anesthetic complications: no      Cardiovascular status: blood pressure returned to baseline and stable  Respiratory status: unassisted and nasal cannula  Hydration status: euvolemic  Follow-up not needed.          Vitals Value Taken Time   /62 11/03/23 1100   Temp 36.6 °C (97.9 °F) 11/03/23 1100   Pulse 74 11/03/23 1100   Resp 18 11/03/23 1100   SpO2 96 % 11/03/23 1100         Event Time   Out of Recovery 11/03/2023 06:19:23         Pain/Henry Score: Pain Rating Prior to Med Admin: 6 (11/3/2023  5:51 AM)  Pain Rating Post Med Admin: 4 (11/3/2023  2:36 AM)  Henry Score: 9 (11/3/2023  6:00 AM)

## 2023-11-03 NOTE — TRANSFER OF CARE
"Anesthesia Transfer of Care Note    Patient: Alma Rosa Helm    Procedure(s) Performed: Procedure(s) (LRB):  CYSTOSCOPY, WITH URETERAL STENT INSERTION (Right)  CYSTOSCOPY, WITH CALCULUS REMOVAL (Right)    Patient location: PACU    Anesthesia Type: general    Transport from OR: Transported from OR on room air with adequate spontaneous ventilation    Post pain: adequate analgesia    Post assessment: no apparent anesthetic complications    Post vital signs: stable    Level of consciousness: awake and alert    Nausea/Vomiting: no nausea/vomiting    Complications: none    Transfer of care protocol was followed      Last vitals:   Visit Vitals  BP (!) 129/58   Pulse 110   Temp 37.6 °C (99.6 °F) (Oral)   Resp 18   Ht 5' 3" (1.6 m)   Wt 81.7 kg (180 lb 1.9 oz)   SpO2 95%   BMI 31.91 kg/m²     "

## 2023-11-03 NOTE — TELEPHONE ENCOUNTER
Patient admitted to [place] [facility] [place] with infected obstructing stone.  Stone removed, stent on a string to be removed with [place] prior to hospital discharge.    Last seen in clinic by NP Veronica in May 2023 after similar episode when planning stone extraction, however she passed that stone    Recurrent stone former.  Needs routine three-month NP stone prevention follow-up with screening KUB and renal ultrasound prior  (orders placed) and 24 hour urine done prior to visit.  As she was admitted, please fax 24 hour urine orders and face sheet to [facility] so they can send her kit.  Will add to discharge notes to complete 3 weeks prior to her follow-up.    Book appts and will show on dc paperwok, and confirm submission of 24hr urine order. No call to pt needed. Will inform [facility] nursing

## 2023-11-03 NOTE — PROGRESS NOTES
Nursing Transfer Note      11/3/2023   6:33 AM    Nurse giving handoff:LEIA Black  Nurse receiving handoff:LEIA Macdonald     Reason patient is being transferred: Return to room post procedure and recovery    Transfer To: 1209    Transfer via bed    Transfer with 2 liters NC to O2    Transported by LEIA Black     Patient belongings transferred with patient: No, none present    Chart send with patient: Yes    Notified: No one present    Patient reassessed at: 11/3/2023, 0625  1  Upon arrival to floor: patient oriented to room, call bell in reach, and bed in lowest position

## 2023-11-03 NOTE — PLAN OF CARE
[facility]  Initial Discharge Assessment       Primary Care Provider: Rhonda Klein NP    Admission Diagnosis: Hydronephrosis [N13.30]  Pre-op evaluation [Z01.818]    Admission Date: 11/2/2023  Expected Discharge Date: 11/4/2023    Transition of Care Barriers: None    Assessment completed at bedside.  Advanced directives not addressed.  Pt lives alone and has the ability to complete ADL's at this time with the use of a cane.  No HH, coumadin or dialysis prior to admission.  No needs identified at this time.    Payor: [employer] MANAGED MEDICARE / Plan: [employer] MEDICARE PPO / Product Type: Medicare Advantage /     Extended Emergency Contact Information  Primary Emergency Contact: Naomi Cortez  Address: [address] [place] [address]           [place] [address] [facility]  Home Phone: [phone]  Mobile Phone: [phone]  Relation: Daughter    Discharge Plan A: Home  Discharge Plan B: Home      [id number] - [place], [place] - [address] AT [facility]  [address]  [address]  [place] [address]  Phone: [phone] Fax: [phone]    [facility] [place]  [address] [place]  [address] [place]  [place] [address]  Phone: [phone] Fax: [phone]    [facility] - [place], [place] - [address]  [address]  [place] [address]  Phone: [phone] Fax: [phone]      Initial Assessment (most recent)       Adult Discharge Assessment - 11/03/23 1301          Discharge Assessment    Assessment Type Discharge Planning Assessment     Confirmed/corrected address, phone number and insurance Yes     Confirmed Demographics Correct on Facesheet     Source of Information patient     Communicated SHAMA with patient/caregiver Yes     Reason For Admission sepsis     People in Home alone     Facility Arrived From: home     Do you expect to return to your current living situation? Yes     Do you have help at home or someone to help you  manage your care at home? No     Prior to hospitilization cognitive status: Alert/Oriented     Current cognitive status: Alert/Oriented     Equipment Currently Used at Home nebulizer;cane, quad     Readmission within 30 days? No     Patient currently being followed by outpatient case management? No     Do you currently have service(s) that help you manage your care at home? No     Do you take prescription medications? Yes     Do you have prescription coverage? Yes     Coverage [employer]     Do you have any problems affording any of your prescribed medications? No     Is the patient taking medications as prescribed? yes     Who is going to help you get home at discharge? Naomi Cortez (Daughter)   [phone] (     How do you get to doctors appointments? family or friend will provide     Are you on dialysis? No     Do you take coumadin? No     DME Needed Upon Discharge  none     Discharge Plan discussed with: Patient     Transition of Care Barriers None     Discharge Plan A Home     Discharge Plan B Home

## 2023-11-03 NOTE — ANESTHESIA PROCEDURE NOTES
Intubation    Date/Time: 11/3/2023 4:50 AM    Performed by: Tiffany Mcgee CRNA  Authorized by: Rigo Gaspar MD    Intubation:     Induction:  Inhalational - ETT/trach    Intubated:  Postinduction    Mask Ventilation:  Not attempted    Attempts:  1    Attempted By:  CRNA    Blade:  Other (see comments)    Difficult Airway Encountered?: No      Complications:  None    Airway Device:  Supraglottic airway/LMA    Airway Device Size:  4.0    Secured at:  The lips    Placement Verified By:  Capnometry

## 2023-11-03 NOTE — NURSING
Nurses Note -- 4 Eyes      11/2/2023   9:46 PM      Skin assessed during: Admit      [x] No Altered Skin Integrity Present    []Prevention Measures Documented  Redness buttocks(blanchable)    [] Yes- Altered Skin Integrity Present or Discovered   [] LDA Added if Not in Epic (Describe Wound)   [] New Altered Skin Integrity was Present on Admit and Documented in LDA   [x] Wound Image Taken    Wound Care Consulted? No    Attending Nurse:  Renae Bojorquez RN/Staff Member:   [id number]

## 2023-11-03 NOTE — NURSING
Pt returned to floor from recovery. No problems noted. Will attempt to call pt daughter for the pt.

## 2023-11-03 NOTE — H&P
[facility] - Emergency Dept  Hospital Medicine  History & Physical    Patient Name: Alma Rosa Helm  MRN: [id number]  Patient Class: IP- Inpatient  Admission Date: 11/2/2023  Attending Physician: Ivette Blanchard MD   Primary Care Provider: Rhonda lKein NP         Patient information was obtained from patient, past medical records and ER records.     Subjective:     Principal Problem:Pyelonephritis of right kidney    Chief Complaint:   Chief Complaint   Patient presents with    Flank Pain     Right, history of kidney stones    Urinary Retention        HPI: Ms. Helm is a 68-years-old female who presented to the ED with abdominal pain associated with nausea and vomiting.  Patient reports feeling unwell for about one-week.  Yesterday developed progressively worsening right anterior and right flank pain, constant, severe, associated with nausea with multiple episodes of emesis, reports about 7 episodes overnight, subjective chills, did not check temperature at home.  Admits to associated dysuria for 2 days as well.  States she has had history of kidney stones in the past, seen by urologist Dr. Dawson at [facility], but not recently.  Reports she has also had sepsis in the setting of UTI in the past.  Admits to chronic shortness of breath due to bronchitis, recently established care with pulmonary, diagnosed with COPD/emphysema, continues to smoke.  In the ED afebrile, tachycardic to the low 100s, BP stable.  Labs with WBC 20 with left shift, hemoglobin 16.9, sodium 134, BUN/creatinine 15/1, UA with few bacteria with > 100 WBC.  EKG reviewed.  CT renal protocol with medullary nephrocalcinosis, 3 by 5 mm right UVJ calculus associated with moderate right hydronephrosis with stranding.  She received IV fluid, Zofran, dilaudid, meropenem and flomax in ED.  Case discussed with ED provider who discussed with on-call urologist, Dr. Staples, recommendations noted.  Plan of care discussed with  patient, no visitors at bedside.      Past Medical History:   Diagnosis Date    Anxiety     Bipolar disorder     COPD, mild     HLD (hyperlipidemia)     HTN (hypertension)     Nephrolithiasis        Past Surgical History:   Procedure Laterality Date    BACK SURGERY      CYSTOSCOPY      HYSTERECTOMY      LITHOTRIPSY      TUBAL LIGATION         Review of patient's allergies indicates:   Allergen Reactions    Tramadol Swelling    Imitrex [sumatriptan succinate]     Penicillins     Sulfa (sulfonamide antibiotics)     Toradol [ketorolac] Swelling       Current Facility-Administered Medications on File Prior to Encounter   Medication    albuterol sulfate nebulizer solution 2.5 mg     Current Outpatient Medications on File Prior to Encounter   Medication Sig    albuterol (PROVENTIL/VENTOLIN HFA) 90 mcg/actuation inhaler Inhale 1-2 puffs into the lungs every 4 (four) hours as needed for Shortness of Breath (coughing). Rescue    albuterol-ipratropium (DUO-NEB) 2.5 mg-0.5 mg/3 mL nebulizer solution Take 3 mLs by nebulization every 6 (six) hours as needed for Wheezing. Rescue    cyclobenzaprine (FLEXERIL) 5 MG tablet Take 5 mg by mouth 3 (three) times daily as needed for Muscle spasms.    fluticasone-umeclidin-vilanter (TRELEGY ELLIPTA) 100-62.5-25 mcg DsDv Inhale 1 puff into the lungs once daily.    hydroCHLOROthiazide (HYDRODIURIL) 12.5 MG Tab Take 1 tablet (12.5 mg total) by mouth once daily.    HYDROcodone-acetaminophen (NORCO) 5-325 mg per tablet Take 2 tablets every 8 hours by oral route as needed for 30 days. (Patient taking differently: Take 2 tablets by mouth every 8 (eight) hours as needed.)    hydrOXYzine pamoate (VISTARIL) 25 MG Cap Take 1 capsule (25 mg total) by mouth every evening.    metoprolol tartrate (LOPRESSOR) 25 MG tablet Take 1 tablet (25 mg total) by mouth Daily.    oxybutynin (DITROPAN-XL) 5 MG TR24 Take 1 tablet (5 mg total) by mouth once daily.    pravastatin (PRAVACHOL) 40  MG tablet Take 1 tablet (40 mg total) by mouth nightly.    pregabalin (LYRICA) 50 MG capsule Take 50 mg by mouth 3 (three) times daily.    sertraline (ZOLOFT) 50 MG tablet Take 1 tablet (50 mg total) by mouth once daily.    alendronate (FOSAMAX) 70 MG tablet Take one tablet weekly. (Patient not taking: Reported on 6/28/2023)    HYDROcodone-acetaminophen (NORCO)  mg per tablet Take 1 tablet by mouth 2 (two) times daily as needed.    mupirocin (BACTROBAN) 2 % ointment mupirocin 2 % topical ointment   APPLY TO THE AFFECTED AREA OF THE TOENAIL TWICE DAILY FOR 3 WEEKS    ondansetron (ZOFRAN-ODT) 8 MG TbDL Take 1 tablet (8 mg total) by mouth every 8 (eight) hours as needed (nausea).    [DISCONTINUED] cyclobenzaprine (FLEXERIL) 5 MG tablet Take 5 mg by mouth 2 (two) times a day.    [DISCONTINUED] diclofenac (VOLTAREN) 75 MG EC tablet Take 75 mg by mouth 2 (two) times daily.     Family History       Problem Relation (Age of Onset)    COPD Father    Cancer Mother    Diabetes Mother    Hypertension Father          Tobacco Use    Smoking status: Every Day     Current packs/day: 0.00     Average packs/day: 0.8 packs/day for 45.0 years (33.7 ttl pk-yrs)     Types: Cigarettes     Start date: 1976     Last attempt to quit: 4/8/2020     Years since quitting: 3.5    Smokeless tobacco: Never    Tobacco comments:     now smoking 1/4 ppd   Substance and Sexual Activity    Alcohol use: Not Currently    Drug use: No    Sexual activity: Not on file     Review of Systems   Constitutional:  Positive for chills. Negative for fatigue.   HENT:  Negative for congestion.    Respiratory:  Positive for shortness of breath (states chronic due to bronchitis).    Cardiovascular:  Negative for leg swelling.   Gastrointestinal:  Positive for abdominal pain, nausea and vomiting.   Genitourinary:  Positive for dysuria and flank pain.   Musculoskeletal:  Positive for back pain (chronic).   Skin:  Negative for wound.    Allergic/Immunologic: Negative for immunocompromised state.   Psychiatric/Behavioral:  Negative for confusion.      Objective:     Vital Signs (Most Recent):  Temp: 98.7 °F (37.1 °C) (11/02/23 1230)  Pulse: 108 (11/02/23 1230)  Resp: 18 (11/02/23 1916)  BP: 138/71 (11/02/23 1230)  SpO2: 95 % (11/02/23 1230) Vital Signs (24h Range):  Temp:  [98.7 °F (37.1 °C)] 98.7 °F (37.1 °C)  Pulse:  [108] 108  Resp:  [18-20] 18  SpO2:  [95 %] 95 %  BP: (138)/(71) 138/71     Weight: 81.6 kg (180 lb)  Body mass index is 31.89 kg/m².     Physical Exam  Vitals and nursing note reviewed.   Constitutional:       General: She is not in acute distress.     Appearance: She is obese. She is not ill-appearing, toxic-appearing or diaphoretic.      Comments: Lying in stretcher, cooperative   HENT:      Head: Normocephalic and atraumatic.      Mouth/Throat:      Mouth: Mucous membranes are moist.      Comments: edentulous  Eyes:      General:         Right eye: No discharge.         Left eye: No discharge.   Cardiovascular:      Rate and Rhythm: Regular rhythm. Tachycardia present.      Comments: Warm peripheries, no significant lower extremity edema  Pulmonary:      Comments: On room air, no wheeze or accessory muscle use  Abdominal:      Comments: Soft, nondistended, tender most prominent right side of abdomen, bowel sounds heard   Genitourinary:     Comments: +smell of urine  Musculoskeletal:      Cervical back: Neck supple.   Skin:     General: Skin is warm and dry.   Neurological:      General: No focal deficit present.      Mental Status: She is alert and oriented to person, place, and time. Mental status is at baseline.   Psychiatric:         Mood and Affect: Mood normal.                Significant Labs: BMP:   Recent Labs   Lab 11/02/23  1311   *   *   K 3.8      CO2 21*   BUN 15   CREATININE 1.0   CALCIUM 10.4     CBC:   Recent Labs   Lab 11/02/23  1311   WBC 20.29*   HGB 16.9*   HCT 50.3*        CMP:  "  Recent Labs   Lab 11/02/23  1311   *   K 3.8      CO2 21*   *   BUN 15   CREATININE 1.0   CALCIUM 10.4   PROT 8.6*   ALBUMIN 4.3   BILITOT 0.9   ALKPHOS 98   AST 13   ALT 17   ANIONGAP 8     Cardiac Markers: No results for input(s): "CKMB", "MYOGLOBIN", "BNP", "TROPISTAT" in the last 48 hours.  Magnesium: No results for input(s): "MG" in the last 48 hours.  POCT Glucose: No results for input(s): "POCTGLUCOSE" in the last 48 hours.  TSH:   Recent Labs   Lab 08/15/23  1338   TSH 0.053*     Urine Culture: No results for input(s): "LABURIN" in the last 48 hours.  Urine Studies:   Recent Labs   Lab 11/02/23  1258   COLORU [place]*   APPEARANCEUA Hazy*   PHUR 5.0   SPECGRAV 1.025   PROTEINUA SEE COMMENT   GLUCUA SEE COMMENT   KETONESU SEE COMMENT   BILIRUBINUA SEE COMMENT   OCCULTUA 1+*   NITRITE SEE COMMENT   UROBILINOGEN SEE COMMENT   LEUKOCYTESUR SEE COMMENT   RBCUA 6*   WBCUA >100*   BACTERIA Few*   SQUAMEPITHEL 1   HYALINECASTS 11*       Significant Imaging: I have reviewed all pertinent imaging results/findings within the past 24 hours.    CT Renal Stone Study ABD Pelvis WO    Result Date: 11/2/2023  CMS MANDATED QUALITY DATA - CT RADIATION - 436 All CT scans at this facility utilize dose modulation, iterative reconstruction, and/or weight based dosing when appropriate to reduce radiation dose to as low as reasonably achievable. Reason: Flank pain, kidney stone suspected Right side flank pian, hx of kidney stones TECHNIQUE: CT abdomen and pelvis without IV or oral contrast. Study is tailored for detection of urinary tract calculi and evaluation of solid organs, hollow viscera, and vascular structures is limited. COMPARISON: None CT ABDOMEN: Partially visualized lung bases are clear. Mild diffuse hepatic steatosis is present. Peripherally hyperdense cholelithiasis lies in otherwise unremarkable gallbladder. Noncontrast pancreas and spleen are normal. 18 mm right adrenal nodule and 14 mm left " adrenal nodule are characteristic of benign adrenal adenomas. Superior left renal cortical thinning suggesting scarring, and hyperdensity near the medullary pyramids bilaterally suggest medullary nephrocalcinosis. 3 x 5 mm right UVJ calculus causes moderate right hydronephrosis and moderate right perinephric fat stranding. Negative for left hydronephrosis or ureteral calculus. Diverticula arise from the colon. Large and small intestines are otherwise unremarkable. A normal appendix is present. No free intraperitoneal gas. Disc degeneration and facet joint osteoarthrosis noted throughout both thoracolumbar spine. CT PELVIS: Has been removed. Noncontrast ovaries are normal. Bladder is normal. No free pelvic fluid. IMPRESSION: 1. Right UVJ 3 x 5 mm calculus causing moderate right hydronephrosis. 2. Findings suggesting medullary nephrocalcinosis. 3. Hepatic steatosis. 4. Cholelithiasis. 5. Diverticulosis. Electronically signed by:  Oseas Buenrostro MD  11/02/2023 02:59 PM CDT Workstation: [id number]    Assessment/Plan:     Active Hospital Problems    Diagnosis    *Pyelonephritis of right kidney    Sepsis    Calculus of ureterovesical junction (UVJ)    Leucocytosis    Hyponatremia    Cholelithiasis    Class 1 obesity in adult    Chronic back pain    Bilateral adrenal adenomas    Smoker    History of kidney stones    Bipolar disorder    COPD/emphysema    Hypertension, essential, benign     Plan:  Admit inpatient medical floor   NPO midnight pending urology evaluation in setting need of potential stenting   IV fluid hydration with normal saline at 125 cc/hour   Previous urine culture 6/28 ESBL E coli, contact precautions   Empiric antibiotics with meropenem 1 g q.8 hours, follow up pending blood and urine culture, deescalate as able   Start Flomax 0.4 mg daily  Straining of all urine to determine if spontaneously passed calculi  P.r.n. antiemetic and analgesic as ordered, adjust as needed  Check lactic acid for  completeness    reviewed, 10/11 Norco 5 mg, 30 day supply, 180 tablets filled  Continue to encourage smoking cessation counseling as able   A.m. labs ordered  Neurology consulted   Further plan as per hospital course    VTE Risk Mitigation (From admission, onward)         Ordered     enoxaparin injection 40 mg  Daily         11/02/23 1929     IP VTE HIGH RISK PATIENT  Once         11/02/23 1929     Place sequential compression device  Until discontinued         11/02/23 1929                               Flaquita Lara MD  Department of Hospital Medicine  [facility] - Emergency Dept    COMPLETED  Family history is reviewed and has not changed   Pertinent information:

## 2023-11-03 NOTE — NURSING
Pt lost IV acces. Tried several times to attempt to get an IV, by different nurses. Informed [facility] Supervisor of situation obtained orders for a midline to be placed. [facility] called infusion service to come place midline.

## 2023-11-03 NOTE — SUBJECTIVE & OBJECTIVE
Past Medical History:   Diagnosis Date    Anxiety     Bipolar disorder     COPD, mild     HLD (hyperlipidemia)     HTN (hypertension)     Nephrolithiasis        Past Surgical History:   Procedure Laterality Date    BACK SURGERY      CYSTOSCOPY      HYSTERECTOMY      LITHOTRIPSY      TUBAL LIGATION         Review of patient's allergies indicates:   Allergen Reactions    Tramadol Swelling    Imitrex [sumatriptan succinate]     Penicillins     Sulfa (sulfonamide antibiotics)     Toradol [ketorolac] Swelling       Current Facility-Administered Medications on File Prior to Encounter   Medication    albuterol sulfate nebulizer solution 2.5 mg     Current Outpatient Medications on File Prior to Encounter   Medication Sig    albuterol (PROVENTIL/VENTOLIN HFA) 90 mcg/actuation inhaler Inhale 1-2 puffs into the lungs every 4 (four) hours as needed for Shortness of Breath (coughing). Rescue    albuterol-ipratropium (DUO-NEB) 2.5 mg-0.5 mg/3 mL nebulizer solution Take 3 mLs by nebulization every 6 (six) hours as needed for Wheezing. Rescue    cyclobenzaprine (FLEXERIL) 5 MG tablet Take 5 mg by mouth 3 (three) times daily as needed for Muscle spasms.    fluticasone-umeclidin-vilanter (TRELEGY ELLIPTA) 100-62.5-25 mcg DsDv Inhale 1 puff into the lungs once daily.    hydroCHLOROthiazide (HYDRODIURIL) 12.5 MG Tab Take 1 tablet (12.5 mg total) by mouth once daily.    HYDROcodone-acetaminophen (NORCO) 5-325 mg per tablet Take 2 tablets every 8 hours by oral route as needed for 30 days. (Patient taking differently: Take 2 tablets by mouth every 8 (eight) hours as needed.)    hydrOXYzine pamoate (VISTARIL) 25 MG Cap Take 1 capsule (25 mg total) by mouth every evening.    metoprolol tartrate (LOPRESSOR) 25 MG tablet Take 1 tablet (25 mg total) by mouth Daily.    oxybutynin (DITROPAN-XL) 5 MG TR24 Take 1 tablet (5 mg total) by mouth once daily.    pravastatin (PRAVACHOL) 40 MG tablet Take 1 tablet (40 mg total) by mouth nightly.     pregabalin (LYRICA) 50 MG capsule Take 50 mg by mouth 3 (three) times daily.    sertraline (ZOLOFT) 50 MG tablet Take 1 tablet (50 mg total) by mouth once daily.    alendronate (FOSAMAX) 70 MG tablet Take one tablet weekly. (Patient not taking: Reported on 6/28/2023)    HYDROcodone-acetaminophen (NORCO)  mg per tablet Take 1 tablet by mouth 2 (two) times daily as needed.    mupirocin (BACTROBAN) 2 % ointment mupirocin 2 % topical ointment   APPLY TO THE AFFECTED AREA OF THE TOENAIL TWICE DAILY FOR 3 WEEKS    ondansetron (ZOFRAN-ODT) 8 MG TbDL Take 1 tablet (8 mg total) by mouth every 8 (eight) hours as needed (nausea).    [DISCONTINUED] cyclobenzaprine (FLEXERIL) 5 MG tablet Take 5 mg by mouth 2 (two) times a day.    [DISCONTINUED] diclofenac (VOLTAREN) 75 MG EC tablet Take 75 mg by mouth 2 (two) times daily.     Family History       Problem Relation (Age of Onset)    COPD Father    Cancer Mother    Diabetes Mother    Hypertension Father          Tobacco Use    Smoking status: Every Day     Current packs/day: 0.00     Average packs/day: 0.8 packs/day for 45.0 years (33.7 ttl pk-yrs)     Types: Cigarettes     Start date: 1976     Last attempt to quit: 4/8/2020     Years since quitting: 3.5    Smokeless tobacco: Never    Tobacco comments:     now smoking 1/4 ppd   Substance and Sexual Activity    Alcohol use: Not Currently    Drug use: No    Sexual activity: Not on file     Review of Systems   Constitutional:  Positive for chills. Negative for fatigue.   HENT:  Negative for congestion.    Respiratory:  Positive for shortness of breath (states chronic due to bronchitis).    Cardiovascular:  Negative for leg swelling.   Gastrointestinal:  Positive for abdominal pain, nausea and vomiting.   Genitourinary:  Positive for dysuria and flank pain.   Musculoskeletal:  Positive for back pain (chronic).   Skin:  Negative for wound.   Allergic/Immunologic: Negative for immunocompromised state.   Psychiatric/Behavioral:   Negative for confusion.      Objective:     Vital Signs (Most Recent):  Temp: 98.7 °F (37.1 °C) (11/02/23 1230)  Pulse: 108 (11/02/23 1230)  Resp: 18 (11/02/23 1916)  BP: 138/71 (11/02/23 1230)  SpO2: 95 % (11/02/23 1230) Vital Signs (24h Range):  Temp:  [98.7 °F (37.1 °C)] 98.7 °F (37.1 °C)  Pulse:  [108] 108  Resp:  [18-20] 18  SpO2:  [95 %] 95 %  BP: (138)/(71) 138/71     Weight: 81.6 kg (180 lb)  Body mass index is 31.89 kg/m².     Physical Exam  Vitals and nursing note reviewed.   Constitutional:       General: She is not in acute distress.     Appearance: She is obese. She is not ill-appearing, toxic-appearing or diaphoretic.      Comments: Lying in stretcher, cooperative   HENT:      Head: Normocephalic and atraumatic.      Mouth/Throat:      Mouth: Mucous membranes are moist.      Comments: edentulous  Eyes:      General:         Right eye: No discharge.         Left eye: No discharge.   Cardiovascular:      Rate and Rhythm: Regular rhythm. Tachycardia present.      Comments: Warm peripheries, no significant lower extremity edema  Pulmonary:      Comments: On room air, no wheeze or accessory muscle use  Abdominal:      Comments: Soft, nondistended, tender most prominent right side of abdomen, bowel sounds heard   Genitourinary:     Comments: +smell of urine  Musculoskeletal:      Cervical back: Neck supple.   Skin:     General: Skin is warm and dry.   Neurological:      General: No focal deficit present.      Mental Status: She is alert and oriented to person, place, and time. Mental status is at baseline.   Psychiatric:         Mood and Affect: Mood normal.                Significant Labs: BMP:   Recent Labs   Lab 11/02/23  1311   *   *   K 3.8      CO2 21*   BUN 15   CREATININE 1.0   CALCIUM 10.4     CBC:   Recent Labs   Lab 11/02/23  1311   WBC 20.29*   HGB 16.9*   HCT 50.3*        CMP:   Recent Labs   Lab 11/02/23  1311   *   K 3.8      CO2 21*   *   BUN 15  "  CREATININE 1.0   CALCIUM 10.4   PROT 8.6*   ALBUMIN 4.3   BILITOT 0.9   ALKPHOS 98   AST 13   ALT 17   ANIONGAP 8     Cardiac Markers: No results for input(s): "CKMB", "MYOGLOBIN", "BNP", "TROPISTAT" in the last 48 hours.  Magnesium: No results for input(s): "MG" in the last 48 hours.  POCT Glucose: No results for input(s): "POCTGLUCOSE" in the last 48 hours.  TSH:   Recent Labs   Lab 08/15/23  1338   TSH 0.053*     Urine Culture: No results for input(s): "LABURIN" in the last 48 hours.  Urine Studies:   Recent Labs   Lab 11/02/23  1258   COLORU [place]*   APPEARANCEUA Hazy*   PHUR 5.0   SPECGRAV 1.025   PROTEINUA SEE COMMENT   GLUCUA SEE COMMENT   KETONESU SEE COMMENT   BILIRUBINUA SEE COMMENT   OCCULTUA 1+*   NITRITE SEE COMMENT   UROBILINOGEN SEE COMMENT   LEUKOCYTESUR SEE COMMENT   RBCUA 6*   WBCUA >100*   BACTERIA Few*   SQUAMEPITHEL 1   HYALINECASTS 11*       Significant Imaging: I have reviewed all pertinent imaging results/findings within the past 24 hours.    CT Renal Stone Study ABD Pelvis WO    Result Date: 11/2/2023  CMS MANDATED QUALITY DATA - CT RADIATION - 436 All CT scans at this facility utilize dose modulation, iterative reconstruction, and/or weight based dosing when appropriate to reduce radiation dose to as low as reasonably achievable. Reason: Flank pain, kidney stone suspected Right side flank pian, hx of kidney stones TECHNIQUE: CT abdomen and pelvis without IV or oral contrast. Study is tailored for detection of urinary tract calculi and evaluation of solid organs, hollow viscera, and vascular structures is limited. COMPARISON: None CT ABDOMEN: Partially visualized lung bases are clear. Mild diffuse hepatic steatosis is present. Peripherally hyperdense cholelithiasis lies in otherwise unremarkable gallbladder. Noncontrast pancreas and spleen are normal. 18 mm right adrenal nodule and 14 mm left adrenal nodule are characteristic of benign adrenal adenomas. Superior left renal cortical " thinning suggesting scarring, and hyperdensity near the medullary pyramids bilaterally suggest medullary nephrocalcinosis. 3 x 5 mm right UVJ calculus causes moderate right hydronephrosis and moderate right perinephric fat stranding. Negative for left hydronephrosis or ureteral calculus. Diverticula arise from the colon. Large and small intestines are otherwise unremarkable. A normal appendix is present. No free intraperitoneal gas. Disc degeneration and facet joint osteoarthrosis noted throughout both thoracolumbar spine. CT PELVIS: Has been removed. Noncontrast ovaries are normal. Bladder is normal. No free pelvic fluid. IMPRESSION: 1. Right UVJ 3 x 5 mm calculus causing moderate right hydronephrosis. 2. Findings suggesting medullary nephrocalcinosis. 3. Hepatic steatosis. 4. Cholelithiasis. 5. Diverticulosis. Electronically signed by:  Oseas Buenrostro MD  11/02/2023 02:59 PM CDT Workstation: [id number]

## 2023-11-03 NOTE — ANESTHESIA PREPROCEDURE EVALUATION
11/03/2023  Alma Rosa Helm is a 68 y.o., female.      Pre-op Assessment    I have reviewed the Patient Summary Reports.     I have reviewed the Nursing Notes. I have reviewed the NPO Status.   I have reviewed the Medications.     Review of Systems  Anesthesia Hx:  No problems with previous Anesthesia  Denies Family Hx of Anesthesia complications.   Denies Personal Hx of Anesthesia complications.   Social:  Smoker    Hematology/Oncology:  Hematology Normal   Oncology Normal     EENT/Dental:EENT/Dental Normal   Cardiovascular:   Hypertension hyperlipidemia ECG has been reviewed.    Pulmonary:   COPD, mild Shortness of breath    Renal/:   renal calculi    Hepatic/GI:  Hepatic/GI Normal    Musculoskeletal:   Arthritis   Spine Disorders:    Neurological:  Neurology Normal    Endocrine:  Endocrine Normal  Obesity / BMI > 30  Psych:   Psychiatric History anxiety depression          Physical Exam  General: Well nourished, Cooperative, Alert and Oriented    Airway:  Mallampati: II   Mouth Opening: Normal  TM Distance: Normal  Tongue: Normal  Neck ROM: Normal ROM    Dental:  Edentulous    Chest/Lungs:  Clear to auscultation    Heart:  Rate: Normal  Rhythm: Regular Rhythm  Sounds: Normal        Anesthesia Plan  Type of Anesthesia, risks & benefits discussed:    Anesthesia Type: Gen Supraglottic Airway  Intra-op Monitoring Plan: Standard ASA Monitors  Post Op Pain Control Plan: multimodal analgesia  Induction:  IV  Informed Consent: Informed consent signed with the Patient and all parties understand the risks and agree with anesthesia plan.  All questions answered.   ASA Score: 3 Emergent    Ready For Surgery From Anesthesia Perspective.     .

## 2023-11-03 NOTE — CONSULTS
[facility] Consult:   Consult for: infected stone  Consult from: Dr Marco Helmspuja Helm is a 68 y.o. female who presents for eval of R flank pain found to have R ureteral calculus with concern for UTI    few days of abdominal pain, nausea, vomiting, reports his like when she is had kidney stones in the past that have been infected reports that she is had sepsis from this as well as drug-resistant bacteria.  Per chart has ESBL an MDRO  Wbc 20.29  Ua micro 6 rbc >100 wbc few bacteria  CT:  Superior left renal cortical thinning suggesting scarring, and hyperdensity near the medullary pyramids bilaterally suggest medullary nephrocalcinosis. 3 x 5 mm right UVJ calculus causes moderate right hydronephrosis and moderate right perinephric fat stranding. Negative for left hydronephrosis or ureteral calculus.    Tahcy 102 on presentation afebrile then HR into 90s at time of er eval. Afebrile.  Lactate never resulted    Personal review of CT scan with right hydroureteronephrosis with significant right perinephric stranding, and distal ureteral stone.    Pt was admitted around 7pm, got dose IV merrem per prior sensitivities.   (Last ucx 6/28/23 esbl ecoli S to penems, gent, NF, tobra only)  By time of floor admit 830pm Tm 99.1, HR 90s, normotensive    At 145am noted to be 99.6-100 and on discussion with nursing had lost IV access since 9:30 p.m. so unable to get IV fluids or 2:00 a.m. dose of antibiotics    On discussion with the patient she continued to have right flank pain.  She had just voided and urine had been strained and still had not passed stone.  She did receive a dose of Flomax in the ER.  She does report lower tract symptoms with dysuria and concern for urinary tract infection for the last few weeks.  Intermittent kidney pain though notes the acute pain was today prior to presentation with nausea  Long history of stones.  Last urologic evaluation in clinic by RICHY Martin in May of 2023 after ER visit at  [place] for a stone that she ultimately passed.  On review, this was a left-sided 5 mm distal stone of which she also had fever and temperature to 101 in the emergency department at time of outpatient evaluation had discussed with Dr. Jewell who had seen her previously in consultation in the hospital as below who planned stone extraction, however a CT renal stone on 05/31 demonstrated that the stone seen on the CT at [place] was no longer present.   Since that time in the interim she did pass a stone about 2 months ago which he reports was right-sided and that all her stones have been right-sided. (Though may stone was on left)  She did have inpatient consult for pyelonephritis, seen by Dr. Jewell on 12/16/22, noting bilateral nonobstructing stones at that time including a 5 mm stone in the right lower pole.  Last intervention was cystoscopy and stent placement in January 2021 and subsequent ureteroscopy and stone extraction in February of 2021 with Dr. Rangel.  She has previously been managed by Dr. Lauren in the past for her stone disease. Left ESWL in 1/2016 and bilateral ureteral stent placement in 12/2015.   She does report being quite septic from infected stone in the past  After noting concern for UTI for weeks, also notes these episodes come on all of a sudden such as today.  Denies seeking any medical attention from PCP etcetera for possible UTI to check urine or have any antibiotics recently.      Past Medical History:   Diagnosis Date    Anxiety     Bipolar disorder     COPD, mild     HLD (hyperlipidemia)     HTN (hypertension)     Nephrolithiasis        Past Surgical History:   Procedure Laterality Date    BACK SURGERY      CYSTOSCOPY      HYSTERECTOMY      LITHOTRIPSY      TUBAL LIGATION         Family History   Problem Relation Age of Onset    Cancer Mother     Diabetes Mother     COPD Father     Hypertension Father        Social History     Socioeconomic History    Marital status:     Tobacco Use    Smoking status: Every Day     Current packs/day: 0.00     Average packs/day: 0.8 packs/day for 45.0 years (33.7 ttl pk-yrs)     Types: Cigarettes     Start date: 1976     Last attempt to quit: 4/8/2020     Years since quitting: 3.5    Smokeless tobacco: Never    Tobacco comments:     now smoking 1/4 ppd   Substance and Sexual Activity    Alcohol use: Not Currently    Drug use: No     Social Determinants of Health     Financial Resource Strain: Unknown (12/16/2022)    Overall Financial Resource Strain (CARDIA)     Difficulty of Paying Living Expenses: Patient refused   Food Insecurity: Unknown (12/16/2022)    Hunger Vital Sign     Worried About Running Out of Food in the Last Year: Patient refused     Ran Out of Food in the Last Year: Patient refused   Transportation Needs: Unknown (12/16/2022)    PRAPARE - Transportation     Lack of Transportation (Medical): Patient refused     Lack of Transportation (Non-Medical): Patient refused   Physical Activity: Unknown (12/16/2022)    Exercise Vital Sign     Days of Exercise per Week: Patient refused     Minutes of Exercise per Session: Patient refused   Stress: Unknown (12/16/2022)    [employer] - Occupational Stress Questionnaire     Feeling of Stress : Patient refused   Social Connections: Unknown (12/16/2022)    Social Connection and Isolation Panel [NHANES]     Frequency of Communication with Friends and Family: Patient refused     Frequency of Social Gatherings with Friends and Family: Patient refused     Attends Yarsani Services: Patient refused     Active Member of Clubs or Organizations: Patient refused     Attends Club or Organization Meetings: Patient refused     Marital Status: Patient refused   Housing Stability: Unknown (12/16/2022)    Housing Stability Vital Sign     Unable to Pay for Housing in the Last Year: Patient refused     Unstable Housing in the Last Year: Patient refused       Review of patient's  allergies indicates:   Allergen Reactions    Tramadol Swelling    Imitrex [sumatriptan succinate]     Penicillins     Sulfa (sulfonamide antibiotics)     Toradol [ketorolac] Swelling       Medications Reviewed: see MAR    Focused Physical Exam    Vitals:    11/03/23 0136   BP:    Pulse:    Resp: 18   Temp:      Body mass index is 31.91 kg/m².        Abdomen: Soft, non-tender, nondistended, mild R CVA tenderness    LABS:    Recent Results (from the past 336 hour(s))   Urinalysis, Reflex to Urine Culture Urine, Clean Catch    Collection Time: 11/02/23 12:58 PM    Specimen: Urine   Result Value Ref Range    Specimen UA Urine, Clean Catch     Color, UA Orange (A) Yellow, Straw, Kelin    Appearance, UA Hazy (A) Clear    pH, UA 5.0 5.0 - 8.0    Specific Gravity, UA 1.025 1.005 - 1.030    Protein, UA SEE COMMENT Negative    Glucose, UA SEE COMMENT Negative    Ketones, UA SEE COMMENT Negative    Bilirubin (UA) SEE COMMENT Negative    Occult Blood UA 1+ (A) Negative    Nitrite, UA SEE COMMENT Negative    Urobilinogen, UA SEE COMMENT Negative EU/dL    Leukocytes, UA SEE COMMENT Negative   Urinalysis Microscopic    Collection Time: 11/02/23 12:58 PM   Result Value Ref Range    RBC, UA 6 (H) 0 - 4 /hpf    WBC, UA >100 (H) 0 - 5 /hpf    Bacteria Few (A) None-Occ /hpf    Squam Epithel, UA 1 /hpf    Hyaline Casts, UA 11 (A) 0-1/lpf /lpf    Microscopic Comment SEE COMMENT    CBC auto differential    Collection Time: 11/02/23  1:11 PM   Result Value Ref Range    WBC 20.29 (H) 3.90 - 12.70 K/uL    RBC 6.24 (H) 4.00 - 5.40 M/uL    Hemoglobin 16.9 (H) 12.0 - 16.0 g/dL    Hematocrit 50.3 (H) 37.0 - 48.5 %    MCV 81 (L) 82 - 98 fL    MCH 27.1 27.0 - 31.0 pg    MCHC 33.6 32.0 - 36.0 g/dL    RDW 15.1 (H) 11.5 - 14.5 %    Platelets 353 150 - 450 K/uL    MPV 9.2 9.2 - 12.9 fL    Immature Granulocytes 0.5 0.0 - 0.5 %    Gran # (ANC) 17.3 (H) 1.8 - 7.7 K/uL    Immature Grans (Abs) 0.10 (H) 0.00 - 0.04 K/uL    Lymph # 1.6 1.0 - 4.8 K/uL    Mono  # 1.3 (H) 0.3 - 1.0 K/uL    Eos # 0.0 0.0 - 0.5 K/uL    Baso # 0.04 0.00 - 0.20 K/uL    nRBC 0 0 /100 WBC    Gran % 85.0 (H) 38.0 - 73.0 %    Lymph % 7.8 (L) 18.0 - 48.0 %    Mono % 6.4 4.0 - 15.0 %    Eosinophil % 0.1 0.0 - 8.0 %    Basophil % 0.2 0.0 - 1.9 %    Differential Method Automated    Comprehensive metabolic panel    Collection Time: 11/02/23  1:11 PM   Result Value Ref Range    Sodium 134 (L) 136 - 145 mmol/L    Potassium 3.8 3.5 - 5.1 mmol/L    Chloride 105 95 - 110 mmol/L    CO2 21 (L) 23 - 29 mmol/L    Glucose 152 (H) 70 - 110 mg/dL    BUN 15 8 - 23 mg/dL    Creatinine 1.0 0.5 - 1.4 mg/dL    Calcium 10.4 8.7 - 10.5 mg/dL    Total Protein 8.6 (H) 6.0 - 8.4 g/dL    Albumin 4.3 3.5 - 5.2 g/dL    Total Bilirubin 0.9 0.1 - 1.0 mg/dL    Alkaline Phosphatase 98 55 - 135 U/L    AST 13 10 - 40 U/L    ALT 17 10 - 44 U/L    eGFR >60.0 >60 mL/min/1.73 m^2    Anion Gap 8 8 - 16 mmol/L   Blood Culture #1 **CANNOT BE ORDERED STAT**    Collection Time: 11/02/23  6:20 PM    Specimen: Blood   Result Value Ref Range    Blood Culture, Routine No Growth to date    Blood Culture #2 **CANNOT BE ORDERED STAT**    Collection Time: 11/02/23  6:30 PM    Specimen: Blood   Result Value Ref Range    Blood Culture, Routine No Growth to date    CBC auto differential    Collection Time: 11/03/23  2:30 AM   Result Value Ref Range    WBC 20.83 (H) 3.90 - 12.70 K/uL    RBC 5.53 (H) 4.00 - 5.40 M/uL    Hemoglobin 15.1 12.0 - 16.0 g/dL    Hematocrit 45.4 37.0 - 48.5 %    MCV 82 82 - 98 fL    MCH 27.3 27.0 - 31.0 pg    MCHC 33.3 32.0 - 36.0 g/dL    RDW 14.7 (H) 11.5 - 14.5 %    Platelets 287 150 - 450 K/uL    MPV 9.6 9.2 - 12.9 fL    Immature Granulocytes 0.6 (H) 0.0 - 0.5 %    Gran # (ANC) 17.8 (H) 1.8 - 7.7 K/uL    Immature Grans (Abs) 0.13 (H) 0.00 - 0.04 K/uL    Lymph # 1.2 1.0 - 4.8 K/uL    Mono # 1.7 (H) 0.3 - 1.0 K/uL    Eos # 0.0 0.0 - 0.5 K/uL    Baso # 0.04 0.00 - 0.20 K/uL    nRBC 0 0 /100 WBC    Gran % 85.2 (H) 38.0 - 73.0 %     Lymph % 5.9 (L) 18.0 - 48.0 %    Mono % 8.1 4.0 - 15.0 %    Eosinophil % 0.0 0.0 - 8.0 %    Basophil % 0.2 0.0 - 1.9 %    Differential Method Automated    Comprehensive Metabolic Panel    Collection Time: 11/03/23  2:30 AM   Result Value Ref Range    Sodium 135 (L) 136 - 145 mmol/L    Potassium 4.5 3.5 - 5.1 mmol/L    Chloride 104 95 - 110 mmol/L    CO2 25 23 - 29 mmol/L    Glucose 182 (H) 70 - 110 mg/dL    BUN 18 8 - 23 mg/dL    Creatinine 1.2 0.5 - 1.4 mg/dL    Calcium 9.7 8.7 - 10.5 mg/dL    Total Protein 7.4 6.0 - 8.4 g/dL    Albumin 3.8 3.5 - 5.2 g/dL    Total Bilirubin 1.1 (H) 0.1 - 1.0 mg/dL    Alkaline Phosphatase 79 55 - 135 U/L    AST 11 10 - 40 U/L    ALT 14 10 - 44 U/L    eGFR 49.9 (A) >60 mL/min/1.73 m^2    Anion Gap 6 (L) 8 - 16 mmol/L   Magnesium    Collection Time: 11/03/23  2:30 AM   Result Value Ref Range    Magnesium 1.8 1.6 - 2.6 mg/dL         Assessment/Diagnosis:    Right ureteral calculus with UTI and sirs/sepsis    Plans:  Extensively reviewed medical record including prior and present ER visits, and details of this hospital admission and past interventions cultures and prior sepsis.   Reviewed CT noting her distal stone with moderate obstructive findings and stranding around the kidney with hydronephrosis and leukocytosis, bacteria in the urine, and fever.  We discussed in this setting given significant obstruction with fever with presumed urosepsis, need to urgently place ureteral stent to relieve the obstruction in the kidney and facilitate drainage of infection.  She did pass a similarly sized stone in May of which she had similar presentation with fever to 101, and was giving her trial of passage overnight as she was stable on IV fluids and IV antibiotics, with plans to intervene in the morning should she have not passed her stone, however she did void multiple times without any passage of stone, remains symptomatic, and had lost IV access and becoming progressively more febrile in the  absence of IV fluids and IV antibiotics with her clinical presentation therefore decision was made to bring more urgently to the operating room, especially given her history of urosepsis and multiple stone procedures previously     We reviewed cystoscopy with ureteral stent placement in detail. Would not make any attempts at stone extraction to avoid manipulation in the setting of infected urine to decrease the risk of worsening sepsis.  A stone is very distal, visible at orifice/UVJ from within the bladder could make attempted basket extraction, however noted would likely just place ureteral stent and need to either return for stone extraction or stent removal if the stone passed alongside the stent.       Discussed the need to stay in the hospital for IV antibiotics and IV fluids until cultures are final, and as she does now have a midline in place, may need home IV antibiotic therapy pending cultures, as review of her previous cultures had significant multidrug resistance in the only p.o. agent was nitrofurantoin which is a poor choice in complicated pyelo/obstructing stones for adequate clearance, and discussed infectious disease consult with Hospital Medicine.  We did also review that she would likely have Duval catheter placed during surgery which would remain indwelling for a minimum of 24 hours from surgery/fever, or 24 hours from her last fever if she continues to be febrile     All questions answered that patient had, appropriate informed consent obtained.  To OR now for cystoscopy and right ureteral stent placement with possible retrograde pyelogram.        Level of Medical Decision Making  [ X ]  High: complex urologic/infectious condition with confounding comorbidities and need for urgent operative management

## 2023-11-03 NOTE — HOSPITAL COURSE
68-years-old female who presented to the ED with abdominal pain associated with nausea and vomiting and pyelonephritis .   Labs with WBC 20 with left shift and CT renal protocol with medullary nephrocalcinosis, 3 by 5 mm right UVJ calculus associated with moderate right hydronephrosis with stranding and urgent urology consult was done and later stent placed .  IV antibiotics started later adjusted the antibiotic because of the urine culture came back with E coli ESBL.  Midline was placed and later patient was discharged with advance as outpatient and follow up with ID and Urology as outpatient.  Prior to discharge discussed with Urology and stent and urinary catheter was removed.

## 2023-11-03 NOTE — PROGRESS NOTES
[facility] Medicine  Progress Note    Patient Name: Alma Rosa Helm  MRN: [id number]  Patient Class: IP- Inpatient   Admission Date: 11/2/2023  Length of Stay: 1 days  Attending Physician: Leodan Watts MD  Primary Care Provider: Rhonda Klein NP        Subjective:     Principal Problem:Pyelonephritis of right kidney        HPI:  Ms. Helm is a 68-years-old female who presented to the ED with abdominal pain associated with nausea and vomiting.  Patient reports feeling unwell for about one-week.  Yesterday developed progressively worsening right anterior and right flank pain, constant, severe, associated with nausea with multiple episodes of emesis, reports about 7 episodes overnight, subjective chills, did not check temperature at home.  Admits to associated dysuria for 2 days as well.  States she has had history of kidney stones in the past, seen by urologist Dr. Dawson at [facility], but not recently.  Reports she has also had sepsis in the setting of UTI in the past.  Admits to chronic shortness of breath due to bronchitis, recently established care with pulmonary, diagnosed with COPD/emphysema, continues to smoke.  In the ED afebrile, tachycardic to the low 100s, BP stable.  Labs with WBC 20 with left shift, hemoglobin 16.9, sodium 134, BUN/creatinine 15/1, UA with few bacteria with > 100 WBC.  EKG reviewed.  CT renal protocol with medullary nephrocalcinosis, 3 by 5 mm right UVJ calculus associated with moderate right hydronephrosis with stranding.  She received IV fluid, Zofran, dilaudid, meropenem and flomax in ED.  Case discussed with ED provider who discussed with on-call urologist, Dr. Staples, recommendations noted.  Plan of care discussed with patient, no visitors at bedside.      Overview/Hospital Course:  No pain and no SOB   Urine clear , little high color       Interval History:     Review of Systems  Objective:     Vital Signs (Most Recent):  Temp: 98.8 °F  (37.1 °C) (11/03/23 1600)  Pulse: 80 (11/03/23 1600)  Resp: 18 (11/03/23 1600)  BP: (!) 127/98 (11/03/23 1640)  SpO2: 96 % (11/03/23 1600) Vital Signs (24h Range):  Temp:  [97.5 °F (36.4 °C)-99.6 °F (37.6 °C)] 98.8 °F (37.1 °C)  Pulse:  [] 80  Resp:  [17-22] 18  SpO2:  [93 %-97 %] 96 %  BP: (100-154)/(42-98) 127/98     Weight: 81.7 kg (180 lb 1.9 oz)  Body mass index is 31.91 kg/m².    Intake/Output Summary (Last 24 hours) at 11/3/2023 1817  Last data filed at 11/3/2023 1801  Gross per 24 hour   Intake 1405 ml   Output 700 ml   Net 705 ml         Physical Exam  Vitals and nursing note reviewed.   HENT:      Head: Normocephalic and atraumatic.   Eyes:      Conjunctiva/sclera: Conjunctivae normal.   Neck:      Vascular: No JVD.   Cardiovascular:      Rate and Rhythm: Normal rate.      Heart sounds: Normal heart sounds.   Pulmonary:      Effort: Pulmonary effort is normal.      Breath sounds: Normal breath sounds.   Abdominal:      Palpations: Abdomen is soft.   Musculoskeletal:         General: Normal range of motion.   Skin:     General: Skin is warm.   Neurological:      Mental Status: She is alert and oriented to person, place, and time.   Psychiatric:         Mood and Affect: Mood normal.             Significant Labs: All pertinent labs within the past 24 hours have been reviewed.  BMP:   Recent Labs   Lab 11/03/23  0230   *   *   K 4.5      CO2 25   BUN 18   CREATININE 1.2   CALCIUM 9.7   MG 1.8     CBC:   Recent Labs   Lab 11/02/23  1311 11/03/23  0230   WBC 20.29* 20.83*   HGB 16.9* 15.1   HCT 50.3* 45.4    287     CMP:   Recent Labs   Lab 11/02/23  1311 11/03/23  0230   * 135*   K 3.8 4.5    104   CO2 21* 25   * 182*   BUN 15 18   CREATININE 1.0 1.2   CALCIUM 10.4 9.7   PROT 8.6* 7.4   ALBUMIN 4.3 3.8   BILITOT 0.9 1.1*   ALKPHOS 98 79   AST 13 11   ALT 17 14   ANIONGAP 8 6*       Significant Imaging: I have reviewed all pertinent imaging results/findings  within the past 24 hours.      Assessment/Plan:      Active Hospital Problems    Diagnosis    *Pyelonephritis of right kidney    Calculus of ureterovesical junction (UVJ)    Sepsis    Leucocytosis    Hyponatremia    Cholelithiasis    Class 1 obesity in adult    Chronic back pain    Bilateral adrenal adenomas    Smoker    History of kidney stones    Bipolar disorder    COPD/emphysema    Hypertension, essential, benign     Plan:    status post emergent cystoscopy with stone basket extraction and right double-J ureteral stent on 11/3  IV fluid hydration and decreae dose   Previous urine culture 6/28 ESBL E coli,  And UC growing gram neg  S/p one dose of amikacin   meropenem 1 g q.8 hours  BC so far negative    Flomax 0.4 mg daily  Possible ESBL and mid line ordered      VTE Risk Mitigation (From admission, onward)         Ordered     enoxaparin injection 40 mg  Daily         11/02/23 1929     IP VTE HIGH RISK PATIENT  Once         11/02/23 1929     Place sequential compression device  Until discontinued         11/02/23 1929                Discharge Planning   SHAMA: 11/4/2023     Code Status: Full Code   Is the patient medically ready for discharge?:     Reason for patient still in hospital (select all that apply): Treatment  Discharge Plan A: Home                  Leodan Watts MD  Department of Hospital Medicine   [facility]

## 2023-11-03 NOTE — SUBJECTIVE & OBJECTIVE
Interval History:     Review of Systems  Objective:     Vital Signs (Most Recent):  Temp: 98.8 °F (37.1 °C) (11/03/23 1600)  Pulse: 80 (11/03/23 1600)  Resp: 18 (11/03/23 1600)  BP: (!) 127/98 (11/03/23 1640)  SpO2: 96 % (11/03/23 1600) Vital Signs (24h Range):  Temp:  [97.5 °F (36.4 °C)-99.6 °F (37.6 °C)] 98.8 °F (37.1 °C)  Pulse:  [] 80  Resp:  [17-22] 18  SpO2:  [93 %-97 %] 96 %  BP: (100-154)/(42-98) 127/98     Weight: 81.7 kg (180 lb 1.9 oz)  Body mass index is 31.91 kg/m².    Intake/Output Summary (Last 24 hours) at 11/3/2023 1817  Last data filed at 11/3/2023 1801  Gross per 24 hour   Intake 1405 ml   Output 700 ml   Net 705 ml         Physical Exam  Vitals and nursing note reviewed.   HENT:      Head: Normocephalic and atraumatic.   Eyes:      Conjunctiva/sclera: Conjunctivae normal.   Neck:      Vascular: No JVD.   Cardiovascular:      Rate and Rhythm: Normal rate.      Heart sounds: Normal heart sounds.   Pulmonary:      Effort: Pulmonary effort is normal.      Breath sounds: Normal breath sounds.   Abdominal:      Palpations: Abdomen is soft.   Musculoskeletal:         General: Normal range of motion.   Skin:     General: Skin is warm.   Neurological:      Mental Status: She is alert and oriented to person, place, and time.   Psychiatric:         Mood and Affect: Mood normal.             Significant Labs: All pertinent labs within the past 24 hours have been reviewed.  BMP:   Recent Labs   Lab 11/03/23  0230   *   *   K 4.5      CO2 25   BUN 18   CREATININE 1.2   CALCIUM 9.7   MG 1.8     CBC:   Recent Labs   Lab 11/02/23  1311 11/03/23  0230   WBC 20.29* 20.83*   HGB 16.9* 15.1   HCT 50.3* 45.4    287     CMP:   Recent Labs   Lab 11/02/23  1311 11/03/23  0230   * 135*   K 3.8 4.5    104   CO2 21* 25   * 182*   BUN 15 18   CREATININE 1.0 1.2   CALCIUM 10.4 9.7   PROT 8.6* 7.4   ALBUMIN 4.3 3.8   BILITOT 0.9 1.1*   ALKPHOS 98 79   AST 13 11   ALT 17 14    ANIONGAP 8 6*       Significant Imaging: I have reviewed all pertinent imaging results/findings within the past 24 hours.

## 2023-11-03 NOTE — OP NOTE
[facility] Urology Operative Report     Date: 11/3/2023     Staff Surgeon: Low Staples MD     Pre-Op Diagnosis:  Right distal obstructing ureteral calculus, urosepsis     Post-Op Diagnosis: same     Procedure(s) Performed:   Right ureteroscopy with stone basket extraction  Cystoscopy with placement of right double-J ureteral stent, 6 Nigerien by 22 cm, on string  Right retrograde pyelogram including injection of contrast  Fluoroscopy less than 1 hour including interpretation of fluoroscopic images     Specimen(s):  Stone for chemical analysis     Anesthesia: GETA     Findings:  Stone not radiopaque but seen from within bladder via cystoscope impacted at the ureteral orifice with tip of stone emanating from orifice and edema of the ureteral orifice.  A stone visible from within bladder, and noted to be 5 mm on CT, ureteroscope with tipless Nitinol stone basket was able to go up to tip of ureteral orifice and passed basket beyond stone and basket extract with minimal ureteral manipulation.  Retrograde pyelogram confirmed significant proximal hydroureteronephrosis.  Stent placed on string attached to Duval.      Estimated Blood Loss:  None     Drains: right ureteral stent, 18 Nigerien Duval catheter     Complications:  None     Indications for procedure:  68-year-old female with infected obstructing distal right ureteral calculus with significant leukocytosis and low-grade temperature with history of multidrug resistant ESBL E coli and history of urosepsis from stones.      Procedure in detail:  After appropriate informed consent was obtained the patient was taken to the operating room placed in lithotomy position.  She was prepped and draped in standard sterile cystoscopic fashion, and preoperative antibiotics were administered (as she was current on her dose of meropenem from the floor 90 minutes prior, 80 mg IV gentamicin were given).  WHO approved time-out performed      Rigid Olympus cystoscope in a 21 Nigerien sheath  was passed via urethra into bladder. Bilateral ureteral orifices were located in the orthotopic position on the trigone.  As noted above at the right ureteral orifice, the stone was seen impacted at the orifice from cystoscopic vision from within the bladder, and given known size of stone and tip of stone emanating from the ureteral orifice it did seem amenable to basket extraction and as discussed previously with the patient would only make attempts if stone was visibly right there given her infected presentation.  Stone did not appear radiopaque on C-arm fluoroscopy with tip of scope up to ureteral orifice, no other radiopacity seen.  Three Congolese tipless Nitinol stone basket via ureteroscope passing up to the tip of the ureteral orifice was able to pass the basket beyond the stone, ensnare it, and carefully remove the stone via basket extraction and collect it for chemical analysis.      Once the stone was removed, A 5 Congolese open-ended ureteral catheter was passed through the cystoscope into the distal right ureter with aid of guidewire.  The open-ended catheter tip was passed under flouro to the proximal ureter and Dilute Isovue contrast was injected in a retrograde fashion to perform retrograde pyelogram with findings noted as above.  Significant hydroureteronephrosis.      A guidewire was then passed through the open-ended catheter and seen to curl and a 6 Congolese by 22 cm double-J ureteral stent was passed over the guidewire using fluoroscopic guidance proximally and direct vision cystoscopically at the ureteral orifice. String was left on the stent.  The cystoscope was removed and an 18 Congolese Duval catheter was placed in the bladder with 10 cc of sterile water in the balloon placed to gravity drainage, and the string of the ureteral stent was secured to the Duval catheter with Mastisol Tegaderm and Steri-Strips.  She tolerated the procedure well without complication was awakened taken to PACU without  incident.     Disposition:   Return to floor under care of Hospital Medicine.  Continue IV abx, fluids.  Source now controlled/removed.  She had no other stones on CT except for the distal obstructing stone which has now been removed, however given her obstructive infectious presentation significant leukocytosis and borderline fever, as well as significant mucosal edema at the distal orifice, a stent was placed.  The stent was left on a string, and as the stent only needs to be in for a short time, and the Duval catheter needs to be in a minimum of 24 hours from surgery/fever which the timing of the same, recommended patient remain in-house until cultures are final, at least 48 hours, given her history of multidrug resistant ESBL E coli in the past.  She does have a midline in place and will likely need IV antibiotic therapy as an outpatient as the only p.o. sensitivity from prior was nitrofurantoin which is a poor choice in clearance of this complicated urinary tract infection with stone and may need IV antibiotic therapy.  Discuss with Hospital Medicine to consult Infectious Disease for final recommendations for outpatient therapy.  However if better p.o. sensitivities on current bacteria, as she will be free of stone, obstruction, and tubes upon discharge, can complete 10-14 day course for complicated by low of outpatient culture specific antibiotics if good PO sensitivities, otherwise would need to be 7-10 days IV therapy per ID.  At 48 hours from procedure, if afebrile and leukocytosis at or near normal and cultures are resulted with outpatient antibiotic plan can remove stent and Duval catheter together as the string of the ureteral stent as on the Duval catheter and all can be removed simultaneously.  However if patient is still in-house at 72 hours, would maintain Duval and stent this long even if Duval removal criteria were met prior, to allow the stent at least 72 hours of drainage and dilation of the  upper tract collecting system.  We did have extensive preoperative discussion about the need for close urologic follow-up, presentation and evaluation and management when she has concerns for infection or flank pain given her history of urosepsis and history of stones and notes that she will do better.  She was last seen in our office after a similar episode 6 months ago, and will set routine follow-up in our office in 3 months for stone prevention follow-up and metabolic workup given her recurrent stone formation.  An appointment will be in the system with our nurse practitioner in 3 months for office visit, with an outpatient appointment for renal ultrasound and KUB as screening imaging 1 week prior.  As well, we will submit orders for a 24 hour urine from [employer] so this kit can be mailed to the house and as included in discharge instructions below, she should be instructed to complete a 24 hour urine 3 weeks prior to her visit after adhering to stone prevention recommendations.  At time of discharge, please utilize the following stone prevention recommendations and instructions on completing 24 hour urine prior to follow-up in her discharge instructions    Pt instructions:  Adhere to recommendations for stone prevention such as adequate daily hydration to produce goal daily urine output > 2 Liters, low salt low sodium diet (avoid added salt, and minimize high sodium content foods such as jerky, deli meat, process/package/frozen/fast food), moderate protein diet, avoiding tea and other oxalate rich foods, minimizing cola and diet soda, and increasing use of fresh lemon and citrus in diet (ie add lemon to water drink lemonade) as citrate is a natural stone inhibitor.   3 weeks prior to follow up in office, complete 24 hour urine. Orders submitted to [employer] to send collection kit to home. Contact urology office with questions

## 2023-11-03 NOTE — CARE UPDATE
11/03/23 0815   Patient Assessment/Suction   Level of Consciousness (AVPU) alert   Respiratory Effort Normal   Expansion/Accessory Muscles/Retractions no use of accessory muscles   All Lung Fields Breath Sounds Anterior:;crackles, coarse   Rhythm/Pattern, Respiratory unlabored   Cough Frequency infrequent   Cough Type loose   Secretions Amount moderate   Secretions Color white   Secretions Characteristics thick   PRE-TX-O2   Device (Oxygen Therapy) nasal cannula   Flow (L/min) 2   SpO2 95 %   Pulse Oximetry Type Intermittent   $ Pulse Oximetry - Multiple Charge Pulse Oximetry - Multiple   Pulse 87   Resp 17   Aerosol Therapy   $ Aerosol Therapy Charges Aerosol Treatment   Daily Review of Necessity (SVN) completed   Treatment Route (SVN) mask   Patient Position (SVN) semi-Hein's   Post Treatment Assessment (SVN) patient reports breathing improved   Signs of Intolerance (SVN) none   Breath Sounds Post-Respiratory Treatment   Throughout All Fields Post-Treatment All Fields   Throughout All Fields Post-Treatment Anterior:;aeration increased   Post-treatment Heart Rate (beats/min) 92   Post-treatment Resp Rate (breaths/min) 18   Education   $ Education Bronchodilator;30 min

## 2023-11-03 NOTE — HPI
Ms. Helm is a 68-years-old female who presented to the ED with abdominal pain associated with nausea and vomiting.  Patient reports feeling unwell for about one-week.  Yesterday developed progressively worsening right anterior and right flank pain, constant, severe, associated with nausea with multiple episodes of emesis, reports about 7 episodes overnight, subjective chills, did not check temperature at home.  Admits to associated dysuria for 2 days as well.  States she has had history of kidney stones in the past, seen by urologist Dr. Dawson at [facility], but not recently.  Reports she has also had sepsis in the setting of UTI in the past.  Admits to chronic shortness of breath due to bronchitis, recently established care with pulmonary, diagnosed with COPD/emphysema, continues to smoke.  In the ED afebrile, tachycardic to the low 100s, BP stable.  Labs with WBC 20 with left shift, hemoglobin 16.9, sodium 134, BUN/creatinine 15/1, UA with few bacteria with > 100 WBC.  EKG reviewed.  CT renal protocol with medullary nephrocalcinosis, 3 by 5 mm right UVJ calculus associated with moderate right hydronephrosis with stranding.  She received IV fluid, Zofran, dilaudid, meropenem and flomax in ED.  Case discussed with ED provider who discussed with on-call urologist, Dr. Staples, recommendations noted.  Plan of care discussed with patient, no visitors at bedside.

## 2023-11-04 LAB
ANION GAP SERPL CALC-SCNC: 5 MMOL/L (ref 8–16)
BACTERIA UR CULT: ABNORMAL
BUN SERPL-MCNC: 16 MG/DL (ref 8–23)
CALCIUM SERPL-MCNC: 9 MG/DL (ref 8.7–10.5)
CHLORIDE SERPL-SCNC: 109 MMOL/L (ref 95–110)
CO2 SERPL-SCNC: 23 MMOL/L (ref 23–29)
CREAT SERPL-MCNC: 0.9 MG/DL (ref 0.5–1.4)
CRP SERPL-MCNC: 225.8 MG/L (ref 0–8.2)
ERYTHROCYTE [DISTWIDTH] IN BLOOD BY AUTOMATED COUNT: 15.3 % (ref 11.5–14.5)
EST. GFR  (NO RACE VARIABLE): >60 ML/MIN/1.73 M^2
GLUCOSE SERPL-MCNC: 99 MG/DL (ref 70–110)
HCT VFR BLD AUTO: 44.7 % (ref 37–48.5)
HGB BLD-MCNC: 13.8 G/DL (ref 12–16)
MCH RBC QN AUTO: 27.4 PG (ref 27–31)
MCHC RBC AUTO-ENTMCNC: 30.9 G/DL (ref 32–36)
MCV RBC AUTO: 89 FL (ref 82–98)
PLATELET # BLD AUTO: 237 K/UL (ref 150–450)
PMV BLD AUTO: 10.1 FL (ref 9.2–12.9)
POTASSIUM SERPL-SCNC: 4.2 MMOL/L (ref 3.5–5.1)
PROCALCITONIN SERPL IA-MCNC: 0.59 NG/ML (ref 0–0.5)
RBC # BLD AUTO: 5.03 M/UL (ref 4–5.4)
SODIUM SERPL-SCNC: 137 MMOL/L (ref 136–145)
WBC # BLD AUTO: 9.72 K/UL (ref 3.9–12.7)

## 2023-11-04 PROCEDURE — 85027 COMPLETE CBC AUTOMATED: CPT | Performed by: INTERNAL MEDICINE

## 2023-11-04 PROCEDURE — 25000003 PHARM REV CODE 250: Performed by: UROLOGY

## 2023-11-04 PROCEDURE — 36415 COLL VENOUS BLD VENIPUNCTURE: CPT | Performed by: INTERNAL MEDICINE

## 2023-11-04 PROCEDURE — 80048 BASIC METABOLIC PNL TOTAL CA: CPT | Performed by: INTERNAL MEDICINE

## 2023-11-04 PROCEDURE — 86140 C-REACTIVE PROTEIN: CPT | Performed by: STUDENT IN AN ORGANIZED HEALTH CARE EDUCATION/TRAINING PROGRAM

## 2023-11-04 PROCEDURE — 94761 N-INVAS EAR/PLS OXIMETRY MLT: CPT

## 2023-11-04 PROCEDURE — 27000221 HC OXYGEN, UP TO 24 HOURS

## 2023-11-04 PROCEDURE — A4216 STERILE WATER/SALINE, 10 ML: HCPCS | Performed by: UROLOGY

## 2023-11-04 PROCEDURE — 63600175 PHARM REV CODE 636 W HCPCS: Performed by: UROLOGY

## 2023-11-04 PROCEDURE — 99900035 HC TECH TIME PER 15 MIN (STAT)

## 2023-11-04 PROCEDURE — 94640 AIRWAY INHALATION TREATMENT: CPT

## 2023-11-04 PROCEDURE — 25000242 PHARM REV CODE 250 ALT 637 W/ HCPCS: Performed by: UROLOGY

## 2023-11-04 PROCEDURE — 25000003 PHARM REV CODE 250: Performed by: INTERNAL MEDICINE

## 2023-11-04 PROCEDURE — 99231 SBSQ HOSP IP/OBS SF/LOW 25: CPT | Mod: ,,, | Performed by: INTERNAL MEDICINE

## 2023-11-04 PROCEDURE — 36415 COLL VENOUS BLD VENIPUNCTURE: CPT | Performed by: STUDENT IN AN ORGANIZED HEALTH CARE EDUCATION/TRAINING PROGRAM

## 2023-11-04 PROCEDURE — 12000002 HC ACUTE/MED SURGE SEMI-PRIVATE ROOM

## 2023-11-04 PROCEDURE — 84145 PROCALCITONIN (PCT): CPT | Performed by: STUDENT IN AN ORGANIZED HEALTH CARE EDUCATION/TRAINING PROGRAM

## 2023-11-04 PROCEDURE — 99231 PR SUBSEQUENT HOSPITAL CARE,LEVL I: ICD-10-PCS | Mod: ,,, | Performed by: INTERNAL MEDICINE

## 2023-11-04 RX ADMIN — ARFORMOTEROL TARTRATE 15 MCG: 15 SOLUTION RESPIRATORY (INHALATION) at 08:11

## 2023-11-04 RX ADMIN — SENNOSIDES AND DOCUSATE SODIUM 2 TABLET: 50; 8.6 TABLET ORAL at 09:11

## 2023-11-04 RX ADMIN — SODIUM CHLORIDE, PRESERVATIVE FREE 10 ML: 5 INJECTION INTRAVENOUS at 06:11

## 2023-11-04 RX ADMIN — ARFORMOTEROL TARTRATE 15 MCG: 15 SOLUTION RESPIRATORY (INHALATION) at 07:11

## 2023-11-04 RX ADMIN — PRAVASTATIN SODIUM 40 MG: 40 TABLET ORAL at 08:11

## 2023-11-04 RX ADMIN — HYDROCODONE BITARTRATE AND ACETAMINOPHEN 1 TABLET: 5; 325 TABLET ORAL at 05:11

## 2023-11-04 RX ADMIN — METOPROLOL TARTRATE 25 MG: 25 TABLET, FILM COATED ORAL at 05:11

## 2023-11-04 RX ADMIN — PREGABALIN 50 MG: 50 CAPSULE ORAL at 05:11

## 2023-11-04 RX ADMIN — ENOXAPARIN SODIUM 40 MG: 40 INJECTION SUBCUTANEOUS at 05:11

## 2023-11-04 RX ADMIN — SERTRALINE HYDROCHLORIDE 50 MG: 50 TABLET ORAL at 09:11

## 2023-11-04 RX ADMIN — MEROPENEM 1 G: 1 INJECTION, POWDER, FOR SOLUTION INTRAVENOUS at 09:11

## 2023-11-04 RX ADMIN — OXYBUTYNIN CHLORIDE 5 MG: 5 TABLET, EXTENDED RELEASE ORAL at 09:11

## 2023-11-04 RX ADMIN — BUDESONIDE INHALATION 0.5 MG: 0.5 SUSPENSION RESPIRATORY (INHALATION) at 08:11

## 2023-11-04 RX ADMIN — BUDESONIDE INHALATION 0.5 MG: 0.5 SUSPENSION RESPIRATORY (INHALATION) at 07:11

## 2023-11-04 RX ADMIN — SENNOSIDES AND DOCUSATE SODIUM 2 TABLET: 50; 8.6 TABLET ORAL at 08:11

## 2023-11-04 RX ADMIN — MEROPENEM 1 G: 1 INJECTION, POWDER, FOR SOLUTION INTRAVENOUS at 05:11

## 2023-11-04 RX ADMIN — PREGABALIN 50 MG: 50 CAPSULE ORAL at 09:11

## 2023-11-04 RX ADMIN — MEROPENEM 1 G: 1 INJECTION, POWDER, FOR SOLUTION INTRAVENOUS at 02:11

## 2023-11-04 RX ADMIN — TAMSULOSIN HYDROCHLORIDE 0.4 MG: 0.4 CAPSULE ORAL at 09:11

## 2023-11-04 RX ADMIN — PREGABALIN 50 MG: 50 CAPSULE ORAL at 08:11

## 2023-11-04 RX ADMIN — MUPIROCIN 1 G: 20 OINTMENT TOPICAL at 09:11

## 2023-11-04 RX ADMIN — MUPIROCIN 1 G: 20 OINTMENT TOPICAL at 08:11

## 2023-11-04 NOTE — PLAN OF CARE
11/03/23 2053   Patient Assessment/Suction   Level of Consciousness (AVPU) alert   Respiratory Effort Normal;Unlabored   Expansion/Accessory Muscles/Retractions expansion symmetric;no retractions;no use of accessory muscles   All Lung Fields Breath Sounds Anterior:;Lateral:;coarse   Rhythm/Pattern, Respiratory depth regular;pattern regular;unlabored   PRE-TX-O2   Device (Oxygen Therapy) nasal cannula   $ Is the patient on Low Flow Oxygen? Yes   Flow (L/min) 2   SpO2 95 %   Pulse Oximetry Type Intermittent   $ Pulse Oximetry - Multiple Charge Pulse Oximetry - Multiple   Pulse 68   Resp 18   Aerosol Therapy   $ Aerosol Therapy Charges Aerosol Treatment   Daily Review of Necessity (SVN) completed   Respiratory Treatment Status (SVN) given   Treatment Route (SVN) mask;oxygen   Patient Position (SVN) semi-Hein's   Post Treatment Assessment (SVN) breath sounds unchanged   Signs of Intolerance (SVN) none   Breath Sounds Post-Respiratory Treatment   Throughout All Fields Post-Treatment no change   Post-treatment Heart Rate (beats/min) 68   Post-treatment Resp Rate (breaths/min) 18   Respiratory Evaluation   $ Care Plan Tech Time 15 min   $ Eval/Re-eval Charges Evaluation   Evaluation For New Orders

## 2023-11-04 NOTE — PROGRESS NOTES
[facility] Medicine  Progress Note    Patient Name: Alma Rosa Helm  MRN: [id number]  Patient Class: IP- Inpatient   Admission Date: 11/2/2023  Length of Stay: 2 days  Attending Physician: Leodan Watts MD  Primary Care Provider: Rhonda Klein NP        Subjective:     Principal Problem:Pyelonephritis of right kidney        HPI:  Ms. Helm is a 68-years-old female who presented to the ED with abdominal pain associated with nausea and vomiting.  Patient reports feeling unwell for about one-week.  Yesterday developed progressively worsening right anterior and right flank pain, constant, severe, associated with nausea with multiple episodes of emesis, reports about 7 episodes overnight, subjective chills, did not check temperature at home.  Admits to associated dysuria for 2 days as well.  States she has had history of kidney stones in the past, seen by urologist Dr. Dawson at [facility], but not recently.  Reports she has also had sepsis in the setting of UTI in the past.  Admits to chronic shortness of breath due to bronchitis, recently established care with pulmonary, diagnosed with COPD/emphysema, continues to smoke.  In the ED afebrile, tachycardic to the low 100s, BP stable.  Labs with WBC 20 with left shift, hemoglobin 16.9, sodium 134, BUN/creatinine 15/1, UA with few bacteria with > 100 WBC.  EKG reviewed.  CT renal protocol with medullary nephrocalcinosis, 3 by 5 mm right UVJ calculus associated with moderate right hydronephrosis with stranding.  She received IV fluid, Zofran, dilaudid, meropenem and flomax in ED.  Case discussed with ED provider who discussed with on-call urologist, Dr. Staples, recommendations noted.  Plan of care discussed with patient, no visitors at bedside.      Overview/Hospital Course:  No pain and no SOB   Urine clear , little high color     11/4  Mild pain at right flank . Urine clear . WBC normal and afebrile . UC ESBL       Interval History:  "    Review of Systems  Objective:     Vital Signs (Most Recent):  Temp: 98.6 °F (37 °C) (11/04/23 1100)  Pulse: 67 (11/04/23 1100)  Resp: 18 (11/04/23 1100)  BP: (!) 106/44 (notified nurse) (11/04/23 1100)  SpO2: (!) 93 % (11/04/23 1100) Vital Signs (24h Range):  Temp:  [98.6 °F (37 °C)-102.4 °F (39.1 °C)] 98.6 °F (37 °C)  Pulse:  [66-80] 67  Resp:  [18-19] 18  SpO2:  [92 %-96 %] 93 %  BP: (106-127)/(44-98) 106/44     Weight: 81.7 kg (180 lb 1.9 oz)  Body mass index is 31.91 kg/m².    Intake/Output Summary (Last 24 hours) at 11/4/2023 1459  Last data filed at 11/4/2023 0934  Gross per 24 hour   Intake 480 ml   Output 1400 ml   Net -920 ml         Physical Exam  Vitals and nursing note reviewed.   HENT:      Head: Normocephalic and atraumatic.   Eyes:      Conjunctiva/sclera: Conjunctivae normal.   Neck:      Vascular: No JVD.   Cardiovascular:      Heart sounds: Normal heart sounds.   Pulmonary:      Effort: Pulmonary effort is normal.      Breath sounds: Normal breath sounds.   Abdominal:      Palpations: Abdomen is soft.   Skin:     General: Skin is warm.   Neurological:      Mental Status: She is alert and oriented to person, place, and time.             Significant Labs: All pertinent labs within the past 24 hours have been reviewed.  Cardiac Markers: No results for input(s): "CKMB", "MYOGLOBIN", "BNP", "TROPISTAT" in the last 48 hours.  Coagulation: No results for input(s): "PT", "INR", "APTT" in the last 48 hours.    Significant Imaging: I have reviewed all pertinent imaging results/findings within the past 24 hours.      Assessment/Plan:      Active Hospital Problems    Diagnosis    *Pyelonephritis of right kidney    Calculus of ureterovesical junction (UVJ)    Sepsis    Leucocytosis    Hyponatremia    Cholelithiasis    Class 1 obesity in adult    Chronic back pain    Bilateral adrenal adenomas    Smoker    History of kidney stones    Bipolar disorder    COPD/emphysema    Hypertension, " essential, benign       Plan:     status post emergent cystoscopy with stone basket extraction and right double-J ureteral stent on 11/3  ESBL growing in  The urine and mid line ordered   IV fluid hydration and decreae dose   Previous urine culture 6/28 ESBL E coli,  And UC growing gram neg  S/p one dose of amikacin   meropenem 1 g q.8 hours  BC so far negative    Flomax 0.4 mg daily  DC when sensitivity come out         VTE Risk Mitigation (From admission, onward)         Ordered     enoxaparin injection 40 mg  Daily         11/02/23 1929     IP VTE HIGH RISK PATIENT  Once         11/02/23 1929     Place sequential compression device  Until discontinued         11/02/23 1929                Discharge Planning   SHAMA: 11/4/2023     Code Status: Full Code   Is the patient medically ready for discharge?:     Reason for patient still in hospital (select all that apply): Treatment  Discharge Plan A: Home                  Leodan Watts MD  Department of Hospital Medicine   [facility]

## 2023-11-04 NOTE — SUBJECTIVE & OBJECTIVE
"Interval History:     Review of Systems  Objective:     Vital Signs (Most Recent):  Temp: 98.6 °F (37 °C) (11/04/23 1100)  Pulse: 67 (11/04/23 1100)  Resp: 18 (11/04/23 1100)  BP: (!) 106/44 (notified nurse) (11/04/23 1100)  SpO2: (!) 93 % (11/04/23 1100) Vital Signs (24h Range):  Temp:  [98.6 °F (37 °C)-102.4 °F (39.1 °C)] 98.6 °F (37 °C)  Pulse:  [66-80] 67  Resp:  [18-19] 18  SpO2:  [92 %-96 %] 93 %  BP: (106-127)/(44-98) 106/44     Weight: 81.7 kg (180 lb 1.9 oz)  Body mass index is 31.91 kg/m².    Intake/Output Summary (Last 24 hours) at 11/4/2023 1459  Last data filed at 11/4/2023 0934  Gross per 24 hour   Intake 480 ml   Output 1400 ml   Net -920 ml         Physical Exam  Vitals and nursing note reviewed.   HENT:      Head: Normocephalic and atraumatic.   Eyes:      Conjunctiva/sclera: Conjunctivae normal.   Neck:      Vascular: No JVD.   Cardiovascular:      Heart sounds: Normal heart sounds.   Pulmonary:      Effort: Pulmonary effort is normal.      Breath sounds: Normal breath sounds.   Abdominal:      Palpations: Abdomen is soft.   Skin:     General: Skin is warm.   Neurological:      Mental Status: She is alert and oriented to person, place, and time.             Significant Labs: All pertinent labs within the past 24 hours have been reviewed.  Cardiac Markers: No results for input(s): "CKMB", "MYOGLOBIN", "BNP", "TROPISTAT" in the last 48 hours.  Coagulation: No results for input(s): "PT", "INR", "APTT" in the last 48 hours.    Significant Imaging: I have reviewed all pertinent imaging results/findings within the past 24 hours.  "

## 2023-11-04 NOTE — CARE UPDATE
11/04/23 0722   Patient Assessment/Suction   Level of Consciousness (AVPU) alert   Respiratory Effort Normal;Unlabored   Expansion/Accessory Muscles/Retractions no use of accessory muscles;no retractions;expansion symmetric   All Lung Fields Breath Sounds diminished   Rhythm/Pattern, Respiratory unlabored;pattern regular;depth regular;chest wiggle adequate   Cough Frequency infrequent   Cough Type good   PRE-TX-O2   Device (Oxygen Therapy) nasal cannula   $ Is the patient on Low Flow Oxygen? Yes   Flow (L/min) 2   SpO2 (!) 94 %   Pulse Oximetry Type Intermittent   $ Pulse Oximetry - Multiple Charge Pulse Oximetry - Multiple   Pulse 79   Resp 19   Aerosol Therapy   $ Aerosol Therapy Charges Aerosol Treatment   Daily Review of Necessity (SVN) completed   Respiratory Treatment Status (SVN) given   Treatment Route (SVN) oxygen;mask   Patient Position (SVN) HOB elevated   Post Treatment Assessment (SVN) increased aeration   Signs of Intolerance (SVN) none   Breath Sounds Post-Respiratory Treatment   Throughout All Fields Post-Treatment All Fields   Throughout All Fields Post-Treatment aeration increased   Post-treatment Heart Rate (beats/min) 80   Post-treatment Resp Rate (breaths/min) 19   Respiratory Evaluation   $ Care Plan Tech Time 15 min

## 2023-11-04 NOTE — PROGRESS NOTES
Consult Note  Infectious Disease    Reason for Consult:      HPI: Alma Rosa Helm is a 68 y.o. female     Alma Rosa Helm is a 68 y.o. female very pleasant, active smoker, as per patient has decreased to 2-3 cigarettes/day, bipolar disorder, COPD, bilateral kidney stones with ureteral stents.  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  She reports she started feeling ill since Tuesday 10/31, she had sharp right flank pain with associated suprapubic tenderness, dysuria, increased frequency, urgency, incontinence, nausea and multiple episodes of emesis at home.  She also mentions subjective chills, did not check temperature at home.  She follows with urologist at [facility] but has not seen him recently.     In the ER, tachycardic, stable BP, T-max 99.1°  Labs on admission, leukocytosis 20.2, left shift 85%, H&H 16.9/50.3, MCV 81, platelet count 353   Normal kidney and liver function tests  UA orange/hazy, pyuria greater than 100, culture with GNR, lactose , suspecting ESBL E coli   Blood cultures x2 sets no growth to date, pending final   CTRSS revealed right UVJ 3 x 5 mm calculus causing moderate right hydronephrosis.  Findings suggesting medullary nephrocalcinosis.  Hepatic steatosis.  Cholelithiasis.  Diverticulosis.     Empirically started on meropenem IV.     Patient seen examined at bedside, she reports she feels better today after intervention overnight, her right flank pain is almost resolved, her appetite is improving, Duval orange urine.  Patient with audible wheezing from the distance, complaining of productive cough of white/thick phlegm.  She reports her nausea is also better.     Outdoor activities:  Lives at home by herself.  Active smoker, no alcohol.  Used to work as a CNA, currently  not working.  Travel:  None  Implants:  None  Antibiotic history:  See HPI    -------------------  11/04/2023.  Patient is resting comfortably in bed.  She still has some right flank pain, 3 to 5/10, improved; the highest temperature during this admission was 102.4.  Fever has resolved now.  WBC is markedly improved 20.2--20.8--9.7.      Review of patient's allergies indicates:   Allergen Reactions    Tramadol Swelling    Imitrex [sumatriptan succinate]     Penicillins     Sulfa (sulfonamide antibiotics)     Toradol [ketorolac] Swelling     Past Medical History:   Diagnosis Date    Anxiety     Bipolar disorder     COPD, mild     HLD (hyperlipidemia)     HTN (hypertension)     Nephrolithiasis      Past Surgical History:   Procedure Laterality Date    BACK SURGERY      CYSTOSCOPY      CYSTOSCOPY W/ URETERAL STENT PLACEMENT Right 11/3/2023    Procedure: CYSTOSCOPY, WITH URETERAL STENT INSERTION;  Surgeon: Low Staples MD;  Location: [facility];  Service: Urology;  Laterality: Right;    CYSTOSCOPY WITH CALCULUS EXTRACTION Right 11/3/2023    Procedure: CYSTOSCOPY, WITH CALCULUS REMOVAL;  Surgeon: Low Staples MD;  Location: [facility];  Service: Urology;  Laterality: Right;    HYSTERECTOMY      LITHOTRIPSY      TUBAL LIGATION       Social History     Socioeconomic History    Marital status:    Tobacco Use    Smoking status: Every Day     Current packs/day: 0.00     Average packs/day: 0.8 packs/day for 45.0 years (33.7 ttl pk-yrs)     Types: Cigarettes     Start date: 1976     Last attempt to quit: 4/8/2020     Years since quitting: 3.5    Smokeless tobacco: Never    Tobacco comments:     now smoking 1/4 ppd   Substance and Sexual Activity    Alcohol use: Not Currently    Drug use: No     Social Determinants of Health     Financial Resource Strain: Unknown (12/16/2022)    Overall Financial Resource Strain (CARDIA)     Difficulty of Paying Living Expenses: Patient refused   Food Insecurity: Unknown  (12/16/2022)    Hunger Vital Sign     Worried About Running Out of Food in the Last Year: Patient refused     Ran Out of Food in the Last Year: Patient refused   Transportation Needs: Unknown (12/16/2022)    PRAPARE - Transportation     Lack of Transportation (Medical): Patient refused     Lack of Transportation (Non-Medical): Patient refused   Physical Activity: Unknown (12/16/2022)    Exercise Vital Sign     Days of Exercise per Week: Patient refused     Minutes of Exercise per Session: Patient refused   Stress: Unknown (12/16/2022)    [employer] [place] [employer] - Occupational Stress Questionnaire     Feeling of Stress : Patient refused   Social Connections: Unknown (12/16/2022)    Social Connection and Isolation Panel [NHANES]     Frequency of Communication with Friends and Family: Patient refused     Frequency of Social Gatherings with Friends and Family: Patient refused     Attends Yarsani Services: Patient refused     Active Member of Clubs or Organizations: Patient refused     Attends Club or Organization Meetings: Patient refused     Marital Status: Patient refused   Housing Stability: Unknown (12/16/2022)    Housing Stability Vital Sign     Unable to Pay for Housing in the Last Year: Patient refused     Unstable Housing in the Last Year: Patient refused     Family History   Problem Relation Age of Onset    Cancer Mother     Diabetes Mother     COPD Father     Hypertension Father          Review of Systems:    Fever has resolved  No change in vision, loss of vision or diplopia  No sinus congestion, purulent nasal discharge, post nasal drip or facial pain  No pain in mouth or throat. No problems with teeth, gums.  No chest pain, palpitations, syncope  No cough, sputum production, shortness of breath, dyspnea on exertion, pleurisy, hemoptysis  No nausea, vomiting, diarrhea, constipation, blood in stool, or focal abd pain,  No dysphagia, odynophagia  Improved right flank pain, sensation of need  to urinate and burning.  Duval in place with urine clearing up  No vaginal/penile discharge, genital ulcers, risk for STD  No swelling of joints, redness of joints, injuries, or new focal pain  No unusual headaches, dizziness, vertigo, numbness, paresthesias, neuropathy, falls  No anxiety, depression, substance abuse, sleep disturbance  No bleeding, lymphadenopathy, anemia, malignancy, unusual bruising  No new rashes, lesions, or wounds  No TB exposure  No recent/prior steroids  Outdoor activities:  Travel:  No  Implants:   Antibiotic History:  As above  Meropenem and amikacin during this admission    EXAM & DIAGNOSTICS REVIEWED:   Vitals:     Temp:  [97.9 °F (36.6 °C)-99.7 °F (37.6 °C)]   Temp: 98.6 °F (37 °C) (11/04/23 0349)  Pulse: 79 (11/04/23 0722)  Resp: 19 (11/04/23 0722)  BP: (!) 117/56 (11/04/23 0349)  SpO2: (!) 94 % (11/04/23 0722)    Intake/Output Summary (Last 24 hours) at 11/4/2023 0830  Last data filed at 11/4/2023 0349  Gross per 24 hour   Intake 360 ml   Output 1400 ml   Net -1040 ml          General:  In NAD. Alert and attentive, cooperative, comfortable, on 2 L O2  Eyes:  Anicteric, PERRL, EOMI  ENT:  No ulcers, exudates, thrush, nares patent, no teeth.  Neck:  supple,   Lungs: Some expiratory wheezing, wet sounding cough during the physical exam.  Baseline as per patient  Heart:  RRR, no gallop/murmur/rub noted  Abd:  Soft, NT, ND, normal BS, no masses or organomegaly appreciated.  :  Duval catheter with urine clear, mild right flank tenderness  Musc:  Joints without effusion, swelling, erythema, synovitis, muscle wasting.   Skin:  No rashes. No palmar or plantar lesions. No subungual petechiae  Neuro:             Alert, attentive, speech fluent, face symmetric, moves all extremities, no focal weakness. Ambulatory  Psych:  Calm, cooperative  Lymphatic:      Extrem: No edema, erythema, phlebitis, cellulitis, warm and well perfused  VAD:  Left midline     Isolation:  "   Wound:       Lines/Tubes/Drains:  Duval catheter placed on 11/03/2023.        General Labs reviewed:  Recent Labs   Lab 11/02/23  1311 11/03/23  0230   WBC 20.29* 20.83*   HGB 16.9* 15.1   HCT 50.3* 45.4    287       Recent Labs   Lab 11/02/23  1311 11/03/23  0230   * 135*   K 3.8 4.5    104   CO2 21* 25   BUN 15 18   CREATININE 1.0 1.2   CALCIUM 10.4 9.7   PROT 8.6* 7.4   BILITOT 0.9 1.1*   ALKPHOS 98 79   ALT 17 14   AST 13 11     No results for input(s): "CRP" in the last 168 hours.      Micro:  Microbiology Results (last 7 days)       Procedure Component Value Units Date/Time    Urine culture [[id number]]  (Abnormal)  (Susceptibility) Collected: 11/02/23 1258    Order Status: Completed Specimen: Urine Updated: 11/04/23 0746     Urine Culture, Routine ESCHERICHIA COLI ESBL  >100,000 cfu/ml  Results called to and read back by Tabby Waterman RN-[address] [place] [id number];    11/04/2023  07:46 CJD      Narrative:      Specimen Source->Urine    Blood Culture #1 **CANNOT BE ORDERED STAT** [[id number]] Collected: 11/02/23 1820    Order Status: Completed Specimen: Blood Updated: 11/03/23 2032     Blood Culture, Routine No Growth to date      No Growth to date    Blood Culture #2 **CANNOT BE ORDERED STAT** [[id number]] Collected: 11/02/23 1830    Order Status: Completed Specimen: Blood Updated: 11/03/23 2032     Blood Culture, Routine No Growth to date      No Growth to date           Escherichia coli ESBL    CULTURE, URINE   Amp/Sulbactam <=4/2 mcg/mL Sensitive   Ampicillin >16 mcg/mL Resistant   Cefazolin >16 mcg/mL Resistant   Cefepime 16 mcg/mL Resistant   Ceftriaxone >32 mcg/mL Resistant   Ciprofloxacin >2 mcg/mL Resistant   Ertapenem <=0.5 mcg/mL Sensitive   Gentamicin <=4 mcg/mL Sensitive   Levofloxacin >4 mcg/mL Resistant   Meropenem <=1 mcg/mL Sensitive   Nitrofurantoin <=32 mcg/mL Sensitive   Piperacillin/Tazo <=16 mcg/mL Sensitive   Tetracycline >8 mcg/mL Resistant   Tobramycin <=4 mcg/mL Sensitive "   Trimeth/Sulfa >2/38 mcg/mL Resistant       Imaging Reviewed:   CXRNegative chest.    CT  1. Right UVJ 3 x 5 mm calculus causing moderate right hydronephrosis.   2. Findings suggesting medullary nephrocalcinosis.   3. Hepatic steatosis.   4. Cholelithiasis.   5. Diverticulosis.     Cardiology:    IMPRESSION & PLAN       Severe sepsis secondary to complicated UTI/right distal obstructing calculus status post emergent cystoscopy with stone basket extraction and right double-J ureteral stent on 11/3, urine culture positive for ESBL, history of the same.  Blood cultures x2 sets no growth to date, pending final      Procalcitonin 0.59      COPD exacerbation on O2 by NC 2L,( if need be she uses her 's O2 at home.)  Patient states she had a 6 minute test and did not qualify for O2..     PMHx: bipolar disorder, COPD, bilateral kidney stones, smoker     Recommendations:  On d3 meropenem 1 g IV q.8, dosed per creatinine clearance    Monitor markers of inflammation, CRP     If she continues to improve may discharge on Monday on Ertapenem 1 g IV q.day x 10 days--order for  is placed in chart    Duval catheter in place, as per op-note/urologist, keep at least for 72 hours after procedure.  Check postvoid residual after discontinuation of Duval   Follow with urologist as outpatient  Follow stone pathology  Drink plenty of fluids, empty her bladder frequently,  Monitor O2 sats  Discussed with patient         Medical Decision Making during this encounter was  [_] Low Complexity  [_] Moderate Complexity  [ xx ] High Complexity

## 2023-11-05 PROCEDURE — 63600175 PHARM REV CODE 636 W HCPCS: Performed by: UROLOGY

## 2023-11-05 PROCEDURE — 27000221 HC OXYGEN, UP TO 24 HOURS

## 2023-11-05 PROCEDURE — 94640 AIRWAY INHALATION TREATMENT: CPT

## 2023-11-05 PROCEDURE — 99499 UNLISTED E&M SERVICE: CPT | Mod: ,,, | Performed by: INTERNAL MEDICINE

## 2023-11-05 PROCEDURE — A4216 STERILE WATER/SALINE, 10 ML: HCPCS | Performed by: UROLOGY

## 2023-11-05 PROCEDURE — 99900035 HC TECH TIME PER 15 MIN (STAT)

## 2023-11-05 PROCEDURE — 25000003 PHARM REV CODE 250: Performed by: UROLOGY

## 2023-11-05 PROCEDURE — 99499 NO LOS: ICD-10-PCS | Mod: ,,, | Performed by: INTERNAL MEDICINE

## 2023-11-05 PROCEDURE — 12000002 HC ACUTE/MED SURGE SEMI-PRIVATE ROOM

## 2023-11-05 PROCEDURE — 25000242 PHARM REV CODE 250 ALT 637 W/ HCPCS: Performed by: UROLOGY

## 2023-11-05 PROCEDURE — 94761 N-INVAS EAR/PLS OXIMETRY MLT: CPT

## 2023-11-05 PROCEDURE — 99900031 HC PATIENT EDUCATION (STAT)

## 2023-11-05 PROCEDURE — 25000003 PHARM REV CODE 250: Performed by: INTERNAL MEDICINE

## 2023-11-05 PROCEDURE — 94799 UNLISTED PULMONARY SVC/PX: CPT

## 2023-11-05 RX ORDER — CALCIUM CARBONATE 200(500)MG
500 TABLET,CHEWABLE ORAL EVERY 6 HOURS PRN
Status: DISCONTINUED | OUTPATIENT
Start: 2023-11-05 | End: 2023-11-06 | Stop reason: HOSPADM

## 2023-11-05 RX ADMIN — SODIUM CHLORIDE, PRESERVATIVE FREE 10 ML: 5 INJECTION INTRAVENOUS at 12:11

## 2023-11-05 RX ADMIN — METOPROLOL TARTRATE 25 MG: 25 TABLET, FILM COATED ORAL at 04:11

## 2023-11-05 RX ADMIN — ENOXAPARIN SODIUM 40 MG: 40 INJECTION SUBCUTANEOUS at 04:11

## 2023-11-05 RX ADMIN — PREGABALIN 50 MG: 50 CAPSULE ORAL at 09:11

## 2023-11-05 RX ADMIN — OXYBUTYNIN CHLORIDE 5 MG: 5 TABLET, EXTENDED RELEASE ORAL at 09:11

## 2023-11-05 RX ADMIN — MEROPENEM 1 G: 1 INJECTION, POWDER, FOR SOLUTION INTRAVENOUS at 01:11

## 2023-11-05 RX ADMIN — SENNOSIDES AND DOCUSATE SODIUM 2 TABLET: 50; 8.6 TABLET ORAL at 11:11

## 2023-11-05 RX ADMIN — SENNOSIDES AND DOCUSATE SODIUM 2 TABLET: 50; 8.6 TABLET ORAL at 09:11

## 2023-11-05 RX ADMIN — PREGABALIN 50 MG: 50 CAPSULE ORAL at 11:11

## 2023-11-05 RX ADMIN — MUPIROCIN 1 G: 20 OINTMENT TOPICAL at 09:11

## 2023-11-05 RX ADMIN — TAMSULOSIN HYDROCHLORIDE 0.4 MG: 0.4 CAPSULE ORAL at 09:11

## 2023-11-05 RX ADMIN — SODIUM CHLORIDE, PRESERVATIVE FREE 10 ML: 5 INJECTION INTRAVENOUS at 04:11

## 2023-11-05 RX ADMIN — MUPIROCIN 1 G: 20 OINTMENT TOPICAL at 11:11

## 2023-11-05 RX ADMIN — MEROPENEM 1 G: 1 INJECTION, POWDER, FOR SOLUTION INTRAVENOUS at 05:11

## 2023-11-05 RX ADMIN — BUDESONIDE INHALATION 0.5 MG: 0.5 SUSPENSION RESPIRATORY (INHALATION) at 08:11

## 2023-11-05 RX ADMIN — HYDROCODONE BITARTRATE AND ACETAMINOPHEN 1 TABLET: 5; 325 TABLET ORAL at 11:11

## 2023-11-05 RX ADMIN — SODIUM CHLORIDE: 900 INJECTION INTRAVENOUS at 04:11

## 2023-11-05 RX ADMIN — ARFORMOTEROL TARTRATE 15 MCG: 15 SOLUTION RESPIRATORY (INHALATION) at 07:11

## 2023-11-05 RX ADMIN — PRAVASTATIN SODIUM 40 MG: 40 TABLET ORAL at 11:11

## 2023-11-05 RX ADMIN — HYDROCODONE BITARTRATE AND ACETAMINOPHEN 1 TABLET: 5; 325 TABLET ORAL at 09:11

## 2023-11-05 RX ADMIN — SERTRALINE HYDROCHLORIDE 50 MG: 50 TABLET ORAL at 09:11

## 2023-11-05 RX ADMIN — MEROPENEM 1 G: 1 INJECTION, POWDER, FOR SOLUTION INTRAVENOUS at 09:11

## 2023-11-05 RX ADMIN — SODIUM CHLORIDE, PRESERVATIVE FREE 10 ML: 5 INJECTION INTRAVENOUS at 06:11

## 2023-11-05 RX ADMIN — ARFORMOTEROL TARTRATE 15 MCG: 15 SOLUTION RESPIRATORY (INHALATION) at 08:11

## 2023-11-05 RX ADMIN — BUDESONIDE INHALATION 0.5 MG: 0.5 SUSPENSION RESPIRATORY (INHALATION) at 07:11

## 2023-11-05 RX ADMIN — PREGABALIN 50 MG: 50 CAPSULE ORAL at 02:11

## 2023-11-05 NOTE — CLINICAL REVIEW
Chart reviewed   C-reactive protein 225 -- Repeat !  I ordered it for tomorrow am     If she continues to improve, and CRP improves,  may discharge on Monday on Ertapenem 1 g IV q.day x 10 days--order for  is placed in chart

## 2023-11-05 NOTE — PROGRESS NOTES
[facility]  Progress Note    Patient Name: Alma Rosa Helm  MRN: [id number]  Patient Class: IP- Inpatient   Admission Date: 11/2/2023  Length of Stay: 3 days  Attending Physician: Leodan Watts MD  Primary Care Provider: Rhonda Klein NP        Subjective:     Principal Problem:Pyelonephritis of right kidney        HPI:  Ms. Helm is a 68-years-old female who presented to the ED with abdominal pain associated with nausea and vomiting.  Patient reports feeling unwell for about one-week.  Yesterday developed progressively worsening right anterior and right flank pain, constant, severe, associated with nausea with multiple episodes of emesis, reports about 7 episodes overnight, subjective chills, did not check temperature at home.  Admits to associated dysuria for 2 days as well.  States she has had history of kidney stones in the past, seen by urologist Dr. Dawson at [facility], but not recently.  Reports she has also had sepsis in the setting of UTI in the past.  Admits to chronic shortness of breath due to bronchitis, recently established care with pulmonary, diagnosed with COPD/emphysema, continues to smoke.  In the ED afebrile, tachycardic to the low 100s, BP stable.  Labs with WBC 20 with left shift, hemoglobin 16.9, sodium 134, BUN/creatinine 15/1, UA with few bacteria with > 100 WBC.  EKG reviewed.  CT renal protocol with medullary nephrocalcinosis, 3 by 5 mm right UVJ calculus associated with moderate right hydronephrosis with stranding.  She received IV fluid, Zofran, dilaudid, meropenem and flomax in ED.  Case discussed with ED provider who discussed with on-call urologist, Dr. Staples, recommendations noted.  Plan of care discussed with patient, no visitors at bedside.      Overview/Hospital Course:  No pain and no SOB   Urine clear , little high color     11/4  Mild pain at right flank . Urine clear . WBC normal and afebrile . UC ESBL     11/5  Less flank  "pain , no fever . WBC normal       Interval History:     Review of Systems  Objective:     Vital Signs (Most Recent):  Temp: 98.6 °F (37 °C) (11/05/23 0739)  Pulse: 65 (11/05/23 0950)  Resp: 16 (11/05/23 0951)  BP: (!) 128/56 (11/05/23 0950)  SpO2: 95 % (11/05/23 0739) Vital Signs (24h Range):  Temp:  [98.3 °F (36.8 °C)-99.4 °F (37.4 °C)] 98.6 °F (37 °C)  Pulse:  [56-76] 65  Resp:  [16-20] 16  SpO2:  [95 %-98 %] 95 %  BP: (113-135)/(44-60) 128/56     Weight: 81.7 kg (180 lb 1.9 oz)  Body mass index is 31.91 kg/m².    Intake/Output Summary (Last 24 hours) at 11/5/2023 1111  Last data filed at 11/4/2023 2341  Gross per 24 hour   Intake --   Output 1000 ml   Net -1000 ml         Physical Exam  Vitals and nursing note reviewed.   HENT:      Head: Normocephalic and atraumatic.   Eyes:      Conjunctiva/sclera: Conjunctivae normal.   Neck:      Vascular: No JVD.   Cardiovascular:      Heart sounds: Normal heart sounds.   Pulmonary:      Effort: Pulmonary effort is normal.   Abdominal:      Palpations: Abdomen is soft.   Musculoskeletal:         General: Normal range of motion.   Skin:     General: Skin is warm.   Neurological:      Mental Status: She is alert and oriented to person, place, and time.             Significant Labs: All pertinent labs within the past 24 hours have been reviewed.  CBC:   Recent Labs   Lab 11/04/23 0817   WBC 9.72   HGB 13.8   HCT 44.7        CMP:   Recent Labs   Lab 11/04/23 0817      K 4.2      CO2 23   GLU 99   BUN 16   CREATININE 0.9   CALCIUM 9.0   ANIONGAP 5*     Cardiac Markers: No results for input(s): "CKMB", "MYOGLOBIN", "BNP", "TROPISTAT" in the last 48 hours.    Significant Imaging: I have reviewed all pertinent imaging results/findings within the past 24 hours.      Assessment/Plan:      Active Hospital Problems    Diagnosis    *Pyelonephritis of right kidney    Calculus of ureterovesical junction (UVJ)    Sepsis    Leucocytosis    Hyponatremia    " Cholelithiasis    Class 1 obesity in adult    Chronic back pain    Bilateral adrenal adenomas    Smoker    History of kidney stones    Bipolar disorder    COPD/emphysema    Hypertension, essential, benign       Plan:     status post emergent cystoscopy with stone basket extraction and right double-J ureteral stent on 11/3  ESBL growing in  The urine and mid line ordered   IV fluid hydration and decreae dose   Previous urine culture 6/28 ESBL E coli,  And UC growing gram neg  S/p one dose of amikacin   meropenem 1 g q.8 hours and DC with Dapto . Patient got PICC line at left   BC so far negative    Flomax 0.4 mg daily  DC possible today Vs tomorrow       VTE Risk Mitigation (From admission, onward)         Ordered     enoxaparin injection 40 mg  Daily         11/02/23 1929     IP VTE HIGH RISK PATIENT  Once         11/02/23 1929     Place sequential compression device  Until discontinued         11/02/23 1929                Discharge Planning   SHAMA: 11/4/2023     Code Status: Full Code   Is the patient medically ready for discharge?:     Reason for patient still in hospital (select all that apply): Treatment  Discharge Plan A: Home                  Leodan Watts MD  Department of Hospital Medicine   [facility]

## 2023-11-05 NOTE — CARE UPDATE
11/05/23 0732   Patient Assessment/Suction   Level of Consciousness (AVPU) alert  (performed by lambert weems,bon)   Respiratory Effort Normal;Unlabored   Expansion/Accessory Muscles/Retractions no use of accessory muscles;no retractions   All Lung Fields Breath Sounds Anterior:;Lateral:;diminished;coarse   Rhythm/Pattern, Respiratory pattern regular   Cough Frequency infrequent   PRE-TX-O2   Device (Oxygen Therapy) nasal cannula   $ Is the patient on Low Flow Oxygen? Yes   Flow (L/min) 2   SpO2 98 %   Pulse Oximetry Type Intermittent   $ Pulse Oximetry - Multiple Charge Pulse Oximetry - Multiple   Pulse (!) 58   Resp 20   Aerosol Therapy   $ Aerosol Therapy Charges Aerosol Treatment   Respiratory Treatment Status (SVN) given   Treatment Route (SVN) mask   Patient Position (SVN) HOB elevated   Post Treatment Assessment (SVN) breath sounds unchanged   Signs of Intolerance (SVN) none   Breath Sounds Post-Respiratory Treatment   Throughout All Fields Post-Treatment All Fields   Throughout All Fields Post-Treatment no change   Post-treatment Heart Rate (beats/min) 62   Post-treatment Resp Rate (breaths/min) 20   Education   $ Education Bronchodilator;15 min

## 2023-11-05 NOTE — SUBJECTIVE & OBJECTIVE
"Interval History:     Review of Systems  Objective:     Vital Signs (Most Recent):  Temp: 98.6 °F (37 °C) (11/05/23 0739)  Pulse: 65 (11/05/23 0950)  Resp: 16 (11/05/23 0951)  BP: (!) 128/56 (11/05/23 0950)  SpO2: 95 % (11/05/23 0739) Vital Signs (24h Range):  Temp:  [98.3 °F (36.8 °C)-99.4 °F (37.4 °C)] 98.6 °F (37 °C)  Pulse:  [56-76] 65  Resp:  [16-20] 16  SpO2:  [95 %-98 %] 95 %  BP: (113-135)/(44-60) 128/56     Weight: 81.7 kg (180 lb 1.9 oz)  Body mass index is 31.91 kg/m².    Intake/Output Summary (Last 24 hours) at 11/5/2023 1111  Last data filed at 11/4/2023 2341  Gross per 24 hour   Intake --   Output 1000 ml   Net -1000 ml         Physical Exam  Vitals and nursing note reviewed.   HENT:      Head: Normocephalic and atraumatic.   Eyes:      Conjunctiva/sclera: Conjunctivae normal.   Neck:      Vascular: No JVD.   Cardiovascular:      Heart sounds: Normal heart sounds.   Pulmonary:      Effort: Pulmonary effort is normal.   Abdominal:      Palpations: Abdomen is soft.   Musculoskeletal:         General: Normal range of motion.   Skin:     General: Skin is warm.   Neurological:      Mental Status: She is alert and oriented to person, place, and time.             Significant Labs: All pertinent labs within the past 24 hours have been reviewed.  CBC:   Recent Labs   Lab 11/04/23  0817   WBC 9.72   HGB 13.8   HCT 44.7        CMP:   Recent Labs   Lab 11/04/23  0817      K 4.2      CO2 23   GLU 99   BUN 16   CREATININE 0.9   CALCIUM 9.0   ANIONGAP 5*     Cardiac Markers: No results for input(s): "CKMB", "MYOGLOBIN", "BNP", "TROPISTAT" in the last 48 hours.    Significant Imaging: I have reviewed all pertinent imaging results/findings within the past 24 hours.  "

## 2023-11-06 ENCOUNTER — CLINICAL SUPPORT (OUTPATIENT)
Dept: SMOKING CESSATION | Facility: CLINIC | Age: 68
End: 2023-11-06
Payer: MEDICARE

## 2023-11-06 VITALS
BODY MASS INDEX: 31.92 KG/M2 | SYSTOLIC BLOOD PRESSURE: 134 MMHG | WEIGHT: 180.13 LBS | OXYGEN SATURATION: 93 % | TEMPERATURE: 98 F | HEART RATE: 60 BPM | RESPIRATION RATE: 18 BRPM | HEIGHT: 63 IN | DIASTOLIC BLOOD PRESSURE: 60 MMHG

## 2023-11-06 DIAGNOSIS — F17.200 NICOTINE DEPENDENCE: Primary | ICD-10-CM

## 2023-11-06 LAB
ANION GAP SERPL CALC-SCNC: 4 MMOL/L (ref 8–16)
BUN SERPL-MCNC: 14 MG/DL (ref 8–23)
CALCIUM SERPL-MCNC: 9.4 MG/DL (ref 8.7–10.5)
CHLORIDE SERPL-SCNC: 109 MMOL/L (ref 95–110)
CO2 SERPL-SCNC: 28 MMOL/L (ref 23–29)
CREAT SERPL-MCNC: 0.8 MG/DL (ref 0.5–1.4)
CRP SERPL-MCNC: 43.6 MG/L (ref 0–8.2)
ERYTHROCYTE [DISTWIDTH] IN BLOOD BY AUTOMATED COUNT: 14.5 % (ref 11.5–14.5)
EST. GFR  (NO RACE VARIABLE): >60 ML/MIN/1.73 M^2
GLUCOSE SERPL-MCNC: 91 MG/DL (ref 70–110)
HCT VFR BLD AUTO: 42.8 % (ref 37–48.5)
HGB BLD-MCNC: 13.7 G/DL (ref 12–16)
MCH RBC QN AUTO: 27.1 PG (ref 27–31)
MCHC RBC AUTO-ENTMCNC: 32 G/DL (ref 32–36)
MCV RBC AUTO: 85 FL (ref 82–98)
PLATELET # BLD AUTO: 297 K/UL (ref 150–450)
PMV BLD AUTO: 9.4 FL (ref 9.2–12.9)
POTASSIUM SERPL-SCNC: 4.2 MMOL/L (ref 3.5–5.1)
RBC # BLD AUTO: 5.06 M/UL (ref 4–5.4)
SODIUM SERPL-SCNC: 141 MMOL/L (ref 136–145)
WBC # BLD AUTO: 5.13 K/UL (ref 3.9–12.7)

## 2023-11-06 PROCEDURE — 25000003 PHARM REV CODE 250: Performed by: UROLOGY

## 2023-11-06 PROCEDURE — 36415 COLL VENOUS BLD VENIPUNCTURE: CPT | Performed by: INTERNAL MEDICINE

## 2023-11-06 PROCEDURE — 25000003 PHARM REV CODE 250: Performed by: INTERNAL MEDICINE

## 2023-11-06 PROCEDURE — 25000242 PHARM REV CODE 250 ALT 637 W/ HCPCS: Performed by: UROLOGY

## 2023-11-06 PROCEDURE — 99406 PT REFUSED TOBACCO CESSATION: ICD-10-PCS | Mod: S$GLB,,, | Performed by: INTERNAL MEDICINE

## 2023-11-06 PROCEDURE — 94640 AIRWAY INHALATION TREATMENT: CPT

## 2023-11-06 PROCEDURE — 80048 BASIC METABOLIC PNL TOTAL CA: CPT | Performed by: INTERNAL MEDICINE

## 2023-11-06 PROCEDURE — 51798 US URINE CAPACITY MEASURE: CPT

## 2023-11-06 PROCEDURE — 94761 N-INVAS EAR/PLS OXIMETRY MLT: CPT

## 2023-11-06 PROCEDURE — 63600175 PHARM REV CODE 636 W HCPCS: Performed by: STUDENT IN AN ORGANIZED HEALTH CARE EDUCATION/TRAINING PROGRAM

## 2023-11-06 PROCEDURE — 99406 BEHAV CHNG SMOKING 3-10 MIN: CPT

## 2023-11-06 PROCEDURE — 99406 BEHAV CHNG SMOKING 3-10 MIN: CPT | Mod: S$GLB,,, | Performed by: INTERNAL MEDICINE

## 2023-11-06 PROCEDURE — 27000221 HC OXYGEN, UP TO 24 HOURS

## 2023-11-06 PROCEDURE — 85027 COMPLETE CBC AUTOMATED: CPT | Performed by: INTERNAL MEDICINE

## 2023-11-06 PROCEDURE — 99900031 HC PATIENT EDUCATION (STAT)

## 2023-11-06 PROCEDURE — 63600175 PHARM REV CODE 636 W HCPCS: Performed by: UROLOGY

## 2023-11-06 PROCEDURE — 25000003 PHARM REV CODE 250: Performed by: STUDENT IN AN ORGANIZED HEALTH CARE EDUCATION/TRAINING PROGRAM

## 2023-11-06 PROCEDURE — 86140 C-REACTIVE PROTEIN: CPT | Performed by: INTERNAL MEDICINE

## 2023-11-06 PROCEDURE — A4216 STERILE WATER/SALINE, 10 ML: HCPCS | Performed by: UROLOGY

## 2023-11-06 RX ORDER — METHOCARBAMOL 500 MG/1
1000 TABLET, FILM COATED ORAL 4 TIMES DAILY
Status: DISCONTINUED | OUTPATIENT
Start: 2023-11-06 | End: 2023-11-06 | Stop reason: HOSPADM

## 2023-11-06 RX ADMIN — MEROPENEM 1 G: 1 INJECTION, POWDER, FOR SOLUTION INTRAVENOUS at 02:11

## 2023-11-06 RX ADMIN — SERTRALINE HYDROCHLORIDE 50 MG: 50 TABLET ORAL at 08:11

## 2023-11-06 RX ADMIN — MUPIROCIN 1 G: 20 OINTMENT TOPICAL at 08:11

## 2023-11-06 RX ADMIN — METHOCARBAMOL 1000 MG: 500 TABLET ORAL at 09:11

## 2023-11-06 RX ADMIN — BUDESONIDE INHALATION 0.5 MG: 0.5 SUSPENSION RESPIRATORY (INHALATION) at 07:11

## 2023-11-06 RX ADMIN — SENNOSIDES AND DOCUSATE SODIUM 2 TABLET: 50; 8.6 TABLET ORAL at 08:11

## 2023-11-06 RX ADMIN — OXYBUTYNIN CHLORIDE 5 MG: 5 TABLET, EXTENDED RELEASE ORAL at 08:11

## 2023-11-06 RX ADMIN — SODIUM CHLORIDE, PRESERVATIVE FREE 10 ML: 5 INJECTION INTRAVENOUS at 12:11

## 2023-11-06 RX ADMIN — TAMSULOSIN HYDROCHLORIDE 0.4 MG: 0.4 CAPSULE ORAL at 08:11

## 2023-11-06 RX ADMIN — ARFORMOTEROL TARTRATE 15 MCG: 15 SOLUTION RESPIRATORY (INHALATION) at 07:11

## 2023-11-06 RX ADMIN — ERTAPENEM 1 G: 1 INJECTION INTRAMUSCULAR; INTRAVENOUS at 11:11

## 2023-11-06 RX ADMIN — PREGABALIN 50 MG: 50 CAPSULE ORAL at 08:11

## 2023-11-06 RX ADMIN — SODIUM CHLORIDE, PRESERVATIVE FREE 10 ML: 5 INJECTION INTRAVENOUS at 06:11

## 2023-11-06 NOTE — NURSING
patient has voided 50 ml since huerta/stent  removed, 147 ml PVRpatient has voided 50 ml since huerta removed, 147 ml PVR. Notified Dr Staples and Dr Watts.

## 2023-11-06 NOTE — PLAN OF CARE
11/06/23 1041   Final Note   Assessment Type Final Discharge Note   Anticipated Discharge Disposition Home-Health   What phone number can be called within the next 1-3 days to see how you are doing after discharge? [phone]   Hospital Resources/Appts/Education Provided Appointments scheduled and added to AVS;Post-Acute resouces added to AVS   Post-Acute Status   Post-Acute Authorization Home Health;IV Infusion   Home Health Status Set-up Complete/Auth obtained   IV Infusion Status Awaiting delivery   Discharge Delays None known at this time     Pt discharged home with [facility] and will be seen 11/7/2023    Patient cleared for discharge from case management standpoint.    Follow up appointments scheduled and added to AVS.    Chart and discharge orders reviewed.  Patient discharged home with no further case management needs.

## 2023-11-06 NOTE — PLAN OF CARE
11/06/23 0937   Post-Acute Status   Post-Acute Authorization IV Infusion;Home Health   Home Health Status Referrals Sent   IV Infusion Status Awaiting delivery   Discharge Plan   Discharge Plan A Home Health   Discharge Plan B Home Health     CarePort Alert: YES response from [facility] re: Referral [id number] for patient in [facility] [id number]: Yes, willing to accept patient    Home Health orders sent to [facility], cm to wait for acceptance

## 2023-11-06 NOTE — PLAN OF CARE
11/06/23 0738   Patient Assessment/Suction   Level of Consciousness (AVPU) alert   Respiratory Effort Normal;Unlabored   Expansion/Accessory Muscles/Retractions no retractions;no use of accessory muscles   All Lung Fields Breath Sounds coarse   Rhythm/Pattern, Respiratory unlabored;pattern regular;depth regular   PRE-TX-O2   Device (Oxygen Therapy) nasal cannula   $ Is the patient on Low Flow Oxygen? Yes   Flow (L/min) 1   SpO2 96 %   Pulse Oximetry Type Intermittent   $ Pulse Oximetry - Multiple Charge Pulse Oximetry - Multiple   Pulse 77   Resp 16   Aerosol Therapy   $ Aerosol Therapy Charges Aerosol Treatment   Daily Review of Necessity (SVN) completed   Respiratory Treatment Status (SVN) given   Treatment Route (SVN) mask;oxygen   Patient Position (SVN) HOB elevated   Post Treatment Assessment (SVN) breath sounds unchanged   Signs of Intolerance (SVN) none   Breath Sounds Post-Respiratory Treatment   Post-treatment Heart Rate (beats/min) 78   Post-treatment Resp Rate (breaths/min) 16   Education   $ Education Bronchodilator;15 min

## 2023-11-06 NOTE — PLAN OF CARE
11/06/23 1002   Post-Acute Status   Post-Acute Authorization IV Infusion;Home Health   Home Health Status Pending medical clearance/testing   IV Infusion Status Pending payor review/awaiting authorization (if required)     CarePort Alert: YES response from [facility] re: Referral [id number] for patient in [facility] [id number]: Yes, willing to accept patient Thank you!    Discharge pending iv abx authorization and delivery

## 2023-11-06 NOTE — PLAN OF CARE
INFECTIOUS DISEASE DISCHARGE RECOMMENDATIONS:  Midline placed   Continue ertapenem 1 g IV daily for total 10 days, end date 11/12/2023   Weekly lab CBC with diff, CMP, CRP while on antibiotics   Remove midline at the end of treatment 11/12  Follow-up ID clinic/Dr. Rosas in 2-3 weeks  Follow-up urology outpatient    D/w [facility]

## 2023-11-06 NOTE — NURSING
Midline left in place, d/c home with IV antibiotics. All discharge instructions reviewed with patient and all questions answered. All belongings collected and sent home with patient. Patient left via wheelchair, discharge home with family.

## 2023-11-06 NOTE — RESPIRATORY THERAPY
11/05/23 2022   Patient Assessment/Suction   Level of Consciousness (AVPU) alert   Respiratory Effort Normal;Unlabored   Expansion/Accessory Muscles/Retractions no use of accessory muscles;no retractions;expansion symmetric   All Lung Fields Breath Sounds Anterior:;coarse   Rhythm/Pattern, Respiratory unlabored;pattern regular;depth regular   Cough Frequency no cough   PRE-TX-O2   Device (Oxygen Therapy) nasal cannula   $ Is the patient on Low Flow Oxygen? Yes   Flow (L/min) 1   SpO2 (!) 93 %   Pulse Oximetry Type Intermittent   $ Pulse Oximetry - Multiple Charge Pulse Oximetry - Multiple   Pulse 72   Resp 17   Positioning HOB elevated 45 degrees   Aerosol Therapy   $ Aerosol Therapy Charges Aerosol Treatment   Daily Review of Necessity (SVN) completed   Respiratory Treatment Status (SVN) given   Treatment Route (SVN) mask;oxygen   Patient Position (SVN) HOB elevated   Post Treatment Assessment (SVN) breath sounds unchanged   Signs of Intolerance (SVN) none   Breath Sounds Post-Respiratory Treatment   Throughout All Fields Post-Treatment All Fields   Throughout All Fields Post-Treatment Anterior:;no change   Post-treatment Heart Rate (beats/min) 69   Post-treatment Resp Rate (breaths/min) 20   Education   $ Education Bronchodilator;15 min   Respiratory Evaluation   $ Care Plan Tech Time 15 min   $ Eval/Re-eval Charges Re-evaluation

## 2023-11-06 NOTE — PROGRESS NOTES
11/06/23 0885   Tobacco Cessation Intervention   Do you use any type of tobacco product? Yes   Are you interested in quitting use of tobacco products? Ready to quit   Are you interested in Nicotine Replacement for symptom relief? Yes   $ Smoking Cessation Charges Smoking Cessation - Intermediate (Non-CTTS)

## 2023-11-07 LAB
BACTERIA BLD CULT: NORMAL
BACTERIA BLD CULT: NORMAL

## 2023-11-07 NOTE — DISCHARGE SUMMARY
[facility]  Discharge Summary      Patient Name: Alma Rosa Helm  MRN: [id number]  ANH: [id number]  Patient Class: IP- Inpatient  Admission Date: 11/2/2023  Hospital Length of Stay: 4 days  Discharge Date and Time:  11/07/2023 5:27 PM  Attending Physician: No att. providers found   Discharging Provider: Leodan Watts MD  Primary Care Provider: Rhonda Klein NP    Primary Care Team: Networked reference to record PCT     HPI:   Ms. Helm is a 68-years-old female who presented to the ED with abdominal pain associated with nausea and vomiting.  Patient reports feeling unwell for about one-week.  Yesterday developed progressively worsening right anterior and right flank pain, constant, severe, associated with nausea with multiple episodes of emesis, reports about 7 episodes overnight, subjective chills, did not check temperature at home.  Admits to associated dysuria for 2 days as well.  States she has had history of kidney stones in the past, seen by urologist Dr. Dawson at [facility], but not recently.  Reports she has also had sepsis in the setting of UTI in the past.  Admits to chronic shortness of breath due to bronchitis, recently established care with pulmonary, diagnosed with COPD/emphysema, continues to smoke.  In the ED afebrile, tachycardic to the low 100s, BP stable.  Labs with WBC 20 with left shift, hemoglobin 16.9, sodium 134, BUN/creatinine 15/1, UA with few bacteria with > 100 WBC.  EKG reviewed.  CT renal protocol with medullary nephrocalcinosis, 3 by 5 mm right UVJ calculus associated with moderate right hydronephrosis with stranding.  She received IV fluid, Zofran, dilaudid, meropenem and flomax in ED.  Case discussed with ED provider who discussed with on-call urologist, Dr. Staples, recommendations noted.  Plan of care discussed with patient, no visitors at bedside.      Procedure(s) (LRB):  CYSTOSCOPY, WITH URETERAL STENT INSERTION  (Right)  CYSTOSCOPY, WITH CALCULUS REMOVAL (Right)      Hospital Course:   68-years-old female who presented to the ED with abdominal pain associated with nausea and vomiting and pyelonephritis .   Labs with WBC 20 with left shift and CT renal protocol with medullary nephrocalcinosis, 3 by 5 mm right UVJ calculus associated with moderate right hydronephrosis with stranding and urgent urology consult was done and later stent placed .  IV antibiotics started later adjusted the antibiotic because of the urine culture came back with E coli ESBL.  Midline was placed and later patient was discharged with advance as outpatient and follow up with ID and Urology as outpatient.  Prior to discharge discussed with Urology and stent and urinary catheter was removed.       Goals of Care Treatment Preferences:  Code Status: Full Code      Consults:   Consults (From admission, onward)        Status Ordering Provider     Inpatient consult to Midline team  Once        Provider:  (Not yet assigned)    Completed ABIMAEL ARRIAGA     Inpatient consult to Infectious Diseases  Once        Provider:  Ivette Myers MD    Completed LOW STAPLES     Inpatient consult to Midline team  Once        Provider:  (Not yet assigned)    Completed LOW STAPLES     Inpatient consult to Urology  Once        Provider:  Low Staples MD    Completed HAL PRIEST          No new Assessment & Plan notes have been filed under this hospital service since the last note was generated.  Service: Hospital Medicine    Final Active Diagnoses:    Diagnosis Date Noted POA    PRINCIPAL PROBLEM:  Pyelonephritis of right kidney [N12] 11/02/2023 Yes    Calculus of ureterovesical junction (UVJ) [N20.1] 11/02/2023 Yes    Sepsis [A41.9] 11/02/2023 Yes    Leucocytosis [D72.829] 11/02/2023 Yes    Hyponatremia [E87.1] 11/02/2023 Yes    Cholelithiasis [K80.20] 11/02/2023 Yes     Chronic    Class 1 obesity in adult [E66.9] 11/02/2023 Yes      Chronic    Chronic back pain [M54.9, G89.29] 11/02/2023 Yes     Chronic    Bilateral adrenal adenomas [E27.8] 02/09/2023 Yes     Chronic    Smoker [F17.200] 05/07/2020 Yes    History of kidney stones [Z87.442] 12/14/2015 Yes     Chronic    Bipolar disorder [F31.9] 12/14/2015 Yes     Chronic    COPD/emphysema [J44.9] 12/14/2015 Yes     Chronic    Hypertension, essential, benign [I10] 01/23/2012 Yes      Problems Resolved During this Admission:       Discharged Condition: good    Disposition: Home-Health Care [facility]    Follow Up:   Contact information for follow-up providers     Alexa Terrell MD. Go on 11/28/2023.    Specialty: Infectious Diseases  Why: 11:30am, Hospital follow up  Contact information:  [address] [place] [address]  [address]  [place] [address]  [phone]             Low Staples MD Follow up on 11/13/2023.    Specialty: Urology  Why: 11:30 am, Hospital follow up, YOU WILL SEE KATHLEEN QUIGLEY np  Contact information:  [address] [place] [address]  [address]  [place] [address]  [phone]                   Contact information for after-discharge care     Dialysis/Infusion     [facility] .    Service: Home Infusion and Injection  Contact information:  [facility]  [facility] [facility] [address]  [phone]                 Home Medical Care     [facility] .    Service: Home Health Services  Contact information:  [address] [address] [facility] [address]  [phone]                           Patient Instructions:      Ambulatory referral/consult to Home Health   Standing Status: Future   Referral Priority: Routine Referral Type: Home Health   Referral Reason: Specialty Services Required   Requested Specialty: Home Health Services   Number of Visits Requested: 1     Ambulatory referral/consult to Smoking Cessation Program   Standing Status: Future   Referral Priority: Routine Referral Type: Consultation   Referral Reason: Specialty Services Required   Requested  Specialty: CTTS   Number of Visits Requested: 1     Diet Cardiac     Reason for not Prescribing Nicotine Replacement     Order Specific Question Answer Comments   Reason for not Prescribing: Patient refused      Activity as tolerated       Significant Diagnostic Studies: Labs:   CMP   Recent Labs   Lab 11/06/23  0444      K 4.2      CO2 28   GLU 91   BUN 14   CREATININE 0.8   CALCIUM 9.4   ANIONGAP 4*    and CBC   Recent Labs   Lab 11/06/23  0721   WBC 5.13   HGB 13.7   HCT 42.8          Pending Diagnostic Studies:     Procedure Component Value Units Date/Time    Lactic Acid, Plasma [[id number]] Collected: 11/02/23 2014    Order Status: Sent Lab Status: In process Updated: 11/02/23 2021    Specimen: Blood          Medications:  Reconciled Home Medications:      Medication List      START taking these medications    sodium chloride 0.9% SolP 100 mL with ertapenem 1 gram SolR 1 g  Inject 1 g into the vein Daily.        CONTINUE taking these medications    albuterol 90 mcg/actuation inhaler  Commonly known as: PROVENTIL/VENTOLIN HFA  Inhale 1-2 puffs into the lungs every 4 (four) hours as needed for Shortness of Breath (coughing). Rescue     albuterol-ipratropium 2.5 mg-0.5 mg/3 mL nebulizer solution  Commonly known as: DUO-NEB  Take 3 mLs by nebulization every 6 (six) hours as needed for Wheezing. Rescue     cyclobenzaprine 5 MG tablet  Commonly known as: FLEXERIL  Take 5 mg by mouth 3 (three) times daily as needed for Muscle spasms.     fluticasone-umeclidin-vilanter 100-62.5-25 mcg Dsdv  Commonly known as: TRELEGY ELLIPTA  Inhale 1 puff into the lungs once daily.     hydroCHLOROthiazide 12.5 MG Tab  Commonly known as: HYDRODIURIL  Take 1 tablet (12.5 mg total) by mouth once daily.     HYDROcodone-acetaminophen 5-325 mg per tablet  Commonly known as: NORCO  Take 2 tablets every 8 hours by oral route as needed for 30 days.     hydrOXYzine pamoate 25 MG Cap  Commonly known as: VISTARIL  Take 1  capsule (25 mg total) by mouth every evening.     metoprolol tartrate 25 MG tablet  Commonly known as: LOPRESSOR  Take 1 tablet (25 mg total) by mouth Daily.     mupirocin 2 % ointment  Commonly known as: BACTROBAN  mupirocin 2 % topical ointment   APPLY TO THE AFFECTED AREA OF THE TOENAIL TWICE DAILY FOR 3 WEEKS     ondansetron 8 MG Tbdl  Commonly known as: ZOFRAN-ODT  Take 1 tablet (8 mg total) by mouth every 8 (eight) hours as needed (nausea).     oxybutynin 5 MG Tr24  Commonly known as: DITROPAN-XL  Take 1 tablet (5 mg total) by mouth once daily.     pravastatin 40 MG tablet  Commonly known as: PRAVACHOL  Take 1 tablet (40 mg total) by mouth nightly.     pregabalin 50 MG capsule  Commonly known as: LYRICA  Take 50 mg by mouth 3 (three) times daily.     sertraline 50 MG tablet  Commonly known as: ZOLOFT  Take 1 tablet (50 mg total) by mouth once daily.        STOP taking these medications    alendronate 70 MG tablet  Commonly known as: FOSAMAX            Indwelling Lines/Drains at time of discharge:   Lines/Drains/Airways     Airway  Duration                Airway - Non-Surgical 11/03/23 0450 LMA 4 days              General: Patient resting comfortably in no acute distress. Appears as stated age. Calm  Eyes: EOM intact. No conjunctivae injection. No scleral icterus.  ENT: Hearing grossly intact. No discharge from ears. No nasal discharge.   CVS: RRR. No LE edema BL.  Lungs: CTA BL, no wheezing or crackles. Good breath sounds. No accessory muscle use. No acute respiratory distress  Neuro: non focal , Follows commands. Responds appropriately      Time spent on the discharge of patient: 33 minutes         Leodan Watts MD  Department of Hospital Medicine  [facility]

## 2023-11-09 ENCOUNTER — PATIENT OUTREACH (OUTPATIENT)
Dept: ADMINISTRATIVE | Facility: CLINIC | Age: 68
End: 2023-11-09
Payer: MEDICARE

## 2023-11-09 NOTE — TELEPHONE ENCOUNTER
Called and spoke with patient's daughter - patient is now scheduled for next week to see RICHY Klein.

## 2023-11-09 NOTE — PROGRESS NOTES
C3 nurse spoke with Alma Rosa Helm's daughter Naomi for a TCC post hospital discharge follow up call. The pt denies any new symptoms or complaints. Home Health has initiated care and home infusions have began. The patient does not have a scheduled HOSFU appointment with her PCP, Rhonda Klein, RICHY within 5-7 days post hospital discharge date of 11/6/23. C3 nurse was unable to schedule HOSFU appointment in [facility] or schedule with [facility].     Message sent to PCP staff requesting they contact patient and schedule follow up appointment.

## 2023-11-11 LAB
CALCULI COMPOSITION: NORMAL
CALCULI DISCLAIMER: NORMAL
CALCULI, PLEASE NOTE: NORMAL
COLOR STONE: NORMAL
COM MFR STONE: 40 %
HYDROXYAPATITE, CALCULI: 60 %
LABORATORY COMMENT REPORT: NORMAL
PHOTO: NORMAL
SIZE STONE: NORMAL MM
STONE ANALYSIS-IMP: NORMAL
WT STONE: 47 MG

## 2023-11-14 ENCOUNTER — TELEPHONE (OUTPATIENT)
Dept: INFECTIOUS DISEASES | Facility: CLINIC | Age: 68
End: 2023-11-14

## 2023-11-14 DIAGNOSIS — R78.81 E COLI BACTEREMIA: Primary | ICD-10-CM

## 2023-11-14 DIAGNOSIS — B96.20 E COLI BACTEREMIA: Primary | ICD-10-CM

## 2023-11-14 NOTE — TELEPHONE ENCOUNTER
Patient's daughter called  ( Miss Salgado  [phone] )    She stated patient 's midline looks bad ; bandage is bloody ; it hurts patient   She stated home health nurse placed an extender on the midline and they  Cannot get it off     She denies patient's arm to be red or swollen   Patient is anxious to get the midline removed     She stated they thought the end of care was supposed to be 11/13/23     Please advise     CL[facility]ieto [facility]   11/14/23

## 2023-11-15 NOTE — TELEPHONE ENCOUNTER
I spoke with Kelsey jeong [facility]   And Pallavi with [facility] to advise to discontinue IV abx and remove line ; per   Dr Terrell's orders   J [facility]  11/15/23

## 2023-11-27 ENCOUNTER — TELEPHONE (OUTPATIENT)
Dept: GASTROENTEROLOGY | Facility: CLINIC | Age: 68
End: 2023-11-27
Payer: MEDICARE

## 2023-11-27 NOTE — TELEPHONE ENCOUNTER
Called patient to reschedule appointment Np not available, no phone connected, sent portal message.

## 2023-12-07 ENCOUNTER — DOCUMENT SCAN (OUTPATIENT)
Dept: HOME HEALTH SERVICES | Facility: HOSPITAL | Age: 68
End: 2023-12-07
Payer: MEDICARE

## 2023-12-26 DIAGNOSIS — L74.9 SWEATING ABNORMALITY: ICD-10-CM

## 2023-12-26 RX ORDER — OXYBUTYNIN CHLORIDE 5 MG/1
5 TABLET, EXTENDED RELEASE ORAL
Qty: 30 TABLET | Refills: 11 | OUTPATIENT
Start: 2023-12-26

## 2024-01-08 ENCOUNTER — TELEPHONE (OUTPATIENT)
Dept: PULMONOLOGY | Facility: CLINIC | Age: 69
End: 2024-01-08
Payer: MEDICARE

## 2024-01-13 ENCOUNTER — EXTERNAL HOME HEALTH (OUTPATIENT)
Dept: HOME HEALTH SERVICES | Facility: HOSPITAL | Age: 69
End: 2024-01-13
Payer: MEDICARE

## 2024-02-05 PROBLEM — A41.9 SEPSIS: Status: RESOLVED | Noted: 2023-11-02 | Resolved: 2024-02-05

## 2024-02-05 PROBLEM — N12 PYELONEPHRITIS OF RIGHT KIDNEY: Status: RESOLVED | Noted: 2023-11-02 | Resolved: 2024-02-05

## 2024-02-12 DIAGNOSIS — F33.1 MODERATE EPISODE OF RECURRENT MAJOR DEPRESSIVE DISORDER: ICD-10-CM

## 2024-02-12 RX ORDER — SERTRALINE HYDROCHLORIDE 50 MG/1
50 TABLET, FILM COATED ORAL
Qty: 30 TABLET | Refills: 3 | Status: SHIPPED | OUTPATIENT
Start: 2024-02-12

## 2024-03-20 ENCOUNTER — PATIENT MESSAGE (OUTPATIENT)
Dept: PULMONOLOGY | Facility: CLINIC | Age: 69
End: 2024-03-20
Payer: MEDICARE

## 2024-03-20 ENCOUNTER — TELEPHONE (OUTPATIENT)
Dept: PULMONOLOGY | Facility: CLINIC | Age: 69
End: 2024-03-20
Payer: MEDICARE

## 2024-03-20 NOTE — TELEPHONE ENCOUNTER
Called pt number has been disconnected , sent portal message     ----- Message from Rhonda Maloney sent at 3/20/2024  8:50 AM CDT -----  Regarding: sleep study equipment  Contact: [facility]  Type:  Needs Medical Advice    Who Called: sleep study  [facility]    Best Call Back Number: Jane from [facility]   ext [phone]    Additional Information:  pt was given sleep study medical equipment on oct 20th.  Pt has not returned it and is aquiring late fees and has a bill that has not been paid. They are asking if the office has a way to contact the pt to ask if she can return the equipment and pay the fees. They will not charge any fees if they hear from her today.   They will pay the [employer] fees to get the device picked up.  Please advise.  Thank you.

## 2024-04-04 ENCOUNTER — OFFICE VISIT (OUTPATIENT)
Dept: PULMONOLOGY | Facility: CLINIC | Age: 69
End: 2024-04-04
Payer: MEDICARE

## 2024-04-04 VITALS
OXYGEN SATURATION: 97 % | WEIGHT: 196.56 LBS | BODY MASS INDEX: 34.83 KG/M2 | SYSTOLIC BLOOD PRESSURE: 142 MMHG | DIASTOLIC BLOOD PRESSURE: 68 MMHG | HEIGHT: 63 IN | HEART RATE: 59 BPM

## 2024-04-04 DIAGNOSIS — F17.200 SMOKER: ICD-10-CM

## 2024-04-04 DIAGNOSIS — J41.1 MUCOPURULENT CHRONIC BRONCHITIS: ICD-10-CM

## 2024-04-04 DIAGNOSIS — J20.9 ACUTE BRONCHITIS, UNSPECIFIED ORGANISM: ICD-10-CM

## 2024-04-04 DIAGNOSIS — J43.2 CENTRILOBULAR EMPHYSEMA: Primary | ICD-10-CM

## 2024-04-04 PROCEDURE — 99999 PR PBB SHADOW E&M-EST. PATIENT-LVL III: CPT | Mod: PBBFAC,,, | Performed by: INTERNAL MEDICINE

## 2024-04-04 PROCEDURE — 3077F SYST BP >= 140 MM HG: CPT | Mod: CPTII,S$GLB,, | Performed by: INTERNAL MEDICINE

## 2024-04-04 PROCEDURE — 3008F BODY MASS INDEX DOCD: CPT | Mod: CPTII,S$GLB,, | Performed by: INTERNAL MEDICINE

## 2024-04-04 PROCEDURE — 1125F AMNT PAIN NOTED PAIN PRSNT: CPT | Mod: CPTII,S$GLB,, | Performed by: INTERNAL MEDICINE

## 2024-04-04 PROCEDURE — 1159F MED LIST DOCD IN RCRD: CPT | Mod: CPTII,S$GLB,, | Performed by: INTERNAL MEDICINE

## 2024-04-04 PROCEDURE — 3078F DIAST BP <80 MM HG: CPT | Mod: CPTII,S$GLB,, | Performed by: INTERNAL MEDICINE

## 2024-04-04 PROCEDURE — 99214 OFFICE O/P EST MOD 30 MIN: CPT | Mod: S$GLB,,, | Performed by: INTERNAL MEDICINE

## 2024-04-04 RX ORDER — PREDNISONE 20 MG/1
40 TABLET ORAL DAILY
Qty: 10 TABLET | Refills: 0 | Status: SHIPPED | OUTPATIENT
Start: 2024-04-04

## 2024-04-04 RX ORDER — PNEUMOCOCCAL 20-VALENT CONJUGATE VACCINE 2.2; 2.2; 2.2; 2.2; 2.2; 2.2; 2.2; 2.2; 2.2; 2.2; 2.2; 2.2; 2.2; 2.2; 2.2; 2.2; 4.4; 2.2; 2.2; 2.2 UG/.5ML; UG/.5ML; UG/.5ML; UG/.5ML; UG/.5ML; UG/.5ML; UG/.5ML; UG/.5ML; UG/.5ML; UG/.5ML; UG/.5ML; UG/.5ML; UG/.5ML; UG/.5ML; UG/.5ML; UG/.5ML; UG/.5ML; UG/.5ML; UG/.5ML; UG/.5ML
0.5 INJECTION, SUSPENSION INTRAMUSCULAR ONCE
Qty: 0.5 ML | Refills: 0 | Status: SHIPPED | OUTPATIENT
Start: 2024-04-04 | End: 2024-04-04

## 2024-04-04 RX ORDER — RESPIRATORY SYNCYTIAL VISUS VACCINE RECOMBINANT, ADJUVANTED 120MCG/0.5
0.5 KIT INTRAMUSCULAR ONCE
Qty: 0.5 ML | Refills: 0 | Status: SHIPPED | OUTPATIENT
Start: 2024-04-04 | End: 2024-04-04

## 2024-04-04 RX ORDER — IPRATROPIUM BROMIDE AND ALBUTEROL SULFATE 2.5; .5 MG/3ML; MG/3ML
3 SOLUTION RESPIRATORY (INHALATION) EVERY 6 HOURS PRN
Qty: 150 EACH | Refills: 11 | Status: SHIPPED | OUTPATIENT
Start: 2024-04-04 | End: 2025-04-04

## 2024-04-04 RX ORDER — PROMETHAZINE HYDROCHLORIDE 6.25 MG/5ML
6.25 SYRUP ORAL EVERY 6 HOURS PRN
Qty: 240 ML | Refills: 0 | Status: SHIPPED | OUTPATIENT
Start: 2024-04-04

## 2024-04-04 NOTE — PATIENT INSTRUCTIONS
Continue to avoid smoking  Apply for fos4X for you program with trelegy inhaler  Continue inhaler, nebulizer regimen  Prednisone 5 day course -  from pharmacy to help you get over bronchitis  Cough medicine ordered as needed- do not take with pain medicine or use alcohol while taking. Do not drive after taking this.  Pneumonia and RSV vaccines recommended and sent to pharmacy  Covid booster also recommended  Flu shot next flu season

## 2024-04-29 ENCOUNTER — PATIENT MESSAGE (OUTPATIENT)
Dept: FAMILY MEDICINE | Facility: CLINIC | Age: 69
End: 2024-04-29
Payer: MEDICARE

## 2024-06-21 DIAGNOSIS — J43.2 CENTRILOBULAR EMPHYSEMA: ICD-10-CM

## 2024-06-21 NOTE — TELEPHONE ENCOUNTER
----- Message from Luciano Warren sent at 6/21/2024  1:31 PM CDT -----  Type: Needs Medical Advice  Who Called:  pt  Symptoms (please be specific):  pt said he need to speak to the office about her fluticasone-umeclidin-vilanter (TRELEGY ELLIPTA) 100-62.5-25 mcg DsDv--said she went to the pharmacy and it was denied by the dr  office and she need to know  why--please call and advise    Pharmacy name and phone #:    [id number] - [place], [place] - [address] AT [facility] OF [facility] [address]  [address]  [place] [address]  Phone: [phone] Fax: [phone]  Best Call Back Number: [phone]  Additional Information: thank you

## 2024-06-21 NOTE — TELEPHONE ENCOUNTER
Patient initially called to see why the pharmacy said that our office cancelled her refills for her Trelegy.  Dr. Whaley printed a new Rx for it on 4/4/24.  Patient states that she lost the paper script  and is requesting it to be escribed to pharmacy.  I told her I would forward the message and she verbalized understanding.

## 2024-06-28 ENCOUNTER — TELEPHONE (OUTPATIENT)
Dept: PULMONOLOGY | Facility: CLINIC | Age: 69
End: 2024-06-28
Payer: MEDICARE

## 2024-08-01 ENCOUNTER — PATIENT MESSAGE (OUTPATIENT)
Dept: FAMILY MEDICINE | Facility: CLINIC | Age: 69
End: 2024-08-01
Payer: MEDICARE

## 2024-08-01 NOTE — TELEPHONE ENCOUNTER
She will need an appt. She has not been seen by be since 8/2023. She also will need to establish with a PCP.   Thanks,   Rhonda

## 2024-08-16 ENCOUNTER — PATIENT MESSAGE (OUTPATIENT)
Dept: FAMILY MEDICINE | Facility: CLINIC | Age: 69
End: 2024-08-16
Payer: MEDICARE

## 2024-10-04 ENCOUNTER — TELEPHONE (OUTPATIENT)
Dept: PULMONOLOGY | Facility: CLINIC | Age: 69
End: 2024-10-04
Payer: MEDICARE

## 2024-12-25 ENCOUNTER — HOSPITAL ENCOUNTER (EMERGENCY)
Facility: HOSPITAL | Age: 69
Discharge: HOME | End: 2024-12-25
Attending: STUDENT IN AN ORGANIZED HEALTH CARE EDUCATION/TRAINING PROGRAM
Payer: MEDICARE

## 2024-12-25 VITALS
WEIGHT: 198 LBS | HEIGHT: 63 IN | RESPIRATION RATE: 17 BRPM | OXYGEN SATURATION: 97 % | DIASTOLIC BLOOD PRESSURE: 74 MMHG | BODY MASS INDEX: 35.08 KG/M2 | HEART RATE: 87 BPM | SYSTOLIC BLOOD PRESSURE: 128 MMHG | TEMPERATURE: 98 F

## 2024-12-25 DIAGNOSIS — R52 PAIN: ICD-10-CM

## 2024-12-25 DIAGNOSIS — Z87.39 HISTORY OF BURNING PAIN IN LEG: Primary | ICD-10-CM

## 2024-12-25 DIAGNOSIS — M79.2 NEUROPATHIC PAIN: ICD-10-CM

## 2024-12-25 LAB
HCV AB SERPL QL IA: NEGATIVE
HIV 1+2 AB+HIV1 P24 AG SERPL QL IA: NEGATIVE
POCT GLUCOSE: 92 MG/DL (ref 70–110)

## 2024-12-25 PROCEDURE — 25000003 PHARM REV CODE 250: Performed by: STUDENT IN AN ORGANIZED HEALTH CARE EDUCATION/TRAINING PROGRAM

## 2024-12-25 PROCEDURE — 99284 EMERGENCY DEPT VISIT MOD MDM: CPT | Mod: 25

## 2024-12-25 PROCEDURE — 86803 HEPATITIS C AB TEST: CPT | Performed by: EMERGENCY MEDICINE

## 2024-12-25 PROCEDURE — 36415 COLL VENOUS BLD VENIPUNCTURE: CPT | Performed by: EMERGENCY MEDICINE

## 2024-12-25 PROCEDURE — 87389 HIV-1 AG W/HIV-1&-2 AB AG IA: CPT | Performed by: EMERGENCY MEDICINE

## 2024-12-25 PROCEDURE — 82962 GLUCOSE BLOOD TEST: CPT

## 2024-12-25 RX ORDER — OXYCODONE AND ACETAMINOPHEN 5; 325 MG/1; MG/1
1 TABLET ORAL
Status: DISCONTINUED | OUTPATIENT
Start: 2024-12-25 | End: 2024-12-25

## 2024-12-25 RX ORDER — LIDOCAINE 50 MG/G
1 PATCH TOPICAL
Status: DISCONTINUED | OUTPATIENT
Start: 2024-12-25 | End: 2024-12-25 | Stop reason: HOSPADM

## 2024-12-25 RX ORDER — LIDOCAINE 50 MG/G
1 PATCH TOPICAL DAILY
Qty: 14 PATCH | Refills: 0 | Status: SHIPPED | OUTPATIENT
Start: 2024-12-25

## 2024-12-25 RX ADMIN — LIDOCAINE 5% 1 PATCH: 700 PATCH TOPICAL at 12:12

## 2024-12-25 NOTE — ED PROVIDER NOTES
Encounter Date: 12/25/2024       History     Chief Complaint   Patient presents with    Leg Pain     Right thigh pain and burning, x 3 weeks got worse last night      HPI    Alma Rosa Helm is a 69 y.o. female with a past medical history of bipolar, chronic back pain, hypertension, and hyperlipidemia in the [facility] emergency department for evaluation of burning right thigh pain that has been ongoing for a proximally 3 weeks.  Pain occurs primarily at night and is not currently ongoing.  She has tried taking her home oxycodone 7.5 without relief of symptoms.  Currently on Lyrica for neuropathic pain.  Patient did have a rash of the beginning of her symptom onset, but this has resolved.  She thought it was cellulitis.  Describes the pain as burning.  No numbness or weakness.  Exclusively on the lateral aspect of the right thigh.    Review of patient's allergies indicates:   Allergen Reactions    Tramadol Swelling    Imitrex [sumatriptan succinate]     Penicillins     Sulfa (sulfonamide antibiotics)     Toradol [ketorolac] Swelling     Past Medical History:   Diagnosis Date    Anxiety     Bipolar disorder     COPD, mild     HLD (hyperlipidemia)     HTN (hypertension)     Nephrolithiasis      Past Surgical History:   Procedure Laterality Date    BACK SURGERY      CYSTOSCOPY      CYSTOSCOPY W/ URETERAL STENT PLACEMENT Right 11/3/2023    Procedure: CYSTOSCOPY, WITH URETERAL STENT INSERTION;  Surgeon: Low Staples MD;  Location: [facility] OR;  Service: Urology;  Laterality: Right;    CYSTOSCOPY WITH CALCULUS EXTRACTION Right 11/3/2023    Procedure: CYSTOSCOPY, WITH CALCULUS REMOVAL;  Surgeon: Low Staples MD;  Location: [facility] OR;  Service: Urology;  Laterality: Right;    HYSTERECTOMY      LITHOTRIPSY      TUBAL LIGATION       Family History   Problem Relation Name Age of Onset    Cancer Mother      Diabetes Mother      COPD Father      Hypertension Father       Social History     Tobacco Use    Smoking status:  Every Day     Current packs/day: 0.00     Average packs/day: 0.8 packs/day for 45.0 years (33.7 ttl pk-yrs)     Types: Cigarettes     Start date:      Last attempt to quit: 2020     Years since quittin.7    Smokeless tobacco: Never    Tobacco comments:     Pt has smoked since her teenage years, has smoked around 1ppd and is ready to quit. The patient feels like she's in a good mindset and is ready. Information and education provided for outpatient smoking cessation classes.   Substance Use Topics    Alcohol use: Not Currently    Drug use: No     Review of Systems   Constitutional:  Negative for fever.   HENT:  Negative for sore throat.    Respiratory:  Negative for cough and shortness of breath.    Cardiovascular:  Negative for chest pain and leg swelling.   Gastrointestinal:  Negative for abdominal pain, diarrhea, nausea and vomiting.   Genitourinary:  Negative for dysuria, frequency and hematuria.   Musculoskeletal:  Negative for back pain and neck pain.        Burning right thigh pain.   Skin:  Negative for rash.   Neurological:  Negative for dizziness, weakness, numbness and headaches.   Hematological:  Does not bruise/bleed easily.   All other systems reviewed and are negative.      Physical Exam     Initial Vitals [24 1045]   BP Pulse Resp Temp SpO2   131/79 92 20 98.2 °F (36.8 °C) 96 %      MAP       --         Physical Exam    Nursing note and vitals reviewed.  Constitutional: She appears well-developed and well-nourished.   HENT:   Head: Normocephalic and atraumatic.   Eyes: EOM are normal. Pupils are equal, round, and reactive to light.   Neck:   Normal range of motion.  Cardiovascular:  Normal rate, regular rhythm and normal heart sounds.           Pulmonary/Chest: Breath sounds normal. No respiratory distress.   Abdominal: Abdomen is soft. She exhibits no distension.   Musculoskeletal:         General: Normal range of motion.      Cervical back: Normal range of motion.      Comments: No  tenderness to palpation over the area of concern.  No overlying skin changes.  Neurovascularly intact distally.  Able to ambulate without difficulty.     Neurological: She is alert and oriented to person, place, and time. She has normal strength. No cranial nerve deficit or sensory deficit. GCS score is 15. GCS eye subscore is 4. GCS verbal subscore is 5. GCS motor subscore is 6.   Skin: Capillary refill takes less than 2 seconds. No rash noted.   Psychiatric: She has a normal mood and affect. Thought content normal.         ED Course   Procedures  Labs Reviewed   HEPATITIS C ANTIBODY       Result Value    Hepatitis C Ab Negative      Narrative:     Release to patient->Immediate   HIV 1 / 2 ANTIBODY    HIV 1/2 Ag/Ab Negative      Narrative:     Release to patient->Immediate          Imaging Results              X-Ray Hip 2 or 3 views Right with Pelvis when performed (Final result)  Result time 12/25/24 11:26:58      Final result by Evelyn Lynn MD (12/25/24 11:26:58)                   Impression:      No acute osseous abnormality.      Electronically signed by: Evelyn Lynn  Date:    12/25/2024  Time:    11:26               Narrative:    EXAMINATION:  XR FEMUR 2 VIEW RIGHT; XR HIP WITH PELVIS WHEN PERFORMED 2 OR 3 VIEWS RIGHT    CLINICAL HISTORY:  pain;Pain, unspecified    TECHNIQUE:  AP and lateral views of the right femur were performed.  Single view of the pelvis and two views of the right hip    COMPARISON:  None    FINDINGS:  The femoral heads are well seated within the acetabula.  Pubic symphysis is not widened.  Sacroiliac joints are symmetric with mild degenerative change.  Left greater than right degenerative change of the hips.  No acute, displaced fracture or aggressive osseous abnormality.                                       X-Ray Femur Ap/Lat Right (Final result)  Result time 12/25/24 11:26:58      Final result by Evelyn Lynn MD (12/25/24 11:26:58)                   Impression:      No acute  osseous abnormality.      Electronically signed by: Evelyn Lynn  Date:    12/25/2024  Time:    11:26               Narrative:    EXAMINATION:  XR FEMUR 2 VIEW RIGHT; XR HIP WITH PELVIS WHEN PERFORMED 2 OR 3 VIEWS RIGHT    CLINICAL HISTORY:  pain;Pain, unspecified    TECHNIQUE:  AP and lateral views of the right femur were performed.  Single view of the pelvis and two views of the right hip    COMPARISON:  None    FINDINGS:  The femoral heads are well seated within the acetabula.  Pubic symphysis is not widened.  Sacroiliac joints are symmetric with mild degenerative change.  Left greater than right degenerative change of the hips.  No acute, displaced fracture or aggressive osseous abnormality.                                       Medications - No data to display    Medical Decision Making  Alma Rosa Helm is a 69 y.o. female with a past medical history bipolar, chronic back pain, hypertension, and hyperlipidemia that presents emergency department for burning right thigh pain that has been ongoing for a proximally 3 weeks.  Vitals were stable.  Nontoxic female.  Exam as above.  Presentation most consistent with neuropathic pain given the distribution and description.  I have a low suspicion for fracture or underlying injury, and x-rays were negative.  No evidence of any vascular compromise based on exam.  Pain does not appear to be radiating from the back and appears to originate in the thigh itself, so I have a low suspicion for sciatic related pain.  There is a possibility given the rash at symptom onset that this could be post herpetic neuralgia, but difficult to say as the description of the rash is not consistent with shingles.  She is already on oxycodone and Lyrica for her symptoms, so we will add lidocaine patches.  Return precautions given.  Instructed follow up with primary care.    Risk  Prescription drug management.                                      Clinical Impression:  Final diagnoses:  [R52]  Pain  [Z87.39] History of burning pain in leg (Primary)  [M79.2] Neuropathic pain          ED Disposition Condition    Discharge Stable          ED Prescriptions       Medication Sig Dispense Start Date End Date Auth. Provider    LIDOcaine (LIDODERM) 5 % Place 1 patch onto the skin once daily. Remove & Discard patch within 12 hours or as directed by MD 14 patch 12/25/2024 -- Leighton Belle MD          Follow-up Information       Follow up With Specialties Details Why Contact Info Additional Information    [facility] -  Emergency Medicine  As needed, If symptoms worsen [address] [place] [address] [address] [place] [address] 1st floor             Leighton Belle MD  12/25/24 0629

## 2024-12-25 NOTE — FIRST PROVIDER EVALUATION
" Emergency Department TeleTriage Encounter Note      CHIEF COMPLAINT    Chief Complaint   Patient presents with    Leg Pain     Right thigh pain and burning, x 3 weeks got worse last night        VITAL SIGNS   Initial Vitals [12/25/24 1045]   BP Pulse Resp Temp SpO2   131/79 92 20 98.2 °F (36.8 °C) 96 %      MAP       --            ALLERGIES    Review of patient's allergies indicates:   Allergen Reactions    Tramadol Swelling    Imitrex [sumatriptan succinate]     Penicillins     Sulfa (sulfonamide antibiotics)     Toradol [ketorolac] Swelling       PROVIDER TRIAGE NOTE  Verbal consent for the teletriage evaluation was given by the patient at the start of the evaluation.  All efforts will be made to maintain patient's privacy during the evaluation.      This is a teletriage evaluation of a 69 y.o. female presenting to the ED with c/o right thigh pain, worse to touch.  "Feels like it is on fire and itching".  Symptoms started several weeks ago. Takes Oxycodone with pain management.  Limited physical exam via telehealth: The patient is awake, alert, answering questions appropriately and is not in respiratory distress.  As the Teletriage provider, I performed an initial assessment and ordered appropriate labs and imaging studies, if any, to facilitate the patient's care once placed in the ED. Once a room is available, care and a full evaluation will be completed by an alternate ED provider.  Any additional orders and the final disposition will be determined by that provider.  All imaging and labs will not be followed-up by the Teletriage Team, including myself.       ORDERS  Labs Reviewed   HEPATITIS C ANTIBODY   HIV 1 / 2 ANTIBODY   POCT GLUCOSE MONITORING CONTINUOUS       ED Orders (720h ago, onward)      Start Ordered     Status Ordering Provider    12/25/24 1055 12/25/24 1054  X-Ray Femur Ap/Lat Right  1 time imaging         Ordered LAKSHMI GOODE    12/25/24 1054 12/25/24 1054  X-Ray Hip 2 or 3 views Right with " Pelvis when performed  1 time imaging         Ordered LAKSHMI GOODE    12/25/24 1052 12/25/24 1051  POCT glucose  Once         Ordered LAKSHMI GOODE    12/25/24 1048 12/25/24 1047  Hepatitis C Antibody  STAT         Pending Collection ASPEN MOREJON    12/25/24 1048 12/25/24 1047  HIV 1/2 Ag/Ab (4th Gen)  STAT         Pending Collection ASPEN MOREJON              Virtual Visit Note: The provider triage portion of this emergency department evaluation and documentation was performed via [employer], a HIPAA-compliant telemedicine application, in concert with a tele-presenter in the room. A face to face patient evaluation with one of my colleagues will occur once the patient is placed in an emergency department room.      DISCLAIMER: This note was prepared with [employer] voice recognition transcription software. Garbled syntax, mangled pronouns, and other bizarre constructions may be attributed to that software system.

## 2025-03-03 ENCOUNTER — TELEPHONE (OUTPATIENT)
Dept: PULMONOLOGY | Facility: CLINIC | Age: 70
End: 2025-03-03
Payer: MEDICARE

## 2025-03-03 NOTE — TELEPHONE ENCOUNTER
Pt was scheduled today with Dr. Whaley, no showed to it. Calling asking for sooner appt but I called 2x no answer just rings.,     ----- Message from Rosendo sent at 3/3/2025 11:55 AM CST -----  Contact: Self  Type:  Sooner Appointment RequestCaller is requesting a sooner appointment.  Caller declined first available appointment listed below.  Caller will not accept being placed on the waitlist and is requesting a message be sent to doctor.Name of Caller:  PatientWhen is the first available appointment?  April 23Symptoms:  COPDWould the patient rather a call back or a response via [facility]? Call [facility] Call Back Number:  [phone] Information:  Patient is requesting to be seen as soon as possible

## 2025-04-23 ENCOUNTER — OFFICE VISIT (OUTPATIENT)
Dept: PULMONOLOGY | Facility: CLINIC | Age: 70
End: 2025-04-23
Payer: MEDICAID

## 2025-04-23 VITALS
HEIGHT: 63 IN | HEART RATE: 87 BPM | DIASTOLIC BLOOD PRESSURE: 76 MMHG | BODY MASS INDEX: 32.32 KG/M2 | SYSTOLIC BLOOD PRESSURE: 142 MMHG | OXYGEN SATURATION: 92 % | WEIGHT: 182.44 LBS

## 2025-04-23 DIAGNOSIS — J45.50 SEVERE PERSISTENT ASTHMA WITHOUT COMPLICATION: Primary | ICD-10-CM

## 2025-04-23 DIAGNOSIS — J82.83 EOSINOPHILIC ASTHMA: ICD-10-CM

## 2025-04-23 DIAGNOSIS — J45.909 STEROID-DEPENDENT ASTHMA, UNSPECIFIED ASTHMA SEVERITY, UNSPECIFIED WHETHER COMPLICATED, UNSPECIFIED WHETHER PERSISTENT: ICD-10-CM

## 2025-04-23 DIAGNOSIS — J44.89 ASTHMA-COPD OVERLAP SYNDROME: ICD-10-CM

## 2025-04-23 PROCEDURE — 3077F SYST BP >= 140 MM HG: CPT | Mod: CPTII,,, | Performed by: INTERNAL MEDICINE

## 2025-04-23 PROCEDURE — 99214 OFFICE O/P EST MOD 30 MIN: CPT | Mod: PBBFAC,PO | Performed by: INTERNAL MEDICINE

## 2025-04-23 PROCEDURE — 3078F DIAST BP <80 MM HG: CPT | Mod: CPTII,,, | Performed by: INTERNAL MEDICINE

## 2025-04-23 PROCEDURE — 3008F BODY MASS INDEX DOCD: CPT | Mod: CPTII,,, | Performed by: INTERNAL MEDICINE

## 2025-04-23 PROCEDURE — 1100F PTFALLS ASSESS-DOCD GE2>/YR: CPT | Mod: CPTII,,, | Performed by: INTERNAL MEDICINE

## 2025-04-23 PROCEDURE — 3044F HG A1C LEVEL LT 7.0%: CPT | Mod: CPTII,,, | Performed by: INTERNAL MEDICINE

## 2025-04-23 PROCEDURE — 99999 PR PBB SHADOW E&M-EST. PATIENT-LVL IV: CPT | Mod: PBBFAC,,, | Performed by: INTERNAL MEDICINE

## 2025-04-23 PROCEDURE — 99214 OFFICE O/P EST MOD 30 MIN: CPT | Mod: S$PBB,,, | Performed by: INTERNAL MEDICINE

## 2025-04-23 PROCEDURE — 1159F MED LIST DOCD IN RCRD: CPT | Mod: CPTII,,, | Performed by: INTERNAL MEDICINE

## 2025-04-23 PROCEDURE — 3288F FALL RISK ASSESSMENT DOCD: CPT | Mod: CPTII,,, | Performed by: INTERNAL MEDICINE

## 2025-04-23 RX ORDER — CYCLOBENZAPRINE HCL 10 MG
10 TABLET ORAL
COMMUNITY

## 2025-04-23 RX ORDER — PREDNISONE 20 MG/1
TABLET ORAL
Qty: 21 TABLET | Refills: 2 | Status: SHIPPED | OUTPATIENT
Start: 2025-04-23

## 2025-04-23 RX ORDER — MOMETASONE FUROATE 1 MG/G
CREAM TOPICAL
COMMUNITY

## 2025-04-23 RX ORDER — SEMAGLUTIDE 0.68 MG/ML
0.5 INJECTION, SOLUTION SUBCUTANEOUS
COMMUNITY
Start: 2025-04-07 | End: 2026-04-07

## 2025-04-23 RX ORDER — ATORVASTATIN CALCIUM 20 MG/1
20 TABLET, FILM COATED ORAL
COMMUNITY

## 2025-04-23 RX ORDER — PROMETHAZINE HYDROCHLORIDE AND DEXTROMETHORPHAN HYDROBROMIDE 6.25; 15 MG/5ML; MG/5ML
5 SYRUP ORAL 4 TIMES DAILY PRN
COMMUNITY
Start: 2025-01-03 | End: 2025-04-23 | Stop reason: SDUPTHER

## 2025-04-23 RX ORDER — PROMETHAZINE HYDROCHLORIDE AND DEXTROMETHORPHAN HYDROBROMIDE 6.25; 15 MG/5ML; MG/5ML
5 SYRUP ORAL 4 TIMES DAILY PRN
Qty: 473 ML | Refills: 2 | Status: SHIPPED | OUTPATIENT
Start: 2025-04-23

## 2025-04-23 RX ORDER — ONDANSETRON 8 MG/1
8 TABLET, ORALLY DISINTEGRATING ORAL EVERY 12 HOURS PRN
COMMUNITY
Start: 2025-04-15

## 2025-04-23 RX ORDER — OXYCODONE AND ACETAMINOPHEN 7.5; 325 MG/1; MG/1
1 TABLET ORAL EVERY 6 HOURS PRN
COMMUNITY

## 2025-04-23 RX ORDER — LEVALBUTEROL TARTRATE 45 UG/1
AEROSOL, METERED ORAL
COMMUNITY

## 2025-04-23 RX ORDER — FLUTICASONE FUROATE, UMECLIDINIUM BROMIDE AND VILANTEROL TRIFENATATE 200; 62.5; 25 UG/1; UG/1; UG/1
1 POWDER RESPIRATORY (INHALATION) DAILY
Qty: 60 EACH | Refills: 11 | Status: SHIPPED | OUTPATIENT
Start: 2025-04-23

## 2025-04-23 RX ORDER — NALOXEGOL OXALATE 25 MG/1
TABLET, FILM COATED ORAL
COMMUNITY
Start: 2024-12-31

## 2025-04-23 RX ORDER — CLOTRIMAZOLE AND BETAMETHASONE DIPROPIONATE 10; .64 MG/G; MG/G
CREAM TOPICAL 2 TIMES DAILY
COMMUNITY

## 2025-04-23 RX ORDER — AZITHROMYCIN 500 MG/1
TABLET, FILM COATED ORAL
Qty: 3 TABLET | Refills: 3 | Status: SHIPPED | OUTPATIENT
Start: 2025-04-23

## 2025-04-23 RX ORDER — FUROSEMIDE 20 MG/1
20 TABLET ORAL
COMMUNITY

## 2025-04-23 RX ORDER — PREGABALIN 150 MG/1
150 CAPSULE ORAL
COMMUNITY
Start: 2025-03-26

## 2025-04-23 NOTE — PATIENT INSTRUCTIONS
You had childhood asthma and have asthma with copd.    You have been on good controller therapy with trelegy.  You had pulmonary functions that were nearly normal in 2023.  You have had steroid dependency  -- you avoid prednisone as causes significant nerve problems.   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

## 2025-04-23 NOTE — PROGRESS NOTES
Patient is here for uploading Dupixent injection 600 mg/4 mL. 2 patient identifiers used (Name and ). 1-SQ injection given into the right abdomen. 1-SQ injection given into the left abdomen. No redness, swelling, bleeding, or reaction noted to injection site.  Pt tolerated well.     NDC: 7609-0517-28  LOT#: [id number]  Expiration: 2026

## 2025-04-23 NOTE — PLAN OF CARE
Problem: Adult Inpatient Plan of Care  Goal: Plan of Care Review  Outcome: Ongoing, Progressing  Goal: Patient-Specific Goal (Individualized)  Outcome: Ongoing, Progressing  Goal: Absence of Hospital-Acquired Illness or Injury  Outcome: Ongoing, Progressing  Goal: Optimal Comfort and Wellbeing  Outcome: Ongoing, Progressing  Goal: Readiness for Transition of Care  Outcome: Ongoing, Progressing     Problem: Infection  Goal: Absence of Infection Signs and Symptoms  Outcome: Ongoing, Progressing     Problem: Fall Injury Risk  Goal: Absence of Fall and Fall-Related Injury  Outcome: Ongoing, Progressing     Problem: Pain Acute  Goal: Acceptable Pain Control and Functional Ability  Outcome: Ongoing, Progressing        She had an EGD with endoscopic ultrasound on 10/21/2024 by Dr. Ian Garrett. Two gastric nodules were excised. Pathology neuroendocrine tumor.  He recommended a surveillance EGD and colonoscopy in October 2025. CPM

## 2025-04-23 NOTE — PROGRESS NOTES
4/23/2025    Alma Rosa Helm  Follow up    Chief Complaint   Patient presents with    Emphysema       HPI:  4/23/2025 pt had frequent cough and wheezes as kid which remitted til age 52 or so -- pt began having sob around that time.  Pt was having severe distress.  Pt was started using nebulized use 2-3 times daily--pt appreciated noct worsening. Pt was worse in hot weather.       Pt has been using trelegy regular      04/04/2024- pt states she has been sick with a cold, with wheezing, coughing, ribs hurting. Taking robitussin OTC with no benefit. Cough and wheeze keeping her up at night lately. Denies fevers, chills, HA, body aches, sick contacts. SOB not as bad today- thinks she is getting over it.  She feels trelegy inhaler has been helping- using daily. Uses rescue inhaler numerous times daily 2 wks, nebulizer up to 6x/day.  She has new insurance w/ high deductible so may be unable to afford trelegy soon.   She quit smoking in feb.  Patient Instructions   Continue to avoid smoking  Apply for [employer] for you program with trelegy inhaler  Continue inhaler, nebulizer regimen  Prednisone 5 day course -  from pharmacy to help you get over bronchitis  Cough medicine ordered as needed- do not take with pain medicine or use alcohol while taking. Do not drive after taking this.  Pneumonia and RSV vaccines recommended and sent to pharmacy  Covid booster also recommended  Flu shot next flu season  10/05/2023-   Get CT chest screening scan scheduled  Ordering overnight sleep study at home to evaluate for sleep apnea    If positive will order CPAP machine, notify clinic when machine is delivered to home to set up 31 days compliance appointment. You must use the machine for a least 4 hours every night.   Six min walk test to see if you need oxygen  Pulmonary function testing     Start trelegy inhaler 1 puff daily, rinse mouth after use  Albuterol inhaler use as needed  Nebulizer machine ordered  Quit smoking- smoking  cessation referral ordered  Pt is a 69 yo female with HTN, HLD, anxiety, bipolar disorder presenting for new evaluation.   She reports dx of emphysema in the past. She is a current smoker, 4-5 cigarettes daily now but used to smoke 2 ppd- started as a child.  She is here today due to increased SOB especially difficult to breathe lying down. She coughs, productive clear/white mucous.  She wheezes loudly all the time. Has nebulizer which she borrowed from her son. She checks oxygen sats are measured at 88% sometimes. She gets so winded she can't walk at the grocery store any more. UNDERWOOD is worsening for 1 yr but much worse for the past 2 mos.  She would like to get a new nebulizer. She has no inhalers currently. Her ex  passed away from COPD earlier this yr.  Pt previously saw Dr. Holder and Britney Mcdowell in 2020.   FH father had COPD.  Pt grew up in [place], moved to [place] in [id number] then moved to [place].   She has had 2 episodes of sepsis, one requiring hospital stay.     The chief complaint problem is varies w/ exacerbation at times    PFSH:  Past Medical History:   Diagnosis Date    Anxiety     Bipolar disorder     COPD, mild     HLD (hyperlipidemia)     HTN (hypertension)     Nephrolithiasis          Past Surgical History:   Procedure Laterality Date    BACK SURGERY      CYSTOSCOPY      CYSTOSCOPY W/ URETERAL STENT PLACEMENT Right 11/3/2023    Procedure: CYSTOSCOPY, WITH URETERAL STENT INSERTION;  Surgeon: Low Staples MD;  Location: [facility] OR;  Service: Urology;  Laterality: Right;    CYSTOSCOPY WITH CALCULUS EXTRACTION Right 11/3/2023    Procedure: CYSTOSCOPY, WITH CALCULUS REMOVAL;  Surgeon: Low Staples MD;  Location: [facility] OR;  Service: Urology;  Laterality: Right;    HYSTERECTOMY      LITHOTRIPSY      TUBAL LIGATION       Social History     Tobacco Use    Smoking status: Every Day     Current packs/day: 0.00     Average packs/day: 0.8 packs/day for 45.0 years (33.7 ttl pk-yrs)      "Types: Cigarettes     Start date:      Last attempt to quit: 2020     Years since quittin.0    Smokeless tobacco: Never    Tobacco comments:     Pt has smoked since her teenage years, has smoked around 1ppd and is ready to quit. The patient feels like she's in a good mindset and is ready. Information and education provided for outpatient smoking cessation classes.   Substance Use Topics    Alcohol use: Not Currently    Drug use: No     Family History   Problem Relation Name Age of Onset    Cancer Mother      Diabetes Mother      COPD Father      Hypertension Father       Review of patient's allergies indicates:   Allergen Reactions    Tramadol Swelling    Imitrex [sumatriptan succinate]     Penicillins     Sulfa (sulfonamide antibiotics)     Toradol [ketorolac] Swelling       Performance Status:The patient's activity level is functions out of house.      Review of Systems:  a review of eleven systems covering constitutional, Eye, HEENT, Psych, Respiratory, Cardiac, GI, , Musculoskeletal, Endocrine, Dermatologic was negative except for pertinent findings as listed ABOVE and below:  All negative with pertinent positives as above       Exam:Comprehensive exam done. BP (!) 142/76 (BP Location: Right arm, Patient Position: Sitting)   Pulse 87   Ht 5' 3" (1.6 m)   Wt 82.7 kg (182 lb 6.9 oz)   SpO2 (!) 92% Comment: on room air at rest  BMI 32.32 kg/m²   Exam included Vitals as listed, and patient's appearance and affect and alertness and mood, oral exam for yeast and hygiene and pharynx lesions and Mallapatti (M) score, neck with inspection for jvd and masses and thyroid abnormalities and lymph nodes (supraclavicular and infraclavicular nodes and axillary also examined and noted if abn), chest exam included symmetry and effort and fremitus and percussion and auscultation, cardiac exam included rhythm and gallops and murmur and rubs and jvd and edema, abdominal exam for mass and hepatosplenomegaly and " tenderness and hernias and bowel sounds, Musculoskeletal exam with muscle tone and posture and mobility/gait and  strength, and skin for rashes and cyanosis and pallor and turgor, extremity for clubbing.  Findings were normal except for pertinent findings listed below:  M1, oropharynx clear  chest is symmetric, no distress, normal percussion, normal fremitus and good normal breath sounds      Radiographs (ct chest and cxr) reviewed: view by direct vision and interpretation as below   CT chest 10/12/23- mild emphysema, lung fields clear  CT chest 12/16/22- clear lungs    Labs reviewed     Lab Results   Component Value Date    WBC 5.13 11/06/2023    HGB 13.7 11/06/2023    HCT 42.8 11/06/2023    MCV 85 11/06/2023     11/06/2023      Latest Reference Range & Units 12/17/22 05:01 12/18/22 05:10 12/19/22 04:35 06/28/23 10:25 08/15/23 13:38   Eos # 0.0 - 0.5 K/uL 0.4 0.3 0.2 0.3 0.1     CMP  Sodium   Date Value Ref Range Status   11/06/2023 141 136 - 145 mmol/L Final     Potassium   Date Value Ref Range Status   11/06/2023 4.2 3.5 - 5.1 mmol/L Final     Chloride   Date Value Ref Range Status   11/06/2023 109 95 - 110 mmol/L Final     CO2   Date Value Ref Range Status   11/06/2023 28 23 - 29 mmol/L Final     Glucose   Date Value Ref Range Status   11/06/2023 91 70 - 110 mg/dL Final     BUN   Date Value Ref Range Status   11/06/2023 14 8 - 23 mg/dL Final     Creatinine   Date Value Ref Range Status   11/06/2023 0.8 0.5 - 1.4 mg/dL Final     Calcium   Date Value Ref Range Status   11/06/2023 9.4 8.7 - 10.5 mg/dL Final     Total Protein   Date Value Ref Range Status   11/03/2023 7.4 6.0 - 8.4 g/dL Final     Albumin   Date Value Ref Range Status   11/03/2023 3.8 3.5 - 5.2 g/dL Final   04/18/2022 4.1 3.6 - 5.1 g/dL Final     Total Bilirubin   Date Value Ref Range Status   11/03/2023 1.1 (H) 0.1 - 1.0 mg/dL Final     Comment:     For infants and newborns, interpretation of results should be based  on gestational age,  weight and in agreement with clinical  observations.    Premature Infant recommended reference ranges:  Up to 24 hours.............<8.0 mg/dL  Up to 48 hours............<12.0 mg/dL  3-5 days..................<15.0 mg/dL  6-29 days.................<15.0 mg/dL       Alkaline Phosphatase   Date Value Ref Range Status   11/03/2023 79 55 - 135 U/L Final     AST   Date Value Ref Range Status   11/03/2023 11 10 - 40 U/L Final     ALT   Date Value Ref Range Status   11/03/2023 14 10 - 44 U/L Final     Anion Gap   Date Value Ref Range Status   11/06/2023 4 (L) 8 - 16 mmol/L Final     eGFR   Date Value Ref Range Status   11/06/2023 >60.0 >60 mL/min/1.73 m^2 Final         PFT results reviewed  10/12/23- normal spirometry no obstruction, +hyperinflation on lung vol, slightly reduced dlco    6/20/22- no obstruction, moderate restriction      6mwt 10/12/23- 340m; min sat 93% on RA    Plan:  Clinical impression is apparently straight forward and impression with management as below.  chronic bronchitis/emphysema, currently exacerbated    Problem List Items Addressed This Visit       Eosinophilic asthma    Severe persistent asthma without complication - Primary    Relevant Medications    dupilumab 300 mg/2 mL PnIj (Start on 5/7/2025)    Steroid-dependent asthma    Relevant Medications    fluticasone-umeclidin-vilanter (TRELEGY ELLIPTA) 200-62.5-25 mcg inhaler    predniSONE (DELTASONE) 20 MG tablet    azithromycin (ZITHROMAX) 500 MG tablet     Other Visit Diagnoses         Asthma-COPD overlap syndrome                    Follow up in about 3 months (around 7/23/2025), or if symptoms worsen or fail to improve.    Discussed with patient above for education the following:      Patient Instructions   You had childhood asthma and have asthma with copd.    You have been on good controller therapy with trelegy.  You had pulmonary functions that were nearly normal in 2023.  You have had steroid dependency  -- you avoid prednisone as causes  significant nerve problems.   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      Lucretia took 31 min

## 2025-07-07 DIAGNOSIS — J43.2 CENTRILOBULAR EMPHYSEMA: ICD-10-CM

## 2025-07-10 RX ORDER — FLUTICASONE FUROATE, UMECLIDINIUM BROMIDE AND VILANTEROL TRIFENATATE 100; 62.5; 25 UG/1; UG/1; UG/1
1 POWDER RESPIRATORY (INHALATION) DAILY
Qty: 60 EACH | Refills: 11 | OUTPATIENT
Start: 2025-07-10

## (undated) DEVICE — COVER PROXIMA MAYO STAND

## (undated) DEVICE — BASKET STONE 3FX7MM NITINOL CONE

## (undated) DEVICE — CATHETER URETERAL OPEN END 4FX70 CM

## (undated) DEVICE — GLOVE SURG ULTRA TOUCH 8

## (undated) DEVICE — DRESSING TELFA STRL 4X3 LF

## (undated) DEVICE — SYS LABEL CORRECT MED

## (undated) DEVICE — PACK CYSTOSCOPY III

## (undated) DEVICE — CATHETER FOLEY 18FR 5CC 165SI18

## (undated) DEVICE — SCRUB BACTOSHIELD 134439

## (undated) DEVICE — GAUZE SPONGE XRAY 4X4

## (undated) DEVICE — DRAPE THREE-QTR REINF 53X77IN

## (undated) DEVICE — TUBING SUCTION STR .25INX6FT

## (undated) DEVICE — TUBING CYSTO DOUBLE 654301

## (undated) DEVICE — KIT ANTIFOG W/SPONG & FLUID

## (undated) DEVICE — GUIDEWIRE URO STRT FLEXIBLE TIP .035X150

## (undated) DEVICE — CATHETER URETERAL CONE TIP 5F X 70CM

## (undated) DEVICE — TUBING SUCTION 12' ST2003

## (undated) DEVICE — SEE L#120831

## (undated) DEVICE — DRESSING SURGICAL 1/2X1/2

## (undated) DEVICE — GLOVE BIOGEL PI ULTRA TOUCH G GRAY SZ 7